# Patient Record
Sex: FEMALE | Race: WHITE | Employment: FULL TIME | URBAN - METROPOLITAN AREA
[De-identification: names, ages, dates, MRNs, and addresses within clinical notes are randomized per-mention and may not be internally consistent; named-entity substitution may affect disease eponyms.]

---

## 2017-01-03 RX ORDER — OMEPRAZOLE 40 MG/1
CAPSULE, DELAYED RELEASE ORAL
Qty: 180 CAPSULE | Refills: 1 | Status: SHIPPED | OUTPATIENT
Start: 2017-01-03 | End: 2017-08-16 | Stop reason: SDUPTHER

## 2017-01-04 ENCOUNTER — TELEPHONE (OUTPATIENT)
Dept: FAMILY MEDICINE CLINIC | Age: 37
End: 2017-01-04

## 2017-02-20 ENCOUNTER — OFFICE VISIT (OUTPATIENT)
Dept: FAMILY MEDICINE CLINIC | Age: 37
End: 2017-02-20

## 2017-02-20 VITALS
TEMPERATURE: 98.7 F | DIASTOLIC BLOOD PRESSURE: 70 MMHG | WEIGHT: 244 LBS | RESPIRATION RATE: 16 BRPM | HEART RATE: 88 BPM | SYSTOLIC BLOOD PRESSURE: 118 MMHG | BODY MASS INDEX: 41.66 KG/M2 | HEIGHT: 64 IN

## 2017-02-20 DIAGNOSIS — I10 ESSENTIAL HYPERTENSION: ICD-10-CM

## 2017-02-20 DIAGNOSIS — J45.21 MILD INTERMITTENT ASTHMA WITH ACUTE EXACERBATION: Primary | ICD-10-CM

## 2017-02-20 PROCEDURE — 99213 OFFICE O/P EST LOW 20 MIN: CPT | Performed by: INTERNAL MEDICINE

## 2017-02-20 RX ORDER — MONTELUKAST SODIUM 10 MG/1
10 TABLET ORAL NIGHTLY
Qty: 30 TABLET | Refills: 2 | Status: SHIPPED | OUTPATIENT
Start: 2017-02-20 | End: 2018-01-19

## 2017-02-20 RX ORDER — LEVOFLOXACIN 500 MG/1
500 TABLET, FILM COATED ORAL DAILY
Qty: 8 TABLET | Refills: 0 | Status: SHIPPED | OUTPATIENT
Start: 2017-02-20 | End: 2017-02-28

## 2017-02-20 RX ORDER — FLUTICASONE PROPIONATE 110 UG/1
2 AEROSOL, METERED RESPIRATORY (INHALATION) 2 TIMES DAILY
Qty: 1 INHALER | Refills: 2 | Status: SHIPPED | OUTPATIENT
Start: 2017-02-20 | End: 2017-03-09 | Stop reason: ALTCHOICE

## 2017-02-20 ASSESSMENT — ENCOUNTER SYMPTOMS
WHEEZING: 1
SHORTNESS OF BREATH: 0
NAUSEA: 0
VOMITING: 0

## 2017-03-09 ENCOUNTER — OFFICE VISIT (OUTPATIENT)
Dept: FAMILY MEDICINE CLINIC | Age: 37
End: 2017-03-09

## 2017-03-09 VITALS
BODY MASS INDEX: 42.23 KG/M2 | RESPIRATION RATE: 14 BRPM | WEIGHT: 246 LBS | SYSTOLIC BLOOD PRESSURE: 116 MMHG | HEART RATE: 91 BPM | OXYGEN SATURATION: 98 % | DIASTOLIC BLOOD PRESSURE: 70 MMHG

## 2017-03-09 DIAGNOSIS — J45.901 ASTHMA EXACERBATION: Primary | ICD-10-CM

## 2017-03-09 PROCEDURE — 99213 OFFICE O/P EST LOW 20 MIN: CPT | Performed by: NURSE PRACTITIONER

## 2017-03-09 RX ORDER — PREDNISONE 20 MG/1
40 TABLET ORAL DAILY
Qty: 14 TABLET | Refills: 0 | Status: SHIPPED | OUTPATIENT
Start: 2017-03-09 | End: 2017-03-16

## 2017-03-09 RX ORDER — FLUTICASONE FUROATE AND VILANTEROL 100; 25 UG/1; UG/1
1 POWDER RESPIRATORY (INHALATION) DAILY
Qty: 1 EACH | Refills: 0 | COMMUNITY
Start: 2017-03-09 | End: 2018-01-19

## 2017-03-09 ASSESSMENT — ENCOUNTER SYMPTOMS
SHORTNESS OF BREATH: 1
NAUSEA: 0
COUGH: 0
DIARRHEA: 0
WHEEZING: 1
CHEST TIGHTNESS: 1
RHINORRHEA: 1
SORE THROAT: 0
VOMITING: 0
SINUS PRESSURE: 0

## 2017-04-07 ENCOUNTER — OFFICE VISIT (OUTPATIENT)
Dept: FAMILY MEDICINE CLINIC | Age: 37
End: 2017-04-07

## 2017-04-07 VITALS
BODY MASS INDEX: 40.68 KG/M2 | TEMPERATURE: 98.3 F | RESPIRATION RATE: 12 BRPM | SYSTOLIC BLOOD PRESSURE: 124 MMHG | HEART RATE: 93 BPM | WEIGHT: 237 LBS | OXYGEN SATURATION: 98 % | DIASTOLIC BLOOD PRESSURE: 80 MMHG

## 2017-04-07 DIAGNOSIS — J06.9 VIRAL URI: Primary | ICD-10-CM

## 2017-04-07 PROCEDURE — 99213 OFFICE O/P EST LOW 20 MIN: CPT | Performed by: NURSE PRACTITIONER

## 2017-04-07 RX ORDER — FLUTICASONE PROPIONATE 50 MCG
2 SPRAY, SUSPENSION (ML) NASAL DAILY
Qty: 1 BOTTLE | Refills: 0 | Status: SHIPPED | OUTPATIENT
Start: 2017-04-07 | End: 2018-01-19

## 2017-04-07 ASSESSMENT — ENCOUNTER SYMPTOMS
SORE THROAT: 1
SINUS PRESSURE: 1
DIARRHEA: 0
SINUS PAIN: 1
CHEST TIGHTNESS: 1
WHEEZING: 0
RHINORRHEA: 1
COUGH: 1
NAUSEA: 0
SHORTNESS OF BREATH: 0
VOMITING: 0

## 2017-04-09 ENCOUNTER — TELEPHONE (OUTPATIENT)
Dept: FAMILY MEDICINE CLINIC | Age: 37
End: 2017-04-09

## 2017-04-09 RX ORDER — AZITHROMYCIN 250 MG/1
TABLET, FILM COATED ORAL
Qty: 1 PACKET | Refills: 0 | OUTPATIENT
Start: 2017-04-09 | End: 2017-04-19

## 2017-04-10 RX ORDER — LISINOPRIL 10 MG/1
TABLET ORAL
Qty: 90 TABLET | Refills: 0 | Status: SHIPPED | OUTPATIENT
Start: 2017-04-10 | End: 2017-09-30 | Stop reason: SDUPTHER

## 2017-04-12 ENCOUNTER — TELEPHONE (OUTPATIENT)
Dept: FAMILY MEDICINE CLINIC | Age: 37
End: 2017-04-12

## 2017-04-12 RX ORDER — PREDNISONE 20 MG/1
20 TABLET ORAL 2 TIMES DAILY
Qty: 10 TABLET | Refills: 0 | Status: SHIPPED | OUTPATIENT
Start: 2017-04-12 | End: 2017-04-17

## 2017-04-17 ENCOUNTER — TELEPHONE (OUTPATIENT)
Dept: FAMILY MEDICINE CLINIC | Age: 37
End: 2017-04-17

## 2017-04-17 RX ORDER — PREDNISONE 10 MG/1
TABLET ORAL
Qty: 39 TABLET | Refills: 0 | Status: SHIPPED | OUTPATIENT
Start: 2017-04-17 | End: 2017-04-29

## 2017-04-17 RX ORDER — DEXTROMETHORPHAN HYDROBROMIDE AND PROMETHAZINE HYDROCHLORIDE 15; 6.25 MG/5ML; MG/5ML
5 SYRUP ORAL 4 TIMES DAILY PRN
Qty: 240 ML | Refills: 0 | Status: SHIPPED | OUTPATIENT
Start: 2017-04-17 | End: 2017-04-24

## 2017-04-25 ENCOUNTER — OFFICE VISIT (OUTPATIENT)
Dept: INTERNAL MEDICINE CLINIC | Age: 37
End: 2017-04-25

## 2017-04-25 VITALS
DIASTOLIC BLOOD PRESSURE: 76 MMHG | HEIGHT: 64 IN | WEIGHT: 230 LBS | OXYGEN SATURATION: 96 % | BODY MASS INDEX: 39.27 KG/M2 | SYSTOLIC BLOOD PRESSURE: 120 MMHG | HEART RATE: 114 BPM | RESPIRATION RATE: 16 BRPM

## 2017-04-25 DIAGNOSIS — R30.0 BURNING WITH URINATION: Primary | ICD-10-CM

## 2017-04-25 DIAGNOSIS — N30.00 ACUTE CYSTITIS WITHOUT HEMATURIA: ICD-10-CM

## 2017-04-25 LAB
BILIRUBIN, POC: ABNORMAL
BLOOD URINE, POC: ABNORMAL
CLARITY, POC: ABNORMAL
COLOR, POC: YELLOW
GLUCOSE URINE, POC: ABNORMAL
KETONES, POC: ABNORMAL
LEUKOCYTE EST, POC: ABNORMAL
NITRITE, POC: ABNORMAL
PH, POC: 5
PROTEIN, POC: ABNORMAL
SPECIFIC GRAVITY, POC: 1.01
UROBILINOGEN, POC: 0.2

## 2017-04-25 PROCEDURE — 99214 OFFICE O/P EST MOD 30 MIN: CPT | Performed by: NURSE PRACTITIONER

## 2017-04-25 PROCEDURE — 81002 URINALYSIS NONAUTO W/O SCOPE: CPT | Performed by: NURSE PRACTITIONER

## 2017-04-25 RX ORDER — SULFAMETHOXAZOLE AND TRIMETHOPRIM 800; 160 MG/1; MG/1
1 TABLET ORAL 2 TIMES DAILY
Qty: 10 TABLET | Refills: 0 | Status: SHIPPED | OUTPATIENT
Start: 2017-04-25 | End: 2017-04-30

## 2017-04-25 ASSESSMENT — ENCOUNTER SYMPTOMS
DIARRHEA: 0
NAUSEA: 0
ABDOMINAL PAIN: 1
WHEEZING: 1
VOMITING: 0

## 2017-04-28 LAB
ORGANISM: ABNORMAL
URINE CULTURE, ROUTINE: ABNORMAL

## 2017-06-12 RX ORDER — DULOXETIN HYDROCHLORIDE 60 MG/1
CAPSULE, DELAYED RELEASE ORAL
Qty: 30 CAPSULE | Refills: 2 | Status: SHIPPED | OUTPATIENT
Start: 2017-06-12 | End: 2017-09-18 | Stop reason: SDUPTHER

## 2017-06-16 RX ORDER — MODAFINIL 200 MG/1
TABLET ORAL
Qty: 60 TABLET | Refills: 2 | Status: SHIPPED | OUTPATIENT
Start: 2017-06-16 | End: 2018-03-16 | Stop reason: SDUPTHER

## 2017-08-16 RX ORDER — OMEPRAZOLE 40 MG/1
CAPSULE, DELAYED RELEASE ORAL
Qty: 180 CAPSULE | Refills: 1 | Status: SHIPPED | OUTPATIENT
Start: 2017-08-16 | End: 2018-06-03 | Stop reason: SDUPTHER

## 2017-09-21 RX ORDER — SPIRONOLACTONE 100 MG/1
TABLET, FILM COATED ORAL
Qty: 90 TABLET | Refills: 1 | Status: SHIPPED | OUTPATIENT
Start: 2017-09-21 | End: 2018-04-03 | Stop reason: SDUPTHER

## 2017-10-01 RX ORDER — LISINOPRIL 10 MG/1
TABLET ORAL
Qty: 90 TABLET | Refills: 0 | Status: SHIPPED | OUTPATIENT
Start: 2017-10-01 | End: 2018-01-19

## 2017-10-02 NOTE — TELEPHONE ENCOUNTER
Patient is over-due for follow up. Please call them to schedule. Medicines will be refilled this time to allow for making an appointment.

## 2017-10-28 ENCOUNTER — NURSE ONLY (OUTPATIENT)
Dept: FAMILY MEDICINE CLINIC | Age: 37
End: 2017-10-28

## 2017-10-28 DIAGNOSIS — Z23 NEED FOR INFLUENZA VACCINATION: Primary | ICD-10-CM

## 2017-10-28 PROCEDURE — 90471 IMMUNIZATION ADMIN: CPT | Performed by: FAMILY MEDICINE

## 2017-10-28 PROCEDURE — 90686 IIV4 VACC NO PRSV 0.5 ML IM: CPT | Performed by: FAMILY MEDICINE

## 2017-11-06 RX ORDER — LISINOPRIL 10 MG/1
TABLET ORAL
Qty: 90 TABLET | Refills: 0 | Status: SHIPPED | OUTPATIENT
Start: 2017-11-06 | End: 2019-02-01

## 2018-01-19 ENCOUNTER — OFFICE VISIT (OUTPATIENT)
Dept: FAMILY MEDICINE CLINIC | Age: 38
End: 2018-01-19

## 2018-01-19 VITALS
SYSTOLIC BLOOD PRESSURE: 124 MMHG | WEIGHT: 236 LBS | BODY MASS INDEX: 40.29 KG/M2 | TEMPERATURE: 98.7 F | RESPIRATION RATE: 14 BRPM | HEART RATE: 89 BPM | DIASTOLIC BLOOD PRESSURE: 62 MMHG | HEIGHT: 64 IN

## 2018-01-19 DIAGNOSIS — Z00.00 WELL ADULT EXAM: Primary | ICD-10-CM

## 2018-01-19 DIAGNOSIS — E28.2 POLYCYSTIC OVARIAN DISEASE: ICD-10-CM

## 2018-01-19 DIAGNOSIS — R73.03 PREDIABETES: ICD-10-CM

## 2018-01-19 DIAGNOSIS — I10 ESSENTIAL HYPERTENSION: ICD-10-CM

## 2018-01-19 DIAGNOSIS — E78.2 MIXED HYPERLIPIDEMIA: ICD-10-CM

## 2018-01-19 DIAGNOSIS — J45.20 MILD INTERMITTENT ASTHMA WITHOUT COMPLICATION: ICD-10-CM

## 2018-01-19 DIAGNOSIS — J30.89 CHRONIC NONSEASONAL ALLERGIC RHINITIS DUE TO POLLEN: ICD-10-CM

## 2018-01-19 DIAGNOSIS — F41.9 ANXIETY: ICD-10-CM

## 2018-01-19 DIAGNOSIS — L68.0 HIRSUTISM: ICD-10-CM

## 2018-01-19 LAB
A/G RATIO: 1.4 (ref 1.1–2.2)
ALBUMIN SERPL-MCNC: 4.7 G/DL (ref 3.4–5)
ALP BLD-CCNC: 63 U/L (ref 40–129)
ALT SERPL-CCNC: 12 U/L (ref 10–40)
ANION GAP SERPL CALCULATED.3IONS-SCNC: 12 MMOL/L (ref 3–16)
AST SERPL-CCNC: 13 U/L (ref 15–37)
BILIRUB SERPL-MCNC: 0.4 MG/DL (ref 0–1)
BUN BLDV-MCNC: 9 MG/DL (ref 7–20)
CALCIUM SERPL-MCNC: 9.9 MG/DL (ref 8.3–10.6)
CHLORIDE BLD-SCNC: 100 MMOL/L (ref 99–110)
CHOLESTEROL, TOTAL: 256 MG/DL (ref 0–199)
CO2: 28 MMOL/L (ref 21–32)
CREAT SERPL-MCNC: 0.6 MG/DL (ref 0.6–1.1)
ESTIMATED AVERAGE GLUCOSE: 114 MG/DL
GFR AFRICAN AMERICAN: >60
GFR NON-AFRICAN AMERICAN: >60
GLOBULIN: 3.4 G/DL
GLUCOSE BLD-MCNC: 93 MG/DL (ref 70–99)
HBA1C MFR BLD: 5.6 %
HDLC SERPL-MCNC: 41 MG/DL (ref 40–60)
LDL CHOLESTEROL CALCULATED: 177 MG/DL
POTASSIUM SERPL-SCNC: 4.7 MMOL/L (ref 3.5–5.1)
SODIUM BLD-SCNC: 140 MMOL/L (ref 136–145)
TOTAL PROTEIN: 8.1 G/DL (ref 6.4–8.2)
TRIGL SERPL-MCNC: 189 MG/DL (ref 0–150)
VLDLC SERPL CALC-MCNC: 38 MG/DL

## 2018-01-19 PROCEDURE — 99395 PREV VISIT EST AGE 18-39: CPT | Performed by: FAMILY MEDICINE

## 2018-01-19 PROCEDURE — 90732 PPSV23 VACC 2 YRS+ SUBQ/IM: CPT | Performed by: FAMILY MEDICINE

## 2018-01-19 PROCEDURE — 90471 IMMUNIZATION ADMIN: CPT | Performed by: FAMILY MEDICINE

## 2018-01-19 RX ORDER — LORATADINE 10 MG/1
10 TABLET ORAL DAILY
COMMUNITY
Start: 2018-01-19 | End: 2020-12-28

## 2018-01-19 RX ORDER — DULOXETIN HYDROCHLORIDE 30 MG/1
30 CAPSULE, DELAYED RELEASE ORAL DAILY
Qty: 30 CAPSULE | Refills: 3 | Status: SHIPPED | OUTPATIENT
Start: 2018-01-19 | End: 2018-03-08 | Stop reason: DRUGHIGH

## 2018-01-19 ASSESSMENT — ENCOUNTER SYMPTOMS
GASTROINTESTINAL NEGATIVE: 1
ALLERGIC/IMMUNOLOGIC NEGATIVE: 1
RESPIRATORY NEGATIVE: 1
EYES NEGATIVE: 1

## 2018-01-19 ASSESSMENT — PATIENT HEALTH QUESTIONNAIRE - PHQ9
SUM OF ALL RESPONSES TO PHQ QUESTIONS 1-9: 0
SUM OF ALL RESPONSES TO PHQ9 QUESTIONS 1 & 2: 0
1. LITTLE INTEREST OR PLEASURE IN DOING THINGS: 0
2. FEELING DOWN, DEPRESSED OR HOPELESS: 0

## 2018-01-19 NOTE — PROGRESS NOTES
Subjective:      Patient ID: Ayse Watts is a 42 y.o. female. Blood pressure 124/62, pulse 89, temperature 98.7 °F (37.1 °C), temperature source Oral, resp. rate 14, height 5' 4\" (1.626 m), weight 236 lb (107 kg), not currently breastfeeding. HPI here for cpx. Doing well!! She is happy that she has been able to go to gym 4-5 days a week for past 6-9 months. She does cardio and weights (usually exercises for  min). Has lost inches, not pounds. But she does not really weigh herself. Clothes fit better. She feels better. Has metabolic disorders as below- remains on lisinopril for bp. Aldactone is for hirsutism (due to PCOS). She cycles monthly on her own. Not on metformin. Uses cymbalta for anxiety. Has been on it a long time. Has been on something for anxiety since age 11. Would be afraid to stop, she thinks. But is in a good place in life to wean. Asthma: does not see pulm or allergy. No daily meds for this. Singulair did not help. Last flare was a year ago- used prednisone. Uses albuterol inhaler zero times since last flare. Did get flu vaccine. Does use claritin otc 10mg daily. Uses prilosec 40 for GERD sx. Was scoped by dr Meche Barrera 2013. Last pap done by ADARSH Alexandra- 3/17. No abnormals. Dental: sees dentist at least annually. Sees optometrist at least every 2 yrs. Hx prediabetes.   No results found for: LABA1C  No results found for: EAG       Lab Results   Component Value Date    CHOL 148 04/25/2014    TRIG 131 04/25/2014    HDL 28 (L) 04/25/2014    LDLCALC 94 04/25/2014     Lab Results   Component Value Date    ALT 19 09/23/2013    AST 16 09/23/2013        Has normal renal function   Lab Results   Component Value Date     04/25/2014    K 3.6 04/25/2014    BUN 7 04/25/2014    CREATININE 0.6 04/25/2014          Patient Active Problem List   Diagnosis    Polycystic ovarian disease    Hyperlipidemia    Anxiety    Hirsutism    Allergic Right: No supraclavicular adenopathy present. Left: No supraclavicular adenopathy present. Neurological: She is alert and oriented to person, place, and time. Skin: Skin is warm and dry. She is not diaphoretic. Psychiatric: She has a normal mood and affect. Her behavior is normal. Judgment and thought content normal.   Nursing note and vitals reviewed. Assessment:      1. Well adult exam  - Discussed healthy diet/exercise, dental care, vision screen, sunscreen, seatbelt use, smoke alarms; anticip guidance given. Congratulated on efforts to lose weight and be healthy. 2. Essential hypertension  - Blood pressure is at goal on current therapy; continue meds and monitoring. Regular physical activity and healthy diet (low sodium, multiple servings of produce daily) was recommended. Renal function to be assessed yearly.   - COMPREHENSIVE METABOLIC PANEL; Future    3. Mild intermittent asthma without complication  - Stable; continue to monitor. Pneumovax given today. 4. Polycystic ovarian disease  - Stable; continue exercise to regulate cycles. 5. Mixed hyperlipidemia  Not on lipid meds. Check FLP today  - Lipid Panel; Future    6. Anxiety  - Stable; continue Cymbalta, but she will try to wean dose to 30mg and maybe stop if she feels OK. 7. Prediabetes A1c 5.7 6/1/16  - assess a1c.  encoruaged healthy lifestyle changes that she is pursuing.  - Hemoglobin A1C; Future    8. Hirsutism  - Stable; continue aldactone at her request    9. Chronic nonseasonal allergic rhinitis due to pollen  - Stable; continue claritin. Plan:      F/u 6 mo.

## 2018-02-10 ENCOUNTER — OFFICE VISIT (OUTPATIENT)
Dept: FAMILY MEDICINE CLINIC | Age: 38
End: 2018-02-10

## 2018-02-10 VITALS
BODY MASS INDEX: 39.95 KG/M2 | TEMPERATURE: 98.7 F | HEIGHT: 64 IN | WEIGHT: 234 LBS | HEART RATE: 86 BPM | DIASTOLIC BLOOD PRESSURE: 78 MMHG | SYSTOLIC BLOOD PRESSURE: 118 MMHG | RESPIRATION RATE: 18 BRPM

## 2018-02-10 DIAGNOSIS — B96.89 ACUTE BACTERIAL SINUSITIS: Primary | ICD-10-CM

## 2018-02-10 DIAGNOSIS — J01.90 ACUTE BACTERIAL SINUSITIS: Primary | ICD-10-CM

## 2018-02-10 PROCEDURE — 99213 OFFICE O/P EST LOW 20 MIN: CPT | Performed by: FAMILY MEDICINE

## 2018-02-10 RX ORDER — GUAIFENESIN 600 MG/1
600 TABLET, EXTENDED RELEASE ORAL 2 TIMES DAILY
Qty: 30 TABLET | Refills: 0 | Status: SHIPPED | OUTPATIENT
Start: 2018-02-10 | End: 2019-08-02

## 2018-02-10 RX ORDER — AMOXICILLIN 500 MG/1
500 CAPSULE ORAL 3 TIMES DAILY
Qty: 21 CAPSULE | Refills: 0 | Status: SHIPPED | OUTPATIENT
Start: 2018-02-10 | End: 2018-02-17

## 2018-02-10 ASSESSMENT — ENCOUNTER SYMPTOMS
COUGH: 1
SHORTNESS OF BREATH: 0
SINUS PAIN: 1
SINUS PRESSURE: 1

## 2018-02-10 NOTE — PROGRESS NOTES
2/10/2018    This is a 42 y.o. female   Chief Complaint   Patient presents with    Sinusitis     HPI   Symptoms started a couple of days ago with sinus pressure and post-nasal drip. Has a deviated septum and has hx of recurrent sinusitis issues, used to see ENT for this. - No fever or chills  - No sick contacts at home. +ear pressure, post-nasal drip. Throat is a little sore. Review of Systems   Constitutional: Negative for chills and fever. HENT: Positive for postnasal drip, sinus pain and sinus pressure. Respiratory: Positive for cough. Negative for shortness of breath. Patient Active Problem List   Diagnosis    Polycystic ovarian disease    Hyperlipidemia    Anxiety    Hirsutism    Allergic rhinitis due to pollen    Deviated septum    Asthma    Lumbar disc disease L4-5, L5-S1 Dr Daxa De Souza [facility]) Epidural injections 2011    HTN (hypertension)    Prediabetes A1c 5.7 6/1/16        Past Medical History:   Diagnosis Date    Allergic rhinitis     Anxiety     Asthma     Environmental allergies     food    Hyperlipidemia     Hypertension     Lumbar disc disease L4-5, L5-S1 Dr Daxa De Souza [facility]) Epidural injections 2011 8/20/2012    Obstructive sleep apnea     PCOS (polycystic ovarian syndrome)     S/P colonoscopy 1/17/13    normal. Dr Camilo Kan       Past Surgical History:   Procedure Laterality Date    BREAST REDUCTION SURGERY  2004    FOOT SURGERY  2000    hallux fx    UPPER GASTROINTESTINAL ENDOSCOPY  1/17/13    gastritis; Dr Nathen Key History     Social History    Marital status: Legally      Spouse name: N/A    Number of children: 1    Years of education: N/A     Occupational History    Not on file.      Social History Main Topics    Smoking status: Never Smoker    Smokeless tobacco: Never Used      Comment: congratulated on nonsmoking status    Alcohol use No    Drug use: No    Sexual activity: Not on file     Other Topics Concern    Not on file     Social History Narrative    No narrative on file       Family History   Problem Relation Age of Onset    High Blood Pressure Mother     Other Mother      hypothyroid    Diabetes Sister     Diabetes Maternal Grandmother     Diabetes Maternal Grandfather        Current Outpatient Prescriptions   Medication Sig Dispense Refill    amoxicillin (AMOXIL) 500 MG capsule Take 1 capsule by mouth 3 times daily for 7 days 21 capsule 0    guaiFENesin (MUCINEX) 600 MG extended release tablet Take 1 tablet by mouth 2 times daily 30 tablet 0    DULoxetine (CYMBALTA) 30 MG extended release capsule Take 1 capsule by mouth daily 30 capsule 3    loratadine (CLARITIN) 10 MG tablet Take 1 tablet by mouth daily      lisinopril (PRINIVIL;ZESTRIL) 10 MG tablet TAKE 1 TABLET EVERY DAY 90 tablet 0    spironolactone (ALDACTONE) 100 MG tablet TAKE ONE TABLET BY MOUTH EVERY DAY 90 tablet 1    omeprazole (PRILOSEC) 40 MG delayed release capsule TAKE 1 CAPSULE TWICE DAILY 180 capsule 1    PROAIR  (90 BASE) MCG/ACT inhaler INHALE 2 PUFFS EVERY 6 HOURS AS NEEDED FOR WHEEZING OR SHORTNESS OF BREATH 8.5 Inhaler 0    Multiple Vitamins-Minerals (THERAPEUTIC MULTIVITAMIN-MINERALS) tablet Take 1 tablet by mouth daily. No current facility-administered medications for this visit. Allergies   Allergen Reactions    Ensure      EGGS    Peanut-Containing Drug Products      PEANUTS    Food      Certain foods (chocolate, tea, soy, eggs, tree nuts, coffee, tomatoes, black pepper)       /78 (Site: Right Arm, Position: Sitting, Cuff Size: Large Adult)   Pulse 86   Temp 98.7 °F (37.1 °C) (Oral)   Resp 18   Ht 5' 4\" (1.626 m)   Wt 234 lb (106.1 kg)   BMI 40.17 kg/m²     Physical Exam   Constitutional: She appears well-developed and well-nourished. HENT:   Head: Normocephalic and atraumatic.    TMs normal  oropharynx moist with mild erythema and mild post-nasal drip  Sinuses +TTP   Eyes: Conjunctivae are normal.   Neck: Normal range of motion. Neck supple. Cardiovascular: Normal rate, regular rhythm and normal heart sounds. No murmur heard. Pulmonary/Chest: Effort normal and breath sounds normal. She has no wheezes. She has no rales. Lymphadenopathy:     She has no cervical adenopathy. Skin: Skin is warm. No rash noted. Psychiatric: She has a normal mood and affect. Wt Readings from Last 3 Encounters:   02/10/18 234 lb (106.1 kg)   01/19/18 236 lb (107 kg)   04/25/17 230 lb (104.3 kg)       BP Readings from Last 3 Encounters:   02/10/18 118/78   01/19/18 124/62   04/25/17 120/76         Assessment/Plan:  Hamzah Landeros was seen today for sinusitis. Diagnoses and all orders for this visit:    Acute bacterial sinusitis  Pt with significant sinus symptoms warranting antibiotic treatment based on past history and current severity. Normal lung exam. Discussed med may cause diarrhea and encouraged use of probiotic. Take mucinex for congestion and increase water intake. Call if symptoms worsen or fail to improve. -     amoxicillin (AMOXIL) 500 MG capsule; Take 1 capsule by mouth 3 times daily for 7 days  -     guaiFENesin (MUCINEX) 600 MG extended release tablet; Take 1 tablet by mouth 2 times daily      Return if symptoms worsen or fail to improve.

## 2018-02-28 ENCOUNTER — TELEPHONE (OUTPATIENT)
Dept: FAMILY MEDICINE CLINIC | Age: 38
End: 2018-02-28

## 2018-02-28 RX ORDER — CEFDINIR 300 MG/1
600 CAPSULE ORAL DAILY
Qty: 20 CAPSULE | Refills: 0 | Status: SHIPPED | OUTPATIENT
Start: 2018-02-28 | End: 2018-03-05 | Stop reason: ALTCHOICE

## 2018-03-05 ENCOUNTER — OFFICE VISIT (OUTPATIENT)
Dept: FAMILY MEDICINE CLINIC | Age: 38
End: 2018-03-05

## 2018-03-05 VITALS
WEIGHT: 238 LBS | RESPIRATION RATE: 12 BRPM | DIASTOLIC BLOOD PRESSURE: 70 MMHG | BODY MASS INDEX: 40.85 KG/M2 | TEMPERATURE: 98.5 F | SYSTOLIC BLOOD PRESSURE: 114 MMHG | HEART RATE: 91 BPM

## 2018-03-05 DIAGNOSIS — H66.002 ACUTE SUPPURATIVE OTITIS MEDIA OF LEFT EAR WITHOUT SPONTANEOUS RUPTURE OF TYMPANIC MEMBRANE, RECURRENCE NOT SPECIFIED: ICD-10-CM

## 2018-03-05 DIAGNOSIS — J02.9 SORE THROAT: ICD-10-CM

## 2018-03-05 DIAGNOSIS — J01.90 ACUTE RHINOSINUSITIS: ICD-10-CM

## 2018-03-05 LAB — S PYO AG THROAT QL: NORMAL

## 2018-03-05 PROCEDURE — 87880 STREP A ASSAY W/OPTIC: CPT | Performed by: NURSE PRACTITIONER

## 2018-03-05 PROCEDURE — 99213 OFFICE O/P EST LOW 20 MIN: CPT | Performed by: NURSE PRACTITIONER

## 2018-03-05 RX ORDER — LEVOFLOXACIN 500 MG/1
500 TABLET, FILM COATED ORAL DAILY
Qty: 10 TABLET | Refills: 0 | Status: SHIPPED | OUTPATIENT
Start: 2018-03-05 | End: 2018-03-15

## 2018-03-05 ASSESSMENT — ENCOUNTER SYMPTOMS
VOMITING: 0
NAUSEA: 0
SINUS PRESSURE: 1
SORE THROAT: 1
DIARRHEA: 1
SHORTNESS OF BREATH: 1
CHEST TIGHTNESS: 0
COUGH: 1

## 2018-03-05 NOTE — PROGRESS NOTES
maxillary sinus tenderness. Right sinus exhibits no frontal sinus tenderness. Left sinus exhibits maxillary sinus tenderness. Left sinus exhibits no frontal sinus tenderness. Mouth/Throat: Uvula is midline. Posterior oropharyngeal erythema present. No oropharyngeal exudate. Eyes: EOM are normal.   Neck: Neck supple. Cardiovascular: Normal rate, regular rhythm and normal heart sounds. Exam reveals no gallop and no friction rub. No murmur heard. Pulmonary/Chest: Effort normal and breath sounds normal. No respiratory distress. Lymphadenopathy:        Head (right side): No submandibular adenopathy present. Head (left side): No submandibular adenopathy present. Right cervical: No superficial cervical adenopathy present. Left cervical: No superficial cervical adenopathy present. Neurological: She is alert and oriented to person, place, and time. Skin: Skin is warm and dry. Psychiatric: She has a normal mood and affect. Her behavior is normal.   Nursing note and vitals reviewed. Assessment and Plan  Eliza Ruiz was seen today for follow-up. Diagnoses and all orders for this visit:    Acute rhinosinusitis: symptoms have not improved on omnicef. Will d/c omnicef and start levaquin  -     levofloxacin (LEVAQUIN) 500 MG tablet; Take 1 tablet by mouth daily for 10 days For treatment of bacterial infection. Should take an over-the-counter probiotic daily while on the antibiotic to help prevent antibiotic associated diarrhea. Increase fluids  Rest  Flonase 2 sprays each nostril daily    Acute suppurative otitis media of left ear without spontaneous rupture of tympanic membrane, recurrence not specified  -     levofloxacin (LEVAQUIN) 500 MG tablet; Take 1 tablet by mouth daily for 10 days For treatment of bacterial infection. Sore throat: likley due to PND  -     POCT rapid strep A- negative   Gargle with warm salt water PRN    Patient is to call if symptoms worsen or fail to improve.

## 2018-03-08 RX ORDER — DULOXETIN HYDROCHLORIDE 60 MG/1
CAPSULE, DELAYED RELEASE ORAL
Qty: 30 CAPSULE | Refills: 5 | Status: SHIPPED | OUTPATIENT
Start: 2018-03-08 | End: 2018-08-30 | Stop reason: SDUPTHER

## 2018-03-16 ENCOUNTER — TELEPHONE (OUTPATIENT)
Dept: FAMILY MEDICINE CLINIC | Age: 38
End: 2018-03-16

## 2018-03-16 RX ORDER — MODAFINIL 200 MG/1
TABLET ORAL
Qty: 60 TABLET | Refills: 2 | Status: SHIPPED | OUTPATIENT
Start: 2018-03-16 | End: 2018-06-16

## 2018-03-31 ENCOUNTER — OFFICE VISIT (OUTPATIENT)
Dept: FAMILY MEDICINE CLINIC | Age: 38
End: 2018-03-31

## 2018-03-31 VITALS
BODY MASS INDEX: 39.78 KG/M2 | HEIGHT: 64 IN | RESPIRATION RATE: 16 BRPM | DIASTOLIC BLOOD PRESSURE: 68 MMHG | WEIGHT: 233 LBS | SYSTOLIC BLOOD PRESSURE: 110 MMHG | OXYGEN SATURATION: 97 % | HEART RATE: 72 BPM

## 2018-03-31 DIAGNOSIS — N30.00 ACUTE CYSTITIS WITHOUT HEMATURIA: ICD-10-CM

## 2018-03-31 DIAGNOSIS — R35.0 URINARY FREQUENCY: Primary | ICD-10-CM

## 2018-03-31 DIAGNOSIS — R82.998 LEUKOCYTES IN URINE: ICD-10-CM

## 2018-03-31 LAB
BILIRUBIN, POC: ABNORMAL
BLOOD URINE, POC: ABNORMAL
CLARITY, POC: ABNORMAL
COLOR, POC: YELLOW
GLUCOSE URINE, POC: ABNORMAL
KETONES, POC: ABNORMAL
LEUKOCYTE EST, POC: ABNORMAL
NITRITE, POC: ABNORMAL
PH, POC: 6.5
PROTEIN, POC: ABNORMAL
SPECIFIC GRAVITY, POC: 1.02
UROBILINOGEN, POC: 0.2

## 2018-03-31 PROCEDURE — 99213 OFFICE O/P EST LOW 20 MIN: CPT | Performed by: INTERNAL MEDICINE

## 2018-03-31 PROCEDURE — 81002 URINALYSIS NONAUTO W/O SCOPE: CPT | Performed by: INTERNAL MEDICINE

## 2018-03-31 RX ORDER — CIPROFLOXACIN 500 MG/1
500 TABLET, FILM COATED ORAL 2 TIMES DAILY
Qty: 14 TABLET | Refills: 0 | Status: SHIPPED | OUTPATIENT
Start: 2018-03-31 | End: 2018-04-05

## 2018-03-31 ASSESSMENT — ENCOUNTER SYMPTOMS
ABDOMINAL PAIN: 0
NAUSEA: 0
VOMITING: 0

## 2018-04-01 LAB
ORGANISM: ABNORMAL
URINE CULTURE, ROUTINE: ABNORMAL
URINE CULTURE, ROUTINE: ABNORMAL

## 2018-04-03 RX ORDER — SPIRONOLACTONE 100 MG/1
TABLET, FILM COATED ORAL
Qty: 90 TABLET | Refills: 1 | Status: SHIPPED | OUTPATIENT
Start: 2018-04-03 | End: 2018-10-03 | Stop reason: SDUPTHER

## 2018-04-04 RX ORDER — AMOXICILLIN 500 MG/1
500 CAPSULE ORAL 3 TIMES DAILY
Qty: 15 CAPSULE | Refills: 0 | Status: SHIPPED | OUTPATIENT
Start: 2018-04-04 | End: 2018-04-09

## 2018-05-08 ENCOUNTER — OFFICE VISIT (OUTPATIENT)
Dept: FAMILY MEDICINE CLINIC | Age: 38
End: 2018-05-08

## 2018-05-08 VITALS
HEART RATE: 98 BPM | SYSTOLIC BLOOD PRESSURE: 124 MMHG | OXYGEN SATURATION: 98 % | HEIGHT: 64 IN | RESPIRATION RATE: 14 BRPM | DIASTOLIC BLOOD PRESSURE: 80 MMHG | TEMPERATURE: 98.4 F

## 2018-05-08 DIAGNOSIS — M54.50 ACUTE RIGHT-SIDED LOW BACK PAIN WITHOUT SCIATICA: Primary | ICD-10-CM

## 2018-05-08 PROCEDURE — 99213 OFFICE O/P EST LOW 20 MIN: CPT | Performed by: FAMILY MEDICINE

## 2018-05-08 RX ORDER — TIZANIDINE 4 MG/1
4 TABLET ORAL EVERY 8 HOURS PRN
Qty: 45 TABLET | Refills: 0 | Status: SHIPPED | OUTPATIENT
Start: 2018-05-08 | End: 2018-05-23

## 2018-05-08 RX ORDER — METHYLPREDNISOLONE 4 MG/1
TABLET ORAL
Qty: 1 KIT | Refills: 0 | Status: SHIPPED | OUTPATIENT
Start: 2018-05-08 | End: 2018-05-11 | Stop reason: ALTCHOICE

## 2018-05-10 ENCOUNTER — TELEPHONE (OUTPATIENT)
Dept: FAMILY MEDICINE CLINIC | Age: 38
End: 2018-05-10

## 2018-05-10 DIAGNOSIS — M51.9 LUMBAR DISC DISEASE: Primary | ICD-10-CM

## 2018-05-10 RX ORDER — TRAMADOL HYDROCHLORIDE 50 MG/1
50 TABLET ORAL EVERY 4 HOURS PRN
Qty: 30 TABLET | Refills: 0 | Status: SHIPPED | OUTPATIENT
Start: 2018-05-10 | End: 2018-05-15

## 2018-05-21 ENCOUNTER — TELEPHONE (OUTPATIENT)
Dept: FAMILY MEDICINE CLINIC | Age: 38
End: 2018-05-21

## 2018-06-04 RX ORDER — OMEPRAZOLE 40 MG/1
CAPSULE, DELAYED RELEASE ORAL
Qty: 180 CAPSULE | Refills: 1 | Status: SHIPPED | OUTPATIENT
Start: 2018-06-04 | End: 2018-12-07 | Stop reason: SDUPTHER

## 2018-06-05 ENCOUNTER — HOSPITAL ENCOUNTER (OUTPATIENT)
Dept: OTHER | Age: 38
Discharge: HOME | End: 2018-06-30
Attending: FAMILY MEDICINE | Admitting: FAMILY MEDICINE

## 2018-06-05 NOTE — FLOWSHEET NOTE
Physical Therapy  Cancellation/No-show Note  Patient Name:  Nate Mello  :  1980   Date:  2018  Cancelled visits to date: 0  No-shows to date: 1    For today's appointment patient:  []  Cancelled  []  Rescheduled appointment  [x]  No-show     Reason given by patient:  []  Patient ill  []  Conflicting appointment  []  No transportation    []  Conflict with work  []  No reason given  [x]  Other:     Comments:  18 Pt was a no show for today's scheduled Initial Eval.    Electronically signed by:  Calvin Vernon PT

## 2018-06-12 ENCOUNTER — TELEPHONE (OUTPATIENT)
Dept: ORTHOPEDIC SURGERY | Age: 38
End: 2018-06-12

## 2018-06-13 ENCOUNTER — TELEPHONE (OUTPATIENT)
Dept: FAMILY MEDICINE CLINIC | Age: 38
End: 2018-06-13

## 2018-06-18 ENCOUNTER — TELEPHONE (OUTPATIENT)
Dept: FAMILY MEDICINE CLINIC | Age: 38
End: 2018-06-18

## 2018-06-18 PROBLEM — G47.33 OBSTRUCTIVE SLEEP APNEA: Status: ACTIVE | Noted: 2018-06-18

## 2018-06-18 PROBLEM — G47.30 HYPERSOMNIA WITH SLEEP APNEA: Status: ACTIVE | Noted: 2018-06-18

## 2018-06-18 PROBLEM — G47.10 HYPERSOMNIA WITH SLEEP APNEA: Status: ACTIVE | Noted: 2018-06-18

## 2018-06-18 NOTE — TELEPHONE ENCOUNTER
The problem was her incomplete problem list.  Needed to contain her diagnosis of sleep apnea with hypersomnia. This has been corrected. Will need to resubmit the PA, I believe, for this to be approved. Sorry and thanks!

## 2018-06-23 ENCOUNTER — OFFICE VISIT (OUTPATIENT)
Dept: FAMILY MEDICINE CLINIC | Age: 38
End: 2018-06-23

## 2018-06-23 VITALS
DIASTOLIC BLOOD PRESSURE: 80 MMHG | TEMPERATURE: 98.3 F | HEART RATE: 83 BPM | BODY MASS INDEX: 38.61 KG/M2 | RESPIRATION RATE: 16 BRPM | WEIGHT: 232 LBS | SYSTOLIC BLOOD PRESSURE: 114 MMHG

## 2018-06-23 DIAGNOSIS — B96.89 ACUTE BACTERIAL TONSILLITIS: ICD-10-CM

## 2018-06-23 DIAGNOSIS — J03.80 ACUTE BACTERIAL TONSILLITIS: ICD-10-CM

## 2018-06-23 DIAGNOSIS — J02.9 SORE THROAT: Primary | ICD-10-CM

## 2018-06-23 LAB — S PYO AG THROAT QL: NORMAL

## 2018-06-23 PROCEDURE — 87880 STREP A ASSAY W/OPTIC: CPT | Performed by: NURSE PRACTITIONER

## 2018-06-23 PROCEDURE — 99213 OFFICE O/P EST LOW 20 MIN: CPT | Performed by: NURSE PRACTITIONER

## 2018-06-23 RX ORDER — AMOXICILLIN 500 MG/1
500 CAPSULE ORAL 3 TIMES DAILY
Qty: 30 CAPSULE | Refills: 0 | Status: SHIPPED | OUTPATIENT
Start: 2018-06-23 | End: 2018-07-03

## 2018-06-23 ASSESSMENT — ENCOUNTER SYMPTOMS
NAUSEA: 0
SINUS PRESSURE: 1
CHEST TIGHTNESS: 0
SINUS PAIN: 1
SHORTNESS OF BREATH: 0
RHINORRHEA: 1
COUGH: 0
SORE THROAT: 1
DIARRHEA: 1
VOMITING: 0

## 2018-06-25 LAB — THROAT CULTURE: NORMAL

## 2018-06-25 RX ORDER — MODAFINIL 200 MG/1
TABLET ORAL
COMMUNITY
Start: 2015-12-07 | End: 2018-08-09 | Stop reason: SDUPTHER

## 2018-07-01 ENCOUNTER — HOSPITAL ENCOUNTER (OUTPATIENT)
Dept: OTHER | Age: 38
Discharge: HOME | End: 2018-07-31
Attending: FAMILY MEDICINE | Admitting: FAMILY MEDICINE

## 2018-07-03 RX ORDER — LISINOPRIL 10 MG/1
TABLET ORAL
Qty: 90 TABLET | Refills: 0 | Status: SHIPPED | OUTPATIENT
Start: 2018-07-03 | End: 2018-10-03 | Stop reason: SDUPTHER

## 2018-08-03 ENCOUNTER — TELEPHONE (OUTPATIENT)
Dept: FAMILY MEDICINE CLINIC | Age: 38
End: 2018-08-03

## 2018-08-09 ENCOUNTER — TELEPHONE (OUTPATIENT)
Dept: FAMILY MEDICINE CLINIC | Age: 38
End: 2018-08-09

## 2018-08-09 DIAGNOSIS — G47.10 HYPERSOMNIA WITH SLEEP APNEA: Primary | ICD-10-CM

## 2018-08-09 DIAGNOSIS — G47.30 HYPERSOMNIA WITH SLEEP APNEA: Primary | ICD-10-CM

## 2018-08-09 RX ORDER — MODAFINIL 200 MG/1
200 TABLET ORAL DAILY
Qty: 30 TABLET | Refills: 2 | Status: SHIPPED | OUTPATIENT
Start: 2018-08-09 | End: 2019-02-28 | Stop reason: SDUPTHER

## 2018-09-27 ENCOUNTER — NURSE ONLY (OUTPATIENT)
Dept: FAMILY MEDICINE CLINIC | Age: 38
End: 2018-09-27
Payer: COMMERCIAL

## 2018-09-27 PROCEDURE — 90682 RIV4 VACC RECOMBINANT DNA IM: CPT | Performed by: FAMILY MEDICINE

## 2018-09-27 PROCEDURE — 90471 IMMUNIZATION ADMIN: CPT | Performed by: FAMILY MEDICINE

## 2018-10-19 ENCOUNTER — OFFICE VISIT (OUTPATIENT)
Dept: FAMILY MEDICINE CLINIC | Age: 38
End: 2018-10-19
Payer: COMMERCIAL

## 2018-10-19 VITALS
BODY MASS INDEX: 38.44 KG/M2 | WEIGHT: 231 LBS | SYSTOLIC BLOOD PRESSURE: 132 MMHG | DIASTOLIC BLOOD PRESSURE: 78 MMHG | RESPIRATION RATE: 15 BRPM | HEART RATE: 91 BPM | TEMPERATURE: 98.6 F

## 2018-10-19 DIAGNOSIS — B96.89 ACUTE BACTERIAL SINUSITIS: Primary | ICD-10-CM

## 2018-10-19 DIAGNOSIS — J01.90 ACUTE BACTERIAL SINUSITIS: Primary | ICD-10-CM

## 2018-10-19 DIAGNOSIS — J45.21 MILD INTERMITTENT ASTHMA WITH ACUTE EXACERBATION: ICD-10-CM

## 2018-10-19 DIAGNOSIS — G47.33 OBSTRUCTIVE SLEEP APNEA: ICD-10-CM

## 2018-10-19 PROCEDURE — 99214 OFFICE O/P EST MOD 30 MIN: CPT | Performed by: FAMILY MEDICINE

## 2018-10-19 RX ORDER — CEFDINIR 300 MG/1
600 CAPSULE ORAL DAILY
Qty: 20 CAPSULE | Refills: 0 | Status: SHIPPED | OUTPATIENT
Start: 2018-10-19 | End: 2018-10-29

## 2018-10-19 RX ORDER — ETONOGESTREL AND ETHINYL ESTRADIOL 11.7; 2.7 MG/1; MG/1
INSERT, EXTENDED RELEASE VAGINAL
COMMUNITY
Start: 2018-09-06 | End: 2019-08-02 | Stop reason: ALTCHOICE

## 2018-11-05 ENCOUNTER — TELEPHONE (OUTPATIENT)
Dept: FAMILY MEDICINE CLINIC | Age: 38
End: 2018-11-05

## 2018-11-05 RX ORDER — SULFAMETHOXAZOLE AND TRIMETHOPRIM 800; 160 MG/1; MG/1
1 TABLET ORAL 2 TIMES DAILY
Qty: 20 TABLET | Refills: 0 | Status: SHIPPED | OUTPATIENT
Start: 2018-11-05 | End: 2018-11-15

## 2018-11-13 RX ORDER — METHYLPREDNISOLONE 4 MG/1
TABLET ORAL
Qty: 1 KIT | Refills: 0 | Status: SHIPPED | OUTPATIENT
Start: 2018-11-13 | End: 2018-11-19

## 2018-12-03 ENCOUNTER — TELEPHONE (OUTPATIENT)
Dept: FAMILY MEDICINE CLINIC | Age: 38
End: 2018-12-03

## 2018-12-03 DIAGNOSIS — J32.9 CHRONIC SINUSITIS, UNSPECIFIED LOCATION: Primary | ICD-10-CM

## 2018-12-07 RX ORDER — OMEPRAZOLE 40 MG/1
CAPSULE, DELAYED RELEASE ORAL
Qty: 180 CAPSULE | Refills: 1 | Status: SHIPPED | OUTPATIENT
Start: 2018-12-07 | End: 2019-06-05 | Stop reason: SDUPTHER

## 2018-12-10 ENCOUNTER — HOSPITAL ENCOUNTER (OUTPATIENT)
Dept: CT IMAGING | Age: 38
Discharge: HOME | End: 2018-12-10
Payer: COMMERCIAL

## 2018-12-10 DIAGNOSIS — J32.9 CHRONIC SINUSITIS, UNSPECIFIED LOCATION: ICD-10-CM

## 2018-12-10 PROCEDURE — 70486 CT MAXILLOFACIAL W/O DYE: CPT

## 2018-12-12 ENCOUNTER — TELEPHONE (OUTPATIENT)
Dept: FAMILY MEDICINE CLINIC | Age: 38
End: 2018-12-12

## 2018-12-12 DIAGNOSIS — J31.0 CHRONIC RHINITIS: Primary | ICD-10-CM

## 2018-12-31 ENCOUNTER — OFFICE VISIT (OUTPATIENT)
Dept: PULMONOLOGY | Age: 38
End: 2018-12-31
Payer: COMMERCIAL

## 2018-12-31 VITALS
HEART RATE: 98 BPM | SYSTOLIC BLOOD PRESSURE: 132 MMHG | OXYGEN SATURATION: 98 % | HEIGHT: 65 IN | BODY MASS INDEX: 38.82 KG/M2 | WEIGHT: 233 LBS | DIASTOLIC BLOOD PRESSURE: 85 MMHG

## 2018-12-31 DIAGNOSIS — J30.1 NON-SEASONAL ALLERGIC RHINITIS DUE TO POLLEN: Chronic | ICD-10-CM

## 2018-12-31 DIAGNOSIS — I10 ESSENTIAL HYPERTENSION: Chronic | ICD-10-CM

## 2018-12-31 DIAGNOSIS — J45.21 MILD INTERMITTENT ASTHMA WITH ACUTE EXACERBATION: Chronic | ICD-10-CM

## 2018-12-31 DIAGNOSIS — G47.33 OBSTRUCTIVE SLEEP APNEA SYNDROME: Primary | ICD-10-CM

## 2018-12-31 DIAGNOSIS — E28.2 POLYCYSTIC OVARIAN DISEASE: Chronic | ICD-10-CM

## 2018-12-31 DIAGNOSIS — E66.9 NON MORBID OBESITY, UNSPECIFIED OBESITY TYPE: Chronic | ICD-10-CM

## 2018-12-31 PROCEDURE — 99244 OFF/OP CNSLTJ NEW/EST MOD 40: CPT | Performed by: INTERNAL MEDICINE

## 2018-12-31 ASSESSMENT — SLEEP AND FATIGUE QUESTIONNAIRES
HOW LIKELY ARE YOU TO NOD OFF OR FALL ASLEEP WHILE LYING DOWN TO REST IN THE AFTERNOON WHEN CIRCUMSTANCES PERMIT: 3
HOW LIKELY ARE YOU TO NOD OFF OR FALL ASLEEP WHILE SITTING QUIETLY AFTER LUNCH WITHOUT ALCOHOL: 0
HOW LIKELY ARE YOU TO NOD OFF OR FALL ASLEEP WHEN YOU ARE A PASSENGER IN A CAR FOR AN HOUR WITHOUT A BREAK: 2
HOW LIKELY ARE YOU TO NOD OFF OR FALL ASLEEP WHILE WATCHING TV: 3
NECK CIRCUMFERENCE (INCHES): 16.5
HOW LIKELY ARE YOU TO NOD OFF OR FALL ASLEEP WHILE SITTING INACTIVE IN A PUBLIC PLACE: 0
ESS TOTAL SCORE: 11
HOW LIKELY ARE YOU TO NOD OFF OR FALL ASLEEP WHILE SITTING AND TALKING TO SOMEONE: 0
HOW LIKELY ARE YOU TO NOD OFF OR FALL ASLEEP IN A CAR, WHILE STOPPED FOR A FEW MINUTES IN TRAFFIC: 0
HOW LIKELY ARE YOU TO NOD OFF OR FALL ASLEEP WHILE SITTING AND READING: 3

## 2018-12-31 NOTE — PROGRESS NOTES
Chrystal Holstein MD, FAASM, [facility]  [facility] AND [facility] \[facility] [place] [facility]\""  [facility]  3rd Floor,  [address], [address] [phone]   [address] [place] [address] [address] . Raul Salvador [address] [phone] [address] [facility] [address] [address] [phone]    Assessment:      Visit Diagnoses and Associated Orders     Obstructive sleep apnea syndrome   (New Problem)  -  Primary    on Tx         Essential hypertension   (Stable)           Mild intermittent asthma with acute exacerbation   (Stable)           Non-seasonal allergic rhinitis due to pollen   (Stable)           Polycystic ovarian disease   (Stable)           Non morbid obesity, unspecified obesity type   (Stable)                  Plan:      Reviewed old records, pertinent data listed. Chronic-worsening:  Continue medications per her PCP and other physicians. She instructed not to drive unless had 4 hrs of effective therapy for her BIBIANA the night before. Did review the risks of under or untreated BIBIANA including, but not limited to, higher risks of motor vehicle accidents, stroke, heart attacks, and death. She understands and accepts all these risks. Needs to be setup with a new [employer] company and needs to use her machine every night. The primary encounter diagnosis was Obstructive sleep apnea syndrome. Diagnoses of Essential hypertension, Mild intermittent asthma with acute exacerbation, Non-seasonal allergic rhinitis due to pollen, Polycystic ovarian disease, and Non morbid obesity, unspecified obesity type were also pertinent to this visit. The chronic medical conditions listed are directly related to the primary diagnosis listed above. The management of the primary diagnosis affects the secondary diagnosis and vice versa. Subjective:     Patient ID: Israel Mari is a 34 y.o. female.     Chief Complaint   Patient presents with    Sleep Apnea HPI:      Johnie Grimm is a 34 y.o. female referred by Yariel Flaherty MD for a sleep evaluation. She complains of snoring, periods of not breathing, tossing and turning, excessive daytime sleepiness, feels sleepy during the day but she denies knees buckling with laughing, completely or partially paralyzed while falling asleep or waking up. Symptoms began 15 years ago, controlled at first on bilevel but in the last 8 months has had trouble using her machine. Has stopped using her machine since April and symptoms have returned off her machine. Still taking provigil 200 mg a day. Struggled to get a mask that fit correctly and her previous DME company out of [place] stated to her there were no other mask available. Hypertension, Asthma, allergic rhinitis, PCOS, and obesity: stable on meds and followed by pt's PCP and other physicians. Previous evaluation and treatment has included- PSG and PSG with C PAP titration. Reviewed studies from [facility] [place]. polysomngraphy done on 4/23/04 showed AHI-34/hr with low sat-84%. Then had titrations done on 5/14/04, 5/20/06, and 8/1/08. Machine Modem/Download Info:  Compliance (hours/night): 3.6 hrs/night  Download AHI (/hour): 3.3 /HR     Average IPAP Pressure: 14.9 cmH2O  Average EPAP Pressure: 9.7 cmH2O AUTO BIPAP - Settings (Cesar)  IPAP Max: 24 cmH2O  EPAP Min: 8 cmH2O  Pressure Support Min: 4  Pressure Support Max: 8   Comfort Settings  Humidity Level (0-8): 3  Heated Tubing (Yes/No): Yes  Flex/EPR (0-3): 3 PAP Mask  Mask Type: Nasal mask  Mask Model: Wizard  Mask Size: Standard     DOT/CDL - No  FAA/'s license -No    Previous Report(s) Reviewed: historical medical records, office notes, andreferral letter(s). Pertinent data has been documented.     Wheeler - Total score: 11    Caffeine Intake - 1 cans/bottles of caffeinated soda pop per day(s)    Social History     Social History    Marital status: Legally      Spouse name: N/A  Number of children: 1    Years of education: N/A     Occupational History    Not on file. Social History Main Topics    Smoking status: Never Smoker    Smokeless tobacco: Never Used      Comment: congratulated on nonsmoking status    Alcohol use No    Drug use: No    Sexual activity: Not on file     Other Topics Concern    Not on file     Social History Narrative    No narrative on file        Current Outpatient Prescriptions   Medication Sig Dispense Refill    omeprazole (PRILOSEC) 40 MG delayed release capsule TAKE ONE CAPSULE BY MOUTH TWICE A  capsule 1    DULoxetine (CYMBALTA) 60 MG extended release capsule TAKE 1 CAPSULE BY MOUTH DAILY 30 capsule 5    DULoxetine (CYMBALTA) 60 MG extended release capsule TAKE 1 CAPSULE BY MOUTH DAILY 30 capsule 5    etonogestrel-ethinyl estradiol (NUVARING) 0.12-0.015 MG/24HR vaginal ring 1 RING INTRAVAGINALLY AS DIRECTED PER MONTH, REMOVE FOR 5 DAYS      spironolactone (ALDACTONE) 100 MG tablet TAKE ONE TABLET BY MOUTH EVERY DAY 90 tablet 0    lisinopril (PRINIVIL;ZESTRIL) 10 MG tablet TAKE 1 TABLET EVERY DAY 90 tablet 0    acetaminophen (APAP EXTRA STRENGTH) 500 MG tablet Take 1 tablet by mouth every 6 hours as needed for Pain 60 tablet 0    guaiFENesin (MUCINEX) 600 MG extended release tablet Take 1 tablet by mouth 2 times daily 30 tablet 0    loratadine (CLARITIN) 10 MG tablet Take 1 tablet by mouth daily      lisinopril (PRINIVIL;ZESTRIL) 10 MG tablet TAKE 1 TABLET EVERY DAY 90 tablet 0    PROAIR  (90 BASE) MCG/ACT inhaler INHALE 2 PUFFS EVERY 6 HOURS AS NEEDED FOR WHEEZING OR SHORTNESS OF BREATH 8.5 Inhaler 0    Multiple Vitamins-Minerals (THERAPEUTIC MULTIVITAMIN-MINERALS) tablet Take 1 tablet by mouth daily. No current facility-administered medications for this visit.         Allergies as of 12/31/2018 - Review Complete 12/31/2018   Allergen Reaction Noted    Ensure  04/22/2011    Peanut-containing drug products

## 2019-01-02 ENCOUNTER — TELEPHONE (OUTPATIENT)
Dept: FAMILY MEDICINE CLINIC | Age: 39
End: 2019-01-02

## 2019-01-02 DIAGNOSIS — J06.9 VIRAL URI: ICD-10-CM

## 2019-01-02 RX ORDER — FLUTICASONE PROPIONATE 50 MCG
SPRAY, SUSPENSION (ML) NASAL
Qty: 2 BOTTLE | Refills: 3 | Status: SHIPPED | OUTPATIENT
Start: 2019-01-02 | End: 2019-10-08

## 2019-01-03 ENCOUNTER — OFFICE VISIT (OUTPATIENT)
Dept: FAMILY MEDICINE CLINIC | Age: 39
End: 2019-01-03
Payer: COMMERCIAL

## 2019-01-03 VITALS
OXYGEN SATURATION: 98 % | RESPIRATION RATE: 18 BRPM | TEMPERATURE: 98.3 F | HEART RATE: 89 BPM | SYSTOLIC BLOOD PRESSURE: 110 MMHG | DIASTOLIC BLOOD PRESSURE: 70 MMHG | WEIGHT: 229 LBS | BODY MASS INDEX: 38.11 KG/M2

## 2019-01-03 DIAGNOSIS — J45.21 MILD INTERMITTENT ASTHMA WITH ACUTE EXACERBATION: ICD-10-CM

## 2019-01-03 DIAGNOSIS — B96.89 ACUTE BACTERIAL SINUSITIS: Primary | ICD-10-CM

## 2019-01-03 DIAGNOSIS — J01.90 ACUTE BACTERIAL SINUSITIS: Primary | ICD-10-CM

## 2019-01-03 PROCEDURE — 99214 OFFICE O/P EST MOD 30 MIN: CPT | Performed by: NURSE PRACTITIONER

## 2019-01-03 RX ORDER — CEFDINIR 300 MG/1
300 CAPSULE ORAL 2 TIMES DAILY
Qty: 20 CAPSULE | Refills: 0 | Status: SHIPPED | OUTPATIENT
Start: 2019-01-03 | End: 2019-01-13

## 2019-01-03 RX ORDER — FLUTICASONE FUROATE AND VILANTEROL 100; 25 UG/1; UG/1
1 POWDER RESPIRATORY (INHALATION) DAILY
Qty: 1 EACH | Refills: 0 | COMMUNITY
Start: 2019-01-03 | End: 2019-02-01

## 2019-01-03 ASSESSMENT — ENCOUNTER SYMPTOMS
WHEEZING: 1
SINUS PRESSURE: 1
SINUS PAIN: 1
SHORTNESS OF BREATH: 1
SORE THROAT: 0
CHEST TIGHTNESS: 1
DIARRHEA: 0
VOMITING: 0
NAUSEA: 0
RHINORRHEA: 1
COUGH: 1

## 2019-01-11 ENCOUNTER — TELEPHONE (OUTPATIENT)
Dept: PULMONOLOGY | Age: 39
End: 2019-01-11

## 2019-01-14 ENCOUNTER — OFFICE VISIT (OUTPATIENT)
Dept: FAMILY MEDICINE CLINIC | Age: 39
End: 2019-01-14
Payer: COMMERCIAL

## 2019-01-14 VITALS
SYSTOLIC BLOOD PRESSURE: 124 MMHG | OXYGEN SATURATION: 99 % | RESPIRATION RATE: 18 BRPM | HEART RATE: 106 BPM | WEIGHT: 232 LBS | DIASTOLIC BLOOD PRESSURE: 70 MMHG | BODY MASS INDEX: 38.61 KG/M2

## 2019-01-14 DIAGNOSIS — J31.0 CHRONIC RHINITIS: ICD-10-CM

## 2019-01-14 DIAGNOSIS — J45.21 MILD INTERMITTENT ASTHMA WITH ACUTE EXACERBATION: Primary | ICD-10-CM

## 2019-01-14 PROCEDURE — 99213 OFFICE O/P EST LOW 20 MIN: CPT | Performed by: NURSE PRACTITIONER

## 2019-01-14 RX ORDER — PREDNISONE 20 MG/1
20 TABLET ORAL 2 TIMES DAILY
Qty: 10 TABLET | Refills: 0 | Status: SHIPPED | OUTPATIENT
Start: 2019-01-14 | End: 2019-01-19

## 2019-01-14 ASSESSMENT — ENCOUNTER SYMPTOMS
RHINORRHEA: 1
WHEEZING: 1
COUGH: 1
NAUSEA: 0
SHORTNESS OF BREATH: 1
SORE THROAT: 0
CHEST TIGHTNESS: 0
DIARRHEA: 0
VOMITING: 0

## 2019-01-15 ENCOUNTER — TELEPHONE (OUTPATIENT)
Dept: PULMONOLOGY | Age: 39
End: 2019-01-15

## 2019-02-01 ENCOUNTER — OFFICE VISIT (OUTPATIENT)
Dept: FAMILY MEDICINE CLINIC | Age: 39
End: 2019-02-01
Payer: COMMERCIAL

## 2019-02-01 VITALS
TEMPERATURE: 98.7 F | WEIGHT: 230 LBS | RESPIRATION RATE: 12 BRPM | HEART RATE: 104 BPM | BODY MASS INDEX: 38.32 KG/M2 | HEIGHT: 65 IN | SYSTOLIC BLOOD PRESSURE: 126 MMHG | DIASTOLIC BLOOD PRESSURE: 72 MMHG

## 2019-02-01 DIAGNOSIS — L68.0 HIRSUTISM: ICD-10-CM

## 2019-02-01 DIAGNOSIS — R73.03 PREDIABETES: ICD-10-CM

## 2019-02-01 DIAGNOSIS — B07.8 OTHER VIRAL WARTS: ICD-10-CM

## 2019-02-01 DIAGNOSIS — F41.9 ANXIETY: ICD-10-CM

## 2019-02-01 DIAGNOSIS — E78.2 MIXED HYPERLIPIDEMIA: Primary | ICD-10-CM

## 2019-02-01 DIAGNOSIS — I10 ESSENTIAL HYPERTENSION: ICD-10-CM

## 2019-02-01 DIAGNOSIS — G47.10 HYPERSOMNIA WITH SLEEP APNEA: ICD-10-CM

## 2019-02-01 DIAGNOSIS — J45.30 MILD PERSISTENT ASTHMA, UNSPECIFIED WHETHER COMPLICATED: ICD-10-CM

## 2019-02-01 DIAGNOSIS — E78.2 MIXED HYPERLIPIDEMIA: ICD-10-CM

## 2019-02-01 DIAGNOSIS — E28.2 POLYCYSTIC OVARIAN DISEASE: ICD-10-CM

## 2019-02-01 DIAGNOSIS — G47.30 HYPERSOMNIA WITH SLEEP APNEA: ICD-10-CM

## 2019-02-01 LAB
A/G RATIO: 1.3 (ref 1.1–2.2)
ALBUMIN SERPL-MCNC: 4.3 G/DL (ref 3.4–5)
ALP BLD-CCNC: 56 U/L (ref 40–129)
ALT SERPL-CCNC: 12 U/L (ref 10–40)
ANION GAP SERPL CALCULATED.3IONS-SCNC: 12 MMOL/L (ref 3–16)
AST SERPL-CCNC: 11 U/L (ref 15–37)
BILIRUB SERPL-MCNC: <0.2 MG/DL (ref 0–1)
BUN BLDV-MCNC: 8 MG/DL (ref 7–20)
CALCIUM SERPL-MCNC: 9.4 MG/DL (ref 8.3–10.6)
CHLORIDE BLD-SCNC: 103 MMOL/L (ref 99–110)
CHOLESTEROL, TOTAL: 312 MG/DL (ref 0–199)
CO2: 26 MMOL/L (ref 21–32)
CREAT SERPL-MCNC: 0.6 MG/DL (ref 0.6–1.1)
GFR AFRICAN AMERICAN: >60
GFR NON-AFRICAN AMERICAN: >60
GLOBULIN: 3.4 G/DL
GLUCOSE BLD-MCNC: 92 MG/DL (ref 70–99)
HBA1C MFR BLD: 5.3 %
HDLC SERPL-MCNC: 42 MG/DL (ref 40–60)
LDL CHOLESTEROL CALCULATED: 232 MG/DL
POTASSIUM SERPL-SCNC: 4.3 MMOL/L (ref 3.5–5.1)
SODIUM BLD-SCNC: 141 MMOL/L (ref 136–145)
TOTAL PROTEIN: 7.7 G/DL (ref 6.4–8.2)
TRIGL SERPL-MCNC: 191 MG/DL (ref 0–150)
VLDLC SERPL CALC-MCNC: 38 MG/DL

## 2019-02-01 PROCEDURE — 83036 HEMOGLOBIN GLYCOSYLATED A1C: CPT | Performed by: FAMILY MEDICINE

## 2019-02-01 PROCEDURE — 99214 OFFICE O/P EST MOD 30 MIN: CPT | Performed by: FAMILY MEDICINE

## 2019-02-01 RX ORDER — IMIQUIMOD 12.5 MG/.25G
CREAM TOPICAL
Qty: 12 EACH | Refills: 3 | Status: SHIPPED | OUTPATIENT
Start: 2019-02-01 | End: 2019-02-08

## 2019-02-01 ASSESSMENT — PATIENT HEALTH QUESTIONNAIRE - PHQ9
2. FEELING DOWN, DEPRESSED OR HOPELESS: 0
1. LITTLE INTEREST OR PLEASURE IN DOING THINGS: 0
SUM OF ALL RESPONSES TO PHQ9 QUESTIONS 1 & 2: 0
SUM OF ALL RESPONSES TO PHQ QUESTIONS 1-9: 0
SUM OF ALL RESPONSES TO PHQ QUESTIONS 1-9: 0

## 2019-02-01 ASSESSMENT — ENCOUNTER SYMPTOMS
ALLERGIC/IMMUNOLOGIC NEGATIVE: 1
EYES NEGATIVE: 1
RESPIRATORY NEGATIVE: 1
GASTROINTESTINAL NEGATIVE: 1

## 2019-02-04 RX ORDER — ATORVASTATIN CALCIUM 40 MG/1
40 TABLET, FILM COATED ORAL DAILY
Qty: 90 TABLET | Refills: 1 | Status: SHIPPED | OUTPATIENT
Start: 2019-02-04 | End: 2019-08-29 | Stop reason: SDUPTHER

## 2019-02-25 ENCOUNTER — OFFICE VISIT (OUTPATIENT)
Dept: FAMILY MEDICINE CLINIC | Age: 39
End: 2019-02-25
Payer: COMMERCIAL

## 2019-02-25 VITALS
SYSTOLIC BLOOD PRESSURE: 120 MMHG | RESPIRATION RATE: 20 BRPM | HEART RATE: 95 BPM | OXYGEN SATURATION: 100 % | WEIGHT: 234 LBS | DIASTOLIC BLOOD PRESSURE: 70 MMHG | TEMPERATURE: 98.1 F | BODY MASS INDEX: 38.94 KG/M2

## 2019-02-25 DIAGNOSIS — J45.40 MODERATE PERSISTENT ASTHMA, UNSPECIFIED WHETHER COMPLICATED: ICD-10-CM

## 2019-02-25 DIAGNOSIS — J32.9 RECURRENT SINUSITIS: Primary | ICD-10-CM

## 2019-02-25 PROCEDURE — 99214 OFFICE O/P EST MOD 30 MIN: CPT | Performed by: NURSE PRACTITIONER

## 2019-02-25 RX ORDER — LEVOFLOXACIN 500 MG/1
500 TABLET, FILM COATED ORAL DAILY
Qty: 10 TABLET | Refills: 0 | Status: SHIPPED | OUTPATIENT
Start: 2019-02-25 | End: 2019-03-07

## 2019-02-25 ASSESSMENT — ENCOUNTER SYMPTOMS
CHEST TIGHTNESS: 1
NAUSEA: 0
SHORTNESS OF BREATH: 1
WHEEZING: 1
RHINORRHEA: 1
DIARRHEA: 0
SINUS PRESSURE: 1
COUGH: 1
SINUS PAIN: 1
SORE THROAT: 1
VOMITING: 0

## 2019-04-02 ENCOUNTER — OFFICE VISIT (OUTPATIENT)
Dept: PULMONOLOGY | Age: 39
End: 2019-04-02
Payer: COMMERCIAL

## 2019-04-02 VITALS
WEIGHT: 232 LBS | OXYGEN SATURATION: 98 % | HEIGHT: 65 IN | BODY MASS INDEX: 38.65 KG/M2 | SYSTOLIC BLOOD PRESSURE: 133 MMHG | HEART RATE: 92 BPM | DIASTOLIC BLOOD PRESSURE: 92 MMHG

## 2019-04-02 DIAGNOSIS — J45.40 MODERATE PERSISTENT ASTHMA, UNSPECIFIED WHETHER COMPLICATED: Chronic | ICD-10-CM

## 2019-04-02 DIAGNOSIS — E66.9 NON MORBID OBESITY, UNSPECIFIED OBESITY TYPE: ICD-10-CM

## 2019-04-02 DIAGNOSIS — I10 ESSENTIAL HYPERTENSION: Chronic | ICD-10-CM

## 2019-04-02 DIAGNOSIS — G47.33 OBSTRUCTIVE SLEEP APNEA (ADULT) (PEDIATRIC): Primary | Chronic | ICD-10-CM

## 2019-04-02 DIAGNOSIS — J30.1 NON-SEASONAL ALLERGIC RHINITIS DUE TO POLLEN: Chronic | ICD-10-CM

## 2019-04-02 PROCEDURE — 99214 OFFICE O/P EST MOD 30 MIN: CPT | Performed by: INTERNAL MEDICINE

## 2019-04-02 ASSESSMENT — ENCOUNTER SYMPTOMS
CHOKING: 0
RHINORRHEA: 0
COUGH: 0
SHORTNESS OF BREATH: 0
APNEA: 0

## 2019-04-02 ASSESSMENT — SLEEP AND FATIGUE QUESTIONNAIRES
HOW LIKELY ARE YOU TO NOD OFF OR FALL ASLEEP WHILE SITTING AND TALKING TO SOMEONE: 0
HOW LIKELY ARE YOU TO NOD OFF OR FALL ASLEEP WHILE SITTING INACTIVE IN A PUBLIC PLACE: 0
HOW LIKELY ARE YOU TO NOD OFF OR FALL ASLEEP WHEN YOU ARE A PASSENGER IN A CAR FOR AN HOUR WITHOUT A BREAK: 3
HOW LIKELY ARE YOU TO NOD OFF OR FALL ASLEEP IN A CAR, WHILE STOPPED FOR A FEW MINUTES IN TRAFFIC: 0
ESS TOTAL SCORE: 9
HOW LIKELY ARE YOU TO NOD OFF OR FALL ASLEEP WHILE SITTING AND READING: 2
HOW LIKELY ARE YOU TO NOD OFF OR FALL ASLEEP WHILE WATCHING TV: 1
HOW LIKELY ARE YOU TO NOD OFF OR FALL ASLEEP WHILE SITTING QUIETLY AFTER LUNCH WITHOUT ALCOHOL: 0
HOW LIKELY ARE YOU TO NOD OFF OR FALL ASLEEP WHILE LYING DOWN TO REST IN THE AFTERNOON WHEN CIRCUMSTANCES PERMIT: 3

## 2019-04-02 NOTE — LETTER
[facility]  [address] [address]  Phone: [phone]  Fax: [phone]    April 2, 2019       Patient: Ree Albarran   MR Number: [id number]   YOB: 1980   Date of Visit: 4/2/2019       Ree Albarran was seen for a follow up visit today. Here is my assessment and plan as well as an attached copy of her visit today:    Obstructive sleep apnea (adult) (pediatric)  Chronic- Stable. Reviewed compliance download with pt. Supplies and parts as needed for her machine. These are medically necessary. Continue medications per her PCP and other physicians. Limit caffeine use after 3pm.  Needs to increase total sleep time and get increased sleep time consistently. Could do trial of increased dose of provigil but will follow BP and rash. Patient to do a trial of drug holiday to see if symptoms improve off of meds. 3 month f/u. Essential hypertension  Chronic- Stable. Cont meds per PCP and other physicians. Asthma  Chronic- Stable. Cont meds per PCP and other physicians. Allergic rhinitis due to pollen  Chronic- Stable. Cont meds per PCP and other physicians. Non morbid obesity, unspecified obesity type  Chronic-Stable. Encouraged her to work on weight loss through diet and exercise. If you have questions or concerns, please do not hesitate to call me. I look forward to following Delores along with you.     Sincerely,    Angela Andrade MD    [facility] providers:  [facility]

## 2019-04-02 NOTE — PROGRESS NOTES
Shobha Hoang MD, FAASM, [facility] HEART AND SURGICAL [facility]  [facility]  3rd Floor, [address] [facility], [address] [phone]   [address] [place] [address] Mega Baez. Raul Salvador 37 [phone] [address] [address] [id number] [phone]    Assessment/Plan:     Obstructive sleep apnea (adult) (pediatric)  Chronic- Stable. Reviewed compliance download with pt. Supplies and parts as needed for her machine. These are medically necessary. Continue medications per her PCP and other physicians. Limit caffeine use after 3pm.  Needs to increase total sleep time and get increased sleep time consistently. Could do trial of increased dose of provigil but will follow BP and rash. Patient to do a trial of drug holiday to see if symptoms improve off of meds. 3 month f/u. Essential hypertension  Chronic- Stable. Cont meds per PCP and other physicians. Asthma  Chronic- Stable. Cont meds per PCP and other physicians. Allergic rhinitis due to pollen  Chronic- Stable. Cont meds per PCP and other physicians. Non morbid obesity, unspecified obesity type  Chronic-Stable. Encouraged her to work on weight loss through diet and exercise. The primary encounter diagnosis was Obstructive sleep apnea (adult) (pediatric). Diagnoses of Essential hypertension, Moderate persistent asthma, unspecified whether complicated, Non-seasonal allergic rhinitis due to pollen, and Non morbid obesity, unspecified obesity type were also pertinent to this visit. The chronic medical conditions listed are directly related to the primary diagnosis listed above. The management of the primary diagnosis affects the secondary diagnosis and vice versa. Subjective:     Patient ID: Jovan Appiah is a 37 y.o. female.     Chief Complaint   Patient presents with    Sleep Apnea     Subjective   HPI:    Machine Modem/Download Info:  Compliance (hours/night): 4.5 hrs/night  Download AHI (/hour): 4 /HR  Average IPAP Pressure: 14.6 cmH2O  Average EPAP Pressure: 9.3 cmH2O AUTO BIPAP - Settings (Cesar)  IPAP Max: 25 cmH2O  EPAP Min: 8 cmH2O  Pressure Support Min: 4  Pressure Support Max: 8   Comfort Settings  Humidity Level (0-8): 3  Heated Tubing (Yes/No): Yes  Flex/EPR (0-3): 3 PAP Mask  Mask Type: Nasal mask  Mask Model: Dreamwear  Mask Size: sm     BIBIANA: Has a mask that is better and able to use her machine more. excessive daytime sleepiness is getting better but still gets tired in the afternoon. Has to wake at 4:30A. Bed time is 10:30-11P. Still taking provigil 200 mg in the AM.  Pressure feels okay. Asthma, hypertension, allergic rhinitis, and obesity: stable on meds and followed by pt's PCP and other physicians.     [facility]    Kiln - Total score: 9    Social History     Socioeconomic History    Marital status: Legally      Spouse name: Not on file    Number of children: 1    Years of education: Not on file    Highest education level: Not on file   Occupational History    Not on file   Social Needs    Financial resource strain: Not on file    Food insecurity:     Worry: Not on file     Inability: Not on file    Transportation needs:     Medical: Not on file     Non-medical: Not on file   Tobacco Use    Smoking status: Never Smoker    Smokeless tobacco: Never Used    Tobacco comment: congratulated on nonsmoking status   Substance and Sexual Activity    Alcohol use: No    Drug use: No    Sexual activity: Not on file   Lifestyle    Physical activity:     Days per week: Not on file     Minutes per session: Not on file    Stress: Not on file   Relationships    Social connections:     Talks on phone: Not on file     Gets together: Not on file     Attends Hinduism service: Not on file     Active member of club or organization: Not on file     Attends meetings of clubs or organizations: Not on file     Relationship status: Not on file    Intimate partner violence:     Fear of current or ex partner: Not on file     Emotionally abused: Not on file     Physically abused: Not on file     Forced sexual activity: Not on file   Other Topics Concern    Not on file   Social History Narrative    Not on file       Current Outpatient Medications   Medication Sig Dispense Refill    modafinil (PROVIGIL) 200 MG tablet TAKE 1 TABLET BY MOUTH EVERY DAY 30 tablet 1    tiotropium (SPIRIVA RESPIMAT) 1.25 MCG/ACT AERS inhaler Inhale 2 puffs into the lungs daily 1 Inhaler 1    atorvastatin (LIPITOR) 40 MG tablet Take 1 tablet by mouth daily 90 tablet 1    mometasone-formoterol (DULERA) 100-5 MCG/ACT inhaler Inhale 2 puffs into the lungs 2 times daily 1 Inhaler 5    lisinopril (PRINIVIL;ZESTRIL) 10 MG tablet TAKE 1 TABLET EVERY DAY 90 tablet 1    fluticasone (FLONASE) 50 MCG/ACT nasal spray USE 2 SPRAYS BY NASAL ROUTE DAILY 2 Bottle 3    omeprazole (PRILOSEC) 40 MG delayed release capsule TAKE ONE CAPSULE BY MOUTH TWICE A  capsule 1    DULoxetine (CYMBALTA) 60 MG extended release capsule TAKE 1 CAPSULE BY MOUTH DAILY 30 capsule 5    etonogestrel-ethinyl estradiol (NUVARING) 0.12-0.015 MG/24HR vaginal ring 1 RING INTRAVAGINALLY AS DIRECTED PER MONTH, REMOVE FOR 5 DAYS      spironolactone (ALDACTONE) 100 MG tablet TAKE ONE TABLET BY MOUTH EVERY DAY 90 tablet 0    acetaminophen (APAP EXTRA STRENGTH) 500 MG tablet Take 1 tablet by mouth every 6 hours as needed for Pain 60 tablet 0    guaiFENesin (MUCINEX) 600 MG extended release tablet Take 1 tablet by mouth 2 times daily 30 tablet 0    loratadine (CLARITIN) 10 MG tablet Take 1 tablet by mouth daily      PROAIR  (90 BASE) MCG/ACT inhaler INHALE 2 PUFFS EVERY 6 HOURS AS NEEDED FOR WHEEZING OR SHORTNESS OF BREATH 8.5 Inhaler 0    Multiple Vitamins-Minerals (THERAPEUTIC MULTIVITAMIN-MINERALS) tablet Take 1 tablet by mouth daily. No current facility-administered medications for this visit. Allergies as of 04/02/2019 - Review Complete 04/02/2019   Allergen Reaction Noted    Ensure  04/22/2011    Peanut-containing drug products  04/22/2011    Food  05/17/2011       Patient Active Problem List   Diagnosis    Polycystic ovarian disease    Hyperlipidemia    Anxiety    Hirsutism    Allergic rhinitis due to pollen    Deviated septum    Asthma    Lumbar disc disease L4-5, L5-S1 Dr Caroline [facility]) Epidural injections 2011    Essential hypertension    Prediabetes A1c 5.7 6/1/16    Obstructive sleep apnea (adult) (pediatric)    Hypersomnia with sleep apnea- on Provigil 200 bid since 11/2/2010    Other viral warts    Non morbid obesity, unspecified obesity type       Past Medical History:   Diagnosis Date    Allergic rhinitis     Anxiety     Asthma     Environmental allergies     food    Hyperlipidemia     Hypertension     Lumbar disc disease L4-5, L5-S1 Dr [facility]) Epidural injections 2011 8/20/2012    Obstructive sleep apnea     Obstructive sleep apnea (adult) (pediatric) 6/18/2018    PCOS (polycystic ovarian syndrome)     S/P colonoscopy 1/17/13    normal. Dr Charlotte Rosas       Past Surgical History:   Procedure Laterality Date    BREAST REDUCTION SURGERY  2004    FOOT SURGERY  2000    hallux fx    UPPER GASTROINTESTINAL ENDOSCOPY  1/17/13    gastritis; Dr Charlotte Rosas       Family History   Problem Relation Age of Onset    High Blood Pressure Mother     Other Mother         hypothyroid    Diabetes Sister     Diabetes Maternal Grandmother     Diabetes Maternal Grandfather            ROS:  Review of Systems   Constitutional: Negative. HENT: Negative for congestion, ear pain and rhinorrhea. Respiratory: Negative for apnea, cough, choking and shortness of breath. Cardiovascular: Negative for chest pain, palpitations and leg swelling. Musculoskeletal: Negative for neck pain. Vitals:  Weight BMI   Wt Readings from Last 3 Encounters:   04/02/19 232 lb (105.2 kg)   02/25/19 234 lb (106.1 kg)   02/01/19 230 lb (104.3 kg)    Body mass index is 38.61 kg/m².      BP HR SaO2   BP Readings from Last 3 Encounters:   04/02/19 (!) 133/92   02/25/19 120/70   02/01/19 126/72    Pulse Readings from Last 3 Encounters:   04/02/19 92   02/25/19 95   02/01/19 104    SpO2 Readings from Last 3 Encounters:   04/02/19 98%   02/25/19 100%   01/14/19 99%          Electronically signed by Eulalio Vides MD on 4/2/2019 at 4:23 PM

## 2019-04-02 NOTE — ASSESSMENT & PLAN NOTE
Chronic- Stable. Reviewed compliance download with pt. Supplies and parts as needed for her machine. These are medically necessary. Continue medications per her PCP and other physicians. Limit caffeine use after 3pm.  Needs to increase total sleep time and get increased sleep time consistently. Could do trial of increased dose of provigil but will follow BP and rash. Patient to do a trial of drug holiday to see if symptoms improve off of meds. 3 month f/u.

## 2019-05-15 ENCOUNTER — TELEPHONE (OUTPATIENT)
Dept: FAMILY MEDICINE CLINIC | Age: 39
End: 2019-05-15

## 2019-05-16 ENCOUNTER — OFFICE VISIT (OUTPATIENT)
Dept: FAMILY MEDICINE CLINIC | Age: 39
End: 2019-05-16
Payer: COMMERCIAL

## 2019-05-16 VITALS
HEART RATE: 93 BPM | HEIGHT: 65 IN | DIASTOLIC BLOOD PRESSURE: 80 MMHG | BODY MASS INDEX: 37.15 KG/M2 | WEIGHT: 223 LBS | SYSTOLIC BLOOD PRESSURE: 118 MMHG | TEMPERATURE: 98.3 F

## 2019-05-16 DIAGNOSIS — J32.9 RECURRENT SINUSITIS: Primary | ICD-10-CM

## 2019-05-16 DIAGNOSIS — J45.31 MILD PERSISTENT ASTHMA WITH ACUTE EXACERBATION: ICD-10-CM

## 2019-05-16 PROCEDURE — 99214 OFFICE O/P EST MOD 30 MIN: CPT | Performed by: NURSE PRACTITIONER

## 2019-05-16 RX ORDER — IPRATROPIUM BROMIDE 42 UG/1
SPRAY, METERED NASAL
COMMUNITY
Start: 2019-04-12 | End: 2019-08-02

## 2019-05-16 RX ORDER — PREDNISONE 20 MG/1
20 TABLET ORAL 2 TIMES DAILY
Qty: 10 TABLET | Refills: 0 | Status: SHIPPED | OUTPATIENT
Start: 2019-05-16 | End: 2019-05-21

## 2019-05-16 RX ORDER — DOXYCYCLINE HYCLATE 100 MG/1
100 CAPSULE ORAL 2 TIMES DAILY
Qty: 20 CAPSULE | Refills: 0 | Status: SHIPPED | OUTPATIENT
Start: 2019-05-16 | End: 2019-05-26

## 2019-05-16 ASSESSMENT — ENCOUNTER SYMPTOMS
COUGH: 1
SORE THROAT: 1
VOMITING: 0
CHEST TIGHTNESS: 1
RHINORRHEA: 1
DIARRHEA: 0
WHEEZING: 1
NAUSEA: 1
SHORTNESS OF BREATH: 1
SINUS PRESSURE: 1
SINUS PAIN: 1

## 2019-05-16 NOTE — PROGRESS NOTES
5/16/2019    This is a 37 y.o. female   Chief Complaint   Patient presents with    Congestion     sinus pressure, headache, drip cough, mucus, ear pressure, sneezing,x 1 week   . URI    This is a recurrent problem. The current episode started in the past 7 days. The problem has been rapidly worsening. Associated symptoms include congestion, coughing, ear pain, headaches, nausea, rhinorrhea, sinus pain, sneezing, a sore throat and wheezing. Pertinent negatives include no chest pain, diarrhea or vomiting. Treatments tried: allergy medications  The treatment provided mild relief. Seeing allergist Dr. Alaina Brizuela. Was given atrovent to use PRN. On flonase and claritin daily. No on singulair as it has not helped symptoms in the past. Reports that she is not a candidate for allergy injections. They did not feel that she would benefit. Was told that she just needs to treat symptoms. ENT- Dr. Nirmal Harper. Has deviated septum, but does not feel that she needs to have corrected.  He sent her to Dr. Alaina Brizuela.      Patient Active Problem List   Diagnosis    Polycystic ovarian disease    Hyperlipidemia    Anxiety    Hirsutism    Allergic rhinitis due to pollen    Deviated septum    Asthma    Lumbar disc disease L4-5, L5-S1 Dr Nenita Guzmán) Epidural injections 2011    Essential hypertension    Prediabetes A1c 5.7 6/1/16    Obstructive sleep apnea (adult) (pediatric)    Hypersomnia with sleep apnea- on Provigil 200 bid since 11/2/2010    Other viral warts    Non morbid obesity, unspecified obesity type          Current Outpatient Medications   Medication Sig Dispense Refill    ipratropium (ATROVENT) 0.06 % nasal spray 1 spray each nostril bid-tid      DULoxetine (CYMBALTA) 60 MG extended release capsule TAKE 1 CAPSULE BY MOUTH DAILY 30 capsule 2    tiotropium (SPIRIVA RESPIMAT) 1.25 MCG/ACT AERS inhaler Inhale 2 puffs into the lungs daily 1 Inhaler 1    atorvastatin (LIPITOR) 40 MG tablet Take 1 Gastrointestinal: Positive for nausea. Negative for diarrhea and vomiting. Nausea from PND   Neurological: Positive for light-headedness and headaches. Vitals:    05/16/19 0945   BP: 118/80   Site: Right Upper Arm   Position: Sitting   Cuff Size: Large Adult   Pulse: 93   Temp: 98.3 °F (36.8 °C)   TempSrc: Oral   Weight: 223 lb (101.2 kg)   Height: 5' 5\" (1.651 m)       Body mass index is 37.11 kg/m². Wt Readings from Last 3 Encounters:   05/16/19 223 lb (101.2 kg)   04/02/19 232 lb (105.2 kg)   02/25/19 234 lb (106.1 kg)       BP Readings from Last 3 Encounters:   05/16/19 118/80   04/02/19 (!) 133/92   02/25/19 120/70       Physical Exam   Constitutional: She is oriented to person, place, and time. She appears well-developed and well-nourished. No distress. HENT:   Head: Normocephalic and atraumatic. Right Ear: Tympanic membrane, external ear and ear canal normal. Tympanic membrane is not erythematous and not bulging. Left Ear: Tympanic membrane, external ear and ear canal normal. Tympanic membrane is not erythematous and not bulging. Nose: Right sinus exhibits maxillary sinus tenderness. Right sinus exhibits no frontal sinus tenderness. Left sinus exhibits maxillary sinus tenderness. Left sinus exhibits no frontal sinus tenderness. Mouth/Throat: Uvula is midline. Posterior oropharyngeal erythema present. No oropharyngeal exudate. Eyes: EOM are normal.   Neck: Neck supple. Cardiovascular: Normal rate, regular rhythm and normal heart sounds. Exam reveals no gallop and no friction rub. No murmur heard. Pulmonary/Chest: Effort normal. No respiratory distress. She has wheezes in the right middle field, the right lower field, the left middle field and the left lower field. She has no rhonchi. She has no rales. Lymphadenopathy:        Head (right side): No submandibular adenopathy present. Head (left side): No submandibular adenopathy present.         Right cervical: No superficial cervical adenopathy present. Left cervical: No superficial cervical adenopathy present. Neurological: She is alert and oriented to person, place, and time. Skin: Skin is warm and dry. Psychiatric: She has a normal mood and affect. Her behavior is normal.   Nursing note and vitals reviewed. [address] was seen today for congestion. Diagnoses and all orders for this visit:    Recurrent sinusitis  -     doxycycline hyclate (VIBRAMYCIN) 100 MG capsule; Take 1 capsule by mouth 2 times daily for 10 days For treatment of bacterial infection. -     predniSONE (DELTASONE) 20 MG tablet; Take 1 tablet by mouth 2 times daily for 5 days  Should take an over-the-counter probiotic daily while on the antibiotic to help prevent antibiotic associated diarrhea. Increase fluids  Rest  Continue to f/u with allergist Dr. Monae Phillips and ENT Dr. Dewey Madsen. Continue allergic rhinitis medications- flonase, claritin     Mild persistent asthma with acute exacerbation  -     predniSONE (DELTASONE) 20 MG tablet; Take 1 tablet by mouth 2 times daily for 5 days  Albuterol inhaler 2 puffs every 4-6 hours PRN. Continue spiriva and dulera. Continue to f/u with allergist Dr. Monae Phillips.   Patient is to call if symptoms worsen or fail to improve. Return in about 2 weeks (around 5/30/2019), or if symptoms worsen or fail to improve, for breathing, with Dr. Delicia Sharp.

## 2019-05-30 DIAGNOSIS — G47.10 HYPERSOMNIA WITH SLEEP APNEA: ICD-10-CM

## 2019-05-30 DIAGNOSIS — G47.30 HYPERSOMNIA WITH SLEEP APNEA: ICD-10-CM

## 2019-05-30 RX ORDER — MODAFINIL 200 MG/1
TABLET ORAL
Qty: 30 TABLET | Refills: 2 | Status: SHIPPED | OUTPATIENT
Start: 2019-05-30 | End: 2019-07-09 | Stop reason: ALTCHOICE

## 2019-06-05 RX ORDER — OMEPRAZOLE 40 MG/1
CAPSULE, DELAYED RELEASE ORAL
Qty: 180 CAPSULE | Refills: 1 | Status: SHIPPED | OUTPATIENT
Start: 2019-06-05 | End: 2020-01-08

## 2019-07-01 RX ORDER — LISINOPRIL 10 MG/1
TABLET ORAL
Qty: 30 TABLET | Refills: 5 | Status: SHIPPED | OUTPATIENT
Start: 2019-07-01 | End: 2019-09-27 | Stop reason: ALTCHOICE

## 2019-07-09 ENCOUNTER — OFFICE VISIT (OUTPATIENT)
Dept: PULMONOLOGY | Age: 39
End: 2019-07-09
Payer: COMMERCIAL

## 2019-07-09 VITALS
BODY MASS INDEX: 36.32 KG/M2 | OXYGEN SATURATION: 98 % | SYSTOLIC BLOOD PRESSURE: 132 MMHG | HEART RATE: 84 BPM | DIASTOLIC BLOOD PRESSURE: 82 MMHG | WEIGHT: 218 LBS | HEIGHT: 65 IN

## 2019-07-09 DIAGNOSIS — E66.9 NON MORBID OBESITY, UNSPECIFIED OBESITY TYPE: Chronic | ICD-10-CM

## 2019-07-09 DIAGNOSIS — I10 ESSENTIAL HYPERTENSION: Chronic | ICD-10-CM

## 2019-07-09 DIAGNOSIS — G47.33 OBSTRUCTIVE SLEEP APNEA (ADULT) (PEDIATRIC): Primary | Chronic | ICD-10-CM

## 2019-07-09 DIAGNOSIS — J45.30 MILD PERSISTENT ASTHMA WITHOUT COMPLICATION: Chronic | ICD-10-CM

## 2019-07-09 PROCEDURE — 99214 OFFICE O/P EST MOD 30 MIN: CPT | Performed by: INTERNAL MEDICINE

## 2019-07-09 RX ORDER — ARMODAFINIL 200 MG/1
200 TABLET ORAL EVERY MORNING
Qty: 30 TABLET | Refills: 5 | Status: SHIPPED | OUTPATIENT
Start: 2019-07-09 | End: 2019-07-17 | Stop reason: ALTCHOICE

## 2019-07-09 ASSESSMENT — ENCOUNTER SYMPTOMS
RHINORRHEA: 0
COUGH: 0
APNEA: 0
SHORTNESS OF BREATH: 0
CHOKING: 0

## 2019-07-09 ASSESSMENT — SLEEP AND FATIGUE QUESTIONNAIRES
HOW LIKELY ARE YOU TO NOD OFF OR FALL ASLEEP WHEN YOU ARE A PASSENGER IN A CAR FOR AN HOUR WITHOUT A BREAK: 3
HOW LIKELY ARE YOU TO NOD OFF OR FALL ASLEEP IN A CAR, WHILE STOPPED FOR A FEW MINUTES IN TRAFFIC: 0
HOW LIKELY ARE YOU TO NOD OFF OR FALL ASLEEP WHILE SITTING AND READING: 3
HOW LIKELY ARE YOU TO NOD OFF OR FALL ASLEEP WHILE WATCHING TV: 3
ESS TOTAL SCORE: 12
HOW LIKELY ARE YOU TO NOD OFF OR FALL ASLEEP WHILE SITTING QUIETLY AFTER LUNCH WITHOUT ALCOHOL: 0
HOW LIKELY ARE YOU TO NOD OFF OR FALL ASLEEP WHILE LYING DOWN TO REST IN THE AFTERNOON WHEN CIRCUMSTANCES PERMIT: 3
HOW LIKELY ARE YOU TO NOD OFF OR FALL ASLEEP WHILE SITTING INACTIVE IN A PUBLIC PLACE: 0
HOW LIKELY ARE YOU TO NOD OFF OR FALL ASLEEP WHILE SITTING AND TALKING TO SOMEONE: 0

## 2019-07-09 NOTE — PROGRESS NOTES
Lamni Oconnell MD, FAASM, [facility] \[facility] [place] [facility]\""  [facility]  3rd Floor, [address] [facility], [address] [phone]   [address] [place] [address] Drive [address] [place] [address] Jara   [facility]. [address]ul [address] [phone] [address] [address] [id number] [phone]    Assessment/Plan:     Obstructive sleep apnea (adult) (pediatric)  Chronic- Stable. Reviewed compliance download with pt. Supplies and parts as needed for her machine. These are medically necessary. Continue medications per her PCP and other physicians. Limit caffeine use after 3pm.  Needs to increase total sleep time. Will attempt to change to nuvigil 200 mg, if not better then can continue provigil and can add 100-200 mg in the late AM for afternoon excessive daytime sleepiness. Essential hypertension  Chronic- Stable. Cont meds per PCP and other physicians. Asthma  Chronic- Stable. Cont meds per PCP and other physicians. Non morbid obesity, unspecified obesity type  Chronic-Stable. Encouraged her to work on weight loss through diet and exercise. Diagnoses of Obstructive sleep apnea (adult) (pediatric), Essential hypertension, Mild persistent asthma without complication, and Non morbid obesity, unspecified obesity type were pertinent to this visit. The chronic medical conditions listed are directly related to the primary diagnosis listed above. The management of the primary diagnosis affects the secondary diagnosis and vice versa. 4month f/u. Orders Placed This Encounter   Medications    Armodafinil 200 MG TABS     Sig: Take 200 mg by mouth every morning for 180 days. Dispense:  30 tablet     Refill:  5     Subjective:     Patient ID: Alexandra Coffey is a 37 y.o. female.     Chief Complaint   Patient presents with    Sleep Apnea     Subjective   HPI:    Machine Modem/Download Info:  Compliance (hours/night): 5

## 2019-07-16 ENCOUNTER — TELEPHONE (OUTPATIENT)
Dept: PULMONOLOGY | Age: 39
End: 2019-07-16

## 2019-07-16 DIAGNOSIS — G47.33 OBSTRUCTIVE SLEEP APNEA (ADULT) (PEDIATRIC): Primary | ICD-10-CM

## 2019-07-17 RX ORDER — MODAFINIL 200 MG/1
200 TABLET ORAL 2 TIMES DAILY
Qty: 60 TABLET | Refills: 2 | Status: SHIPPED | OUTPATIENT
Start: 2019-07-17 | End: 2020-07-08

## 2019-08-02 ENCOUNTER — OFFICE VISIT (OUTPATIENT)
Dept: FAMILY MEDICINE CLINIC | Age: 39
End: 2019-08-02
Payer: COMMERCIAL

## 2019-08-02 VITALS
SYSTOLIC BLOOD PRESSURE: 110 MMHG | HEART RATE: 80 BPM | TEMPERATURE: 98.5 F | HEIGHT: 65 IN | DIASTOLIC BLOOD PRESSURE: 76 MMHG | WEIGHT: 216 LBS | BODY MASS INDEX: 35.99 KG/M2 | OXYGEN SATURATION: 97 %

## 2019-08-02 DIAGNOSIS — F41.9 ANXIETY: ICD-10-CM

## 2019-08-02 DIAGNOSIS — E66.9 NON MORBID OBESITY, UNSPECIFIED OBESITY TYPE: ICD-10-CM

## 2019-08-02 DIAGNOSIS — J45.40 MODERATE PERSISTENT ASTHMA, UNSPECIFIED WHETHER COMPLICATED: ICD-10-CM

## 2019-08-02 DIAGNOSIS — E78.2 MIXED HYPERLIPIDEMIA: ICD-10-CM

## 2019-08-02 DIAGNOSIS — R73.03 PREDIABETES: ICD-10-CM

## 2019-08-02 DIAGNOSIS — I10 ESSENTIAL HYPERTENSION: ICD-10-CM

## 2019-08-02 DIAGNOSIS — E78.2 MIXED HYPERLIPIDEMIA: Primary | ICD-10-CM

## 2019-08-02 DIAGNOSIS — L68.0 HIRSUTISM: ICD-10-CM

## 2019-08-02 LAB
A/G RATIO: 1.5 (ref 1.1–2.2)
ALBUMIN SERPL-MCNC: 3.9 G/DL (ref 3.4–5)
ALP BLD-CCNC: 68 U/L (ref 40–129)
ALT SERPL-CCNC: 10 U/L (ref 10–40)
ANION GAP SERPL CALCULATED.3IONS-SCNC: 13 MMOL/L (ref 3–16)
AST SERPL-CCNC: 11 U/L (ref 15–37)
BILIRUB SERPL-MCNC: <0.2 MG/DL (ref 0–1)
BUN BLDV-MCNC: 9 MG/DL (ref 7–20)
CALCIUM SERPL-MCNC: 9.2 MG/DL (ref 8.3–10.6)
CHLORIDE BLD-SCNC: 102 MMOL/L (ref 99–110)
CHOLESTEROL, TOTAL: 149 MG/DL (ref 0–199)
CO2: 27 MMOL/L (ref 21–32)
CREAT SERPL-MCNC: 0.6 MG/DL (ref 0.6–1.1)
GFR AFRICAN AMERICAN: >60
GFR NON-AFRICAN AMERICAN: >60
GLOBULIN: 2.6 G/DL
GLUCOSE BLD-MCNC: 88 MG/DL (ref 70–99)
HDLC SERPL-MCNC: 46 MG/DL (ref 40–60)
LDL CHOLESTEROL CALCULATED: 74 MG/DL
POTASSIUM SERPL-SCNC: 4.1 MMOL/L (ref 3.5–5.1)
SODIUM BLD-SCNC: 142 MMOL/L (ref 136–145)
TOTAL PROTEIN: 6.5 G/DL (ref 6.4–8.2)
TRIGL SERPL-MCNC: 145 MG/DL (ref 0–150)
TSH SERPL DL<=0.05 MIU/L-ACNC: 1.92 UIU/ML (ref 0.27–4.2)
VLDLC SERPL CALC-MCNC: 29 MG/DL

## 2019-08-02 PROCEDURE — 99214 OFFICE O/P EST MOD 30 MIN: CPT | Performed by: FAMILY MEDICINE

## 2019-08-02 RX ORDER — ALBUTEROL SULFATE 90 UG/1
AEROSOL, METERED RESPIRATORY (INHALATION)
Qty: 8.5 INHALER | Refills: 0 | Status: SHIPPED | OUTPATIENT
Start: 2019-08-02 | End: 2020-11-09 | Stop reason: SDUPTHER

## 2019-08-02 NOTE — PROGRESS NOTES
tablet by mouth every 6 hours as needed for Pain 60 tablet 0    guaiFENesin (MUCINEX) 600 MG extended release tablet Take 1 tablet by mouth 2 times daily 30 tablet 0    loratadine (CLARITIN) 10 MG tablet Take 1 tablet by mouth daily      PROAIR  (90 BASE) MCG/ACT inhaler INHALE 2 PUFFS EVERY 6 HOURS AS NEEDED FOR WHEEZING OR SHORTNESS OF BREATH 8.5 Inhaler 0    Multiple Vitamins-Minerals (THERAPEUTIC MULTIVITAMIN-MINERALS) tablet Take 1 tablet by mouth daily. No current facility-administered medications for this visit. Immunization History   Administered Date(s) Administered    Influenza Virus Vaccine 09/09/2014    Influenza, Intradermal, Preservative free 11/28/2012, 09/23/2013    Influenza, Intradermal, Quadrivalent, Preservative Free 10/05/2016    Influenza, Quadv, 3 yrs and older, IM, PF (Fluzone 3 yrs and older or Afluria 5 yrs and older) 10/28/2017    Influenza, Quadv, Recombinant, IM PF (Flublok 18 yrs and older) 09/27/2018    Pneumococcal Polysaccharide ([id number]) 01/19/2018    Tdap (Boostrix, Adacel) 08/20/2012        Social History     Tobacco Use    Smoking status: Never Smoker    Smokeless tobacco: Never Used    Tobacco comment: congratulated on nonsmoking status   Substance Use Topics    Alcohol use: No    Drug use: No        Review of Systems As above. No chest pains, dizziness, heart palpitations, dyspnea, lightheadedness, worsening edema. Objective:   Physical Exam   Constitutional: She is oriented to person, place, and time. She appears well-developed and well-nourished. Neck: Normal range of motion. Neck supple. Carotid bruit is not present. No thyromegaly present. Cardiovascular: Normal rate, regular rhythm, normal heart sounds and intact distal pulses. No murmur heard. Pulmonary/Chest: Effort normal and breath sounds normal. No respiratory distress. She has no wheezes. She has no rales. Musculoskeletal: Normal range of motion.  She exhibits no

## 2019-08-03 LAB
ESTIMATED AVERAGE GLUCOSE: 111.2 MG/DL
HBA1C MFR BLD: 5.5 %

## 2019-08-12 ENCOUNTER — TELEPHONE (OUTPATIENT)
Dept: FAMILY MEDICINE CLINIC | Age: 39
End: 2019-08-12

## 2019-08-29 RX ORDER — ATORVASTATIN CALCIUM 40 MG/1
TABLET, FILM COATED ORAL
Qty: 30 TABLET | Refills: 5 | Status: SHIPPED | OUTPATIENT
Start: 2019-08-29 | End: 2020-03-05

## 2019-09-26 ENCOUNTER — TELEPHONE (OUTPATIENT)
Dept: FAMILY MEDICINE CLINIC | Age: 39
End: 2019-09-26

## 2019-09-27 ENCOUNTER — OFFICE VISIT (OUTPATIENT)
Dept: FAMILY MEDICINE CLINIC | Age: 39
End: 2019-09-27
Payer: COMMERCIAL

## 2019-09-27 VITALS
DIASTOLIC BLOOD PRESSURE: 80 MMHG | SYSTOLIC BLOOD PRESSURE: 122 MMHG | WEIGHT: 210 LBS | BODY MASS INDEX: 34.99 KG/M2 | HEART RATE: 73 BPM | HEIGHT: 65 IN | TEMPERATURE: 98.1 F

## 2019-09-27 DIAGNOSIS — J01.90 ACUTE BACTERIAL SINUSITIS: Primary | ICD-10-CM

## 2019-09-27 DIAGNOSIS — B96.89 ACUTE BACTERIAL SINUSITIS: Primary | ICD-10-CM

## 2019-09-27 DIAGNOSIS — Z23 NEED FOR INFLUENZA VACCINATION: ICD-10-CM

## 2019-09-27 DIAGNOSIS — J45.21 MILD INTERMITTENT ASTHMA WITH EXACERBATION: ICD-10-CM

## 2019-09-27 PROCEDURE — 90471 IMMUNIZATION ADMIN: CPT | Performed by: NURSE PRACTITIONER

## 2019-09-27 PROCEDURE — 99214 OFFICE O/P EST MOD 30 MIN: CPT | Performed by: NURSE PRACTITIONER

## 2019-09-27 PROCEDURE — 90674 CCIIV4 VAC NO PRSV 0.5 ML IM: CPT | Performed by: NURSE PRACTITIONER

## 2019-09-27 RX ORDER — PREDNISONE 20 MG/1
20 TABLET ORAL 2 TIMES DAILY
Qty: 10 TABLET | Refills: 0 | Status: SHIPPED | OUTPATIENT
Start: 2019-09-27 | End: 2019-10-02

## 2019-09-27 RX ORDER — DOXYCYCLINE HYCLATE 100 MG
100 TABLET ORAL 2 TIMES DAILY
Qty: 20 TABLET | Refills: 0 | Status: SHIPPED | OUTPATIENT
Start: 2019-09-27 | End: 2019-10-07

## 2019-09-27 ASSESSMENT — ENCOUNTER SYMPTOMS
WHEEZING: 1
DIARRHEA: 0
CHEST TIGHTNESS: 1
VOMITING: 0
COUGH: 1
SINUS PRESSURE: 1
RHINORRHEA: 1
SORE THROAT: 0
SINUS PAIN: 1
NAUSEA: 0
SHORTNESS OF BREATH: 1

## 2019-09-27 NOTE — PROGRESS NOTES
exacerbation  -     predniSONE (DELTASONE) 20 MG tablet; Take 1 tablet by mouth 2 times daily for 5 days  She has been of her daily inhalers for the past couple of months. Would like her to restart her dulera 2 puffs BID. Should rinse mouth out after use. Patient is to call if symptoms worsen or fail to improve. Need for influenza vaccination  -     INFLUENZA, MDCK QUADV, 4 YRS AND OLDER, IM, PF, PREFILL SYR OR SDV, 0.5ML (FLUCELVAX QUADV, PF)    Return in about 2 weeks (around 10/11/2019), or if symptoms worsen or fail to improve, for breathing, with Dr. Boa Muller.

## 2019-10-06 LAB — URINE CULTURE, ROUTINE: NORMAL

## 2019-10-07 ENCOUNTER — TELEPHONE (OUTPATIENT)
Dept: PULMONOLOGY | Age: 39
End: 2019-10-07

## 2019-10-08 ENCOUNTER — OFFICE VISIT (OUTPATIENT)
Dept: FAMILY MEDICINE CLINIC | Age: 39
End: 2019-10-08
Payer: COMMERCIAL

## 2019-10-08 VITALS
HEIGHT: 65 IN | BODY MASS INDEX: 35.32 KG/M2 | HEART RATE: 86 BPM | DIASTOLIC BLOOD PRESSURE: 80 MMHG | WEIGHT: 212 LBS | OXYGEN SATURATION: 98 % | SYSTOLIC BLOOD PRESSURE: 114 MMHG | TEMPERATURE: 98.6 F

## 2019-10-08 DIAGNOSIS — D72.829 LEUKOCYTOSIS, UNSPECIFIED TYPE: ICD-10-CM

## 2019-10-08 DIAGNOSIS — I10 ESSENTIAL HYPERTENSION: ICD-10-CM

## 2019-10-08 DIAGNOSIS — R82.71 ASYMPTOMATIC BACTERIURIA: ICD-10-CM

## 2019-10-08 DIAGNOSIS — S43.432A ANTERIOR TO POSTERIOR TEAR OF SUPERIOR GLENOID LABRUM OF LEFT SHOULDER: ICD-10-CM

## 2019-10-08 DIAGNOSIS — J45.21 MILD INTERMITTENT ASTHMA WITH EXACERBATION: ICD-10-CM

## 2019-10-08 DIAGNOSIS — Z01.818 PREOP EXAMINATION: Primary | ICD-10-CM

## 2019-10-08 DIAGNOSIS — B96.89 ACUTE BACTERIAL SINUSITIS: ICD-10-CM

## 2019-10-08 DIAGNOSIS — J01.90 ACUTE BACTERIAL SINUSITIS: ICD-10-CM

## 2019-10-08 LAB
BASOPHILS ABSOLUTE: 0 K/UL (ref 0–0.2)
BASOPHILS RELATIVE PERCENT: 0.5 %
BILIRUBIN, POC: ABNORMAL
BLOOD URINE, POC: ABNORMAL
CLARITY, POC: CLEAR
COLOR, POC: YELLOW
EOSINOPHILS ABSOLUTE: 0.2 K/UL (ref 0–0.6)
EOSINOPHILS RELATIVE PERCENT: 1.9 %
GLUCOSE URINE, POC: ABNORMAL
HCT VFR BLD CALC: 37.6 % (ref 36–48)
HEMOGLOBIN: 12.9 G/DL (ref 12–16)
KETONES, POC: ABNORMAL
LEUKOCYTE EST, POC: ABNORMAL
LYMPHOCYTES ABSOLUTE: 2.1 K/UL (ref 1–5.1)
LYMPHOCYTES RELATIVE PERCENT: 24.5 %
MCH RBC QN AUTO: 30.6 PG (ref 26–34)
MCHC RBC AUTO-ENTMCNC: 34.4 G/DL (ref 31–36)
MCV RBC AUTO: 89.2 FL (ref 80–100)
MONOCYTES ABSOLUTE: 0.5 K/UL (ref 0–1.3)
MONOCYTES RELATIVE PERCENT: 6 %
NEUTROPHILS ABSOLUTE: 5.7 K/UL (ref 1.7–7.7)
NEUTROPHILS RELATIVE PERCENT: 67.1 %
NITRITE, POC: ABNORMAL
PDW BLD-RTO: 12.7 % (ref 12.4–15.4)
PH, POC: 6.5
PLATELET # BLD: 366 K/UL (ref 135–450)
PMV BLD AUTO: 7 FL (ref 5–10.5)
PROTEIN, POC: ABNORMAL
RBC # BLD: 4.22 M/UL (ref 4–5.2)
SPECIFIC GRAVITY, POC: 1.01
UROBILINOGEN, POC: 0.2
WBC # BLD: 8.5 K/UL (ref 4–11)

## 2019-10-08 PROCEDURE — 81002 URINALYSIS NONAUTO W/O SCOPE: CPT | Performed by: FAMILY MEDICINE

## 2019-10-08 PROCEDURE — 99244 OFF/OP CNSLTJ NEW/EST MOD 40: CPT | Performed by: FAMILY MEDICINE

## 2019-10-08 RX ORDER — CEFDINIR 300 MG/1
600 CAPSULE ORAL DAILY
Qty: 20 CAPSULE | Refills: 0 | Status: SHIPPED | OUTPATIENT
Start: 2019-10-08 | End: 2019-10-18

## 2019-10-08 ASSESSMENT — ENCOUNTER SYMPTOMS
ALLERGIC/IMMUNOLOGIC NEGATIVE: 1
GASTROINTESTINAL NEGATIVE: 1
EYES NEGATIVE: 1
COUGH: 1

## 2019-10-29 ENCOUNTER — OFFICE VISIT (OUTPATIENT)
Dept: PULMONOLOGY | Age: 39
End: 2019-10-29
Payer: COMMERCIAL

## 2019-10-29 VITALS
SYSTOLIC BLOOD PRESSURE: 132 MMHG | BODY MASS INDEX: 37.49 KG/M2 | HEART RATE: 95 BPM | HEIGHT: 65 IN | OXYGEN SATURATION: 99 % | WEIGHT: 225 LBS | DIASTOLIC BLOOD PRESSURE: 82 MMHG

## 2019-10-29 DIAGNOSIS — I10 ESSENTIAL HYPERTENSION: Chronic | ICD-10-CM

## 2019-10-29 DIAGNOSIS — G47.33 OBSTRUCTIVE SLEEP APNEA (ADULT) (PEDIATRIC): Chronic | ICD-10-CM

## 2019-10-29 DIAGNOSIS — E66.9 NON MORBID OBESITY, UNSPECIFIED OBESITY TYPE: Chronic | ICD-10-CM

## 2019-10-29 DIAGNOSIS — J45.30 MILD PERSISTENT ASTHMA WITHOUT COMPLICATION: Chronic | ICD-10-CM

## 2019-10-29 PROCEDURE — 99214 OFFICE O/P EST MOD 30 MIN: CPT | Performed by: INTERNAL MEDICINE

## 2019-10-29 RX ORDER — MODAFINIL 200 MG/1
200 TABLET ORAL DAILY
COMMUNITY
End: 2019-12-11 | Stop reason: SDUPTHER

## 2019-10-29 RX ORDER — OMEGA-3 FATTY ACIDS/FISH OIL 300-1000MG
1 CAPSULE ORAL
COMMUNITY
End: 2020-12-28

## 2019-10-29 ASSESSMENT — ENCOUNTER SYMPTOMS
APNEA: 0
CHOKING: 0
COUGH: 0
SHORTNESS OF BREATH: 0
RHINORRHEA: 0

## 2019-10-29 ASSESSMENT — SLEEP AND FATIGUE QUESTIONNAIRES
HOW LIKELY ARE YOU TO NOD OFF OR FALL ASLEEP WHILE WATCHING TV: 1
HOW LIKELY ARE YOU TO NOD OFF OR FALL ASLEEP IN A CAR, WHILE STOPPED FOR A FEW MINUTES IN TRAFFIC: 0
HOW LIKELY ARE YOU TO NOD OFF OR FALL ASLEEP WHILE SITTING AND READING: 3
HOW LIKELY ARE YOU TO NOD OFF OR FALL ASLEEP WHILE SITTING QUIETLY AFTER LUNCH WITHOUT ALCOHOL: 2
HOW LIKELY ARE YOU TO NOD OFF OR FALL ASLEEP WHILE SITTING AND TALKING TO SOMEONE: 0
HOW LIKELY ARE YOU TO NOD OFF OR FALL ASLEEP WHEN YOU ARE A PASSENGER IN A CAR FOR AN HOUR WITHOUT A BREAK: 3
HOW LIKELY ARE YOU TO NOD OFF OR FALL ASLEEP WHILE SITTING INACTIVE IN A PUBLIC PLACE: 0
HOW LIKELY ARE YOU TO NOD OFF OR FALL ASLEEP WHILE LYING DOWN TO REST IN THE AFTERNOON WHEN CIRCUMSTANCES PERMIT: 3
ESS TOTAL SCORE: 12

## 2019-11-20 ENCOUNTER — PATIENT MESSAGE (OUTPATIENT)
Dept: FAMILY MEDICINE CLINIC | Age: 39
End: 2019-11-20

## 2019-11-20 RX ORDER — LISINOPRIL 5 MG/1
TABLET ORAL
Qty: 90 TABLET | Refills: 1 | Status: SHIPPED | OUTPATIENT
Start: 2019-11-20 | End: 2020-11-09 | Stop reason: SDUPTHER

## 2019-12-11 DIAGNOSIS — G47.33 OBSTRUCTIVE SLEEP APNEA SYNDROME: Primary | ICD-10-CM

## 2019-12-12 RX ORDER — MODAFINIL 200 MG/1
TABLET ORAL
Qty: 60 TABLET | Refills: 5 | Status: SHIPPED | OUTPATIENT
Start: 2019-12-12 | End: 2020-04-13 | Stop reason: SDUPTHER

## 2020-03-05 RX ORDER — ATORVASTATIN CALCIUM 40 MG/1
40 TABLET, FILM COATED ORAL DAILY
Qty: 30 TABLET | Refills: 0 | Status: SHIPPED | OUTPATIENT
Start: 2020-03-05 | End: 2020-04-13

## 2020-03-20 ENCOUNTER — TELEPHONE (OUTPATIENT)
Dept: FAMILY MEDICINE CLINIC | Age: 40
End: 2020-03-20

## 2020-03-20 ENCOUNTER — PATIENT MESSAGE (OUTPATIENT)
Dept: FAMILY MEDICINE CLINIC | Age: 40
End: 2020-03-20

## 2020-03-20 NOTE — TELEPHONE ENCOUNTER
From: Brigid Ge  To: Shreya Aguilar MD  Sent: 3/20/2020 2:36 PM EDT  Subject: Non-Urgent Medical Question    Hi Dr. Kalee Aguirre is still experiencing congestion and wheezing in her chest from the flu 2 weeks ago. I called the office earlier I was told no one is being seen and a message would be sent to you. Then I was transferred to the coronavirus hotline where I was told Kenn Vela does not sound like she has the coronavirus and I would need to speak to a nurse. Then I was transferred again and put on hold for an hour just to be hung up on. Can you give me some instruction on what I need to do? I don't want the chest congestion to turn into pneumonia.      Thanks,  Brigid Ge

## 2020-03-23 NOTE — TELEPHONE ENCOUNTER
I did call Jocelynn Cerrato on Friday 3/20/20 to let her know antibiotic was sent to pharmacy and she needed to pick it up. She said she would.

## 2020-03-31 ENCOUNTER — TELEMEDICINE (OUTPATIENT)
Dept: FAMILY MEDICINE CLINIC | Age: 40
End: 2020-03-31
Payer: COMMERCIAL

## 2020-03-31 PROCEDURE — 99213 OFFICE O/P EST LOW 20 MIN: CPT | Performed by: FAMILY MEDICINE

## 2020-03-31 RX ORDER — AZITHROMYCIN 250 MG/1
TABLET, FILM COATED ORAL
Qty: 1 PACKET | Refills: 0 | Status: SHIPPED | OUTPATIENT
Start: 2020-03-31 | End: 2020-04-10

## 2020-03-31 RX ORDER — PREDNISONE 10 MG/1
TABLET ORAL
Qty: 30 TABLET | Refills: 0 | Status: SHIPPED | OUTPATIENT
Start: 2020-03-31 | End: 2020-04-10

## 2020-04-03 ENCOUNTER — PATIENT MESSAGE (OUTPATIENT)
Dept: FAMILY MEDICINE CLINIC | Age: 40
End: 2020-04-03

## 2020-04-03 NOTE — TELEPHONE ENCOUNTER
Called [facility] and advised to try hot tub soaks as advised by Dr Lamin Marshall.   If this does not work she will let us know

## 2020-04-09 ENCOUNTER — PATIENT MESSAGE (OUTPATIENT)
Dept: FAMILY MEDICINE CLINIC | Age: 40
End: 2020-04-09

## 2020-04-09 RX ORDER — MONTELUKAST SODIUM 10 MG/1
10 TABLET ORAL DAILY
Qty: 30 TABLET | Refills: 3 | Status: SHIPPED | OUTPATIENT
Start: 2020-04-09 | End: 2020-11-09

## 2020-04-09 RX ORDER — BUDESONIDE AND FORMOTEROL FUMARATE DIHYDRATE 160; 4.5 UG/1; UG/1
2 AEROSOL RESPIRATORY (INHALATION) 2 TIMES DAILY
Qty: 3 INHALER | Refills: 1 | Status: SHIPPED | OUTPATIENT
Start: 2020-04-09 | End: 2020-12-15 | Stop reason: SDUPTHER

## 2020-04-09 RX ORDER — PREDNISONE 10 MG/1
TABLET ORAL
Qty: 30 TABLET | Refills: 0 | Status: SHIPPED | OUTPATIENT
Start: 2020-04-09 | End: 2020-04-19

## 2020-04-13 ENCOUNTER — VIRTUAL VISIT (OUTPATIENT)
Dept: FAMILY MEDICINE CLINIC | Age: 40
End: 2020-04-13
Payer: COMMERCIAL

## 2020-04-13 VITALS — BODY MASS INDEX: 39.11 KG/M2 | WEIGHT: 235 LBS | TEMPERATURE: 96.4 F

## 2020-04-13 PROCEDURE — 99214 OFFICE O/P EST MOD 30 MIN: CPT | Performed by: NURSE PRACTITIONER

## 2020-04-13 RX ORDER — NITROFURANTOIN 25; 75 MG/1; MG/1
100 CAPSULE ORAL 2 TIMES DAILY
Qty: 14 CAPSULE | Refills: 0 | Status: SHIPPED | OUTPATIENT
Start: 2020-04-13 | End: 2020-04-20

## 2020-04-13 RX ORDER — DULOXETIN HYDROCHLORIDE 60 MG/1
CAPSULE, DELAYED RELEASE ORAL
Qty: 30 CAPSULE | Refills: 1 | Status: SHIPPED | OUTPATIENT
Start: 2020-04-13 | End: 2020-06-12

## 2020-04-13 RX ORDER — ATORVASTATIN CALCIUM 40 MG/1
TABLET, FILM COATED ORAL
Qty: 30 TABLET | Refills: 1 | Status: SHIPPED | OUTPATIENT
Start: 2020-04-13 | End: 2020-06-12

## 2020-04-13 ASSESSMENT — ENCOUNTER SYMPTOMS
VOMITING: 0
NAUSEA: 0
FOREIGN BODY SENSATION: 0
COUGH: 0
EYE DISCHARGE: 1
EYE REDNESS: 0
EYE ITCHING: 1

## 2020-04-13 NOTE — PROGRESS NOTES
4/13/2020    This is a 41 y.o. female   Chief Complaint   Patient presents with    Eye Problem     Lt eye pain, drainage for 3 days    Urinary Tract Infection     dysuria, urinary frequency, flank pain for 1 week    . Eye Problem    The left eye is affected. This is a new problem. Episode onset: 3 days  The problem occurs intermittently. The problem has been gradually improving. There was no injury mechanism. The pain is mild. There is no known exposure to pink eye. She wears contacts. Associated symptoms include an eye discharge and itching. Pertinent negatives include no eye redness, fever, foreign body sensation, nausea or vomiting. Treatments tried: warm compress  The treatment provided mild relief. Urinary Tract Infection    This is a new problem. The current episode started in the past 7 days. The problem occurs every urination. The problem has been gradually worsening. The quality of the pain is described as burning. The pain is at a severity of 5/10. The pain is moderate. There has been no fever. Associated symptoms include flank pain, frequency and urgency. Pertinent negatives include no hematuria, hesitancy, nausea or vomiting. Treatments tried: AZO. The treatment provided mild relief. Positive for white and slight green drainage from left eye. Has been crusted shut in AM. Positive for itching. Denies redness does have some pink irritation. Has not been wearing contacts. Did warm compress today with slight improvement. Having decreased urine output at times.      LMP- 3 weeks ago      Patient Active Problem List   Diagnosis    Polycystic ovarian disease    Hyperlipidemia    Anxiety    Hirsutism    Allergic rhinitis due to pollen    Deviated septum    Asthma    Lumbar disc disease L4-5, L5-S1 Dr [facility]) Epidural injections 2011    Essential hypertension    Prediabetes A1c 5.7 6/1/16    Obstructive sleep apnea (adult) (pediatric)    Hypersomnia with sleep apnea- on Provigil 200 bid since 11/2/2010    Other viral warts    Non morbid obesity, unspecified obesity type          Current Outpatient Medications   Medication Sig Dispense Refill    montelukast (SINGULAIR) 10 MG tablet Take 1 tablet by mouth daily 30 tablet 3    budesonide-formoterol (SYMBICORT) 160-4.5 MCG/ACT AERO Inhale 2 puffs into the lungs 2 times daily 3 Inhaler 1    Spacer/Aero-Holding Chambers GABRIELLE 1 Device by Does not apply route daily as needed (use with HFA devices) 1 Device 0    atorvastatin (LIPITOR) 40 MG tablet Take 1 tablet by mouth daily APPOINTMENT REQUIRED FOR FURTHER REFILLS 30 tablet 0    DULoxetine (CYMBALTA) 60 MG extended release capsule TAKE 1 CAPSULE BY MOUTH DAILY 30 capsule 1    omeprazole (PRILOSEC) 40 MG delayed release capsule TAKE ONE CAPSULE BY MOUTH TWICE A DAY 60 capsule 2    lisinopril (PRINIVIL;ZESTRIL) 5 MG tablet TAKE 1 TABLET EVERY DAY 90 tablet 1    ibuprofen (ADVIL;MOTRIN) 200 MG CAPS Take 1 capsule by mouth      spironolactone (ALDACTONE) 100 MG tablet TAKE ONE TABLET BY MOUTH EVERY DAY 90 tablet 1    SPRINTEC 28 0.25-35 MG-MCG per tablet       albuterol sulfate HFA (PROAIR HFA) 108 (90 Base) MCG/ACT inhaler INHALE 2 PUFFS EVERY 6 HOURS AS NEEDED FOR WHEEZING OR SHORTNESS OF BREATH 8.5 Inhaler 0    modafinil (PROVIGIL) 200 MG tablet Take 1 tablet by mouth 2 times daily for 90 days. 1-2 po qAM 60 tablet 2    loratadine (CLARITIN) 10 MG tablet Take 1 tablet by mouth daily      Multiple Vitamins-Minerals (THERAPEUTIC MULTIVITAMIN-MINERALS) tablet Take 1 tablet by mouth daily.  predniSONE (DELTASONE) 10 MG tablet Take 4 tabs for 3 days, then 3 tabs for 3 days, then 2 tabs for 3 days, then 1 tab for 3 days. (Patient not taking: Reported on 4/13/2020) 30 tablet 0     No current facility-administered medications for this visit.         Allergies   Allergen Reactions    Ensure      EGGS    Peanut-Containing Drug Products      PEANUTS    Food      Certain foods (chocolate, tea, soy, eggs, tree nuts, coffee, tomatoes, black pepper)       Review of Systems   Constitutional: Negative for activity change and fever. Eyes: Positive for discharge and itching. Negative for redness. Respiratory: Negative for cough. Cardiovascular: Negative for chest pain. Gastrointestinal: Negative for nausea and vomiting. Genitourinary: Positive for dysuria, flank pain, frequency and urgency. Negative for difficulty urinating, hematuria, hesitancy, pelvic pain and vaginal discharge. Vitals:    04/13/20 1136   Temp: 96.4 °F (35.8 °C)   Weight: 235 lb (106.6 kg)   Patient reported     Body mass index is 39.11 kg/m². Wt Readings from Last 3 Encounters:   04/13/20 235 lb (106.6 kg)   10/29/19 225 lb (102.1 kg)   10/08/19 212 lb (96.2 kg)       BP Readings from Last 3 Encounters:   10/29/19 132/82   10/08/19 114/80   09/27/19 122/80       Physical Exam  Vitals signs and nursing note reviewed. Constitutional:       Appearance: She is well-developed. HENT:      Head: Normocephalic and atraumatic. Nose: Nose normal.   Eyes:      General: Lids are normal.      Extraocular Movements: Extraocular movements intact. Conjunctiva/sclera: Conjunctivae normal.      Right eye: Right conjunctiva is not injected. Left eye: Left conjunctiva is not injected. Pulmonary:      Effort: Pulmonary effort is normal.   Neurological:      Mental Status: She is alert and oriented to person, place, and time. Psychiatric:         Mood and Affect: Mood normal.         Behavior: Behavior normal.         Thought Content: Thought content normal.         Judgment: Judgment normal.         Assessmentand Plan  Nesha Banerjee was seen today for eye problem and urinary tract infection. Diagnoses and all orders for this visit:    Acute cystitis without hematuria  -     nitrofurantoin, macrocrystal-monohydrate, (MACROBID) 100 MG capsule;  Take 1 capsule by mouth 2 times daily for 7 days For treatment of urinary tract infection. Increase fluids  Patient is to call if symptoms worsen or fail to improve within a couple of days. Eye drainage  Eyes do not appear to be injected on video exam.   Advised to use warm compresses for 20 minutes TID and PRN. Should not wear contacts till symptoms resolve. Patient is to call if symptoms do not continue to improve. If worsen she will let me know and will call in antibiotic ointment. Return if symptoms worsen or fail to improve. Kathya Brink is a 41 y.o. female being evaluated by a Virtual Visit (video visit) encounter to address concerns as mentioned above. A caregiver was present when appropriate. Due to this being a TeleHealth encounter (During THJFO-33 public health emergency), evaluation of the following organ systems was limited: Vitals/Constitutional/EENT/Resp/CV/GI//MS/Neuro/Skin/Heme-Lymph-Imm. Pursuant to the emergency declaration under the [address] [place] [address] authority and the [employer] Act, this Virtual Visit was conducted with patient's (and/or legal guardian's) consent, to reduce the patient's risk of exposure to COVID-19 and provide necessary medical care. The patient (and/or legal guardian) has also been advised to contact this office for worsening conditions or problems, and seek emergency medical treatment and/or call 911 if deemed necessary. Services were provided through a video synchronous discussion virtually to substitute for in-person clinic visit. Patient and provider were located at their individual homes. --QI Garcia CNP on 4/13/2020 at 11:44 AM    An electronic signature was used to authenticate this note.

## 2020-05-05 ENCOUNTER — TELEPHONE (OUTPATIENT)
Dept: PULMONOLOGY | Age: 40
End: 2020-05-05

## 2020-07-08 RX ORDER — MODAFINIL 200 MG/1
TABLET ORAL
Qty: 60 TABLET | Refills: 2 | Status: SHIPPED | OUTPATIENT
Start: 2020-07-08 | End: 2020-11-20 | Stop reason: SDUPTHER

## 2020-07-28 ENCOUNTER — TELEPHONE (OUTPATIENT)
Dept: FAMILY MEDICINE CLINIC | Age: 40
End: 2020-07-28

## 2020-07-28 NOTE — TELEPHONE ENCOUNTER
Patient called stating for the past week her blood pressure has been running higher/ patient suffers from vertigo. but not sure from vertigo or from bp.   bp yesterday was 138/89   Patient had a bad headache last night, dizziness    Patient refusing virtually visit no availably with dr Sierra Jimenez visit until Friday 8/07. patient wants to be seen in person by dr Lucy Bradshaw. Patient stating would like a call back if we could get her in sooner.    Please advise

## 2020-08-31 ENCOUNTER — OFFICE VISIT (OUTPATIENT)
Dept: FAMILY MEDICINE CLINIC | Age: 40
End: 2020-08-31
Payer: COMMERCIAL

## 2020-08-31 ENCOUNTER — PATIENT MESSAGE (OUTPATIENT)
Dept: FAMILY MEDICINE CLINIC | Age: 40
End: 2020-08-31

## 2020-08-31 VITALS
HEART RATE: 77 BPM | OXYGEN SATURATION: 98 % | DIASTOLIC BLOOD PRESSURE: 70 MMHG | BODY MASS INDEX: 39.49 KG/M2 | SYSTOLIC BLOOD PRESSURE: 108 MMHG | WEIGHT: 237 LBS | HEIGHT: 65 IN | TEMPERATURE: 97.9 F

## 2020-08-31 LAB
BILIRUBIN, POC: ABNORMAL
BLOOD URINE, POC: ABNORMAL
CLARITY, POC: CLEAR
COLOR, POC: YELLOW
GLUCOSE URINE, POC: ABNORMAL
KETONES, POC: ABNORMAL
LEUKOCYTE EST, POC: ABNORMAL
NITRITE, POC: ABNORMAL
PH, POC: 5.5
PROTEIN, POC: ABNORMAL
SPECIFIC GRAVITY, POC: 1.01
UROBILINOGEN, POC: 0.2

## 2020-08-31 PROCEDURE — 99213 OFFICE O/P EST LOW 20 MIN: CPT | Performed by: FAMILY MEDICINE

## 2020-08-31 PROCEDURE — 81002 URINALYSIS NONAUTO W/O SCOPE: CPT | Performed by: FAMILY MEDICINE

## 2020-08-31 RX ORDER — NITROFURANTOIN 25; 75 MG/1; MG/1
100 CAPSULE ORAL 2 TIMES DAILY
Qty: 20 CAPSULE | Refills: 0 | Status: SHIPPED | OUTPATIENT
Start: 2020-08-31 | End: 2020-09-10

## 2020-08-31 ASSESSMENT — PATIENT HEALTH QUESTIONNAIRE - PHQ9
1. LITTLE INTEREST OR PLEASURE IN DOING THINGS: 0
SUM OF ALL RESPONSES TO PHQ QUESTIONS 1-9: 0
SUM OF ALL RESPONSES TO PHQ9 QUESTIONS 1 & 2: 0
2. FEELING DOWN, DEPRESSED OR HOPELESS: 0
SUM OF ALL RESPONSES TO PHQ QUESTIONS 1-9: 0

## 2020-08-31 NOTE — TELEPHONE ENCOUNTER
She needs to be seen today so we can evaluate this, get urine culture. OK to add on to end of day. Call her to see if the urgent care did a culture also.

## 2020-08-31 NOTE — TELEPHONE ENCOUNTER
From: Lyly Samano  To: Sharon Rosa MD  Sent: 8/31/2020 9:08 AM EDT  Subject: Non-Urgent Medical Question    Good morning Dr. Kylah Coffey,  A couple of weeks ago I went to urgent care and was diagnosed with UTI. I was give a 10 day supply of Bactrim; which I completed last Thursday. I am not feeling better. What would you recommend I do.     Thank you,  Lyly Samano

## 2020-08-31 NOTE — PROGRESS NOTES
Subjective:      Patient ID: Lyly Samano is a 41 y.o. female. Blood pressure 108/70, pulse 77, temperature 97.9 °F (36.6 °C), temperature source Temporal, height 5' 5\" (1.651 m), weight 237 lb (107.5 kg), last menstrual period 08/02/2020, SpO2 98 %, not currently breastfeeding. HPI   Chief Complaint   Patient presents with    Urinary Frequency     treated 2 weeks ago, took 10 days of med, still feels like she has it. dysruia x 2 wks. Dx as uti at urgent care. Bactrim bid for 10 days. Did not get fully better, now worse. A culture was done, but the species is unknown to [place]. But was sensitive to Bactrim. Mild abd pain- lower, freq, mild urgency, mid back discomfort (sometimes sharp). No f/c, no gross hematuria,  No flank pain. No vag d/c      Patient Active Problem List   Diagnosis    Polycystic ovarian disease    Hyperlipidemia    Anxiety    Hirsutism    Allergic rhinitis due to pollen    Deviated septum    Asthma    Lumbar disc disease L4-5, L5-S1 Dr [facility] [address] [facility]) Epidural injections 2011    Essential hypertension    Prediabetes A1c 5.7 6/1/16    Obstructive sleep apnea (adult) (pediatric)    Hypersomnia with sleep apnea- on Provigil 200 bid since 11/2/2010    Other viral warts    Non morbid obesity, unspecified obesity type      Body mass index is 39.44 kg/m².     Wt Readings from Last 3 Encounters:   08/31/20 237 lb (107.5 kg)   04/13/20 235 lb (106.6 kg)   10/29/19 225 lb (102.1 kg)      BP Readings from Last 3 Encounters:   08/31/20 108/70   10/29/19 132/82   10/08/19 114/80      Current Outpatient Medications   Medication Sig Dispense Refill    omeprazole (PRILOSEC) 40 MG delayed release capsule TAKE ONE CAPSULE BY MOUTH TWICE A DAY 60 capsule 5    DULoxetine (CYMBALTA) 60 MG extended release capsule TAKE ONE CAPSULE BY MOUTH DAILY 30 capsule 2    atorvastatin (LIPITOR) 40 MG tablet TAKE ONE TABLET BY MOUTH DAILY 30 tablet 2    modafinil (PROVIGIL) 200 MG tablet TAKE ONE TO TWO TABLETS BY MOUTH EVERY MORNING 60 tablet 2    spironolactone (ALDACTONE) 100 MG tablet TAKE ONE TABLET BY MOUTH DAILY 30 tablet 1    montelukast (SINGULAIR) 10 MG tablet Take 1 tablet by mouth daily 30 tablet 3    budesonide-formoterol (SYMBICORT) 160-4.5 MCG/ACT AERO Inhale 2 puffs into the lungs 2 times daily 3 Inhaler 1    Spacer/Aero-Holding Chambers GABRIELLE 1 Device by Does not apply route daily as needed (use with HFA devices) 1 Device 0    lisinopril (PRINIVIL;ZESTRIL) 5 MG tablet TAKE 1 TABLET EVERY DAY 90 tablet 1    ibuprofen (ADVIL;MOTRIN) 200 MG CAPS Take 1 capsule by mouth      SPRINTEC 28 0.25-35 MG-MCG per tablet       albuterol sulfate HFA (PROAIR HFA) 108 (90 Base) MCG/ACT inhaler INHALE 2 PUFFS EVERY 6 HOURS AS NEEDED FOR WHEEZING OR SHORTNESS OF BREATH 8.5 Inhaler 0    loratadine (CLARITIN) 10 MG tablet Take 1 tablet by mouth daily      Multiple Vitamins-Minerals (THERAPEUTIC MULTIVITAMIN-MINERALS) tablet Take 1 tablet by mouth daily. No current facility-administered medications for this visit.        Immunization History   Administered Date(s) Administered    Influenza Virus Vaccine 09/09/2014    Influenza, Intradermal, Preservative free 10/01/2010, 11/28/2012, 09/23/2013    Influenza, Intradermal, Quadrivalent, Preservative Free 10/05/2016    Influenza, MDCK Quadv, IM, PF (Flucelvax 4 yrs and older) 09/27/2019    Influenza, Quadv, IM, PF (6 mo and older Fluzone, Flulaval, Fluarix, and 3 yrs and older Afluria) 10/21/2015, 10/28/2017    Influenza, Karuna Vanessa, Recombinant, IM PF (Flublok 18 yrs and older) 09/27/2018    Pneumococcal Polysaccharide ([id number]) 01/19/2018    Tdap (Boostrix, Adacel) 08/20/2012        Social History     Tobacco Use    Smoking status: Never Smoker    Smokeless tobacco: Never Used    Tobacco comment: congratulated on nonsmoking status   Substance Use Topics    Alcohol use: No    Drug use: No        Review of Systems As above Objective:   Physical Exam  Vitals signs and nursing note reviewed. Constitutional:       Appearance: Normal appearance. She is well-developed. Neck:      Musculoskeletal: Normal range of motion and neck supple. Thyroid: No thyromegaly. Vascular: No carotid bruit. Cardiovascular:      Rate and Rhythm: Normal rate and regular rhythm. Pulses: Normal pulses. Heart sounds: Normal heart sounds. No murmur. Pulmonary:      Effort: Pulmonary effort is normal. No respiratory distress. Breath sounds: Normal breath sounds. No wheezing or rales. Abdominal:      General: Abdomen is flat. Bowel sounds are normal. There is no distension. Palpations: There is no mass. Tenderness: There is no abdominal tenderness. There is no right CVA tenderness or left CVA tenderness. Musculoskeletal: Normal range of motion. Right lower leg: No edema. Left lower leg: No edema. Skin:     General: Skin is warm and dry. Neurological:      General: No focal deficit present. Mental Status: She is alert and oriented to person, place, and time. Mental status is at baseline. Psychiatric:         Mood and Affect: Mood normal.         Behavior: Behavior normal.         Thought Content: Thought content normal.         Judgment: Judgment normal.         Assessment:      1. Urinary tract infection with hematuria, site unspecified  - POCT Urinalysis no Micro: trace blood, small leukos. - Culture, Urine    Start macrobid.   Rest/fluids  Call if worsens        Plan:      As above         Tim Mario MD

## 2020-09-01 LAB — URINE CULTURE, ROUTINE: NORMAL

## 2020-11-09 ENCOUNTER — OFFICE VISIT (OUTPATIENT)
Dept: FAMILY MEDICINE CLINIC | Age: 40
End: 2020-11-09
Payer: COMMERCIAL

## 2020-11-09 VITALS
HEIGHT: 66 IN | WEIGHT: 242.2 LBS | HEART RATE: 86 BPM | DIASTOLIC BLOOD PRESSURE: 76 MMHG | BODY MASS INDEX: 38.92 KG/M2 | OXYGEN SATURATION: 99 % | RESPIRATION RATE: 16 BRPM | SYSTOLIC BLOOD PRESSURE: 118 MMHG | TEMPERATURE: 97.5 F

## 2020-11-09 PROCEDURE — 99214 OFFICE O/P EST MOD 30 MIN: CPT | Performed by: FAMILY MEDICINE

## 2020-11-09 PROCEDURE — 90471 IMMUNIZATION ADMIN: CPT | Performed by: FAMILY MEDICINE

## 2020-11-09 PROCEDURE — 90756 CCIIV4 VACC ABX FREE IM: CPT | Performed by: FAMILY MEDICINE

## 2020-11-09 RX ORDER — ALBUTEROL SULFATE 90 UG/1
AEROSOL, METERED RESPIRATORY (INHALATION)
Qty: 8.5 INHALER | Refills: 0 | Status: SHIPPED | OUTPATIENT
Start: 2020-11-09 | End: 2021-01-12

## 2020-11-09 RX ORDER — DULOXETIN HYDROCHLORIDE 30 MG/1
30 CAPSULE, DELAYED RELEASE ORAL DAILY
Qty: 30 CAPSULE | Refills: 0 | Status: SHIPPED
Start: 2020-11-09 | End: 2020-12-28 | Stop reason: ALTCHOICE

## 2020-11-09 RX ORDER — LISINOPRIL 5 MG/1
TABLET ORAL
Qty: 90 TABLET | Refills: 1 | Status: SHIPPED | OUTPATIENT
Start: 2020-11-09 | End: 2021-06-09

## 2020-11-09 RX ORDER — MODAFINIL 200 MG/1
TABLET ORAL
Qty: 60 TABLET | Refills: 0 | OUTPATIENT
Start: 2020-11-09 | End: 2021-02-07

## 2020-11-09 RX ORDER — VILAZODONE HYDROCHLORIDE 20 MG/1
20 TABLET ORAL DAILY
Qty: 30 TABLET | Refills: 3 | Status: SHIPPED | OUTPATIENT
Start: 2020-11-09 | End: 2021-01-26

## 2020-11-09 RX ORDER — SPIRONOLACTONE 100 MG/1
TABLET, FILM COATED ORAL
Qty: 90 TABLET | Refills: 1 | Status: SHIPPED | OUTPATIENT
Start: 2020-11-09 | End: 2021-06-29

## 2020-11-09 NOTE — PROGRESS NOTES
Subjective:      Patient ID: Wing Marina is a 41 y.o. female. Blood pressure 118/76, pulse 86, temperature 97.5 °F (36.4 °C), temperature source Temporal, resp. rate 16, height 5' 6\" (1.676 m), weight 242 lb 3.2 oz (109.9 kg), SpO2 99 %, not currently breastfeeding. HPI    Chief Complaint   Patient presents with    Annual Exam     Patient here for physical. Alyson Rojas is complaing that she seems to be losing alot of hair when she washes it for past month. She is complainging that her vertigo is getting worse     + generalized hair loss for past month- notes increased hair in shower. Thinning @ crown. Hx PCOS and is on aldactone for several years. Aldactone has helped somewhat with hirsutism. Still has to shave. Has seen derm in past- for laser hair removal.    She thinks PNV use has helped in the past.  On sprintec bcp for a very long time: Dr Ale Acevedo    She has periodic vertigo. Has not sen ENT for this for a couple years, did not really talk about it then. Present for past few years,  But past 3-4 months has been more bothersome. 5x a week. Lasts ~3-5 min  epley maneuvers have been helpful in the past and has been doing them again and seem to help. She has chronic allergies and this fall has been bad. + headaches, scratchy throat, mucus production. No SOB. + sneezing.  meds used: claritin, stopped singulair as it did not help. Does use a nasal spray steroid- 1 spray each nostril BID. No longer goes to asthma doctor. Has noted this fall to be pretty flared up also. Is on symbicort and prn albuterol (very infrequent use)    Hyperlipidemia: on atorva 40. Cholesterol was VERY high prior to starting. Is not fasting today. Lab Results   Component Value Date    CHOL 149 08/02/2019    TRIG 145 08/02/2019    HDL 46 08/02/2019    LDLCALC 74 08/02/2019     Lab Results   Component Value Date    ALT 10 08/02/2019    AST 11 (L) 08/02/2019        On cymbalta for anxiety.   Affects libido and wishes to change. It does work ok, but finds that her mind does still ruminate on anxious thoughts. She does not recall a prior med that worked and did not affect sex drive. Patient Active Problem List   Diagnosis    Polycystic ovarian disease    Hyperlipidemia    Anxiety    Hirsutism    Allergic rhinitis due to pollen    Deviated septum    Asthma    Lumbar disc disease L4-5, L5-S1 Dr Lajoyce Shire [facility]) Epidural injections 2011    Essential hypertension    Prediabetes A1c 5.7 6/1/16    Obstructive sleep apnea (adult) (pediatric)    Hypersomnia with sleep apnea- on Provigil 200 bid since 11/2/2010    Other viral warts    Non morbid obesity, unspecified obesity type      Body mass index is 39.09 kg/m².     Wt Readings from Last 3 Encounters:   11/09/20 242 lb 3.2 oz (109.9 kg)   08/31/20 237 lb (107.5 kg)   04/13/20 235 lb (106.6 kg)      BP Readings from Last 3 Encounters:   11/09/20 118/76   08/31/20 108/70   10/29/19 132/82      Current Outpatient Medications   Medication Sig Dispense Refill    DULoxetine (CYMBALTA) 60 MG extended release capsule TAKE ONE CAPSULE BY MOUTH DAILY 90 capsule 1    atorvastatin (LIPITOR) 40 MG tablet TAKE ONE TABLET BY MOUTH DAILY 90 tablet 1    spironolactone (ALDACTONE) 100 MG tablet TAKE ONE TABLET BY MOUTH DAILY 30 tablet 1    omeprazole (PRILOSEC) 40 MG delayed release capsule TAKE ONE CAPSULE BY MOUTH TWICE A DAY 60 capsule 5    budesonide-formoterol (SYMBICORT) 160-4.5 MCG/ACT AERO Inhale 2 puffs into the lungs 2 times daily 3 Inhaler 1    Spacer/Aero-Holding Chambers GABRIELLE 1 Device by Does not apply route daily as needed (use with HFA devices) 1 Device 0    lisinopril (PRINIVIL;ZESTRIL) 5 MG tablet TAKE 1 TABLET EVERY DAY 90 tablet 1    ibuprofen (ADVIL;MOTRIN) 200 MG CAPS Take 1 capsule by mouth      SPRINTEC 28 0.25-35 MG-MCG per tablet       albuterol sulfate HFA (PROAIR HFA) 108 (90 Base) MCG/ACT inhaler INHALE 2 PUFFS EVERY 6 HOURS AS NEEDED FOR WHEEZING OR SHORTNESS OF BREATH 8.5 Inhaler 0    loratadine (CLARITIN) 10 MG tablet Take 1 tablet by mouth daily      Multiple Vitamins-Minerals (THERAPEUTIC MULTIVITAMIN-MINERALS) tablet Take 1 tablet by mouth daily.  modafinil (PROVIGIL) 200 MG tablet TAKE ONE TO TWO TABLETS BY MOUTH EVERY MORNING 60 tablet 2    montelukast (SINGULAIR) 10 MG tablet Take 1 tablet by mouth daily (Patient not taking: Reported on 11/9/2020) 30 tablet 3     No current facility-administered medications for this visit. Immunization History   Administered Date(s) Administered    Influenza Virus Vaccine 09/09/2014    Influenza, Intradermal, Preservative free 10/01/2010, 11/28/2012, 09/23/2013    Influenza, Intradermal, Quadrivalent, Preservative Free 10/05/2016    Influenza, MDCK Quadv, IM, PF (Flucelvax 4 yrs and older) 09/27/2019    Influenza, Quadv, IM, PF (6 mo and older Fluzone, Flulaval, Fluarix, and 3 yrs and older Afluria) 10/21/2015, 10/28/2017    Influenza, Loren Memory, Recombinant, IM PF (Flublok 18 yrs and older) 09/27/2018    Pneumococcal Polysaccharide ([id number]) 01/19/2018    Tdap (Boostrix, Adacel) 08/20/2012        Social History     Tobacco Use    Smoking status: Never Smoker    Smokeless tobacco: Never Used    Tobacco comment: congratulated on nonsmoking status   Substance Use Topics    Alcohol use: No    Drug use: No        Review of Systems No chest pains, dizziness, heart palpitations, dyspnea, lightheadedness, worsening edema. Objective:   Physical Exam  Vitals signs and nursing note reviewed. Constitutional:       Appearance: Normal appearance. She is well-developed. HENT:      Head: Normocephalic and atraumatic. Right Ear: Tympanic membrane, ear canal and external ear normal. There is no impacted cerumen. Left Ear: Tympanic membrane, ear canal and external ear normal. There is no impacted cerumen. Nose: Congestion present.       Comments: Mild turb fullness bilaterally  Neck: Musculoskeletal: Normal range of motion and neck supple. Thyroid: No thyromegaly. Vascular: No carotid bruit. Cardiovascular:      Rate and Rhythm: Normal rate and regular rhythm. Pulses: Normal pulses. Heart sounds: Normal heart sounds. No murmur. Pulmonary:      Effort: Pulmonary effort is normal. No respiratory distress. Breath sounds: Normal breath sounds. No wheezing or rales. Musculoskeletal: Normal range of motion. Right lower leg: No edema. Left lower leg: No edema. Skin:     General: Skin is warm and dry. Comments: Mild crown thinning of hair. Neurological:      General: No focal deficit present. Mental Status: She is alert and oriented to person, place, and time. Mental status is at baseline. Psychiatric:         Mood and Affect: Mood normal.         Behavior: Behavior normal.         Thought Content: Thought content normal.         Judgment: Judgment normal.         Assessment:      1. Essential hypertension  - bp stable on aldactone and prinzide. Continue; monitor renal fxn/lytes  - COMPREHENSIVE METABOLIC PANEL; Future    2. Hyperlipidemia, unspecified hyperlipidemia type  - on atorva for primary prevention. Retest when fasting  - Lipid, Fasting; Future    3. Prediabetes A1c 5.7 6/1/16  - discussed diet/exercise/wt loss/diabetes prevention methods to reduce insulin resistance. - Hemoglobin A1C; Future    4. Need for influenza vaccination  - INFLUENZA, MDCK QUADV, 4 YRS AND OLDER, IM, MDV, 0.5ML (FLUCELVAX QUADV)    5. Androgenic alopecia  - assess thyroid. Already on high dose aldactone. She is advised to f/u with her gyn to discuss her OCP to make sure it is the least androgenic option. She will also try DIM (Diindolylmethane 200mg/d) to see if this makes a difference (aids in estradiol glucuronidation for elimination, which in theory may divert androgens towards this pathway)     - TSH without Reflex; Future    6.  Vertigo  - likely due to allergy flare. Advised to change from claritin to more potent antihistamine such as allegra 180. And can increase nasal steroid to 2 sprays also. Does not have worrisome features, but she is to report if does not improve    7. Mild persistent asthma without complication  - - Stable; continue symbicort for maintenance therapy. Refill albuterol for prn use. 8. Anxiety  - change to vilazodone due to cymbalta's effect on libido. 11. Screening for HIV without presence of risk factors  - HIV Screen; Future          Plan:       Fu 6 mo        Tiana Hubbard MD

## 2020-11-09 NOTE — PATIENT INSTRUCTIONS
Diindolylmethane 200mg/d    Cymbalta: lower dose to 30 mg/d for 2 weeks, then every other day for a week, then stop. Viibryd (viilazodone) 20 mg tabs: use 1/2 tab for first 8-10 days, then move to whole tab once daily.

## 2020-11-16 ENCOUNTER — TELEPHONE (OUTPATIENT)
Dept: FAMILY MEDICINE CLINIC | Age: 40
End: 2020-11-16

## 2020-11-20 ENCOUNTER — VIRTUAL VISIT (OUTPATIENT)
Dept: PULMONOLOGY | Age: 40
End: 2020-11-20
Payer: COMMERCIAL

## 2020-11-20 PROCEDURE — 99214 OFFICE O/P EST MOD 30 MIN: CPT | Performed by: NURSE PRACTITIONER

## 2020-11-20 RX ORDER — MODAFINIL 200 MG/1
200 TABLET ORAL DAILY
Qty: 30 TABLET | Refills: 0 | Status: SHIPPED | OUTPATIENT
Start: 2020-11-20 | End: 2020-12-14

## 2020-11-20 ASSESSMENT — SLEEP AND FATIGUE QUESTIONNAIRES
HOW LIKELY ARE YOU TO NOD OFF OR FALL ASLEEP WHILE SITTING AND TALKING TO SOMEONE: 0
HOW LIKELY ARE YOU TO NOD OFF OR FALL ASLEEP WHILE LYING DOWN TO REST IN THE AFTERNOON WHEN CIRCUMSTANCES PERMIT: 3
HOW LIKELY ARE YOU TO NOD OFF OR FALL ASLEEP WHILE SITTING AND READING: 3
HOW LIKELY ARE YOU TO NOD OFF OR FALL ASLEEP WHILE WATCHING TV: 3
HOW LIKELY ARE YOU TO NOD OFF OR FALL ASLEEP WHEN YOU ARE A PASSENGER IN A CAR FOR AN HOUR WITHOUT A BREAK: 2
ESS TOTAL SCORE: 12
HOW LIKELY ARE YOU TO NOD OFF OR FALL ASLEEP WHILE SITTING QUIETLY AFTER LUNCH WITHOUT ALCOHOL: 1
HOW LIKELY ARE YOU TO NOD OFF OR FALL ASLEEP WHILE SITTING INACTIVE IN A PUBLIC PLACE: 0
HOW LIKELY ARE YOU TO NOD OFF OR FALL ASLEEP IN A CAR, WHILE STOPPED FOR A FEW MINUTES IN TRAFFIC: 0

## 2020-11-20 NOTE — PROGRESS NOTES
Dell Crump MD, FAASM, [facility]  Claudie Kayser, MSN, RN, CNP     [address] [place] [address]  Dept: [phone]  Dept Fax: [phone]  Loc: [phone]      [address] [place] [id number] [phone]    Subjective:     Patient ID: Nitza Durham is a 41 y.o. female. Chief Complaint   Patient presents with    Sleep Apnea       HPI:      Sleep Medicine Video Visit    Pursuant to the emergency declaration under the [address] [place] [address], [facility] waiver authority and the [employer] Act this Telephone Visit was insisted, with patient's consent, to reduce the patient's risk of exposure to COVID-19 and provide continuity of care for an established patient. Services were provided through a synchronous discussion over a telephone and/or Video chat to substitute for in-person clinic visit, and coded as such. While patient is at home. Machine Modem/Download Info:  Compliance (hours/night): 5.5 hrs/night  Download AHI (/hour): 3.9 /HR     Average IPAP Pressure: 14.6 cmH2O  Average EPAP Pressure: 9.4 cmH2O         AUTO BIPAP - Settings (Cesar)  IPAP Max: 25 cmH2O  EPAP Min: 8 cmH2O  Pressure Support Min: 4  Pressure Support Max: 8             Comfort Settings  Humidity Level (0-8): 3  Flex/EPR (0-3): 3 PAP Mask  Mask Type: Nasal mask  Clinically Relevant Leak: No     [place] - Total score: 12    Follow-up :     Last Visit : October 2019      Patient reports the listed chronic Co-morbidities: Hypersomia, HTN, Allergies, Asthma, Obesity   are well controlled and stable at this time.      Subjective Health Changes: None      Over Night Oximetry: [] Yes  [] No  [x] NA [] WNL   Using O2: [] Yes  [] No  [x] NA   Patient is compliant with the machine  [x] Yes  [] No   Feeling rested when using the machine   [x] Yes  [] No     Pressure is comfortable with inspiration and expiration  [x] Yes  [] No     Noticed changes in pressure   [] Yes  [] No  [x] NA   Mask is fitting well  [x] Yes  [] No   Noting Mask Air Leak  [] Yes  [x] No   Having painful Aerophagia  [] Yes  [x] No   Nocturia   0  per night. Having  HA upon waking  [] Yes  [x] No   Dry mouth upon waking   Dry Nose  Dry Eyes  [x] Yes  [] No   Congestion upon waking   [] Yes  [x] No    Nose Bleeds  [] Yes  [x] No   Using Sleep Aides  Provigil no rash, diarrhea, dizziness, or headaches    Understands how to change humidification and/or tubing temperature for comfort while at home  [x] Yes  [] No     Difficulties falling asleep  [] Yes  [x] No   Difficulties staying asleep  [] Yes  [x] No   Approximate time to bed  9:30-10pm   Approximate wake time  4-4:30am   Taking Naps  no   If taking naps usual length    [x] NA   If taking naps using the machine  [] Yes  [] No  [x] NA [] With and With out    Drowsy when driving  [] Yes  [x] No     Does patient carry a DOT/CDL  [] Yes  [x] No     Does patient carry FAA/[employer] License   [] Yes  [x] No      Any concerns noted with the machine at this time  [] Yes  [x] No        Diagnosis Orders   1. Obstructive sleep apnea (adult) (pediatric)  modafinil (PROVIGIL) 200 MG tablet   2. Non morbid obesity, unspecified obesity type     3. Hypersomnia with sleep apnea- on Provigil 200 bid since 11/2/2010     4. Essential hypertension     5. Non-seasonal allergic rhinitis due to pollen     6. Mild persistent asthma without complication         The chronic medical conditions listed are directly related to the primary diagnosis listed above. The management of the primary diagnosis affects the secondary diagnosis and vice versa. Assessment/Plan:     Non morbid obesity, unspecified obesity type  Chronic-Stable. Encouraged her to work on weight loss through diet and exercise.       Hypersomnia with sleep

## 2020-11-20 NOTE — ASSESSMENT & PLAN NOTE
Reviewed compliance download with pt. Supplies and parts as needed for her machine. These are medically necessary. Continue medications per her PCP and other physicians. Limit caffeine use after 3pm.  Encouraged her to work on weight loss through diet and exercise. Diagnoses of Non morbid obesity, unspecified obesity type, Hypersomnia with sleep apnea- on Provigil 200 bid since 11/2/2010, Essential hypertension, Non-seasonal allergic rhinitis due to pollen, and Mild persistent asthma without complication were pertinent to this visit. The chronic medical conditions listed are directly related to the primary diagnosis listed above. The management of the primary diagnosis affects the secondary diagnosis and vice versa.

## 2020-12-14 RX ORDER — MODAFINIL 200 MG/1
200-400 TABLET ORAL EVERY MORNING
Qty: 180 TABLET | Refills: 1 | Status: SHIPPED | OUTPATIENT
Start: 2020-12-14 | End: 2021-07-23 | Stop reason: SDUPTHER

## 2020-12-15 ENCOUNTER — PATIENT MESSAGE (OUTPATIENT)
Dept: FAMILY MEDICINE CLINIC | Age: 40
End: 2020-12-15

## 2020-12-15 RX ORDER — BUDESONIDE AND FORMOTEROL FUMARATE DIHYDRATE 160; 4.5 UG/1; UG/1
2 AEROSOL RESPIRATORY (INHALATION) 2 TIMES DAILY
Qty: 3 INHALER | Refills: 1 | Status: SHIPPED | OUTPATIENT
Start: 2020-12-15 | End: 2021-06-29

## 2020-12-15 NOTE — TELEPHONE ENCOUNTER
From: Saqib Quiles  To: Randalyn Gilford, MD  Sent: 12/15/2020 12:26 PM EST  Subject: Non-Urgent Medical Question    Good afternoon Dr. Chilango Manzanares been having difficulty with my breathing and my asthma. My rescue inhaler doesn't seem to be working as well as it did.  What do you suggest?    Thank you,  Saqib Quiles

## 2020-12-18 ENCOUNTER — TELEPHONE (OUTPATIENT)
Dept: FAMILY MEDICINE CLINIC | Age: 40
End: 2020-12-18

## 2020-12-18 DIAGNOSIS — Z11.4 SCREENING FOR HIV WITHOUT PRESENCE OF RISK FACTORS: ICD-10-CM

## 2020-12-18 DIAGNOSIS — L64.9 ANDROGENIC ALOPECIA: ICD-10-CM

## 2020-12-18 DIAGNOSIS — R73.03 PREDIABETES: ICD-10-CM

## 2020-12-18 DIAGNOSIS — E78.5 HYPERLIPIDEMIA, UNSPECIFIED HYPERLIPIDEMIA TYPE: ICD-10-CM

## 2020-12-18 DIAGNOSIS — I10 ESSENTIAL HYPERTENSION: Chronic | ICD-10-CM

## 2020-12-18 LAB
A/G RATIO: 1.5 (ref 1.1–2.2)
ALBUMIN SERPL-MCNC: 4.2 G/DL (ref 3.4–5)
ALP BLD-CCNC: 82 U/L (ref 40–129)
ALT SERPL-CCNC: 26 U/L (ref 10–40)
ANION GAP SERPL CALCULATED.3IONS-SCNC: 9 MMOL/L (ref 3–16)
AST SERPL-CCNC: 17 U/L (ref 15–37)
BILIRUB SERPL-MCNC: 0.3 MG/DL (ref 0–1)
BUN BLDV-MCNC: 9 MG/DL (ref 7–20)
CALCIUM SERPL-MCNC: 9.6 MG/DL (ref 8.3–10.6)
CHLORIDE BLD-SCNC: 103 MMOL/L (ref 99–110)
CHOLESTEROL, FASTING: 167 MG/DL (ref 0–199)
CO2: 28 MMOL/L (ref 21–32)
CREAT SERPL-MCNC: 0.6 MG/DL (ref 0.6–1.1)
GFR AFRICAN AMERICAN: >60
GFR NON-AFRICAN AMERICAN: >60
GLOBULIN: 2.8 G/DL
GLUCOSE BLD-MCNC: 105 MG/DL (ref 70–99)
HDLC SERPL-MCNC: 39 MG/DL (ref 40–60)
LDL CHOLESTEROL CALCULATED: 101 MG/DL
POTASSIUM SERPL-SCNC: 4.2 MMOL/L (ref 3.5–5.1)
SODIUM BLD-SCNC: 140 MMOL/L (ref 136–145)
TOTAL PROTEIN: 7 G/DL (ref 6.4–8.2)
TRIGLYCERIDE, FASTING: 136 MG/DL (ref 0–150)
TSH SERPL DL<=0.05 MIU/L-ACNC: 1.5 UIU/ML (ref 0.27–4.2)
VLDLC SERPL CALC-MCNC: 27 MG/DL

## 2020-12-18 NOTE — TELEPHONE ENCOUNTER
Pt. Came into the office because she just had some blood work done downstairs. She asked if Dr. Margie Cooley could put in an order to get the antibody test done for COVID.      Please Advise

## 2020-12-19 LAB
ESTIMATED AVERAGE GLUCOSE: 111.2 MG/DL
HBA1C MFR BLD: 5.5 %
HIV AG/AB: NORMAL
HIV ANTIGEN: NORMAL
HIV-1 ANTIBODY: NORMAL
HIV-2 AB: NORMAL

## 2020-12-22 ENCOUNTER — TELEPHONE (OUTPATIENT)
Dept: FAMILY MEDICINE CLINIC | Age: 40
End: 2020-12-22

## 2020-12-22 NOTE — TELEPHONE ENCOUNTER
Pt. called in concerning her asthma condition. Pt. sent Dr. Ganesh Bell a message about her asthma condition & he called her in a new inhaler and advised her to use that but she feels it's not helping. She is currently using Symbicort.      Please Advise

## 2020-12-22 NOTE — TELEPHONE ENCOUNTER
Patient screened for green clinic. No symptoms, just needs eval for asthma- inhaler not working. Per Jina Fernandez can book with Dr. Eh Rose tomorrow in [address] [place] [address]. Informed patient , she can make this time.

## 2020-12-22 NOTE — TELEPHONE ENCOUNTER
Probably tome to be seen. Screen for green clinic, otherwise virtual.  Can see Dr. Kunal Yu next week if not severe. Sooner if significant shortness of breath.

## 2020-12-23 ENCOUNTER — HOSPITAL ENCOUNTER (OUTPATIENT)
Dept: CT IMAGING | Age: 40
Discharge: HOME | End: 2020-12-23
Payer: COMMERCIAL

## 2020-12-23 ENCOUNTER — OFFICE VISIT (OUTPATIENT)
Dept: FAMILY MEDICINE CLINIC | Age: 40
End: 2020-12-23
Payer: COMMERCIAL

## 2020-12-23 ENCOUNTER — TELEPHONE (OUTPATIENT)
Dept: FAMILY MEDICINE CLINIC | Age: 40
End: 2020-12-23

## 2020-12-23 VITALS
DIASTOLIC BLOOD PRESSURE: 62 MMHG | SYSTOLIC BLOOD PRESSURE: 108 MMHG | RESPIRATION RATE: 14 BRPM | HEIGHT: 66 IN | BODY MASS INDEX: 38.89 KG/M2 | WEIGHT: 242 LBS | TEMPERATURE: 97.4 F | HEART RATE: 86 BPM | OXYGEN SATURATION: 99 %

## 2020-12-23 DIAGNOSIS — R06.02 SHORTNESS OF BREATH: ICD-10-CM

## 2020-12-23 DIAGNOSIS — Z87.09 HISTORY OF UPPER RESPIRATORY INFECTION: ICD-10-CM

## 2020-12-23 DIAGNOSIS — R07.81 PLEURITIC CHEST PAIN: ICD-10-CM

## 2020-12-23 LAB
D DIMER: 252 NG/ML DDU (ref 0–229)
FERRITIN: 50.8 NG/ML (ref 15–150)
SARS-COV-2 ANTIBODY, TOTAL: NEGATIVE

## 2020-12-23 PROCEDURE — 71260 CT THORAX DX C+: CPT

## 2020-12-23 PROCEDURE — 99214 OFFICE O/P EST MOD 30 MIN: CPT | Performed by: FAMILY MEDICINE

## 2020-12-23 PROCEDURE — 6360000004 HC RX CONTRAST MEDICATION: Performed by: FAMILY MEDICINE

## 2020-12-23 RX ORDER — PREDNISONE 10 MG/1
TABLET ORAL
Qty: 20 TABLET | Refills: 0 | Status: SHIPPED | OUTPATIENT
Start: 2020-12-23 | End: 2021-01-26

## 2020-12-23 RX ORDER — MONTELUKAST SODIUM 10 MG/1
10 TABLET ORAL NIGHTLY
Qty: 30 TABLET | Refills: 0 | Status: SHIPPED | OUTPATIENT
Start: 2020-12-23 | End: 2021-01-26

## 2020-12-23 RX ADMIN — IOPAMIDOL 75 ML: 755 INJECTION, SOLUTION INTRAVENOUS at 18:25

## 2020-12-23 NOTE — TELEPHONE ENCOUNTER
Please let Anand Esteban know that her blood test did show an elevation. So we need to check a lung scan. I've ordered this stat if we can assist her in scheduling please. Thank you.

## 2020-12-23 NOTE — PROGRESS NOTES
12/23/2020    This is a 41 y.o. female   Chief Complaint   Patient presents with    Asthma     HPI    Here to discuss asthma  - notes intermittent problems with SOB. Over past 2-3 weeks feels this has worsened some. Notes it when talking, exerting herself, singing. Getting winded when walking up the steps at work. - recently restarted Symbicort after being off this for a while. She's unsure if this has made a difference at this point  - denies cough. No fever or systemic symptoms. Has chronic allergy issues and takes Allegra for this. - when taking a deep breath, feels constricted or tight    No known cardiac disease. Denies significant family hx of cardiac disease (grandfather in 62s)    She reports prior hx of PFTs with her Pulmonologist.    Denies hx of DVT. Non-smoker. On Sprintec.  No known leg swelling    Review of Systems   As per HPI, otherwise negative    Past Medical History:   Diagnosis Date    Allergic rhinitis     Anxiety     Asthma     Environmental allergies     food    Hyperlipidemia     Hypertension     Lumbar disc disease L4-5, L5-S1 Dr [facility]) Epidural injections 2011 8/20/2012    Obstructive sleep apnea     Obstructive sleep apnea (adult) (pediatric) 6/18/2018    PCOS (polycystic ovarian syndrome)     S/P colonoscopy 1/17/13    normal. Dr Nancy De Paz       Past Surgical History:   Procedure Laterality Date    BREAST REDUCTION SURGERY  2004    FOOT SURGERY  2000    hallux fx    UPPER GASTROINTESTINAL ENDOSCOPY  1/17/13    gastritis; Dr Nancy De Paz       Family History   Problem Relation Age of Onset    High Blood Pressure Mother     Other Mother         hypothyroid    Diabetes Sister     Diabetes Maternal Grandmother     Diabetes Maternal Grandfather        Current Outpatient Medications   Medication Sig Dispense Refill    predniSONE (DELTASONE) 10 MG tablet Take 4 tabs by mouth daily for 2 days, 3 tabs for 2 days, 2 tabs for 2 days, 1 tab for 2 days 20 tablet 0  montelukast (SINGULAIR) 10 MG tablet Take 1 tablet by mouth nightly 30 tablet 0    budesonide-formoterol (SYMBICORT) 160-4.5 MCG/ACT AERO Inhale 2 puffs into the lungs 2 times daily 3 Inhaler 1    Spacer/Aero-Holding Chambers GABRIELLE 1 Device by Does not apply route daily as needed (with HFA inhalers) 1 Device 0    modafinil (PROVIGIL) 200 MG tablet Take 1-2 tablets by mouth every morning for 30 days. 180 tablet 1    spironolactone (ALDACTONE) 100 MG tablet TAKE ONE TABLET BY MOUTH DAILY 90 tablet 1    lisinopril (PRINIVIL;ZESTRIL) 5 MG tablet TAKE 1 TABLET EVERY DAY 90 tablet 1    albuterol sulfate HFA (PROAIR HFA) 108 (90 Base) MCG/ACT inhaler INHALE 2 PUFFS EVERY 6 HOURS AS NEEDED FOR WHEEZING OR SHORTNESS OF BREATH 8.5 Inhaler 0    vilazodone HCl (VIIBRYD) 20 MG TABS Take 1 tablet by mouth daily 30 tablet 3    atorvastatin (LIPITOR) 40 MG tablet TAKE ONE TABLET BY MOUTH DAILY 90 tablet 1    omeprazole (PRILOSEC) 40 MG delayed release capsule TAKE ONE CAPSULE BY MOUTH TWICE A DAY 60 capsule 5    Spacer/Aero-Holding Chambers GABRIELLE 1 Device by Does not apply route daily as needed (use with HFA devices) 1 Device 0    ibuprofen (ADVIL;MOTRIN) 200 MG CAPS Take 1 capsule by mouth      SPRINTEC 28 0.25-35 MG-MCG per tablet       loratadine (CLARITIN) 10 MG tablet Take 1 tablet by mouth daily      Multiple Vitamins-Minerals (THERAPEUTIC MULTIVITAMIN-MINERALS) tablet Take 1 tablet by mouth daily.  DULoxetine (CYMBALTA) 30 MG extended release capsule Take 1 capsule by mouth daily (Patient not taking: Reported on 12/23/2020) 30 capsule 0     No current facility-administered medications for this visit. /62 (Site: Right Upper Arm, Position: Sitting, Cuff Size: Large Adult)   Pulse 86   Temp 97.4 °F (36.3 °C) (Temporal)   Resp 14   Ht 5' 6\" (1.676 m)   Wt 242 lb (109.8 kg)   SpO2 99%   BMI 39.06 kg/m²     Physical Exam  Constitutional:       Appearance: She is well-developed.    HENT: Head: Normocephalic and atraumatic. Neck:      Musculoskeletal: Normal range of motion. Cardiovascular:      Rate and Rhythm: Normal rate and regular rhythm. Heart sounds: Normal heart sounds. Pulmonary:      Effort: Pulmonary effort is normal.      Breath sounds: Normal breath sounds. Musculoskeletal: Normal range of motion. General: No tenderness. Skin:     General: Skin is warm and dry. Findings: No rash. Neurological:      Mental Status: She is alert and oriented to person, place, and time. Deep Tendon Reflexes: Reflexes are normal and symmetric. Wt Readings from Last 3 Encounters:   12/23/20 242 lb (109.8 kg)   11/09/20 242 lb 3.2 oz (109.9 kg)   08/31/20 237 lb (107.5 kg)     BP Readings from Last 3 Encounters:   12/23/20 108/62   11/09/20 118/76   08/31/20 108/70     Assessment/Plan:  1. Mild persistent asthma without complication  - montelukast (SINGULAIR) 10 MG tablet; Take 1 tablet by mouth nightly  Dispense: 30 tablet; Refill: 0  - Bhavna Pittman MD, Pulmonary, [facility]    2. Shortness of breath  - FERRITIN; Future  - D-DIMER, QUANTITATIVE; Future    3. Pleuritic chest pain  - D-DIMER, QUANTITATIVE; Future  - predniSONE (DELTASONE) 10 MG tablet; Take 4 tabs by mouth daily for 2 days, 3 tabs for 2 days, 2 tabs for 2 days, 1 tab for 2 days  Dispense: 20 tablet; Refill: 0      D-dimer obtained based on progressive SOB over past 2 weeks with risk factor of OCP use over 36years of age. + so CT-PE performed and was negative    Advised using inhaler Symbicort consistently. Start Singulair. If not improving can consider Pulm Consult with possible PFTs.

## 2020-12-28 ENCOUNTER — OFFICE VISIT (OUTPATIENT)
Dept: FAMILY MEDICINE CLINIC | Age: 40
End: 2020-12-28
Payer: COMMERCIAL

## 2020-12-28 ENCOUNTER — TELEPHONE (OUTPATIENT)
Dept: FAMILY MEDICINE CLINIC | Age: 40
End: 2020-12-28

## 2020-12-28 VITALS
OXYGEN SATURATION: 98 % | HEART RATE: 84 BPM | HEIGHT: 66 IN | SYSTOLIC BLOOD PRESSURE: 118 MMHG | DIASTOLIC BLOOD PRESSURE: 76 MMHG | WEIGHT: 242 LBS | BODY MASS INDEX: 38.89 KG/M2 | TEMPERATURE: 97.7 F

## 2020-12-28 LAB
BILIRUBIN, POC: ABNORMAL
BLOOD URINE, POC: ABNORMAL
CLARITY, POC: CLEAR
COLOR, POC: ABNORMAL
GLUCOSE URINE, POC: ABNORMAL
KETONES, POC: ABNORMAL
LEUKOCYTE EST, POC: ABNORMAL
NITRITE, POC: ABNORMAL
PH, POC: 6.5
PROTEIN, POC: ABNORMAL
SPECIFIC GRAVITY, POC: 1.01
UROBILINOGEN, POC: 0.2

## 2020-12-28 PROCEDURE — 99214 OFFICE O/P EST MOD 30 MIN: CPT | Performed by: FAMILY MEDICINE

## 2020-12-28 PROCEDURE — 81002 URINALYSIS NONAUTO W/O SCOPE: CPT | Performed by: FAMILY MEDICINE

## 2020-12-28 RX ORDER — AZITHROMYCIN 500 MG/1
500 TABLET, FILM COATED ORAL DAILY
Qty: 7 TABLET | Refills: 0 | Status: SHIPPED | OUTPATIENT
Start: 2020-12-28 | End: 2021-01-04

## 2020-12-28 RX ORDER — PREDNISONE 10 MG/1
TABLET ORAL
Qty: 46 TABLET | Refills: 0 | Status: SHIPPED | OUTPATIENT
Start: 2020-12-28 | End: 2021-01-07

## 2020-12-28 NOTE — PROGRESS NOTES
Subjective:      Patient ID: Sarah Longo is a 41 y.o. female. Blood pressure 118/76, pulse 84, temperature 97.7 °F (36.5 °C), temperature source Temporal, height 5' 6\" (1.676 m), weight 242 lb (109.8 kg), last menstrual period 12/12/2020, SpO2 98 %, not currently breastfeeding. HPI   Chief Complaint   Patient presents with    Asthma     f/u asthma    Dysuria     poss UTI - 2-3 days      Is in the midst of an asthma flare. For past month. Very SOB. Wheezing. Restarted symbicort a few weeks ago. Has appt with pulm. On allegra for STEVE. Saw dr Day 12/23, started steroid taper over 8 days. Never really got much response. Unsure what has caused current flare up. Also started on singlulair 10 last week. Has chronic nasal congestion. Has been to an allergist in past- has been on immunotherapy (did not really make a big difference)    Using albuteral several times a day. Also has dysuria for past 3-4 days. No fever. No abd pain  No vag d/c  Has 'discomfort' supraoptically that worsens if bladder is distended and when bladder empties. Has had cysto in past  No recent abx. Has hx of freqeunt yeast infections. Has appt with GYN for that tomorrow in fact. She has prior urethral dilation x2- Dr Parvez Vaughan. Last UTI 8/20 (culture neg), then 10/19 (mixed bacteria)  No hematuria    Patient Active Problem List   Diagnosis    Polycystic ovarian disease    Hyperlipidemia    Anxiety    Hirsutism    Allergic rhinitis due to pollen    Deviated septum    Asthma    Lumbar disc disease L4-5, L5-S1 Dr [facility] Epidural injections 2011    Essential hypertension    Prediabetes A1c 5.7 6/1/16    Obstructive sleep apnea (adult) (pediatric)    Hypersomnia with sleep apnea- on Provigil 200 bid since 11/2/2010    Other viral warts    Non morbid obesity, unspecified obesity type      Body mass index is 39.06 kg/m².     Wt Readings from Last 3 Encounters:   12/28/20 242 lb (109.8 kg) 12/23/20 242 lb (109.8 kg)   11/09/20 242 lb 3.2 oz (109.9 kg)      BP Readings from Last 3 Encounters:   12/28/20 118/76   12/23/20 108/62   11/09/20 118/76      Current Outpatient Medications   Medication Sig Dispense Refill    predniSONE (DELTASONE) 10 MG tablet Take 4 tabs by mouth daily for 2 days, 3 tabs for 2 days, 2 tabs for 2 days, 1 tab for 2 days 20 tablet 0    montelukast (SINGULAIR) 10 MG tablet Take 1 tablet by mouth nightly 30 tablet 0    budesonide-formoterol (SYMBICORT) 160-4.5 MCG/ACT AERO Inhale 2 puffs into the lungs 2 times daily 3 Inhaler 1    Spacer/Aero-Holding Chambers GABRIELLE 1 Device by Does not apply route daily as needed (with HFA inhalers) 1 Device 0    modafinil (PROVIGIL) 200 MG tablet Take 1-2 tablets by mouth every morning for 30 days. 180 tablet 1    spironolactone (ALDACTONE) 100 MG tablet TAKE ONE TABLET BY MOUTH DAILY 90 tablet 1    lisinopril (PRINIVIL;ZESTRIL) 5 MG tablet TAKE 1 TABLET EVERY DAY 90 tablet 1    albuterol sulfate HFA (PROAIR HFA) 108 (90 Base) MCG/ACT inhaler INHALE 2 PUFFS EVERY 6 HOURS AS NEEDED FOR WHEEZING OR SHORTNESS OF BREATH 8.5 Inhaler 0    vilazodone HCl (VIIBRYD) 20 MG TABS Take 1 tablet by mouth daily 30 tablet 3    atorvastatin (LIPITOR) 40 MG tablet TAKE ONE TABLET BY MOUTH DAILY 90 tablet 1    omeprazole (PRILOSEC) 40 MG delayed release capsule TAKE ONE CAPSULE BY MOUTH TWICE A DAY 60 capsule 5    Spacer/Aero-Holding Chambers GABRIELLE 1 Device by Does not apply route daily as needed (use with HFA devices) 1 Device 0    ibuprofen (ADVIL;MOTRIN) 200 MG CAPS Take 1 capsule by mouth      SPRINTEC 28 0.25-35 MG-MCG per tablet       loratadine (CLARITIN) 10 MG tablet Take 1 tablet by mouth daily      Multiple Vitamins-Minerals (THERAPEUTIC MULTIVITAMIN-MINERALS) tablet Take 1 tablet by mouth daily. No current facility-administered medications for this visit.        Immunization History   Administered Date(s) Administered  Influenza Virus Vaccine 09/09/2014    Influenza, Intradermal, Preservative free 10/01/2010, 11/28/2012, 09/23/2013    Influenza, Intradermal, Quadrivalent, Preservative Free 10/05/2016    Influenza, MDCK Quadv, IM, PF (Flucelvax 4 yrs and older) 09/27/2019    Influenza, MDCK Quadv, with preserv IM (Flucelvax 4 yrs and older) 11/09/2020    Influenza, Quadv, IM, PF (6 mo and older Fluzone, Flulaval, Fluarix, and 3 yrs and older Afluria) 10/21/2015, 10/28/2017    Influenza, [place], Recombinant, IM PF (Flublok 18 yrs and older) 09/27/2018    Pneumococcal Polysaccharide ([id number]) 01/19/2018    Tdap (Boostrix, Adacel) 08/20/2012        Social History     Tobacco Use    Smoking status: Never Smoker    Smokeless tobacco: Never Used    Tobacco comment: congratulated on nonsmoking status   Substance Use Topics    Alcohol use: No    Drug use: No      Review of Systems As above     Objective:   Physical Exam  Vitals signs and nursing note reviewed. Constitutional:       Appearance: Normal appearance. She is well-developed. Neck:      Musculoskeletal: Normal range of motion and neck supple. Thyroid: No thyromegaly. Vascular: No carotid bruit. Cardiovascular:      Rate and Rhythm: Normal rate and regular rhythm. Pulses: Normal pulses. Heart sounds: Normal heart sounds. No murmur. Pulmonary:      Effort: Pulmonary effort is normal. No respiratory distress. Breath sounds: Wheezing (with forced expiration) present. No rales. Abdominal:      General: Abdomen is flat. Bowel sounds are normal. There is no distension. Palpations: Abdomen is soft. Tenderness: There is no abdominal tenderness. There is no right CVA tenderness, left CVA tenderness, guarding or rebound. Musculoskeletal: Normal range of motion. Right lower leg: No edema. Left lower leg: No edema. Skin:     General: Skin is warm and dry. Neurological:      General: No focal deficit present. Mental Status: She is alert and oriented to person, place, and time. Mental status is at baseline. Psychiatric:         Mood and Affect: Mood normal.         Behavior: Behavior normal.         Thought Content: Thought content normal.         Judgment: Judgment normal.         Assessment:      1. Recurrent cystitis with negative culture  - ua shows leukos. Multiple cultures have been neg in past year. I wonder if she could have IC or intracellular pathogen like mycoplasma/ureaplasma. Will treat presumptively with zithromax.  (add other agent if cx shows urine pathogen)  - POCT Urinalysis no Micro  - Culture, Urine  - azithromycin (ZITHROMAX) 500 MG tablet; Take 1 tablet by mouth daily for 7 days  Dispense: 7 tablet; Refill: 0    2. Mild persistent asthma with acute exacerbation  - incr pred to 40 mg for 7 days, then taper. Continue Symbicort (high dose, 160), Singulair, prn albuterol, allergy meds  She declines allergy referral again as it was not helpful in past, will happily see pulm for consultation though. Plan: Fu prn for these since she is plannign to see pulm for asthma and GYN for recurrent infections.         Arlene Garrett MD

## 2020-12-29 LAB
ORGANISM: ABNORMAL
URINE CULTURE, ROUTINE: ABNORMAL
URINE CULTURE, ROUTINE: ABNORMAL

## 2020-12-30 RX ORDER — AMOXICILLIN 500 MG/1
500 CAPSULE ORAL 3 TIMES DAILY
Qty: 21 CAPSULE | Refills: 0 | Status: SHIPPED | OUTPATIENT
Start: 2020-12-30 | End: 2021-01-06

## 2021-01-05 RX ORDER — VILAZODONE HYDROCHLORIDE 40 MG/1
40 TABLET ORAL DAILY
Qty: 30 TABLET | Refills: 1 | Status: SHIPPED | OUTPATIENT
Start: 2021-01-05 | End: 2021-03-16

## 2021-01-06 ENCOUNTER — PATIENT MESSAGE (OUTPATIENT)
Dept: FAMILY MEDICINE CLINIC | Age: 41
End: 2021-01-06

## 2021-01-06 NOTE — TELEPHONE ENCOUNTER
From: Georgse Morillo  To: Janice Kumar MD  Sent: 1/6/2021 1:51 PM EST  Subject: Prescription Question    Good afternoon Dr. Yamil Wren     I am really sorry to keep bothering you, but I got the test results for the urine culture the hospital did. It has E. Coli. The antibiotic that you put me on, will that do the trick?     Thanks   Jorge Luis Ortiz

## 2021-01-07 RX ORDER — CIPROFLOXACIN 500 MG/1
500 TABLET, FILM COATED ORAL 2 TIMES DAILY
Qty: 14 TABLET | Refills: 0 | Status: SHIPPED | OUTPATIENT
Start: 2021-01-07 | End: 2021-01-14

## 2021-01-12 RX ORDER — ALBUTEROL SULFATE 90 UG/1
AEROSOL, METERED RESPIRATORY (INHALATION)
Qty: 8.5 G | Refills: 0 | Status: SHIPPED | OUTPATIENT
Start: 2021-01-12 | End: 2021-05-17

## 2021-01-26 ENCOUNTER — OFFICE VISIT (OUTPATIENT)
Dept: PULMONOLOGY | Age: 41
End: 2021-01-26
Payer: COMMERCIAL

## 2021-01-26 VITALS
DIASTOLIC BLOOD PRESSURE: 83 MMHG | OXYGEN SATURATION: 97 % | WEIGHT: 239.4 LBS | HEIGHT: 66 IN | SYSTOLIC BLOOD PRESSURE: 129 MMHG | BODY MASS INDEX: 38.47 KG/M2 | TEMPERATURE: 96.4 F | RESPIRATION RATE: 16 BRPM | HEART RATE: 100 BPM

## 2021-01-26 DIAGNOSIS — J45.30 MILD PERSISTENT ASTHMA WITHOUT COMPLICATION: Chronic | ICD-10-CM

## 2021-01-26 DIAGNOSIS — J45.40 MODERATE PERSISTENT ASTHMA WITHOUT COMPLICATION: ICD-10-CM

## 2021-01-26 DIAGNOSIS — J45.31 MILD PERSISTENT ASTHMA WITH ACUTE EXACERBATION: Primary | Chronic | ICD-10-CM

## 2021-01-26 DIAGNOSIS — J45.31 MILD PERSISTENT ASTHMA WITH ACUTE EXACERBATION: Chronic | ICD-10-CM

## 2021-01-26 LAB
BASOPHILS ABSOLUTE: 0 K/UL (ref 0–0.2)
BASOPHILS RELATIVE PERCENT: 0.3 %
EOSINOPHILS ABSOLUTE: 0.1 K/UL (ref 0–0.6)
EOSINOPHILS RELATIVE PERCENT: 1.5 %
HCT VFR BLD CALC: 35.6 % (ref 36–48)
HEMOGLOBIN: 12 G/DL (ref 12–16)
LYMPHOCYTES ABSOLUTE: 1.2 K/UL (ref 1–5.1)
LYMPHOCYTES RELATIVE PERCENT: 12.8 %
MCH RBC QN AUTO: 30.6 PG (ref 26–34)
MCHC RBC AUTO-ENTMCNC: 33.8 G/DL (ref 31–36)
MCV RBC AUTO: 90.4 FL (ref 80–100)
MONOCYTES ABSOLUTE: 0.5 K/UL (ref 0–1.3)
MONOCYTES RELATIVE PERCENT: 4.9 %
NEUTROPHILS ABSOLUTE: 7.4 K/UL (ref 1.7–7.7)
NEUTROPHILS RELATIVE PERCENT: 80.5 %
PDW BLD-RTO: 13.6 % (ref 12.4–15.4)
PLATELET # BLD: 338 K/UL (ref 135–450)
PMV BLD AUTO: 6.9 FL (ref 5–10.5)
RBC # BLD: 3.94 M/UL (ref 4–5.2)
WBC # BLD: 9.2 K/UL (ref 4–11)

## 2021-01-26 PROCEDURE — 99204 OFFICE O/P NEW MOD 45 MIN: CPT | Performed by: INTERNAL MEDICINE

## 2021-01-26 RX ORDER — MONTELUKAST SODIUM 10 MG/1
10 TABLET ORAL NIGHTLY
Qty: 30 TABLET | Refills: 5 | Status: SHIPPED | OUTPATIENT
Start: 2021-01-26 | End: 2021-08-16

## 2021-01-26 NOTE — PROGRESS NOTES
REASON FOR CONSULTATION:  Chief Complaint   Patient presents with    Asthma     ref by Dr. Danuta Lewis at request of nAastasiia Rodriguez MD     PCP: Anastasiia Rodriguez MD    HISTORY OF PRESENT ILLNESS: Viral Villarreal is a 41y.o. year old female never smoker present with history of asthma since age 4    Asthma: Patient complains of possible asthma. The patient has been previously diagnosed with asthma at age 4. Symptoms include inability to get a deep breath, and chest tightness. She states she does not have much in the way of wheezing or resting shortness of breath. Suspected precipitants include animal dander, pollens and smoke. Symptoms have been rapidly worsening since their onset. Current limitations in activity from asthma include none. Number of days of school or work missed in the last month: not applicable. She has had 2 exacerbations within the past year. Patient is on Symbicort, Singulair, and albuterol as needed. The patient reports adherence to this regimen    She has not had pulmonary function test performed since 2008. CBC with 200 eosinophils. In 2019. Has had 2 exacerbations within the past year. Both of these required multiple consecutive regimens of systemic corticosteroids. While not completely back to baseline symptoms are dramatically improved from earlier.       Past Medical History:   Diagnosis Date    Allergic rhinitis     Anxiety     Asthma     Environmental allergies     food    Hyperlipidemia     Hypertension     Lumbar disc disease L4-5, L5-S1 Dr Carmelo Perea [facility]) Epidural injections 2011 8/20/2012    Obstructive sleep apnea     Obstructive sleep apnea (adult) (pediatric) 6/18/2018    PCOS (polycystic ovarian syndrome)     S/P colonoscopy 1/17/13    normal. Dr Naima Krueger       Past Surgical History:   Procedure Laterality Date    BREAST REDUCTION SURGERY  2004    FOOT SURGERY  2000    hallux fx    UPPER GASTROINTESTINAL ENDOSCOPY  1/17/13 gastritis; Dr Grey Benson       family history includes Diabetes in her maternal grandfather, maternal grandmother, and sister; High Blood Pressure in her mother; Other in her mother. SOCIAL HISTORY:   reports that she has never smoked. She has never used smokeless tobacco.      ALLERGIES:  Patient is allergic to ensure; peanut-containing drug products; and food. REVIEW OF SYSTEMS:  Constitutional: Negative for fever   HENT: Negative for sore throat  Eyes: Negative for redness   Respiratory: Negative for dyspnea, cough  Cardiovascular: Negative for chest pain  Gastrointestinal: Negative for vomiting, diarrhea   Genitourinary: Negative for hematuria   Musculoskeletal: Negative for arthralgias   Skin: Negative for rash  Neurological: Negative for syncope  Hematological: Negative for adenopathy  Psychiatric/Behavorial: Negative for anxiety    Objective:   PHYSICAL EXAM:  Blood pressure 129/83, pulse 100, temperature 96.4 °F (35.8 °C), temperature source Temporal, resp. rate 16, height 5' 6\" (1.676 m), weight 239 lb 6.4 oz (108.6 kg), SpO2 97 %, not currently breastfeeding.'  Gen: No distress. Eyes: PERRL. No sclera icterus. No conjunctival injection. ENT: No discharge. Pharynx clear. External appearance of ears and nose normal.  Neck: Trachea midline. No obvious mass. Resp: No accessory muscle use. No crackles. No wheezes. No rhonchi. CV: Regular rate. Regular rhythm. No murmur or rub. No edema. GI: Non-tender. Non-distended. No hernia. Skin: Warm, dry, normal texture and turgor. No nodule on exposed extremities. Lymph: No cervical LAD. No supraclavicular LAD. M/S: No cyanosis. No clubbing. No joint deformity. Neuro: Moves all four extremities. Psych: Oriented x 3. No anxiety. Awake. Alert. Intact judgement and insight.     Current Outpatient Medications   Medication Sig Dispense Refill    montelukast (SINGULAIR) 10 MG tablet Take 1 tablet by mouth nightly 30 tablet 5  albuterol sulfate  (90 Base) MCG/ACT inhaler INHALE TWO PUFFS BY MOUTH EVERY 6 HOURS AS NEEDED FOR WHEEZING OR SHORTNESS OF BREATH 8.5 g 0    vilazodone HCl (VIIBRYD) 40 MG TABS Take 1 tablet by mouth daily 30 tablet 1    budesonide-formoterol (SYMBICORT) 160-4.5 MCG/ACT AERO Inhale 2 puffs into the lungs 2 times daily 3 Inhaler 1    Spacer/Aero-Holding Chambers GABRIELLE 1 Device by Does not apply route daily as needed (with HFA inhalers) 1 Device 0    modafinil (PROVIGIL) 200 MG tablet Take 1-2 tablets by mouth every morning for 30 days. 180 tablet 1    spironolactone (ALDACTONE) 100 MG tablet TAKE ONE TABLET BY MOUTH DAILY 90 tablet 1    lisinopril (PRINIVIL;ZESTRIL) 5 MG tablet TAKE 1 TABLET EVERY DAY 90 tablet 1    atorvastatin (LIPITOR) 40 MG tablet TAKE ONE TABLET BY MOUTH DAILY 90 tablet 1    omeprazole (PRILOSEC) 40 MG delayed release capsule TAKE ONE CAPSULE BY MOUTH TWICE A DAY 60 capsule 5    Spacer/Aero-Holding Chambers GABRIELLE 1 Device by Does not apply route daily as needed (use with HFA devices) 1 Device 0    SPRINTEC 28 0.25-35 MG-MCG per tablet       Multiple Vitamins-Minerals (THERAPEUTIC MULTIVITAMIN-MINERALS) tablet Take 1 tablet by mouth daily. No current facility-administered medications for this visit. Data Reviewed:   CBC and Renal reviewed  Last CBC  Lab Results   Component Value Date    WBC 8.5 10/08/2019    RBC 4.22 10/08/2019    HGB 12.9 10/08/2019    MCV 89.2 10/08/2019     10/08/2019     Last Renal  Lab Results   Component Value Date     12/18/2020    K 4.2 12/18/2020     12/18/2020    CO2 28 12/18/2020    CO2 28 10/04/2019    CO2 27 08/02/2019    BUN 9 12/18/2020    CREATININE 0.6 12/18/2020    GLUCOSE 105 12/18/2020    CALCIUM 9.6 12/18/2020       Last ABG  POC Blood Gas: No results found for: POCPH, POCPCO2, POCPO2, POCHCO3, NBEA, CPXD6SKW  No results for input(s): PH, PCO2, PO2, HCO3, BE, O2SAT in the last 72 hours. Radiology Review:  Pertinent images / reports were reviewed as a part of this visit. CT Chest w/ contrast: Images reviewed personally by me, interpretation as follows:  -No acute airspace process bilaterally. No masses or effusions. Pulmonary function testing  Ordered    Notes from Dr. Olvera Host reviewed    Assessment/Plan:       Diagnosis Orders   1. Mild persistent asthma with acute exacerbation  Full PFT Study With Bronchodilator    Respiratory allergen profile    CBC Auto Differential   2. Moderate persistent asthma without complication  Respiratory allergen profile    CBC Auto Differential   3. Mild persistent asthma without complication  montelukast (SINGULAIR) 10 MG tablet         Problem List Items Addressed This Visit        Pulmonary Problems    Asthma - Primary (Chronic)     -Patient has lifelong asthma and does appear she has had somewhat worsening control over the past year. With recent exacerbations x2.  -We will agree clinically consistent with asthma there are some features that are somewhat atypical, cannot rule out asthma mimickers.  -We will obtain PFTs with bronchodilator, respiratory allergy profile and CBC with differential to evaluate for IgE and eosinophils respectively.  -Agree with Symbicort 160, continue Singulair, albuterol as needed. Relevant Medications    montelukast (SINGULAIR) 10 MG tablet    Other Relevant Orders    Full PFT Study With Bronchodilator    Respiratory allergen profile    CBC Auto Differential    Respiratory allergen profile    CBC Auto Differential          Return to clinic in 1 month     This note was transcribed using [id number] [address] [employer]. Please disregard any translational errors.     Diamante Lowery [address] [place] [address] Pulmonary, Sleep and Critical Care

## 2021-01-26 NOTE — ASSESSMENT & PLAN NOTE
-Patient has lifelong asthma and does appear she has had somewhat worsening control over the past year. With recent exacerbations x2.  -We will agree clinically consistent with asthma there are some features that are somewhat atypical, cannot rule out asthma mimickers.  -We will obtain PFTs with bronchodilator, respiratory allergy profile and CBC with differential to evaluate for IgE and eosinophils respectively.  -Agree with Symbicort 160, continue Singulair, albuterol as needed.

## 2021-01-28 LAB
REASON FOR REJECTION: NORMAL
REJECTED TEST: NORMAL

## 2021-01-29 ENCOUNTER — TELEPHONE (OUTPATIENT)
Dept: PULMONOLOGY | Age: 41
End: 2021-01-29

## 2021-01-29 NOTE — TELEPHONE ENCOUNTER
Lab calling because Region 5 Respiratory panel that was ordered was rejected due to an insufficient collection. Please advise.

## 2021-01-29 NOTE — TELEPHONE ENCOUNTER
[phone] spoke to [facility] at the lab patient needs to come back to the lab due to not enough blood was collected. Kailyn Appiah said that she will call the patient and ask her to come back in to get the test redone.

## 2021-02-02 LAB
ALLERGEN BERMUDA GRASS IGE: <0.1
ALLERGEN BIRCH IGE: <0.1
ALLERGEN DOG DANDER IGE: <0.1
ALLERGEN GERMAN COCKROACH IGE: <0.1
ALLERGEN HORMODENDRUM IGE: <0.1
ALLERGEN MOUSE EPITHELIA IGE: <0.1
ALLERGEN OAK TREE IGE: <0.1
ALLERGEN PIGWEED ROUGH IGE: <0.1
ALLERGEN SHEEP SORREL (W18) IGE: <0.1
ALLERGEN TREE SYCAMORE: <0.1
ALLERGEN WALNUT TREE IGE: <0.1
ALLERGEN WHITE MULBERRY TREE, IGE: <0.1
ALLERGEN, TREE, WHITE ASH IGE: <0.1
ALTERNARIA ALTERNATA: <0.1
ASPERGILLUS FUMIGATUS: <0.1
CAT DANDER ANTIBODY: <0.1
COTTONWOOD TREE: <0.1
D. FARINAE: <0.1
D. PTERONYSSINUS: <0.1
IGE: 29
MAPLE/BOXELDER TREE: <0.1
MOUNTAIN CEDAR TREE: <0.1
MUCOR RACEMOSUS: <0.1
P. NOTATUM: <0.1
RUSSIAN THISTLE: <0.1
SHORT RAGWD(A ARTEMIS.) IGE: <0.1
TIMOTHY GRASS: <0.1

## 2021-02-08 ENCOUNTER — TELEPHONE (OUTPATIENT)
Dept: PULMONOLOGY | Age: 41
End: 2021-02-08

## 2021-02-08 NOTE — TELEPHONE ENCOUNTER
Sandra Almeida called back and said she called the lab and they said the quantity was sufficient other than the only things left off were [address]. She also wandered if the testing was correct because in the past she felt she had a higher response to things? ?  Do you want to reorder this and start over or do you want to discuss further at her next appt? ?

## 2021-02-08 NOTE — TELEPHONE ENCOUNTER
----- Message from Laila Rooney MD sent at 2/3/2021  2:54 PM EST -----  Yes.  ----- Message -----  From: Cheryle Petty, LPN  Sent: 3/8/9097   8:44 AM EST  To: MD Dr Syeda Arias to this it appears the specimen was rejected due to insufficient quantity. Does this need to be repeated?

## 2021-02-08 NOTE — TELEPHONE ENCOUNTER
Left messages to call back to let her know the respiratory allergen needs to be redone do to insufficient quantity

## 2021-02-09 NOTE — TELEPHONE ENCOUNTER
Do not need to repeat. The test has a high accuracy but skin testing is more sensitive. If patient would like can refer to allergist who could perform this skin testing.

## 2021-03-01 ENCOUNTER — PATIENT MESSAGE (OUTPATIENT)
Dept: FAMILY MEDICINE CLINIC | Age: 41
End: 2021-03-01

## 2021-03-01 DIAGNOSIS — J31.0 CHRONIC RHINITIS: Primary | ICD-10-CM

## 2021-03-01 NOTE — TELEPHONE ENCOUNTER
From: Khalif Patel  To: Shara Fishman MD  Sent: 3/1/2021 9:48 AM EST  Subject: Non-Urgent Medical Question    Goid morning Dr. Araceli Miller,    Can you refer me to an ENT? For about 3 months I have only been able to smell 3 different scents. I have been getting headaches too. The lining of my nose is red and irritated looking. I have never been diagnosed with COVID so I feel like it may be something else.     Thank you,  Ferol Duane

## 2021-03-03 ENCOUNTER — PATIENT MESSAGE (OUTPATIENT)
Dept: FAMILY MEDICINE CLINIC | Age: 41
End: 2021-03-03

## 2021-03-03 RX ORDER — BUSPIRONE HYDROCHLORIDE 5 MG/1
5 TABLET ORAL 2 TIMES DAILY
Qty: 60 TABLET | Refills: 0 | Status: SHIPPED | OUTPATIENT
Start: 2021-03-03 | End: 2021-04-05

## 2021-03-03 NOTE — TELEPHONE ENCOUNTER
From: Rito Beckett  To: Linda Chase MD  Sent: 3/3/2021 6:15 AM EST  Subject: Non-Urgent Medical Question    Good morning Dr. Brendon Paz,    I'm sorry to keep bothering you but I have been very tearful, worrying things I have no control over, and otto, which for me is a huge indicator that my anxiety medication isn't working. I am currently taking Vibryd 40mg.  What would you suggest?    Thank you,  Lawyer Price

## 2021-03-09 ENCOUNTER — OFFICE VISIT (OUTPATIENT)
Dept: ENT CLINIC | Age: 41
End: 2021-03-09
Payer: COMMERCIAL

## 2021-03-09 VITALS
BODY MASS INDEX: 38.58 KG/M2 | TEMPERATURE: 97.9 F | WEIGHT: 239 LBS | HEART RATE: 85 BPM | DIASTOLIC BLOOD PRESSURE: 75 MMHG | OXYGEN SATURATION: 97 % | SYSTOLIC BLOOD PRESSURE: 120 MMHG

## 2021-03-09 DIAGNOSIS — J34.2 DEVIATED NASAL SEPTUM: ICD-10-CM

## 2021-03-09 DIAGNOSIS — J31.0 CHRONIC RHINITIS: ICD-10-CM

## 2021-03-09 DIAGNOSIS — J32.0 CHRONIC MAXILLARY SINUSITIS: Primary | ICD-10-CM

## 2021-03-09 DIAGNOSIS — R43.8 HYPOSMIA: ICD-10-CM

## 2021-03-09 PROCEDURE — 31231 NASAL ENDOSCOPY DX: CPT | Performed by: STUDENT IN AN ORGANIZED HEALTH CARE EDUCATION/TRAINING PROGRAM

## 2021-03-09 PROCEDURE — 99244 OFF/OP CNSLTJ NEW/EST MOD 40: CPT | Performed by: STUDENT IN AN ORGANIZED HEALTH CARE EDUCATION/TRAINING PROGRAM

## 2021-03-09 RX ORDER — DOXYCYCLINE HYCLATE 100 MG
100 TABLET ORAL 2 TIMES DAILY
Qty: 20 TABLET | Refills: 0 | Status: SHIPPED | OUTPATIENT
Start: 2021-03-09 | End: 2021-03-19

## 2021-03-09 NOTE — PROGRESS NOTES
[address] [facility] SURGERY  CONSULT      Yduy Villafana (:  1980) is a 35 y.o. female, here for evaluation of the following chief complaint(s):  Sinus Problem (smells cigarette smoke and car exhaust fumes 80% of the time, sense of smell, has chronic sinus infection, runny nose )      ASSESSMENT/PLAN:  1. Chronic maxillary sinusitis  -     CT SINUS FOR IMAGE GUIDANCE; Future  2. Chronic rhinitis  3. Deviated nasal septum  4. Hyposmia      This is a very pleasant 35 y.o. female here today for evaluation of the the above-noted complaints. On exam she has evidence of some inflammation about the olfactory clefts bilaterally. I am unable to visualize the olfactory cleft on the left due to a high septal deviation posteriorly. I would like to obtain a CT sinus to evaluate for any underlying pathology as she has a history of recurrent sinus infections and chronic sinonasal complaints and loss of sense of smell. I will also plan to start her on doxycycline 100 mg twice daily for 10 days. I will see her back afterwards to discuss further treatment options. Should her CT be normal, then I think she would be a good candidate for saline irrigations and fluticasone nasal sprays. The risks, benefits and alternatives to antibiotics were discussed with patient in detail including but not limited to the risk of GI upset, xerostomia, anaphylaxis and rash/ sun sensitivity. The patient was instructed to contact me should they develop any adverse reactions or have other concerns. SUBJECTIVE/OBJECTIVE:  HPI    Yudy Villafana is here today for evaluation of evaluation of issues related to her nose. She gets multiple sinus infections per year. She requires antibiotics for these. She think she has a sinus infection right now. She also has over the last 3 months had a loss of sense of smell. She has not had Covid recently.   She has had some issues related to smelling gasoline and cigarette smoke that is not actually present. She gets difficulty breathing through her nose it is worse on the left side. She will get constant nasal drainage that is clear and green. She gets very rare nosebleeds. REVIEW OF SYSTEMS  The following systems were reviewed and revealed the following in addition to any already discussed in the HPI:    PHYSICAL EXAM    GENERAL: No acute distress, alert and oriented, no hoarseness, strong voice  EYES: EOMI, Anti-icteric  HENT:   Head: Normocephalic and atraumatic. Face:  Symmetric, facial nerve intact, no sinus tenderness  Right Ear: Normal external ear, normal external auditory canal, intact tympanic membrane with normal mobility and aerated middle ear  Left Ear: Normal external ear, normal external auditory canal, intact tympanic membrane with normal mobility and aerated middle ear  Mouth/Oral Cavity:  normal lips, Uvula is midline, no mucosal lesions, no trismus, normal dentition, normal salivary quality/flow  Oropharynx/Larynx:  normal oropharynx, 2+ tonsils; patient did not tolerate mirror exam due to excessive gag reflex  Nose:Normal external nasal appearance. Anterior rhinoscopy shows  a deviated septum preventing view posteriorly. Inflammation of turbinates. Normal mucosa   NECK: Normal range of motion, no thyromegaly, trachea is midline, no lymphadenopathy, no neck masses, no crepitus  CHEST: Normal respiratory effort, no retractions, breathing comfortably  SKIN: No rashes, normal appearing skin, no evidence of skin lesions/tumors  Neuro:  cranial nerve II-XII intact; normal gait  Cardio:  no edema        PROCEDURE  Nasal Endoscopy (CPT code 35434)    Preop: chronic rhinitis  Postop: Same    Verbal consent was received. After topical anesthesia and decongestion had been obtained using aerosolized 1% lidocaine and oxymetazoline, a 45 degree rigid endoscope was placed into both nares with the patient in a sitting position.  The following was observed: Right Nasal Cavity and Paranasal Sinuses:  Polyp score = 0 (0 = no polyps, 1 = small polyps in middle meatus not reaching below the inferior border of the middle yaron, 2 = polyps reaching below the middle border of the middle turbinate, 3= large polyps reaching the lower border of the inferior turbinate or polyps medial to the middle yaron, 4= large polyps causing almost complete congestion/obstruction of the interior meatus)  Edema score = 1 (0 = absent, 1 = mild, 2 = severe)  Discharge score = 0 (0 = no discharge, 1 = clear thin discharge, 2 = thick purulent discharge)    Left Nasal Cavity and Paranasal Sinuses:    Polyp score = 0 (0 = no polyps, 1 = small polyps in middle meatus not reaching below the inferior border of the middle yaron, 2 = polyps reaching below the middle border of the middle turbinate, 3= large polyps reaching the lower border of the inferior turbinate or polyps medial to the middle yaron, 4= large polyps causing almost complete congestion/obstruction of the interior meatus)  Edema score = 1 (0 = absent, 1 = mild, 2 = severe)  Discharge score = 0 (0 = no discharge, 1 = clear thin discharge, 2 = thick purulent discharge)    Septum: intact and deviated   Other:   -The inferior and middle turbinates were examined. The middle meatus, and sphenoethmoid recess was examined bilaterally.    -There is a left word high septal deviation. There is some mild inflammation of the bilateral olfactory cleft. There is some crusting scattered throughout the nasal cavities. .   -There were no complications. Tolerated well without complication. I attest that I was present for and did the entire procedure myself. This note was generated completely or in part utilizing Dragon dictation speech recognition software. Occasionally, words are mistranscribed and despite editing, the text may contain inaccuracies due to incorrect word recognition.   If further clarification is needed please contact the office at [phone]. An electronic signature was used to authenticate this note.     --Jodi Jackson MD

## 2021-03-16 ENCOUNTER — OFFICE VISIT (OUTPATIENT)
Dept: ENT CLINIC | Age: 41
End: 2021-03-16
Payer: COMMERCIAL

## 2021-03-16 ENCOUNTER — HOSPITAL ENCOUNTER (OUTPATIENT)
Dept: CT IMAGING | Age: 41
Discharge: HOME | End: 2021-03-16
Payer: COMMERCIAL

## 2021-03-16 VITALS — TEMPERATURE: 97.9 F | DIASTOLIC BLOOD PRESSURE: 81 MMHG | SYSTOLIC BLOOD PRESSURE: 125 MMHG | HEART RATE: 89 BPM

## 2021-03-16 DIAGNOSIS — J32.0 CHRONIC MAXILLARY SINUSITIS: ICD-10-CM

## 2021-03-16 DIAGNOSIS — J31.0 CHRONIC RHINITIS: Primary | ICD-10-CM

## 2021-03-16 DIAGNOSIS — J34.89 NASAL VALVE COLLAPSE: ICD-10-CM

## 2021-03-16 DIAGNOSIS — R43.8 HYPOSMIA: ICD-10-CM

## 2021-03-16 DIAGNOSIS — J34.2 DEVIATED NASAL SEPTUM: ICD-10-CM

## 2021-03-16 PROCEDURE — 70486 CT MAXILLOFACIAL W/O DYE: CPT

## 2021-03-16 PROCEDURE — 99214 OFFICE O/P EST MOD 30 MIN: CPT | Performed by: STUDENT IN AN ORGANIZED HEALTH CARE EDUCATION/TRAINING PROGRAM

## 2021-03-16 RX ORDER — MOMETASONE FUROATE 50 UG/1
2 SPRAY, METERED NASAL 2 TIMES DAILY
Qty: 2 INHALER | Refills: 3 | Status: SHIPPED
Start: 2021-03-16 | End: 2021-06-04 | Stop reason: ALTCHOICE

## 2021-03-16 RX ORDER — VILAZODONE HYDROCHLORIDE 40 MG/1
TABLET ORAL
Qty: 30 TABLET | Refills: 1 | Status: SHIPPED | OUTPATIENT
Start: 2021-03-16 | End: 2021-05-17

## 2021-03-17 NOTE — PROGRESS NOTES
Paul Carlos (:  1980) is a 35 y.o. female, here for evaluation of the following chief complaint(s): Other (CT Results )      ASSESSMENT/PLAN:  1. Chronic rhinitis  2. Deviated nasal septum  3. Hyposmia  4. Nasal valve collapse      This is a very pleasant 35 y.o. female here today for evaluation of the the above-noted complaints. On exam she has evidence of some inflammation about the olfactory clefts bilaterally. I am unable to visualize the olfactory cleft on the left due to a high septal deviation posteriorly. Her CT scan did not show any evidence of a skull base mass or chronic sinus disease causing obstruction of her olfactory cleft. I have discussed with her the concept of smell retraining therapy and its benefit in helping with decrease sense of smell. Regarding her sinonasal complaints, we discussed that should she be interested she would be a good candidate for an open septorhinoplasty to address her nasal valve collapse and septal deviation turbinate hypertrophy. Like to hold off any surgery at this time to address her sinonasal complaints I think that that is very reasonable. I have asked her to continue her nasal steroid sprays and irrigations. SUBJECTIVE/OBJECTIVE:  ROBBY    Fatimah Sarmiento is here today for evaluation of evaluation of issues related to her nose. She gets multiple sinus infections per year. She requires antibiotics for these. She think she has a sinus infection right now. She also has over the last 3 months had a loss of sense of smell. She has not had Covid recently. She has had some issues related to smelling gasoline and cigarette smoke that is not actually present. She gets difficulty breathing through her nose it is worse on the left side. She will get constant nasal drainage that is clear and green. She gets very rare nosebleeds.     Update 3/17/2012:    She is still having issues breathing out of her nose and with nasal congestion. She is still getting intermittent nasal drainage    REVIEW OF SYSTEMS  The following systems were reviewed and revealed the following in addition to any already discussed in the HPI:    PHYSICAL EXAM    GENERAL: No acute distress, alert and oriented, no hoarseness, strong voice  EYES: EOMI, Anti-icteric  HENT:   Head: Normocephalic and atraumatic. Face:  Symmetric, facial nerve intact, no sinus tenderness  Right Ear: Normal external ear, normal external auditory canal, intact tympanic membrane with normal mobility and aerated middle ear  Left Ear: Normal external ear, normal external auditory canal, intact tympanic membrane with normal mobility and aerated middle ear  Mouth/Oral Cavity:  normal lips, Uvula is midline, no mucosal lesions, no trismus, normal dentition, normal salivary quality/flow  Oropharynx/Larynx:  normal oropharynx, 2+ tonsils; patient did not tolerate mirror exam due to excessive gag reflex  Nose:Normal external nasal appearance. Anterior rhinoscopy shows  a deviated septum preventing view posteriorly. Inflammation of turbinates. Normal mucosa   NECK: Normal range of motion, no thyromegaly, trachea is midline, no lymphadenopathy, no neck masses, no crepitus  CHEST: Normal respiratory effort, no retractions, breathing comfortably  SKIN: No rashes, normal appearing skin, no evidence of skin lesions/tumors  Neuro:  cranial nerve II-XII intact; normal gait  Cardio:  no edema        PROCEDURE  Nasal Endoscopy (CPT code 94678) from previous visit    Preop: chronic rhinitis  Postop: Same    Verbal consent was received. After topical anesthesia and decongestion had been obtained using aerosolized 1% lidocaine and oxymetazoline, a 45 degree rigid endoscope was placed into both nares with the patient in a sitting position.  The following was observed:    Right Nasal Cavity and Paranasal Sinuses:  Polyp score = 0 (0 = no polyps, 1 = small polyps in middle meatus not reaching below the inferior border of the middle yaron, 2 = polyps reaching below the middle border of the middle turbinate, 3= large polyps reaching the lower border of the inferior turbinate or polyps medial to the middle yaron, 4= large polyps causing almost complete congestion/obstruction of the interior meatus)  Edema score = 1 (0 = absent, 1 = mild, 2 = severe)  Discharge score = 0 (0 = no discharge, 1 = clear thin discharge, 2 = thick purulent discharge)    Left Nasal Cavity and Paranasal Sinuses:    Polyp score = 0 (0 = no polyps, 1 = small polyps in middle meatus not reaching below the inferior border of the middle yaron, 2 = polyps reaching below the middle border of the middle turbinate, 3= large polyps reaching the lower border of the inferior turbinate or polyps medial to the middle yaron, 4= large polyps causing almost complete congestion/obstruction of the interior meatus)  Edema score = 1 (0 = absent, 1 = mild, 2 = severe)  Discharge score = 0 (0 = no discharge, 1 = clear thin discharge, 2 = thick purulent discharge)    Septum: intact and deviated   Other:   -The inferior and middle turbinates were examined. The middle meatus, and sphenoethmoid recess was examined bilaterally.    -There is a left word high septal deviation. There is some mild inflammation of the bilateral olfactory cleft. There is some crusting scattered throughout the nasal cavities. .   -There were no complications. Tolerated well without complication. I attest that I was present for and did the entire procedure myself. This note was generated completely or in part utilizing Dragon dictation speech recognition software. Occasionally, words are mistranscribed and despite editing, the text may contain inaccuracies due to incorrect word recognition. If further clarification is needed please contact the office at [phone].     An electronic signature was used to authenticate this note.    --Mitch Keene MD

## 2021-03-25 ENCOUNTER — TELEPHONE (OUTPATIENT)
Dept: FAMILY MEDICINE CLINIC | Age: 41
End: 2021-03-25

## 2021-03-25 NOTE — TELEPHONE ENCOUNTER
Patient calling asking what Dr. Araceli Miller opinion is due to her health conditions if she should get covid vaccine. Patient has had bellspalsy twice in her life and not sure if she should get the vaccine. If Dr. Alanna Marie reccomends this which one?    Please advise

## 2021-04-05 RX ORDER — BUSPIRONE HYDROCHLORIDE 5 MG/1
TABLET ORAL
Qty: 60 TABLET | Refills: 0 | Status: SHIPPED | OUTPATIENT
Start: 2021-04-05 | End: 2021-04-23

## 2021-04-08 ENCOUNTER — TELEPHONE (OUTPATIENT)
Dept: FAMILY MEDICINE CLINIC | Age: 41
End: 2021-04-08

## 2021-04-08 NOTE — TELEPHONE ENCOUNTER
Lilly Eduardo pt has been vaccinated last Friday. Are you able to see in office or should she still go to [facility]?

## 2021-04-08 NOTE — TELEPHONE ENCOUNTER
I called the pt she has asthma she is pretty sure she has a sinus infection and its in her chest   Pt has a cough for a few days   Pt has a runny nose no other symptoms     Pt has had the covid vaccine last Friday [address] to see in office tomorrow with CJ?

## 2021-04-08 NOTE — TELEPHONE ENCOUNTER
Needs virtual with someone, or at [facility] if she wants to be seen in person.

## 2021-04-15 ENCOUNTER — PATIENT MESSAGE (OUTPATIENT)
Dept: FAMILY MEDICINE CLINIC | Age: 41
End: 2021-04-15

## 2021-04-15 DIAGNOSIS — R06.02 SHORTNESS OF BREATH: Primary | ICD-10-CM

## 2021-04-15 RX ORDER — PREDNISONE 10 MG/1
TABLET ORAL
Qty: 12 TABLET | Refills: 0 | Status: SHIPPED | OUTPATIENT
Start: 2021-04-15 | End: 2021-04-23 | Stop reason: ALTCHOICE

## 2021-04-15 NOTE — TELEPHONE ENCOUNTER
From: Bryce Kraus  To: Anjana Philip MD  Sent: 4/15/2021 2:18 PM EDT  Subject: Non-Urgent Medical Question    Good afternoon Dr. Alla Weiner,    I seen Dr. Bethanie Shah mid last week because my asthma had flared up. He gave me an antibiotic and steroid for a few days. It seem to work while I was taking it but now I feel the same again. I feel like I'm still weezing and like someone has me in a bear hug. My chest and back feel tight and my back hurts. It feels like pleurisy. I still have sinus pressure and the post nasal drip. I know it's not Covid. These are the symptoms I normally have. And I was vaccinated on 4/2/21 with the [employer] shot. I have an appointment scheduled with you on April 23rd but not sure my asthma will hold off until then.  What would you suggest?

## 2021-04-15 NOTE — TELEPHONE ENCOUNTER
[address] [place] [address] called wanting to verify quantity of this medication. She said the way it was written was for 12 but it should be 13 or 10.     Please Advise     #[phone]  Fax #[phone]

## 2021-04-15 NOTE — TELEPHONE ENCOUNTER
Antibiotic and steroid worked while taking it. Now has symptoms back. You saw her last week. What should she do? Do you want to retreat her?

## 2021-04-16 ENCOUNTER — HOSPITAL ENCOUNTER (OUTPATIENT)
Age: 41
Discharge: HOME | End: 2021-04-16
Payer: COMMERCIAL

## 2021-04-16 ENCOUNTER — HOSPITAL ENCOUNTER (OUTPATIENT)
Dept: GENERAL RADIOLOGY | Age: 41
Discharge: HOME | End: 2021-04-16
Payer: COMMERCIAL

## 2021-04-16 ENCOUNTER — TELEPHONE (OUTPATIENT)
Dept: FAMILY MEDICINE CLINIC | Age: 41
End: 2021-04-16

## 2021-04-16 DIAGNOSIS — R06.02 SHORTNESS OF BREATH: ICD-10-CM

## 2021-04-16 LAB — D DIMER: 281 NG/ML DDU (ref 0–229)

## 2021-04-16 PROCEDURE — 71046 X-RAY EXAM CHEST 2 VIEWS: CPT

## 2021-04-16 NOTE — TELEPHONE ENCOUNTER
Spoke with patient regarding results. She had a normal chest x-ray. Her D-dimer is very slightly elevated, is actually about the same as it was back in December and at that time she had a negative CT for PE. So, since no significant change from this prior baseline we discussed it is okay to forego further imaging. Fortunately, her symptoms are actually improving.   We discussed red flag symptoms that would warrant urgent evaluation

## 2021-04-16 NOTE — TELEPHONE ENCOUNTER
Right, the quantity for the prednisone should be #10. Please confirm this with the pharmacy if it has not been done so already. Thanks.

## 2021-04-23 ENCOUNTER — OFFICE VISIT (OUTPATIENT)
Dept: FAMILY MEDICINE CLINIC | Age: 41
End: 2021-04-23
Payer: COMMERCIAL

## 2021-04-23 VITALS
BODY MASS INDEX: 36.96 KG/M2 | SYSTOLIC BLOOD PRESSURE: 122 MMHG | HEART RATE: 85 BPM | OXYGEN SATURATION: 98 % | DIASTOLIC BLOOD PRESSURE: 80 MMHG | HEIGHT: 66 IN | WEIGHT: 230 LBS

## 2021-04-23 DIAGNOSIS — J45.31 MILD PERSISTENT ASTHMA WITH ACUTE EXACERBATION: Chronic | ICD-10-CM

## 2021-04-23 DIAGNOSIS — F43.12 CHRONIC POST-TRAUMATIC STRESS DISORDER (PTSD): Primary | ICD-10-CM

## 2021-04-23 PROCEDURE — 99214 OFFICE O/P EST MOD 30 MIN: CPT | Performed by: FAMILY MEDICINE

## 2021-04-23 RX ORDER — BUSPIRONE HYDROCHLORIDE 10 MG/1
10 TABLET ORAL 3 TIMES DAILY
Qty: 90 TABLET | Refills: 2 | Status: SHIPPED | OUTPATIENT
Start: 2021-04-23 | End: 2021-07-21

## 2021-04-23 ASSESSMENT — PATIENT HEALTH QUESTIONNAIRE - PHQ9
SUM OF ALL RESPONSES TO PHQ9 QUESTIONS 1 & 2: 0
SUM OF ALL RESPONSES TO PHQ QUESTIONS 1-9: 0
2. FEELING DOWN, DEPRESSED OR HOPELESS: 0
SUM OF ALL RESPONSES TO PHQ QUESTIONS 1-9: 0

## 2021-04-23 NOTE — PROGRESS NOTES
2021    Blood pressure 122/80, pulse 85, height 5' 6\" (1.676 m), weight 230 lb (104.3 kg), last menstrual period 2021, SpO2 98 %, not currently breastfeeding. Bne Beard (:  1980) is a 35 y.o. female, here for evaluation of the following medical concerns:    Chief Complaint   Patient presents with    Follow-up     med check - anxiety med not helping     Here due to increase in anxiety. Increased fear of death and dying. We changed to vilazodone due to side effects to cymbalta- decreased libido. But this is not as effective. Adding buspar has not helped. Goes to downward spirals of anxiiety if any body function is off or gets a mild illness. Had asthma flare last week. This is resolved but it was very upsetting to her.  + panic (manages with breathing techniques). Greater freq now- daily. There was a traumatic death experience as a child. Still has flashbacks to that experience (grandfather in a casket). Nightmares about 'death' and about her grandfather. She had PTSD psychology about that already  Has been to therapy a lot over the years. Does not feel more would be helpful. But has never done EMDR. She does not worry about things other than death fear. prior meds: celexa, cymbalta 60, buspar, wellbutrin, effexor xr. No drug use, alcohol use, tobacco use. Sleep is good. Does not feel depressed    'my life is wonderful' otherwise. Has understanding boyfriend. So this is more frustrating for her because she has no cause for worry. Her breathing is returned to normal now. Using her usual meds as below. Usual albuterol use is ~1/mo.       Patient Active Problem List   Diagnosis    Polycystic ovarian disease    Hyperlipidemia    Anxiety    Hirsutism    Allergic rhinitis due to pollen    Deviated septum    Asthma    Lumbar disc disease L4-5, L5-S1 Dr [facility]) Epidural injections     Essential hypertension    Prediabetes A1c 5.7 16  Obstructive sleep apnea (adult) (pediatric)    Hypersomnia with sleep apnea- on Provigil 200 bid since 11/2/2010    Other viral warts    Non morbid obesity, unspecified obesity type        Body mass index is 37.12 kg/m². Wt Readings from Last 3 Encounters:   04/23/21 230 lb (104.3 kg)   04/08/21 239 lb 3.2 oz (108.5 kg)   03/09/21 239 lb (108.4 kg)       BP Readings from Last 3 Encounters:   04/23/21 122/80   03/16/21 125/81   03/09/21 120/75       Allergies   Allergen Reactions    Ensure      EGGS    Peanut-Containing Drug Products      PEANUTS    Food      Certain foods (chocolate, tea, soy, eggs, tree nuts, coffee, tomatoes, black pepper)       Prior to Visit Medications    Medication Sig Taking? Authorizing Provider   atorvastatin (LIPITOR) 40 MG tablet TAKE ONE TABLET BY MOUTH DAILY Yes Tk Diez MD   busPIRone (BUSPAR) 5 MG tablet TAKE ONE TABLET BY MOUTH TWICE A DAY Yes Tk Diez MD   VILAZODONE HCL 40 MG Tablet TAKE ONE TABLET BY MOUTH DAILY Yes Tk Diez MD   mometasone (NASONEX) 50 MCG/ACT nasal spray 2 sprays by Nasal route 2 times daily A one month prescription equals 2 bottles.  Yes Trenton Francisco MD   montelukast (SINGULAIR) 10 MG tablet Take 1 tablet by mouth nightly Yes Nael Benton MD   albuterol sulfate  (90 Base) MCG/ACT inhaler INHALE TWO PUFFS BY MOUTH EVERY 6 HOURS AS NEEDED FOR WHEEZING OR SHORTNESS OF BREATH Yes Tk Diez MD   budesonide-formoterol [facility]) 160-4.5 MCG/ACT AERO Inhale 2 puffs into the lungs 2 times daily Yes Tk Diez MD   Spacer/Aero-Holding Chambers GABRIELLE 1 Device by Does not apply route daily as needed (with HFA inhalers) Yes Tk Diez MD   spironolactone (ALDACTONE) 100 MG tablet TAKE ONE TABLET BY MOUTH DAILY Yes Tk Diez MD   lisinopril (PRINIVIL;ZESTRIL) 5 MG tablet TAKE 1 TABLET EVERY DAY Yes Tk Diez MD   omeprazole (PRILOSEC) 40 MG delayed release 9: 07 AM

## 2021-04-27 ENCOUNTER — PATIENT MESSAGE (OUTPATIENT)
Dept: FAMILY MEDICINE CLINIC | Age: 41
End: 2021-04-27

## 2021-04-27 RX ORDER — PREDNISONE 10 MG/1
TABLET ORAL
Qty: 30 TABLET | Refills: 0 | Status: SHIPPED | OUTPATIENT
Start: 2021-04-27 | End: 2021-05-07

## 2021-04-28 ENCOUNTER — OFFICE VISIT (OUTPATIENT)
Dept: ENT CLINIC | Age: 41
End: 2021-04-28
Payer: COMMERCIAL

## 2021-04-28 VITALS
HEIGHT: 66 IN | HEART RATE: 91 BPM | BODY MASS INDEX: 37.45 KG/M2 | DIASTOLIC BLOOD PRESSURE: 81 MMHG | WEIGHT: 233 LBS | SYSTOLIC BLOOD PRESSURE: 131 MMHG | TEMPERATURE: 98 F

## 2021-04-28 DIAGNOSIS — R43.8 HYPOSMIA: ICD-10-CM

## 2021-04-28 DIAGNOSIS — J32.0 CHRONIC MAXILLARY SINUSITIS: ICD-10-CM

## 2021-04-28 DIAGNOSIS — J31.0 CHRONIC RHINITIS: Primary | ICD-10-CM

## 2021-04-28 DIAGNOSIS — J34.89 NASAL VALVE COLLAPSE: ICD-10-CM

## 2021-04-28 DIAGNOSIS — J34.2 DEVIATED NASAL SEPTUM: ICD-10-CM

## 2021-04-28 PROCEDURE — 99213 OFFICE O/P EST LOW 20 MIN: CPT | Performed by: STUDENT IN AN ORGANIZED HEALTH CARE EDUCATION/TRAINING PROGRAM

## 2021-04-28 RX ORDER — FLUTICASONE PROPIONATE 50 MCG
2 SPRAY, SUSPENSION (ML) NASAL 2 TIMES DAILY
Qty: 2 BOTTLE | Refills: 5 | Status: ON HOLD
Start: 2021-04-28 | End: 2021-10-29 | Stop reason: HOSPADM

## 2021-04-28 NOTE — PROGRESS NOTES
Paul Carlos (:  1980) is a 35 y.o. female, here for evaluation of the following chief complaint(s):  Follow-up (F/u to discuss possible surgical options. No changes since last OV other than decreased sense of smell.)      ASSESSMENT/PLAN:  1. Chronic rhinitis  2. Deviated nasal septum  3. Hyposmia  4. Nasal valve collapse  5. Chronic maxillary sinusitis      This is a very pleasant 35 y.o. female here today for evaluation of the the above-noted complaints. On exam she has evidence of some inflammation about the olfactory clefts bilaterally. I am unable to visualize the olfactory cleft on the left due to a high septal deviation posteriorly. Her CT scan did not show any evidence of a skull base mass or chronic sinus disease causing obstruction of her olfactory cleft. I have discussed with her the concept of smell retraining therapy and its benefit in helping with decrease sense of smell. Regarding her sinonasal complaints, we discussed that should she be interested she would be a good candidate for an open septorhinoplasty to address her nasal valve collapse,  septal deviation turbinate hypertrophy as well as posterior ablation of her nasal nerves. I discussed with her that I do not think she is getting sinus infections but rather flares of her nasal allergies. If she were to undergo surgery the goals of surgery would be to help her breathe better through her nose as well as to improve the application of topical nasal steroids. I did discuss with her that this may not improve her symptoms of frequent sinus infections and she expressed understanding. She would like to hold off any surgery at this time to address her sinonasal complaints and I think that that is very reasonable. I have asked her to continue her nasal steroid sprays and irrigations.       SUBJECTIVE/OBJECTIVE:  Dena Hawkinsleonel Patel is here today for evaluation of evaluation of issues related to her nose. She gets multiple sinus infections per year. She requires antibiotics for these. She think she has a sinus infection right now. She also has over the last 3 months had a loss of sense of smell. She has not had Covid recently. She has had some issues related to smelling gasoline and cigarette smoke that is not actually present. She gets difficulty breathing through her nose it is worse on the left side. She will get constant nasal drainage that is clear and green. She gets very rare nosebleeds. Update 3/17/2012:    She is still having issues breathing out of her nose and with nasal congestion. She is still getting intermittent nasal drainage. Update 4/28/2021:    Patient presents today for follow-up. She is still having the same issues regarded to difficulty breathing out of her nose. She gets intermittent nasal drainage which is clear. She thinks she is getting sinus infections. REVIEW OF SYSTEMS  The following systems were reviewed and revealed the following in addition to any already discussed in the HPI:    PHYSICAL EXAM    GENERAL: No acute distress, alert and oriented, no hoarseness, strong voice  EYES: EOMI, Anti-icteric  HENT:   Head: Normocephalic and atraumatic. Face:  Symmetric, facial nerve intact, no sinus tenderness  Right Ear: Normal external ear, normal external auditory canal, intact tympanic membrane with normal mobility and aerated middle ear  Left Ear: Normal external ear, normal external auditory canal, intact tympanic membrane with normal mobility and aerated middle ear  Mouth/Oral Cavity:  normal lips, Uvula is midline, no mucosal lesions, no trismus, normal dentition, normal salivary quality/flow  Oropharynx/Larynx:  normal oropharynx, 2+ tonsils; patient did not tolerate mirror exam due to excessive gag reflex  Nose:Normal external nasal appearance.   Anterior rhinoscopy shows  a deviated septum preventing view intact and deviated   Other:   -The inferior and middle turbinates were examined. The middle meatus, and sphenoethmoid recess was examined bilaterally.    -There is a left word high septal deviation. There is some mild inflammation of the bilateral olfactory cleft. There is some crusting scattered throughout the nasal cavities. .   -There were no complications. Tolerated well without complication. I attest that I was present for and did the entire procedure myself. This note was generated completely or in part utilizing Dragon dictation speech recognition software. Occasionally, words are mistranscribed and despite editing, the text may contain inaccuracies due to incorrect word recognition. If further clarification is needed please contact the office at [phone]. An electronic signature was used to authenticate this note.     --Ghanshyam Huddleston MD

## 2021-05-03 ENCOUNTER — TELEPHONE (OUTPATIENT)
Dept: PULMONOLOGY | Age: 41
End: 2021-05-03

## 2021-05-03 RX ORDER — BUSPIRONE HYDROCHLORIDE 5 MG/1
TABLET ORAL
Qty: 60 TABLET | Refills: 0 | OUTPATIENT
Start: 2021-05-03

## 2021-05-03 NOTE — TELEPHONE ENCOUNTER
Patient was prescribed prednisone by her PCP for SOB. She is still experiencing SOB and soreness in her back when she breathes in. Very little coughing but is  coughing up yellow. She has a follow up scheduled on 5/19 but needs to know if there is something she can use until then to help with her symptoms. Please advise.

## 2021-05-04 ENCOUNTER — OFFICE VISIT (OUTPATIENT)
Dept: FAMILY MEDICINE CLINIC | Age: 41
End: 2021-05-04
Payer: COMMERCIAL

## 2021-05-04 ENCOUNTER — TELEPHONE (OUTPATIENT)
Dept: FAMILY MEDICINE CLINIC | Age: 41
End: 2021-05-04

## 2021-05-04 VITALS
SYSTOLIC BLOOD PRESSURE: 120 MMHG | HEIGHT: 66 IN | DIASTOLIC BLOOD PRESSURE: 82 MMHG | HEART RATE: 88 BPM | BODY MASS INDEX: 37.28 KG/M2 | WEIGHT: 232 LBS | OXYGEN SATURATION: 99 %

## 2021-05-04 DIAGNOSIS — F43.12 CHRONIC POST-TRAUMATIC STRESS DISORDER (PTSD): ICD-10-CM

## 2021-05-04 DIAGNOSIS — J45.31 MILD PERSISTENT ASTHMA WITH ACUTE EXACERBATION: Primary | Chronic | ICD-10-CM

## 2021-05-04 DIAGNOSIS — J30.1 NON-SEASONAL ALLERGIC RHINITIS DUE TO POLLEN: Chronic | ICD-10-CM

## 2021-05-04 PROCEDURE — 99214 OFFICE O/P EST MOD 30 MIN: CPT | Performed by: FAMILY MEDICINE

## 2021-05-04 RX ORDER — FLUCONAZOLE 100 MG/1
100 TABLET ORAL DAILY
Qty: 10 TABLET | Refills: 0 | Status: SHIPPED | OUTPATIENT
Start: 2021-05-04 | End: 2021-05-14

## 2021-05-04 RX ORDER — LORAZEPAM 0.5 MG/1
0.5 TABLET ORAL EVERY 8 HOURS PRN
Qty: 20 TABLET | Refills: 0 | Status: SHIPPED | OUTPATIENT
Start: 2021-05-04 | End: 2022-01-14

## 2021-05-04 NOTE — PROGRESS NOTES
2021    Blood pressure 120/82, pulse 88, height 5' 6\" (1.676 m), weight 232 lb (105.2 kg), last menstrual period 2021, SpO2 99 %, not currently breastfeeding. Ben Beard (:  1980) is a 35 y.o. female, here for evaluation of the following medical concerns:    Chief Complaint   Patient presents with    Other     breathing issues     Worsening breathing over the past week. Tried steroids- but did not seem to improve. Having difficulty getting in with pulm. Chest feels tight. Hard to breathe in AND out  Seems to improve if she lays down and worsens with exertion. SOB with exertion. Back pain with symptoms. Saw Dr Estrella Syed . Is scheduled for PFT's.    pred taper was started  at 40 mg, but did not seem to help at all. Albuterol helps 'for a few minutes.'  She has also noted increased reflux related to constipation. Is emotionally upset by her distress as well. This is a trigger for anxiety. No f/c  No cough. Using all usual meds: symbicort BID, singulair. Albuterol. Saw DR Titi Gustafson for chronic rhinitis. Considering surgery. Has had multiple rounds of ABX and steroids: oral, nasal, inhaled. She has had immunotherapy in the past- DR Marti Perry at [facility]. He does not recommend further treatment. Has not tried food elimination diets. Patient Active Problem List   Diagnosis    Polycystic ovarian disease    Hyperlipidemia    Hirsutism    Allergic rhinitis due to pollen    Deviated septum    Asthma    Lumbar disc disease L4-5, L5-S1 Dr [facility]) Epidural injections     Essential hypertension    Prediabetes A1c 5.7 16    Obstructive sleep apnea (adult) (pediatric)    Hypersomnia with sleep apnea- on Provigil 200 bid since 2010    Other viral warts    Non morbid obesity, unspecified obesity type    Chronic post-traumatic stress disorder (PTSD)        Body mass index is 37.45 kg/m².     Wt Readings from Last 3 Encounters:   21 232 lb (105.2 kg)   04/28/21 233 lb (105.7 kg)   04/23/21 230 lb (104.3 kg)       BP Readings from Last 3 Encounters:   05/04/21 120/82   04/28/21 131/81   04/23/21 122/80       Allergies   Allergen Reactions    Ensure      EGGS    Peanut-Containing Drug Products      PEANUTS    Food      Certain foods (chocolate, tea, soy, eggs, tree nuts, coffee, tomatoes, black pepper)       Prior to Visit Medications    Medication Sig Taking? Authorizing Provider   fluticasone (FLONASE) 50 MCG/ACT nasal spray 2 sprays by Nasal route 2 times daily Yes Tennis Appl, MD   predniSONE (DELTASONE) 10 MG tablet Take 4 tabs for 3 days, then 3 tabs for 3 days, then 2 tabs for 3 days, then 1 tab for 3 days. Yes Serafin Robert MD   busPIRone (BUSPAR) 10 MG tablet Take 1 tablet by mouth 3 times daily Yes Serafin Robert MD   atorvastatin (LIPITOR) 40 MG tablet TAKE ONE TABLET BY MOUTH DAILY Yes Serafin Robert MD   VILAZODONE HCL 40 MG Tablet TAKE ONE TABLET BY MOUTH DAILY Yes Serafin Robert MD   mometasone (NASONEX) 50 MCG/ACT nasal spray 2 sprays by Nasal route 2 times daily A one month prescription equals 2 bottles.  Yes Solo Trimble MD   montelukast (SINGULAIR) 10 MG tablet Take 1 tablet by mouth nightly Yes Becky Brian MD   albuterol sulfate  (90 Base) MCG/ACT inhaler INHALE TWO PUFFS BY MOUTH EVERY 6 HOURS AS NEEDED FOR WHEEZING OR SHORTNESS OF BREATH Yes Serafin Robert MD   budesonide-formoterol [facility]) 160-4.5 MCG/ACT AERO Inhale 2 puffs into the lungs 2 times daily Yes Serafin Robert MD   Spacer/Aero-Holding Chambers GABRIELLE 1 Device by Does not apply route daily as needed (with HFA inhalers) Yes Serafin Robert MD   spironolactone (ALDACTONE) 100 MG tablet TAKE ONE TABLET BY MOUTH DAILY Yes Serafin Robert MD   lisinopril (PRINIVIL;ZESTRIL) 5 MG tablet TAKE 1 TABLET EVERY DAY Yes Serafin Robert MD   omeprazole (PRILOSEC) 40 MG delayed release capsule TAKE ONE Jose M Shi Yes Serafin Robert MD   Spacer/Aero-Holding Chambers GABRIELLE 1 Device by Does not apply route daily as needed (use with [facility] devices) Yes Serafin Robert MD   [address] [address] 0.25-35 MG-MCG per tablet  Yes Historical Provider, MD   Multiple Vitamins-Minerals (THERAPEUTIC MULTIVITAMIN-MINERALS) tablet Take 1 tablet by mouth daily. Yes Historical Provider, MD   modafinil (PROVIGIL) 200 MG tablet Take 1-2 tablets by mouth every morning for 30 days. Lilly Cleaning, APRN - CNP        Social History     Tobacco Use    Smoking status: Never Smoker    Smokeless tobacco: Never Used    Tobacco comment: congratulated on nonsmoking status   Substance Use Topics    Alcohol use: No    Drug use: No       Review of Systems    Physical Exam  Vitals signs and nursing note reviewed. Constitutional:       General: She is not in acute distress. Appearance: Normal appearance. She is well-developed. She is not toxic-appearing or diaphoretic. HENT:      Head: Normocephalic and atraumatic. Right Ear: Tympanic membrane, ear canal and external ear normal. There is no impacted cerumen. Left Ear: Tympanic membrane, ear canal and external ear normal. There is no impacted cerumen. Nose: Congestion present. No rhinorrhea. Comments: Nasal turbs full and reddened bilat, R>L. Mouth/Throat:      Mouth: Mucous membranes are moist.      Pharynx: Oropharynx is clear. No oropharyngeal exudate or posterior oropharyngeal erythema. Eyes:      General:         Right eye: No discharge. Left eye: No discharge. Conjunctiva/sclera: Conjunctivae normal.      Pupils: Pupils are equal, round, and reactive to light. Neck:      Musculoskeletal: Normal range of motion and neck supple. Thyroid: No thyromegaly. Trachea: No tracheal deviation. Cardiovascular:      Rate and Rhythm: Normal rate and regular rhythm. Heart sounds: Normal heart sounds. No murmur.  No friction rub. No gallop. Pulmonary:      Effort: Pulmonary effort is normal. No respiratory distress. Breath sounds: Wheezing (faint exp wheezes) present. No rales. Chest:      Chest wall: No tenderness. Musculoskeletal: Normal range of motion. Lymphadenopathy:      Cervical: No cervical adenopathy. Upper Body:      Right upper body: No supraclavicular adenopathy. Left upper body: No supraclavicular adenopathy. Skin:     General: Skin is warm and dry. Findings: No rash. Neurological:      General: No focal deficit present. Mental Status: She is alert and oriented to person, place, and time. Mental status is at baseline. Psychiatric:         Behavior: Behavior normal.         Thought Content: Thought content normal.         Judgment: Judgment normal.      Comments: Anxious, but pleasant         ASSESSMENT/PLAN:    1. Mild persistent asthma with acute exacerbation  - unusual to have recurrent flares without cause. discussed that several possibilities exist  1. Could have undiagnosed infection (MAC or fungal). 2. Could have component of laryngospasm that is worsening symptoms  3. Anxiety can be worsening symptoms and perceptions, which can be worsened by albuterol use. Her pulse ox is reassuring. When she is calmer, moves air better. Recommend:   1. Empiric treament with diflucan x 10 days (hold atorva)  2. Encouraged to get PFT's done to continue eval.  3. rx ativan for prn use if anxious with wheezing  4. Continue work up with pulm. 5. pred did not seem to help. She may need MORE steroids, but none. Will try above therapies first.    We discussed imaging: has had CXR and CT PA in past few months that were neither one abnormal.    2. Non-seasonal allergic rhinitis due to pollen  - once her lungs are managed, she will likely proceed with sinus surgery with DR Platt. Follow up: she will remain in contact with me about how she is progressing.     An trustedsafeignature was used to authenticate this note.     --Fredy Neil MD on 5/4/2021 at 4:23 PM

## 2021-05-04 NOTE — TELEPHONE ENCOUNTER
Patient was at her PCP's office at the time of this call. She stated she wants to cancel her future appointment and will find another doctor.

## 2021-05-04 NOTE — TELEPHONE ENCOUNTER
I don't see a clear indication for antibiotic. Would recommend monitoring sx improvement on steroids.

## 2021-05-06 ENCOUNTER — PATIENT MESSAGE (OUTPATIENT)
Dept: FAMILY MEDICINE CLINIC | Age: 41
End: 2021-05-06

## 2021-05-06 RX ORDER — FLUCONAZOLE 200 MG/1
200 TABLET ORAL DAILY
Qty: 7 TABLET | Refills: 0 | Status: SHIPPED | OUTPATIENT
Start: 2021-05-06 | End: 2021-05-13

## 2021-05-07 ENCOUNTER — OFFICE VISIT (OUTPATIENT)
Dept: PRIMARY CARE CLINIC | Age: 41
End: 2021-05-07
Payer: COMMERCIAL

## 2021-05-07 DIAGNOSIS — Z20.828 EXPOSURE TO SARS-ASSOCIATED CORONAVIRUS: Primary | ICD-10-CM

## 2021-05-07 PROCEDURE — 99211 OFF/OP EST MAY X REQ PHY/QHP: CPT | Performed by: NURSE PRACTITIONER

## 2021-05-07 NOTE — PATIENT INSTRUCTIONS

## 2021-05-07 NOTE — PROGRESS NOTES
Ilana Cheung received a viral test for COVID-19. They were educated on isolation and quarantine as appropriate. For any symptoms, they were directed to seek care from their PCP, given contact information to establish with a doctor, directed to an urgent care or the emergency room.

## 2021-05-08 LAB — SARS-COV-2: NOT DETECTED

## 2021-05-12 ENCOUNTER — HOSPITAL ENCOUNTER (OUTPATIENT)
Dept: PULMONOLOGY | Age: 41
Discharge: HOME | End: 2021-05-12
Payer: COMMERCIAL

## 2021-05-12 DIAGNOSIS — J45.31 MILD PERSISTENT ASTHMA WITH ACUTE EXACERBATION: Chronic | ICD-10-CM

## 2021-05-12 LAB
DLCO %PRED: 105 %
DLCO PRED: NORMAL
DLCO/VA %PRED: NORMAL
DLCO/VA PRED: NORMAL
DLCO/VA: NORMAL
DLCO: NORMAL
EXPIRATORY TIME-POST: NORMAL
EXPIRATORY TIME: NORMAL
FEF 25-75% %CHNG: NORMAL
FEF 25-75% %PRED-POST: NORMAL
FEF 25-75% %PRED-PRE: NORMAL
FEF 25-75% PRED: NORMAL
FEF 25-75%-POST: NORMAL
FEF 25-75%-PRE: NORMAL
FEV1 %PRED-POST: 102 %
FEV1 %PRED-PRE: 99 %
FEV1 PRED: NORMAL
FEV1-POST: NORMAL
FEV1-PRE: NORMAL
FEV1/FVC %PRED-POST: NORMAL
FEV1/FVC %PRED-PRE: NORMAL
FEV1/FVC PRED: NORMAL
FEV1/FVC-POST: 88 %
FEV1/FVC-PRE: 87 %
FVC %PRED-POST: NORMAL
FVC %PRED-PRE: NORMAL
FVC PRED: NORMAL
FVC-POST: NORMAL
FVC-PRE: NORMAL
GAW %PRED: NORMAL
GAW PRED: NORMAL
GAW: NORMAL
IC %PRED: NORMAL
IC PRED: NORMAL
IC: NORMAL
MEP: NORMAL
MIP: NORMAL
MVV %PRED-PRE: NORMAL
MVV PRED: NORMAL
MVV-PRE: NORMAL
PEF %PRED-POST: NORMAL
PEF %PRED-PRE: NORMAL
PEF PRED: NORMAL
PEF%CHNG: NORMAL
PEF-POST: NORMAL
PEF-PRE: NORMAL
RAW %PRED: NORMAL
RAW PRED: NORMAL
RAW: NORMAL
RV %PRED: NORMAL
RV PRED: NORMAL
RV: NORMAL
SVC %PRED: NORMAL
SVC PRED: NORMAL
SVC: NORMAL
TLC %PRED: 90 %
TLC PRED: NORMAL
TLC: NORMAL
VA %PRED: NORMAL
VA PRED: NORMAL
VA: NORMAL
VTG %PRED: NORMAL
VTG PRED: NORMAL
VTG: NORMAL

## 2021-05-12 PROCEDURE — 94640 AIRWAY INHALATION TREATMENT: CPT

## 2021-05-12 PROCEDURE — 94726 PLETHYSMOGRAPHY LUNG VOLUMES: CPT

## 2021-05-12 PROCEDURE — 6370000000 HC RX 637 (ALT 250 FOR IP): Performed by: INTERNAL MEDICINE

## 2021-05-12 PROCEDURE — 94726 PLETHYSMOGRAPHY LUNG VOLUMES: CPT | Performed by: INTERNAL MEDICINE

## 2021-05-12 PROCEDURE — 94060 EVALUATION OF WHEEZING: CPT | Performed by: INTERNAL MEDICINE

## 2021-05-12 PROCEDURE — 94729 DIFFUSING CAPACITY: CPT | Performed by: INTERNAL MEDICINE

## 2021-05-12 PROCEDURE — 94729 DIFFUSING CAPACITY: CPT

## 2021-05-12 PROCEDURE — 94664 DEMO&/EVAL PT USE INHALER: CPT

## 2021-05-12 PROCEDURE — 94060 EVALUATION OF WHEEZING: CPT

## 2021-05-12 RX ORDER — ALBUTEROL SULFATE 90 UG/1
4 AEROSOL, METERED RESPIRATORY (INHALATION) ONCE
Status: COMPLETED | OUTPATIENT
Start: 2021-05-12 | End: 2021-05-12

## 2021-05-12 RX ADMIN — ALBUTEROL SULFATE 4 PUFF: 90 AEROSOL, METERED RESPIRATORY (INHALATION) at 07:57

## 2021-05-12 ASSESSMENT — PULMONARY FUNCTION TESTS
FEV1_PERCENT_PREDICTED_PRE: 99
FEV1_PERCENT_PREDICTED_POST: 102
FEV1/FVC_POST: 88
FEV1/FVC_PRE: 87

## 2021-05-13 NOTE — PROCEDURES
Reason for PFT:  Mild persistent asthma    Spirometry  1. Spirometry is normal.  2. There is no demonstrable obstructive defect. Lung Volumes  3. Lung volumes are normal.    Bronchodilator  1. There is no response to bronchodilator demonstrated. [Increase in FEV1 and/or FVC => 12% of control and => 200 ml]    Flow-Volume Loop  4. The flow-volume loop is normal.    Diffusing Capacity  5. Diffusing capacity is normal.      Overall Interpretation  No obstruction is present    No restriction is present    There is not a bronchodilator response present    Diffusion capacity is normal    FEV1 is 3.02 L at 99% predicted. FVC is 3.49 L at 93% predicted    FEV1/FVC ratio is 87    Normal study         OBSTRUCTION % Predicted FEV1   MILD >70%   MODERATE 60-69%   MODERATELY-SEVERE 50-59%   SEVERE 35-49%   VERY SEVERE <35%         RESTRICTION % Predicted TLC   MILD Less than LLN but > than 70%   MODERATE 60-69%   MODERATELY-SEVERE <60%                 DIFFUSION CAPACITY DL,CO % Pred   MILD >60% AND < LLN   MODERATE 40-60%   SEVERE <40%       PFT data will be scanned into the media tab in epic.     Brisa Arenas [facility] Pulmonology

## 2021-05-17 ENCOUNTER — TELEPHONE (OUTPATIENT)
Dept: PULMONOLOGY | Age: 41
End: 2021-05-17

## 2021-05-17 DIAGNOSIS — R06.09 CHRONIC DYSPNEA: Primary | ICD-10-CM

## 2021-05-17 RX ORDER — VILAZODONE HYDROCHLORIDE 40 MG/1
TABLET ORAL
Qty: 30 TABLET | Refills: 0 | Status: SHIPPED | OUTPATIENT
Start: 2021-05-17 | End: 2021-08-02

## 2021-05-17 RX ORDER — ALBUTEROL SULFATE 90 UG/1
AEROSOL, METERED RESPIRATORY (INHALATION)
Qty: 8.5 G | Refills: 0 | Status: SHIPPED | OUTPATIENT
Start: 2021-05-17 | End: 2021-07-06

## 2021-05-17 NOTE — TELEPHONE ENCOUNTER
Hanna message for patient to return our call re :  A patient named Trace Ames is on my schedule May 27th who used to see Lyda Boxer was upset and wanted another doctor. Carylon Osmany disgruntled patients should not get assigned to anyone else in the office without a discussion with said doctor and the patient should be referred to another practice.  I am not going to be put in that situation. Ameena Oh is going to be looking for me to bad mouth him or question what he did and I will not do it     She needs to go elsewhere than someone else in our office

## 2021-05-18 ENCOUNTER — PATIENT MESSAGE (OUTPATIENT)
Dept: FAMILY MEDICINE CLINIC | Age: 41
End: 2021-05-18

## 2021-05-19 NOTE — TELEPHONE ENCOUNTER
I spoke to patient to advise I have 8 boxes of Viibryd for her which is an 8 week supply. Advised it will be at the .

## 2021-05-28 ENCOUNTER — HOSPITAL ENCOUNTER (OUTPATIENT)
Dept: NON INVASIVE DIAGNOSTICS | Age: 41
Discharge: HOME | End: 2021-05-28
Payer: COMMERCIAL

## 2021-05-28 ENCOUNTER — HOSPITAL ENCOUNTER (OUTPATIENT)
Dept: CT IMAGING | Age: 41
Discharge: HOME | End: 2021-05-28
Payer: COMMERCIAL

## 2021-05-28 DIAGNOSIS — R06.09 CHRONIC DYSPNEA: ICD-10-CM

## 2021-05-28 LAB
LV EF: 71 %
LVEF MODALITY: NORMAL

## 2021-05-28 PROCEDURE — 3430000000 HC RX DIAGNOSTIC RADIOPHARMACEUTICAL: Performed by: FAMILY MEDICINE

## 2021-05-28 PROCEDURE — A9502 TC99M TETROFOSMIN: HCPCS | Performed by: FAMILY MEDICINE

## 2021-05-28 PROCEDURE — 71260 CT THORAX DX C+: CPT

## 2021-05-28 PROCEDURE — 78451 HT MUSCLE IMAGE SPECT SING: CPT

## 2021-05-28 PROCEDURE — 6360000004 HC RX CONTRAST MEDICATION: Performed by: FAMILY MEDICINE

## 2021-05-28 PROCEDURE — 93017 CV STRESS TEST TRACING ONLY: CPT

## 2021-05-28 PROCEDURE — 78452 HT MUSCLE IMAGE SPECT MULT: CPT

## 2021-05-28 RX ADMIN — TETROFOSMIN 10 MILLICURIE: 1.38 INJECTION, POWDER, LYOPHILIZED, FOR SOLUTION INTRAVENOUS at 08:42

## 2021-05-28 RX ADMIN — TETROFOSMIN 30 MILLICURIE: 1.38 INJECTION, POWDER, LYOPHILIZED, FOR SOLUTION INTRAVENOUS at 10:12

## 2021-05-28 RX ADMIN — IOPAMIDOL 75 ML: 755 INJECTION, SOLUTION INTRAVENOUS at 08:27

## 2021-05-31 ENCOUNTER — PATIENT MESSAGE (OUTPATIENT)
Dept: FAMILY MEDICINE CLINIC | Age: 41
End: 2021-05-31

## 2021-06-01 RX ORDER — MOXIFLOXACIN HYDROCHLORIDE 400 MG/1
400 TABLET ORAL DAILY
Qty: 10 TABLET | Refills: 0 | Status: SHIPPED
Start: 2021-06-01 | End: 2021-06-04

## 2021-06-01 RX ORDER — LEVOFLOXACIN 500 MG/1
500 TABLET, FILM COATED ORAL DAILY
Qty: 10 TABLET | Refills: 0 | Status: SHIPPED | OUTPATIENT
Start: 2021-06-01 | End: 2021-06-04 | Stop reason: SDUPTHER

## 2021-06-01 NOTE — TELEPHONE ENCOUNTER
From: Norma Johns  To: Ruthie Mullins MD  Sent: 5/31/2021 9:04 PM EDT  Subject: Non-Urgent Medical Question    Sorry to keep bothering you Dr. Joel Leslie but I still feel miserable. I know I have a sinus infection and my right ear still hurts. I have a cough and I have coughed up yellow mucous. Can you call me in an antibiotic? I see you Friday at 11am but my ear hurts too bad to wait that long.

## 2021-06-04 ENCOUNTER — OFFICE VISIT (OUTPATIENT)
Dept: FAMILY MEDICINE CLINIC | Age: 41
End: 2021-06-04
Payer: COMMERCIAL

## 2021-06-04 VITALS
WEIGHT: 236 LBS | DIASTOLIC BLOOD PRESSURE: 66 MMHG | BODY MASS INDEX: 37.93 KG/M2 | HEIGHT: 66 IN | HEART RATE: 81 BPM | SYSTOLIC BLOOD PRESSURE: 106 MMHG | OXYGEN SATURATION: 98 %

## 2021-06-04 DIAGNOSIS — F43.12 CHRONIC POST-TRAUMATIC STRESS DISORDER (PTSD): ICD-10-CM

## 2021-06-04 DIAGNOSIS — J32.9 CHRONIC SINUSITIS, UNSPECIFIED LOCATION: Primary | ICD-10-CM

## 2021-06-04 DIAGNOSIS — H65.91 OME (OTITIS MEDIA WITH EFFUSION), RIGHT: ICD-10-CM

## 2021-06-04 DIAGNOSIS — J45.31 MILD PERSISTENT ASTHMA WITH ACUTE EXACERBATION: Chronic | ICD-10-CM

## 2021-06-04 PROCEDURE — 99214 OFFICE O/P EST MOD 30 MIN: CPT | Performed by: FAMILY MEDICINE

## 2021-06-04 RX ORDER — LEVOFLOXACIN 500 MG/1
500 TABLET, FILM COATED ORAL DAILY
Qty: 4 TABLET | Refills: 0 | Status: SHIPPED | OUTPATIENT
Start: 2021-06-04 | End: 2021-06-08

## 2021-06-04 NOTE — PROGRESS NOTES
2021    Blood pressure 106/66, pulse 81, height 5' 6\" (1.676 m), weight 236 lb (107 kg), last menstrual period 2021, SpO2 98 %, not currently breastfeeding. Simon Sauer (:  1980) is a 35 y.o. female, here for evaluation of the following medical concerns:    Chief Complaint   Patient presents with    Follow-up     f/u anxiety     F/u on chest tightness that has been disruptive and worrisome, quinton when associated with SOB. Extensive workup, influding CT lungs, PFT's, cardiac stress test all normal.    Has asthma that is well controlled with sybmicort, singulair and albuterol. She has started antibiotics 3 days ago (levaquin) which has helped reduce thick yellow mucus/sputum. Does have a hx of chronics sinusitis and sees DR Mick Guadalupe. With abx this has become more clear. Sinus pressure persists, but otalgia has improved significantly. Trial of antifungals did not help. She thinks that her chest tightness and dyspnea has also improved the last couple days. She is planning to see GI as well due to dyspepsia with burning after eating, belching, SOB (which improves after belching). She is already taking prilosec 40 BID, but symptoms remain. No wt loss, dysphagia, emesis. She uses buspar 10 mg TID and vilazodone 40 for anxiety.  (she feels her physical symptoms make anxiety worse, but increasing dose of buspar has really helped recently). Other ssri's have caused sexual dysfunction, but vilazodone does not.       Patient Active Problem List   Diagnosis    Polycystic ovarian disease    Hyperlipidemia    Hirsutism    Allergic rhinitis due to pollen    Deviated septum    Asthma    Lumbar disc disease L4-5, L5-S1 Dr Bradley Ferreira) Epidural injections     Essential hypertension    Prediabetes A1c 5.7 16    Obstructive sleep apnea (adult) (pediatric)    Hypersomnia with sleep apnea- on Provigil 200 bid since 2010    Other viral warts    Non morbid obesity, unspecified obesity type    Chronic post-traumatic stress disorder (PTSD)        Body mass index is 38.09 kg/m². Wt Readings from Last 3 Encounters:   06/04/21 236 lb (107 kg)   05/04/21 232 lb (105.2 kg)   04/28/21 233 lb (105.7 kg)       BP Readings from Last 3 Encounters:   06/04/21 106/66   05/04/21 120/82   04/28/21 131/81       Allergies   Allergen Reactions    Ensure      EGGS    Peanut-Containing Drug Products      PEANUTS    Food      Certain foods (chocolate, tea, soy, eggs, tree nuts, coffee, tomatoes, black pepper)       Prior to Visit Medications    Medication Sig Taking?  Authorizing Provider   levoFLOXacin (LEVAQUIN) 500 MG tablet Take 1 tablet by mouth daily for 10 days Yes Ari Thacker MD   albuterol sulfate  (90 Base) MCG/ACT inhaler INHALE TWO PUFFS BY MOUTH EVERY 6 HOURS AS NEEDED FOR WHEEZING OR FOR SHORTNESS OF BREATH Yes Ari Thacker MD   VILAZODONE HCL 40 MG Tablet TAKE ONE TABLET BY MOUTH DAILY Yes Ari Thacker MD   fluticasone (FLONASE) 50 MCG/ACT nasal spray 2 sprays by Nasal route 2 times daily Yes Rehana Holbrook MD   busPIRone (BUSPAR) 10 MG tablet Take 1 tablet by mouth 3 times daily Yes Ari Thacker MD   atorvastatin (LIPITOR) 40 MG tablet TAKE ONE TABLET BY MOUTH DAILY Yes Ari Thacker MD   montelukast (SINGULAIR) 10 MG tablet Take 1 tablet by mouth nightly Yes Janes James MD   budesonide-formoterol (SYMBICORT) 160-4.5 MCG/ACT AERO Inhale 2 puffs into the lungs 2 times daily Yes Ari Thacker MD   Spacer/Aero-Holding Chambers GABRIELLE 1 Device by Does not apply route daily as needed (with HFA inhalers) Yes Ari Thacker MD   spironolactone (ALDACTONE) 100 MG tablet TAKE ONE TABLET BY MOUTH DAILY Yes Ari Thacker MD   lisinopril (PRINIVIL;ZESTRIL) 5 MG tablet TAKE 1 TABLET EVERY DAY Yes Ari Thacker MD   omeprazole (PRILOSEC) 40 MG delayed release capsule TAKE ONE CAPSULE BY MOUTH TWICE A Jorge Aguilera MD   Spacer/Aero-Holding Chambers GABRIELLE 1 Device by Does not apply route daily as needed (use with [facility] devices) Yes Melissa Enciso MD   [id number] [address] 0.25-35 MG-MCG per tablet  Yes Historical Provider, MD   Multiple Vitamins-Minerals (THERAPEUTIC MULTIVITAMIN-MINERALS) tablet Take 1 tablet by mouth daily. Yes Historical Provider, MD   modafinil (PROVIGIL) 200 MG tablet Take 1-2 tablets by mouth every morning for 30 days. Lilly Hartman, APRN - CNP        Social History     Tobacco Use    Smoking status: Never Smoker    Smokeless tobacco: Never Used    Tobacco comment: congratulated on nonsmoking status   Vaping Use    Vaping Use: Never used   Substance Use Topics    Alcohol use: No    Drug use: No       Review of Systems    Physical Exam  Vitals and nursing note reviewed. Constitutional:       General: She is not in acute distress. Appearance: Normal appearance. She is well-developed. She is obese. She is not toxic-appearing or diaphoretic. HENT:      Head: Normocephalic and atraumatic. Right Ear: Ear canal and external ear normal. There is no impacted cerumen. Left Ear: Tympanic membrane, ear canal and external ear normal. There is no impacted cerumen. Ears:      Comments: R TM with effusion, mild erythema     Nose: Congestion present. No rhinorrhea. Comments: Nasal turbs red and full on R.  + max and frontal sinus tenderness, R>L     Mouth/Throat:      Mouth: Mucous membranes are moist.      Pharynx: Oropharynx is clear. No oropharyngeal exudate or posterior oropharyngeal erythema. Eyes:      General:         Right eye: No discharge. Left eye: No discharge. Conjunctiva/sclera: Conjunctivae normal.      Pupils: Pupils are equal, round, and reactive to light. Neck:      Thyroid: No thyromegaly. Vascular: No carotid bruit. Trachea: No tracheal deviation.    Cardiovascular:      Rate and Rhythm: Normal rate and regular rhythm. Pulses: Normal pulses. Heart sounds: Normal heart sounds. No murmur heard. No friction rub. No gallop. Pulmonary:      Effort: Pulmonary effort is normal. No respiratory distress. Breath sounds: Wheezing (faint exp wheezes bilateral lower lung fields.) present. No rales. Chest:      Chest wall: No tenderness. Musculoskeletal:         General: Normal range of motion. Cervical back: Normal range of motion and neck supple. Right lower leg: No edema. Left lower leg: No edema. Lymphadenopathy:      Cervical: No cervical adenopathy. Upper Body:      Right upper body: No supraclavicular adenopathy. Left upper body: No supraclavicular adenopathy. Skin:     General: Skin is warm and dry. Findings: No rash. Neurological:      General: No focal deficit present. Mental Status: She is alert and oriented to person, place, and time. Mental status is at baseline. Psychiatric:         Mood and Affect: Mood normal.         Behavior: Behavior normal.         Thought Content: Thought content normal.         Judgment: Judgment normal.         ASSESSMENT/PLAN:    1. Chronic sinusitis, unspecified location  - exam is significant for R nasal inflammation and hypertrophy. Sx starting to improve on levaquin. Will extend her thearapy from 10 to 14 days. 2. OME (otitis media with effusion), right  - improving on levaquin- continue. 3. Mild persistent asthma with acute exacerbation  - some mid lung wheezes were auscultated today, despite her recent normal PFT's. This is also beginning to improve on antibiotic thearpy (levaquin- which makes me wonder if she needs BAL-culture for pseudomonas if symptoms return again. 4. Chronic post-traumatic stress disorder (PTSD)  - anxious symptoms improved with higher dose of buspar with viibryd. Continue current medications and treatment plan.      Follow up: 3 mo    An  [employer] was used to authenticate this note.    --Taylor Oconnell MD on 6/4/2021 at 11:48 AM

## 2021-06-21 ENCOUNTER — HOSPITAL ENCOUNTER (OUTPATIENT)
Age: 41
Discharge: HOME | End: 2021-06-21
Payer: COMMERCIAL

## 2021-06-21 ENCOUNTER — HOSPITAL ENCOUNTER (OUTPATIENT)
Dept: GENERAL RADIOLOGY | Age: 41
Discharge: HOME | End: 2021-06-21
Payer: COMMERCIAL

## 2021-06-21 ENCOUNTER — OFFICE VISIT (OUTPATIENT)
Dept: FAMILY MEDICINE CLINIC | Age: 41
End: 2021-06-21
Payer: COMMERCIAL

## 2021-06-21 VITALS
SYSTOLIC BLOOD PRESSURE: 110 MMHG | DIASTOLIC BLOOD PRESSURE: 74 MMHG | RESPIRATION RATE: 16 BRPM | BODY MASS INDEX: 37.97 KG/M2 | TEMPERATURE: 98.3 F | HEIGHT: 66 IN | OXYGEN SATURATION: 97 % | HEART RATE: 83 BPM | WEIGHT: 236.25 LBS

## 2021-06-21 DIAGNOSIS — H60.501 ACUTE OTITIS EXTERNA OF RIGHT EAR, UNSPECIFIED TYPE: Primary | ICD-10-CM

## 2021-06-21 DIAGNOSIS — J32.9 CHRONIC SINUSITIS, UNSPECIFIED LOCATION: ICD-10-CM

## 2021-06-21 DIAGNOSIS — K59.00 CONSTIPATION, UNSPECIFIED CONSTIPATION TYPE: ICD-10-CM

## 2021-06-21 DIAGNOSIS — J45.40 MODERATE PERSISTENT ASTHMA WITHOUT COMPLICATION: ICD-10-CM

## 2021-06-21 DIAGNOSIS — R06.2 WHEEZING: ICD-10-CM

## 2021-06-21 PROCEDURE — 99214 OFFICE O/P EST MOD 30 MIN: CPT | Performed by: NURSE PRACTITIONER

## 2021-06-21 PROCEDURE — 74018 RADEX ABDOMEN 1 VIEW: CPT

## 2021-06-21 RX ORDER — CIPROFLOXACIN AND DEXAMETHASONE 3; 1 MG/ML; MG/ML
4 SUSPENSION/ DROPS AURICULAR (OTIC) 2 TIMES DAILY
Qty: 1 BOTTLE | Refills: 0 | Status: SHIPPED | OUTPATIENT
Start: 2021-06-21 | End: 2021-06-28

## 2021-06-21 ASSESSMENT — ENCOUNTER SYMPTOMS
SORE THROAT: 0
NAUSEA: 0
SHORTNESS OF BREATH: 1
WHEEZING: 1
DIARRHEA: 0
RHINORRHEA: 1
VOMITING: 0
COUGH: 1
ABDOMINAL PAIN: 0
SINUS PRESSURE: 1
SINUS PAIN: 1

## 2021-06-21 NOTE — PROGRESS NOTES
6/21/2021    This is a 35 y.o. female   Chief Complaint   Patient presents with    Otalgia     Patient complaining of right ear pain since early May. She was on antibiotics and  has not improved    Wheezing     Patient gets winded very easily with exertion. She has been using her albuterol more frequently   . Otalgia   There is pain in the right ear. This is a new problem. Episode onset: May 2021. The problem occurs constantly. The problem has been unchanged. There has been no fever. The pain is at a severity of 7/10. The pain is moderate. Associated symptoms include coughing and rhinorrhea. Pertinent negatives include no abdominal pain, diarrhea, ear discharge, headaches, hearing loss, sore throat or vomiting. When she is at work and the fans on running, will make pain worse in right ear. Seeing ENT Dr. Donnie Durand on 6/23/21. Has seen him the past for chronic sinusitis. Planning on scheduling for septorhinoplasty to address septal deviation, turbinate hypertrophy. Seen Dr. Edith Walker on 6/4/21 and given levaquin 500 mg for 14 days. Did not have improvement in ear pain. Felt that sinus symptoms have improved. Did have sinus congestion, PND, sinus pressure. Sinus drainage is yellow. Pain in right ear is 7.5/10 that is constant. Reports that wheezing has been constant for a long time. She is not able to say when she has felt normal. Also having shortness of breath. Also feels that she has a chronic chest cold. Has taken mucinex without improvement in symptoms. Occasionally will cough up thick yellow or white sputum. Most the time cough is dry. Uses albuterol inhaler 2-3 times per day. On symbicort 2 puffs BID, singulair, claritin, and flonase and sinus rinse BID. Had normal chest xray 4/16/21  CT chest normal- 5/28/21  Normal myocardial perfusion study 5/28/21. Normal PFT 5/13/21. Seen Dr. Akira Dawn. Reports there were no further recommendations.  This report is not currently available. Taking ibuprofen 400 mg TID for ear pain. Does not typically take tylenol. Patient Active Problem List   Diagnosis    Polycystic ovarian disease    Hyperlipidemia    Hirsutism    Allergic rhinitis due to pollen    Deviated septum    Asthma    Lumbar disc disease L4-5, L5-S1 Dr Antolin Cruz [facility]) Epidural injections 2011    Essential hypertension    Prediabetes A1c 5.7 6/1/16    Obstructive sleep apnea (adult) (pediatric)    Hypersomnia with sleep apnea- on Provigil 200 bid since 11/2/2010    Other viral warts    Non morbid obesity, unspecified obesity type    Chronic post-traumatic stress disorder (PTSD)          Current Outpatient Medications   Medication Sig Dispense Refill    lisinopril (PRINIVIL;ZESTRIL) 5 MG tablet TAKE ONE TABLET BY MOUTH DAILY 30 tablet 2    albuterol sulfate  (90 Base) MCG/ACT inhaler INHALE TWO PUFFS BY MOUTH EVERY 6 HOURS AS NEEDED FOR WHEEZING OR FOR SHORTNESS OF BREATH 8.5 g 0    VILAZODONE HCL 40 MG Tablet TAKE ONE TABLET BY MOUTH DAILY 30 tablet 0    fluticasone (FLONASE) 50 MCG/ACT nasal spray 2 sprays by Nasal route 2 times daily 2 Bottle 5    busPIRone (BUSPAR) 10 MG tablet Take 1 tablet by mouth 3 times daily 90 tablet 2    atorvastatin (LIPITOR) 40 MG tablet TAKE ONE TABLET BY MOUTH DAILY 30 tablet 5    montelukast (SINGULAIR) 10 MG tablet Take 1 tablet by mouth nightly 30 tablet 5    budesonide-formoterol (SYMBICORT) 160-4.5 MCG/ACT AERO Inhale 2 puffs into the lungs 2 times daily 3 Inhaler 1    Spacer/Aero-Holding Chambers GABRIELLE 1 Device by Does not apply route daily as needed (with HFA inhalers) 1 Device 0    modafinil (PROVIGIL) 200 MG tablet Take 1-2 tablets by mouth every morning for 30 days.  180 tablet 1    spironolactone (ALDACTONE) 100 MG tablet TAKE ONE TABLET BY MOUTH DAILY 90 tablet 1    omeprazole (PRILOSEC) 40 MG delayed release capsule TAKE ONE CAPSULE BY MOUTH TWICE A DAY 60 capsule 5    SPRINTEC 28 0.25-35 MG-MCG per tablet       Multiple Vitamins-Minerals (THERAPEUTIC MULTIVITAMIN-MINERALS) tablet Take 1 tablet by mouth daily. No current facility-administered medications for this visit. Allergies   Allergen Reactions    Ensure      EGGS    Peanut-Containing Drug Products      PEANUTS    Food      Certain foods (chocolate, tea, soy, eggs, tree nuts, coffee, tomatoes, black pepper)       Review of Systems   Constitutional: Negative for activity change and fever. HENT: Positive for congestion, ear pain, postnasal drip, rhinorrhea, sinus pressure and sinus pain. Negative for ear discharge, hearing loss and sore throat. Respiratory: Positive for cough, shortness of breath and wheezing. Cardiovascular: Negative for chest pain. Gastrointestinal: Negative for abdominal pain, diarrhea, nausea and vomiting. Neurological: Negative for dizziness and headaches. Vitals:    06/21/21 1450   BP: 110/74   Site: Right Upper Arm   Position: Sitting   Cuff Size: Large Adult   Pulse: 83   Resp: 16   Temp: 98.3 °F (36.8 °C)   TempSrc: Oral   SpO2: 97%   Weight: 236 lb 4 oz (107.2 kg)   Height: 5' 6\" (1.676 m)       Body mass index is 38.13 kg/m². Wt Readings from Last 3 Encounters:   06/21/21 236 lb 4 oz (107.2 kg)   06/04/21 236 lb (107 kg)   05/04/21 232 lb (105.2 kg)       BP Readings from Last 3 Encounters:   06/21/21 110/74   06/04/21 106/66   05/04/21 120/82       Physical Exam  Vitals and nursing note reviewed. Constitutional:       General: She is not in acute distress. Appearance: She is well-developed. HENT:      Head: Normocephalic and atraumatic. Right Ear: Tympanic membrane is bulging. Tympanic membrane is not perforated or erythematous. Left Ear: Ear canal and external ear normal. Tympanic membrane is bulging. Tympanic membrane is not perforated or erythematous. Ears:      Comments: Right ear canal slightly erythematous and narrowed.  Positive for tenderness to palpation. Nose: Mucosal edema present. Right Turbinates: Swollen. Left Turbinates: Swollen. Right Sinus: Maxillary sinus tenderness present. No frontal sinus tenderness. Left Sinus: Maxillary sinus tenderness present. No frontal sinus tenderness. Cardiovascular:      Rate and Rhythm: Normal rate and regular rhythm. Heart sounds: Normal heart sounds. No murmur heard. No friction rub. No gallop. Pulmonary:      Effort: Pulmonary effort is normal. No respiratory distress. Breath sounds: Examination of the right-middle field reveals wheezing. Examination of the right-lower field reveals wheezing. Wheezing present. Comments: Faint expiratory wheezing in right middle and lower lobe. Musculoskeletal:      Cervical back: Neck supple. Skin:     General: Skin is warm and dry. Neurological:      Mental Status: She is alert and oriented to person, place, and time. Psychiatric:         Behavior: Behavior normal.         Thought Content: Thought content normal.         Judgment: Judgment normal.         Assessmentand Plan  Helen Heaton was seen today for otalgia and wheezing. Diagnoses and all orders for this visit:    Acute otitis externa of right ear, unspecified type  -     ciprofloxacin-dexamethasone (CIPRODEX) 0.3-0.1 % otic suspension; Place 4 drops into the right ear 2 times daily for 7 days    Chronic sinusitis, unspecified location  Sinus symptoms improved after 14 days of levaquin. Planning on having septorhinoplasty with Dr. Anuj Saenz sometime in the future. Has f/u with him later this week to discuss. Continue current allergy medications. Zahraa Fabian MD, Pulmonary, [facility]  Symptoms improved while taking levaquin for 14 days, but did not resolve. Advised to have evaluation with pulmonologist. She would like to change providers- refer to Dr. Geri Orantes.    Likely will need bronchoscopy     Moderate persistent asthma without complication  -     Ivette Deluca MD, Pulmonary, [facility]  Continues with wheezing. Refer to pulmonologist for further evaluation. Continue current medications- symbicort, singulair, claritin, flonase. Continue albuterol inhaler PRN. Return if symptoms worsen or fail to improve.

## 2021-06-29 ENCOUNTER — HOSPITAL ENCOUNTER (OUTPATIENT)
Age: 41
Discharge: HOME | End: 2021-06-29
Payer: COMMERCIAL

## 2021-06-29 ENCOUNTER — OFFICE VISIT (OUTPATIENT)
Dept: PULMONOLOGY | Age: 41
End: 2021-06-29
Payer: COMMERCIAL

## 2021-06-29 VITALS
HEART RATE: 91 BPM | WEIGHT: 236 LBS | OXYGEN SATURATION: 100 % | BODY MASS INDEX: 37.93 KG/M2 | DIASTOLIC BLOOD PRESSURE: 72 MMHG | HEIGHT: 66 IN | SYSTOLIC BLOOD PRESSURE: 118 MMHG

## 2021-06-29 DIAGNOSIS — J45.50 SEVERE PERSISTENT ASTHMA WITHOUT COMPLICATION: Primary | ICD-10-CM

## 2021-06-29 DIAGNOSIS — J45.50 SEVERE PERSISTENT ASTHMA WITHOUT COMPLICATION: ICD-10-CM

## 2021-06-29 DIAGNOSIS — I10 ESSENTIAL HYPERTENSION: Primary | ICD-10-CM

## 2021-06-29 LAB
BASOPHILS ABSOLUTE: 0.1 K/UL (ref 0–0.2)
BASOPHILS RELATIVE PERCENT: 0.6 %
EOSINOPHILS ABSOLUTE: 0.3 K/UL (ref 0–0.6)
EOSINOPHILS RELATIVE PERCENT: 2.9 %
HCT VFR BLD CALC: 34.2 % (ref 36–48)
HEMOGLOBIN: 12.1 G/DL (ref 12–16)
LYMPHOCYTES ABSOLUTE: 2 K/UL (ref 1–5.1)
LYMPHOCYTES RELATIVE PERCENT: 23.2 %
MCH RBC QN AUTO: 31.8 PG (ref 26–34)
MCHC RBC AUTO-ENTMCNC: 35.2 G/DL (ref 31–36)
MCV RBC AUTO: 90.1 FL (ref 80–100)
MONOCYTES ABSOLUTE: 0.4 K/UL (ref 0–1.3)
MONOCYTES RELATIVE PERCENT: 4.4 %
NEUTROPHILS ABSOLUTE: 6 K/UL (ref 1.7–7.7)
NEUTROPHILS RELATIVE PERCENT: 68.9 %
PDW BLD-RTO: 12.8 % (ref 12.4–15.4)
PLATELET # BLD: 346 K/UL (ref 135–450)
PMV BLD AUTO: 6.9 FL (ref 5–10.5)
RBC # BLD: 3.79 M/UL (ref 4–5.2)
WBC # BLD: 8.7 K/UL (ref 4–11)

## 2021-06-29 PROCEDURE — 99244 OFF/OP CNSLTJ NEW/EST MOD 40: CPT | Performed by: INTERNAL MEDICINE

## 2021-06-29 PROCEDURE — 85025 COMPLETE CBC W/AUTO DIFF WBC: CPT

## 2021-06-29 PROCEDURE — 36415 COLL VENOUS BLD VENIPUNCTURE: CPT

## 2021-06-29 RX ORDER — FLUTICASONE FUROATE, UMECLIDINIUM BROMIDE AND VILANTEROL TRIFENATATE 200; 62.5; 25 UG/1; UG/1; UG/1
1 POWDER RESPIRATORY (INHALATION) DAILY
Qty: 1 EACH | Refills: 3 | Status: SHIPPED | OUTPATIENT
Start: 2021-06-29 | End: 2021-11-03

## 2021-06-29 RX ORDER — SPIRONOLACTONE 100 MG/1
TABLET, FILM COATED ORAL
Qty: 30 TABLET | Refills: 2 | Status: SHIPPED | OUTPATIENT
Start: 2021-06-29 | End: 2021-10-01

## 2021-06-29 ASSESSMENT — ENCOUNTER SYMPTOMS
WHEEZING: 1
BLOOD IN STOOL: 0
ANAL BLEEDING: 0
CHEST TIGHTNESS: 1
COUGH: 1
ABDOMINAL DISTENTION: 0
DIARRHEA: 0
CHOKING: 0
SINUS PRESSURE: 0
SHORTNESS OF BREATH: 1
BACK PAIN: 0
CONSTIPATION: 0
VOICE CHANGE: 0
APNEA: 0
STRIDOR: 0
ABDOMINAL PAIN: 0
SORE THROAT: 0
RHINORRHEA: 1

## 2021-06-29 NOTE — PROGRESS NOTES
Mina Melara    YOB: 1980     Date of Service:  6/29/2021     Chief Complaint   Patient presents with    New Patient     ref by Leopold Coss     Asthma    Pleurisy     that radiates to her back    Wheezing    Shortness of Breath    Cough     dry         HPI patient referred for consultation by Leopold Coss, CNP for evaluation of poorly controlled asthma. Patient states that she has had a constant pleuritic type chest pain associated with central chest tightness and wheezing for well over 6 months. She has also had a cough which is particularly expectorant of yellow phlegm in early morning. She has been extensively investigated, has received several courses of antibiotics and prednisone tapers over the months, with recurrent symptoms. Patient has issues with chronic sinusitis and postnasal drip. She follows with Dr. Nany Reveles, who has considered septorhinoplasty for nasal valve collapse and septal deviation/turbinate hypertrophy. Patient also has history of GERD-currently treated with Prilosec 40 mg twice daily. History of asthma since she has been a teenager, triggers are particularly environmental-change in season and some food allergies. Has not received allergy immunotherapy in the past.  Daughter and mother both have asthma. Patient has never smoked. Patient works in an [employer], with significant exposure to dust.  Denies any exposure to mold. No pets or birds at home. History of BIBIANA on BiPAP. History of anxiety, no significant voice change or dysphagia reported.     Allergies   Allergen Reactions    Ensure      EGGS    Peanut-Containing Drug Products      PEANUTS    Food      Certain foods (chocolate, tea, soy, eggs, tree nuts, coffee, tomatoes, black pepper)     Outpatient Medications Marked as Taking for the 6/29/21 encounter (Office Visit) with Elisabeth Martin MD   Medication Sig Dispense Refill    Fluticasone-Umeclidin-Vilant (Stephanie Tan ELLIPTA) 200-62.5-25 MCG/INH AEPB Inhale 1 puff into the lungs daily 1 each 3    lisinopril (PRINIVIL;ZESTRIL) 5 MG tablet TAKE ONE TABLET BY MOUTH DAILY 30 tablet 2    albuterol sulfate  (90 Base) MCG/ACT inhaler INHALE TWO PUFFS BY MOUTH EVERY 6 HOURS AS NEEDED FOR WHEEZING OR FOR SHORTNESS OF BREATH 8.5 g 0    VILAZODONE HCL 40 MG Tablet TAKE ONE TABLET BY MOUTH DAILY 30 tablet 0    fluticasone (FLONASE) 50 MCG/ACT nasal spray 2 sprays by Nasal route 2 times daily 2 Bottle 5    busPIRone (BUSPAR) 10 MG tablet Take 1 tablet by mouth 3 times daily 90 tablet 2    atorvastatin (LIPITOR) 40 MG tablet TAKE ONE TABLET BY MOUTH DAILY 30 tablet 5    montelukast (SINGULAIR) 10 MG tablet Take 1 tablet by mouth nightly 30 tablet 5    Spacer/Aero-Holding Chambers GABRIELLE 1 Device by Does not apply route daily as needed (with HFA inhalers) 1 Device 0    spironolactone (ALDACTONE) 100 MG tablet TAKE ONE TABLET BY MOUTH DAILY 90 tablet 1    omeprazole (PRILOSEC) 40 MG delayed release capsule TAKE ONE CAPSULE BY MOUTH TWICE A DAY 60 capsule 5    SPRINTEC 28 0.25-35 MG-MCG per tablet       Multiple Vitamins-Minerals (THERAPEUTIC MULTIVITAMIN-MINERALS) tablet Take 1 tablet by mouth daily.          Immunization History   Administered Date(s) Administered    Influenza Virus Vaccine 10/01/2010, 09/09/2014    Influenza, Intradermal, Preservative free 10/01/2010, 11/28/2012, 09/23/2013    Influenza, Intradermal, Quadrivalent, Preservative Free 10/05/2016    Influenza, MDCK Quadv, IM, PF (Flucelvax 4 yrs and older) 09/27/2019    Influenza, MDCK Quadv, with preserv IM (Flucelvax 4 yrs and older) 11/09/2020    Influenza, Quadv, IM, PF (6 mo and older Fluzone, Flulaval, Fluarix, and 3 yrs and older Afluria) 10/21/2015, 10/28/2017    Influenza, Luis Lard, Recombinant, IM PF (Flublok 18 yrs and older) 09/27/2018    Pneumococcal Polysaccharide ([id number]) 01/19/2018    Tdap (Boostrix, Adacel) 08/20/2012       Past Medical History:   Diagnosis Date    Allergic rhinitis     Anxiety     Asthma     Environmental allergies     food    Hyperlipidemia     Hypertension     Lumbar disc disease L4-5, L5-S1 Dr Sandie Guzmán) Epidural injections 2011 8/20/2012    Obstructive sleep apnea     Obstructive sleep apnea (adult) (pediatric) 6/18/2018    PCOS (polycystic ovarian syndrome)     S/P colonoscopy 1/17/13    normal. Dr Duc Tenorio     Past Surgical History:   Procedure Laterality Date    BREAST REDUCTION SURGERY  2004    FOOT SURGERY  2000    hallux fx    UPPER GASTROINTESTINAL ENDOSCOPY  1/17/13    gastritis; Dr Duc Tenorio     Family History   Problem Relation Age of Onset    High Blood Pressure Mother     Other Mother         hypothyroid    Diabetes Sister     Diabetes Maternal Grandmother     Diabetes Maternal Grandfather        Review of Systems:  Review of Systems   Constitutional: Positive for fatigue. Negative for activity change, appetite change and fever. HENT: Positive for congestion, postnasal drip and rhinorrhea. Negative for ear discharge, ear pain, sinus pressure, sneezing, sore throat, tinnitus and voice change. Respiratory: Positive for cough, chest tightness, shortness of breath and wheezing. Negative for apnea, choking and stridor. Cardiovascular: Positive for chest pain. Negative for palpitations and leg swelling. Gastrointestinal: Negative for abdominal distention, abdominal pain, anal bleeding, blood in stool, constipation and diarrhea. Musculoskeletal: Negative for arthralgias, back pain and gait problem. Skin: Negative for pallor and rash. Allergic/Immunologic: Negative for environmental allergies. Neurological: Negative for dizziness, tremors, seizures, syncope, speech difficulty, weakness, light-headedness, numbness and headaches. Hematological: Negative for adenopathy. Does not bruise/bleed easily. Psychiatric/Behavioral: Positive for sleep disturbance.        Vitals: 06/29/21 1435   BP: 118/72   Pulse: 91   SpO2: 100%   Weight: 236 lb (107 kg)   Height: 5' 6\" (1.676 m)     Patient-Reported Vitals 11/20/2020   Patient-Reported Weight 242lb   Patient-Reported Height 5'6   Patient-Reported Systolic 692   Patient-Reported Diastolic 76   Patient-Reported Pulse 86      Body mass index is 38.09 kg/m². Wt Readings from Last 3 Encounters:   06/29/21 236 lb (107 kg)   06/21/21 236 lb 4 oz (107.2 kg)   06/04/21 236 lb (107 kg)     BP Readings from Last 3 Encounters:   06/29/21 118/72   06/21/21 110/74   06/04/21 106/66         Physical Exam  Constitutional:       General: She is not in acute distress. Appearance: She is well-developed. She is not ill-appearing or diaphoretic. HENT:      Mouth/Throat:      Pharynx: No oropharyngeal exudate. Cardiovascular:      Rate and Rhythm: Normal rate and regular rhythm. Heart sounds: Normal heart sounds. No murmur heard. Pulmonary:      Effort: No respiratory distress. Breath sounds: Wheezing present. No rales. Chest:      Chest wall: No tenderness. Abdominal:      General: There is no distension. Palpations: There is no mass. Tenderness: There is no abdominal tenderness. There is no guarding or rebound. Musculoskeletal:         General: No swelling, tenderness or deformity. Skin:     Coloration: Skin is not pale. Findings: No erythema or rash. Neurological:      Mental Status: She is alert and oriented to person, place, and time. Cranial Nerves: No cranial nerve deficit. Motor: No abnormal muscle tone.       Coordination: Coordination normal.      Deep Tendon Reflexes: Reflexes normal.             Health Maintenance   Topic Date Due    Hepatitis C screen  Never done    A1C test (Diabetic or Prediabetic)  12/18/2021    Lipid screen  12/18/2021    Potassium monitoring  12/18/2021    Creatinine monitoring  12/18/2021    DTaP/Tdap/Td vaccine (2 - Td or Tdap) 08/20/2022    Cervical cancer screen  11/04/2022    Pneumococcal 0-64 years Vaccine (2 of 2 - PPSV23) 02/21/2043    Flu vaccine  Completed    COVID-19 Vaccine  Completed    HIV screen  Completed    Hepatitis A vaccine  Aged Out    Hepatitis B vaccine  Aged Out    Hib vaccine  Aged Out    Meningococcal (ACWY) vaccine  Aged Out          Assessment/Plan:     Diagnosis Orders   1. Severe persistent asthma without complication  CBC WITH AUTO DIFFERENTIAL      Reviewed patient's PFT performed on 5/12, no clear evidence of obstructive airway disease, FEV1 of 3.02 L [99% predicted] with no significant signs of hyperinflation noted. CTA chest performed on 5/28 was suggestive of no PE or lung nodules. The images were personally reviewed by me today. Respiratory allergy panel from blood work not suggestive of any significant allergies of note from 1/26, CBC suggestive of eosinophil count of 0.1. Nuclear stress test from 5/28 was negative. Overall patient symptoms are compatible with severe persistent asthma with recurrent exacerbations. Unclear about the triggers which could be leading to exacerbation over the last several months. Will repeat CBC with differential.  I have also recommended an evaluation by allergist for skin testing to be performed. Will switch Symbicort over to Trelegy 200, 1 puff daily. Continue with Singulair, Claritin and albuterol as needed. Based on her response to Trelegy and allergy testing/CBC, we will decide about use of biologic therapy. Return in about 1 month (around 7/29/2021).

## 2021-07-06 RX ORDER — ALBUTEROL SULFATE 90 UG/1
AEROSOL, METERED RESPIRATORY (INHALATION)
Qty: 8.5 G | Refills: 0 | Status: SHIPPED | OUTPATIENT
Start: 2021-07-06 | End: 2021-12-03

## 2021-07-21 ENCOUNTER — TELEPHONE (OUTPATIENT)
Dept: PULMONOLOGY | Age: 41
End: 2021-07-21

## 2021-07-21 RX ORDER — BUSPIRONE HYDROCHLORIDE 10 MG/1
TABLET ORAL
Qty: 90 TABLET | Refills: 1 | Status: SHIPPED | OUTPATIENT
Start: 2021-07-21 | End: 2021-08-06

## 2021-07-23 ENCOUNTER — VIRTUAL VISIT (OUTPATIENT)
Dept: PULMONOLOGY | Age: 41
End: 2021-07-23
Payer: COMMERCIAL

## 2021-07-23 DIAGNOSIS — I10 ESSENTIAL HYPERTENSION: Chronic | ICD-10-CM

## 2021-07-23 DIAGNOSIS — E66.9 NON MORBID OBESITY, UNSPECIFIED OBESITY TYPE: Chronic | ICD-10-CM

## 2021-07-23 DIAGNOSIS — G47.33 OBSTRUCTIVE SLEEP APNEA (ADULT) (PEDIATRIC): Primary | Chronic | ICD-10-CM

## 2021-07-23 DIAGNOSIS — J45.20 INTERMITTENT ASTHMA, UNSPECIFIED ASTHMA SEVERITY, UNSPECIFIED WHETHER COMPLICATED: Chronic | ICD-10-CM

## 2021-07-23 DIAGNOSIS — J30.1 ALLERGIC RHINITIS DUE TO POLLEN, UNSPECIFIED SEASONALITY: Chronic | ICD-10-CM

## 2021-07-23 DIAGNOSIS — G47.19 EXCESSIVE DAYTIME SLEEPINESS: ICD-10-CM

## 2021-07-23 PROCEDURE — 99214 OFFICE O/P EST MOD 30 MIN: CPT | Performed by: NURSE PRACTITIONER

## 2021-07-23 RX ORDER — MODAFINIL 200 MG/1
200-400 TABLET ORAL EVERY MORNING
Qty: 180 TABLET | Refills: 1 | Status: SHIPPED | OUTPATIENT
Start: 2021-07-23 | End: 2022-02-11 | Stop reason: SDUPTHER

## 2021-07-23 ASSESSMENT — SLEEP AND FATIGUE QUESTIONNAIRES
HOW LIKELY ARE YOU TO NOD OFF OR FALL ASLEEP WHILE SITTING AND TALKING TO SOMEONE: 1
HOW LIKELY ARE YOU TO NOD OFF OR FALL ASLEEP IN A CAR, WHILE STOPPED FOR A FEW MINUTES IN TRAFFIC: 0
HOW LIKELY ARE YOU TO NOD OFF OR FALL ASLEEP WHILE SITTING INACTIVE IN A PUBLIC PLACE: 1
HOW LIKELY ARE YOU TO NOD OFF OR FALL ASLEEP WHEN YOU ARE A PASSENGER IN A CAR FOR AN HOUR WITHOUT A BREAK: 3
HOW LIKELY ARE YOU TO NOD OFF OR FALL ASLEEP WHILE WATCHING TV: 2
HOW LIKELY ARE YOU TO NOD OFF OR FALL ASLEEP WHILE SITTING AND READING: 3
HOW LIKELY ARE YOU TO NOD OFF OR FALL ASLEEP WHILE LYING DOWN TO REST IN THE AFTERNOON WHEN CIRCUMSTANCES PERMIT: 3
ESS TOTAL SCORE: 14
HOW LIKELY ARE YOU TO NOD OFF OR FALL ASLEEP WHILE SITTING QUIETLY AFTER LUNCH WITHOUT ALCOHOL: 1

## 2021-07-23 NOTE — PROGRESS NOTES
Iqra Gaines MD, FAASM, [facility]  Bryson Medellin, MSN, RN, CNP     [address] [address]  Dept: [phone]  Dept Fax: [phone]  Loc: [phone]    Subjective:     Patient ID: Colin Barney is a 35 y.o. female. Chief Complaint   Patient presents with    Sleep Apnea       HPI:      Sleep Medicine Video Visit    Pursuant to the emergency declaration under the [address] [place] [address] waiver authority and the [employer] Act this Telephone Visit was insisted, with patient's consent, to reduce the patient's risk of exposure to COVID-19 and provide continuity of care for an established patient. Services were provided through a synchronous discussion over a telephone and/or Video chat to substitute for in-person clinic visit, and coded as such. While patient is at home.     Machine Modem/Download Info:  Compliance (hours/night): 6.5 hrs/night  Download AHI (/hour): 2.8 /HR     Average IPAP Pressure: 15 cmH2O  Average EPAP Pressure: 11 cmH2O         AUTO BIPAP - Settings (Cesar)  IPAP Max: 25 cmH2O  EPAP Min: 8 cmH2O  Pressure Support Min: 2  Pressure Support Max: 8             Comfort Settings  Humidity Level (0-8): 2  Flex/EPR (0-3): 3 PAP Mask  Mask Type: Nasal mask  Clinically Relevant Leak: No     [place] - Total score: 14    Follow-up :     Last Visit : November 2020      Subjective Health Changes: none      Over Night Oximetry: [] Yes  [] No  [x] NA [] WNL   Using O2: [] Yes  [] No  [x] NA   Patient is compliant with the machine  [x] Yes  [] No [] Per patient   Feeling rested when using the machine   [x] Yes  [] No     Pressure is comfortable with inspiration and expiration  [x] Yes  [] No   ([x] NA   [] Feeling of suffocation  [] Feeling like not enough air    [] To much pressure)     Noticed changes in pressure  [x] NA  [] Yes [] No     Mask is fitting well  [x] Yes  [] No   Noting Mask Air Leak  [] Yes  [x] No   Having painful Aerophagia  [] Yes  [x] No   Nocturia   0-1  per night. Having  HA upon waking  [] Yes  [x] No   Dry mouth upon waking   Dry Nose  Dry Eyes  [x] Yes  [] No   Congestion upon waking   [] Yes  [x] No    Nose Bleeds  [] Yes  [x] No   Using Sleep Aides  Provigil (No: Rash, Headache, Diarrhea, or Dizziness)  [] NA  [] OTC  [x] Per our office   [] Per another provider   Understands how to change humidification and/or tubing temperature for comfort while at home  [x] Yes  [] No     Difficulties falling asleep  [] Yes  [x] No   Difficulties staying asleep  [] Yes  [x] No   Approximate time to bed  10pm   Approximate wake time  4:30-10am   Taking Naps  occasional   If taking naps usual length   30-45 min    [] NA   If taking naps using the machine [] NA  [] Yes    [] No    [x] With and With out    Drowsy when driving  [] Yes  [x] No     Does patient carry a DOT/CDL  [] Yes  [x] No     Does patient carry FAA/[employer] License   [] Yes  [x] No      Any concerns noted with the machine at this time  [] Yes  [x] No       Assessment/Plan:   1. Obstructive sleep apnea (adult) (pediatric)  Assessment & Plan:   After downloading data and reviewing  Reviewed compliance download with pt. Supplies and parts as needed for his machine. These are medically necessary. Continue medications per his PCP and other physicians. Limit caffeine use after 3pm.    The chronic medical conditions listed are directly related to the primary diagnosis listed above. The management of the primary diagnosis affects the secondary diagnosis and vice versa    Patient is complaint encouraged to maintain compliance to aide on controlling other stated healthcare concerns. 2. Non morbid obesity, unspecified obesity type  Assessment & Plan:  Patient encouraged to work on maintaining a healthy weight per height.   Achievable with diet restriction/modifications and exercise (may consult primary care if unsure of any restrictions or concerns). Weight management directly correlates to risk of control and maintenance of Obstructive Sleep Apnea. 3. Essential hypertension  Assessment & Plan:  Chronic- stable. After speaking with patient:    Agree with current plan, and would agree to continue this plan per prescribing and managing physician. 4. Allergic rhinitis due to pollen, unspecified seasonality  Assessment & Plan:  Chronic- stable. After speaking with patient:    Agree with current plan, and would agree to continue this plan per prescribing and managing physician. 5. Intermittent asthma, unspecified asthma severity, unspecified whether complicated  Assessment & Plan:  Chronic- stable. After speaking with patient:    Agree with current plan, and would agree to continue this plan per prescribing and managing physician. 6. Excessive daytime sleepiness  Assessment & Plan:  Current chronicity: stable    Current Medication: Provigil (No: Rash, Headache, Diarrhea, or Dizziness)    Prescribed by: our office    Agree on current plan: Continue current plan per provider          - After pulling data and reviewing it   - Reviewed compliance download with patient    -Medically necessary supplies and parts as needed for her machine.   - Continue medications per his primary care provider and other physicians.   - Encouraged to limit caffeine use after 3pm.    - Encouraged her to work on weight loss through diet and exercise  - Educated not to drive when feeling sleepy   - Patient using [employer]  - Napping with the machine  Office locations  OARRS Viewed today  Compliance  Follow up  After speaking to the patient, patient is currently stable.  We will continue with the current machine settings  Recall Information and DME contact     The chronic medical conditions listed are directly related to the primary diagnosis listed above.    The management of the primary diagnosis affects the secondary diagnosis and vice versa.     - Will follow up in off in 6 months    Electronically signed by  INÉS Guzman, RN, CNP on 7/23/2021 at 10:12 AM

## 2021-07-23 NOTE — PROGRESS NOTES
21    Electronically signed by QI Ross CNP on 2021 at 10:12 AM    Jaylin Resendiz  1980  [address] [place], [place] [id number] [phone] (home)   [phone] (mobile)      Insurance Info (confirm with patient if correct):  Payor/Plan Subscr  Sex Relation Sub.  Ins. ID Effective Group Num

## 2021-07-27 ENCOUNTER — TELEPHONE (OUTPATIENT)
Dept: FAMILY MEDICINE CLINIC | Age: 41
End: 2021-07-27

## 2021-07-27 NOTE — TELEPHONE ENCOUNTER
----- Message from Karrie Saab sent at 7/27/2021  8:59 AM EDT -----  Subject: Appointment Request    Reason for Call: Routine (Patient Request) No Script    QUESTIONS  Type of Appointment? Established Patient  Reason for appointment request? Available appointments did not meet   patient need  Additional Information for Provider? Pt is having muscle spasms and    told her to make an appointment but there wasn't anything available until   08/06. Please advise. Thanks  ---------------------------------------------------------------------------  --------------  Darl Boom INFO  What is the best way for the office to contact you? OK to leave message on   voicemail  Preferred Call Back Phone Number? [phone]  ---------------------------------------------------------------------------  --------------  SCRIPT ANSWERS  Relationship to Patient? Self  (Is the patient requesting to see the provider for a procedure?)? No  (Is the patient requesting to see the provider urgently  today or   tomorrow. )? No  Have you been diagnosed with, awaiting test results for, or told that you   are suspected of having COVID-19 (Coronavirus)? (If patient has tested   negative or was tested as a requirement for work, school, or travel and   not based on symptoms, answer no)? No  Do you currently have flu-like symptoms including fever or chills, cough,   shortness of breath, difficulty breathing, or new loss of taste or smell? No  Have you had close contact with someone with COVID-19 in the last 14 days? No  (Service Expert  click yes below to proceed with [employer] As Usual   Scheduling)?  Yes

## 2021-07-27 NOTE — TELEPHONE ENCOUNTER
Called pt    Stated she is having muscle spams and medication vibryb is not working. Stated she was told she could make an appt to discuss.      Advised of appt 8.6.2021 at 1:00pm    Scheduled

## 2021-07-30 ENCOUNTER — TELEPHONE (OUTPATIENT)
Dept: ENT CLINIC | Age: 41
End: 2021-07-30

## 2021-07-30 ENCOUNTER — HOSPITAL ENCOUNTER (OUTPATIENT)
Age: 41
Discharge: HOME | End: 2021-07-30
Payer: COMMERCIAL

## 2021-07-30 ENCOUNTER — OFFICE VISIT (OUTPATIENT)
Dept: PULMONOLOGY | Age: 41
End: 2021-07-30
Payer: COMMERCIAL

## 2021-07-30 VITALS
SYSTOLIC BLOOD PRESSURE: 112 MMHG | BODY MASS INDEX: 37.93 KG/M2 | DIASTOLIC BLOOD PRESSURE: 78 MMHG | OXYGEN SATURATION: 99 % | WEIGHT: 236 LBS | HEART RATE: 79 BPM | HEIGHT: 66 IN

## 2021-07-30 DIAGNOSIS — J45.50 SEVERE PERSISTENT ASTHMA WITHOUT COMPLICATION: Primary | ICD-10-CM

## 2021-07-30 PROCEDURE — 82785 ASSAY OF IGE: CPT

## 2021-07-30 PROCEDURE — 99214 OFFICE O/P EST MOD 30 MIN: CPT | Performed by: INTERNAL MEDICINE

## 2021-07-30 ASSESSMENT — ENCOUNTER SYMPTOMS
CONSTIPATION: 0
WHEEZING: 1
BLOOD IN STOOL: 0
CHOKING: 0
SINUS PRESSURE: 0
BACK PAIN: 0
SORE THROAT: 0
ABDOMINAL PAIN: 0
CHEST TIGHTNESS: 1
RHINORRHEA: 0
APNEA: 0
VOICE CHANGE: 0
STRIDOR: 0
ABDOMINAL DISTENTION: 0
ANAL BLEEDING: 0
DIARRHEA: 0
SHORTNESS OF BREATH: 1
COUGH: 1

## 2021-07-30 NOTE — PROGRESS NOTES
Sorin Chan    YOB: 1980     Date of Service:  7/30/2021     Chief Complaint   Patient presents with    Asthma         HPI patient is here for follow-up for severe persistent asthma. Also has some symptoms of GERD for which she uses Prilosec. Has on and off persistent chest tightness, using albuterol frequently. Already on Trelegy 200, Singulair, Xyzal and albuterol inhaler. Uses peak flow meter at home, could not tell me the readings. Allergies   Allergen Reactions    Ensure      EGGS    Peanut-Containing Drug Products      PEANUTS    Food      Certain foods (chocolate, tea, soy, eggs, tree nuts, coffee, tomatoes, black pepper)     Outpatient Medications Marked as Taking for the 7/30/21 encounter (Office Visit) with Shanta Matt MD   Medication Sig Dispense Refill    modafinil (PROVIGIL) 200 MG tablet Take 1-2 tablets by mouth every morning for 30 days.  180 tablet 1    busPIRone (BUSPAR) 10 MG tablet TAKE ONE TABLET BY MOUTH THREE TIMES A DAY 90 tablet 1    albuterol sulfate  (90 Base) MCG/ACT inhaler INHALE TWO PUFFS BY MOUTH EVERY 6 HOURS AS NEEDED FOR WHEEZING OR FOR SHORTNESS OF BREATH 8.5 g 0    spironolactone (ALDACTONE) 100 MG tablet TAKE ONE TABLET BY MOUTH DAILY 30 tablet 2    Fluticasone-Umeclidin-Vilant (TRELEGY ELLIPTA) 200-62.5-25 MCG/INH AEPB Inhale 1 puff into the lungs daily 1 each 3    lisinopril (PRINIVIL;ZESTRIL) 5 MG tablet TAKE ONE TABLET BY MOUTH DAILY 30 tablet 2    VILAZODONE HCL 40 MG Tablet TAKE ONE TABLET BY MOUTH DAILY 30 tablet 0    fluticasone (FLONASE) 50 MCG/ACT nasal spray 2 sprays by Nasal route 2 times daily 2 Bottle 5    atorvastatin (LIPITOR) 40 MG tablet TAKE ONE TABLET BY MOUTH DAILY 30 tablet 5    montelukast (SINGULAIR) 10 MG tablet Take 1 tablet by mouth nightly 30 tablet 5    Spacer/Aero-Holding Chambers GABRIELLE 1 Device by Does not apply route daily as needed (with HFA inhalers) 1 Device 0    omeprazole (PRILOSEC) 40 MG delayed release capsule TAKE ONE CAPSULE BY MOUTH TWICE A DAY 60 capsule 5    SPRINTEC 28 0.25-35 MG-MCG per tablet       Multiple Vitamins-Minerals (THERAPEUTIC MULTIVITAMIN-MINERALS) tablet Take 1 tablet by mouth daily. Immunization History   Administered Date(s) Administered    Influenza Virus Vaccine 10/01/2010, 09/09/2014    Influenza, Intradermal, Preservative free 10/01/2010, 11/28/2012, 09/23/2013    Influenza, Intradermal, Quadrivalent, Preservative Free 10/05/2016    Influenza, MDCK Quadv, IM, PF (Flucelvax 4 yrs and older) 09/27/2019    Influenza, MDCK Quadv, with preserv IM (Flucelvax 4 yrs and older) 11/09/2020    Influenza, Quadv, IM, PF (6 mo and older Fluzone, Flulaval, Fluarix, and 3 yrs and older Afluria) 10/21/2015, 10/28/2017    Influenza, Jenniffer Jairon, Recombinant, IM PF (Flublok 18 yrs and older) 09/27/2018    Pneumococcal Polysaccharide ([id number]) 01/19/2018    Tdap (Boostrix, Adacel) 08/20/2012       Past Medical History:   Diagnosis Date    Allergic rhinitis     Anxiety     Asthma     Environmental allergies     food    Hyperlipidemia     Hypertension     Lumbar disc disease L4-5, L5-S1 Dr Josh Jenkins [facility]) Epidural injections 2011 8/20/2012    Obstructive sleep apnea     Obstructive sleep apnea (adult) (pediatric) 6/18/2018    PCOS (polycystic ovarian syndrome)     S/P colonoscopy 1/17/13    normal. Dr Heather Giordano     Past Surgical History:   Procedure Laterality Date    BREAST REDUCTION SURGERY  2004    FOOT SURGERY  2000    hallux fx    UPPER GASTROINTESTINAL ENDOSCOPY  1/17/13    gastritis; Dr Heather Giordano     Family History   Problem Relation Age of Onset    High Blood Pressure Mother     Other Mother         hypothyroid    Diabetes Sister     Diabetes Maternal Grandmother     Diabetes Maternal Grandfather        Review of Systems:  Review of Systems   Constitutional: Negative for activity change, appetite change, fatigue and fever. HENT: Negative for congestion, ear discharge, ear pain, postnasal drip, rhinorrhea, sinus pressure, sneezing, sore throat, tinnitus and voice change. Respiratory: Positive for cough, chest tightness, shortness of breath and wheezing. Negative for apnea, choking and stridor. Cardiovascular: Negative for chest pain, palpitations and leg swelling. Gastrointestinal: Negative for abdominal distention, abdominal pain, anal bleeding, blood in stool, constipation and diarrhea. Musculoskeletal: Negative for arthralgias, back pain and gait problem. Skin: Negative for pallor and rash. Allergic/Immunologic: Negative for environmental allergies. Neurological: Negative for dizziness, tremors, seizures, syncope, speech difficulty, weakness, light-headedness, numbness and headaches. Hematological: Negative for adenopathy. Does not bruise/bleed easily. Psychiatric/Behavioral: Negative for sleep disturbance. Vitals:    07/30/21 0915   BP: 112/78   Pulse: 79   SpO2: 99%   Weight: 236 lb (107 kg)   Height: 5' 6\" (1.676 m)     Patient-Reported Vitals 7/23/2021   Patient-Reported Weight 238lb   Patient-Reported Height 5'6\"   Patient-Reported Systolic -   Patient-Reported Diastolic -   Patient-Reported Pulse -      Body mass index is 38.09 kg/m². Wt Readings from Last 3 Encounters:   07/30/21 236 lb (107 kg)   06/29/21 236 lb (107 kg)   06/21/21 236 lb 4 oz (107.2 kg)     BP Readings from Last 3 Encounters:   07/30/21 112/78   06/29/21 118/72   06/21/21 110/74         Physical Exam  Constitutional:       General: She is not in acute distress. Appearance: She is well-developed. She is not diaphoretic. HENT:      Mouth/Throat:      Pharynx: No oropharyngeal exudate. Cardiovascular:      Rate and Rhythm: Normal rate and regular rhythm. Heart sounds: Normal heart sounds. No murmur heard. Pulmonary:      Effort: No respiratory distress. Breath sounds: Normal breath sounds.  No wheezing or rales.   Chest:      Chest wall: No tenderness. Abdominal:      General: There is no distension. Palpations: There is no mass. Tenderness: There is no abdominal tenderness. There is no guarding or rebound. Musculoskeletal:         General: No swelling, tenderness or deformity. Skin:     Coloration: Skin is not pale. Findings: No erythema or rash. Neurological:      Mental Status: She is alert and oriented to person, place, and time. Cranial Nerves: No cranial nerve deficit. Motor: No abnormal muscle tone. Coordination: Coordination normal.      Deep Tendon Reflexes: Reflexes normal.             Health Maintenance   Topic Date Due    Hepatitis C screen  Never done    Flu vaccine (1) 09/01/2021    A1C test (Diabetic or Prediabetic)  12/18/2021    Lipid screen  12/18/2021    Potassium monitoring  12/18/2021    Creatinine monitoring  12/18/2021    DTaP/Tdap/Td vaccine (2 - Td or Tdap) 08/20/2022    Cervical cancer screen  11/04/2022    Pneumococcal 0-64 years Vaccine (2 of 2 - PPSV23) 02/21/2043    COVID-19 Vaccine  Completed    HIV screen  Completed    Hepatitis A vaccine  Aged Out    Hepatitis B vaccine  Aged Out    Hib vaccine  Aged Out    Meningococcal (ACWY) vaccine  Aged Out          Assessment/Plan:    Severe persistent asthma, already on Trelegy 200, Singulair, Xyzal and albuterol inhaler. Also uses Prilosec 40 mg daily for history of GERD. Currently no exacerbation of asthma, but has frequent use of albuterol inhaler    Type II asthma-AEC of 0.3, IgE levels elevated. Patient has been seen by allergist-awaiting for skin allergy testing. Next step would be to determine if patient would qualify for biologic therapy such as Nucala/Dupixent or allergy immunotherapy, await advice from allergist.  Requested patient to contact us with more information if able to. Return in about 1 month (around 8/30/2021).

## 2021-07-30 NOTE — TELEPHONE ENCOUNTER
Patient was here in the spring and surgery was discussed. Patient has decided to proceed with surgery. Could surgery be scheduled or does she need to come to clinic first to discuss this?

## 2021-08-02 NOTE — TELEPHONE ENCOUNTER
Since it has been so long since I saw her,  I would like to see her again to just go over the surgery itself.

## 2021-08-06 ENCOUNTER — OFFICE VISIT (OUTPATIENT)
Dept: FAMILY MEDICINE CLINIC | Age: 41
End: 2021-08-06
Payer: COMMERCIAL

## 2021-08-06 VITALS
HEIGHT: 66 IN | OXYGEN SATURATION: 98 % | DIASTOLIC BLOOD PRESSURE: 72 MMHG | WEIGHT: 230 LBS | BODY MASS INDEX: 36.96 KG/M2 | SYSTOLIC BLOOD PRESSURE: 110 MMHG | HEART RATE: 82 BPM

## 2021-08-06 DIAGNOSIS — F43.12 CHRONIC POST-TRAUMATIC STRESS DISORDER (PTSD): ICD-10-CM

## 2021-08-06 DIAGNOSIS — M62.838 MUSCLE SPASM: Primary | ICD-10-CM

## 2021-08-06 LAB — IGE: 34 KU/L

## 2021-08-06 PROCEDURE — 99214 OFFICE O/P EST MOD 30 MIN: CPT | Performed by: FAMILY MEDICINE

## 2021-08-06 RX ORDER — BUSPIRONE HYDROCHLORIDE 10 MG/1
20 TABLET ORAL 2 TIMES DAILY
Qty: 120 TABLET | Refills: 2 | Status: SHIPPED | OUTPATIENT
Start: 2021-08-06 | End: 2021-11-02

## 2021-08-06 NOTE — PROGRESS NOTES
2021    Blood pressure 110/72, pulse 82, height 5' 6\" (1.676 m), weight 230 lb (104.3 kg), last menstrual period 2021, SpO2 98 %, not currently breastfeeding. Christina Darden (:  1980) is a 35 y.o. female, here for evaluation of the following medical concerns:    Chief Complaint   Patient presents with    Spasms     muscle spasms everywhere - 2 months- but getting worse recently    Other     viibryd not working     Here for concerns as above. C/o painful spasms in muscles (forearms, hands feet, legs, abdomen, not all at the same time)  For a couple months. Getting worse. Last typically a few minutes. Feet can last longer. Any time of day or night, have awakened her. No new meds (Trelegy is a new med as of ). About the same timeframe as her spasms. Unsure if it started prior to or after starting Trelegy. Last spasm was yesterday when sitting by the pool, after sitting up she developed spasms and cramps in abd muscles. Last renal function test:   Lab Results   Component Value Date     2020    K 4.2 2020    BUN 9 2020    CREATININE 0.6 2020     CrCl cannot be calculated (Patient's most recent lab result is older than the maximum 120 days allowed. ). She is also worried that she is more emotiononality premenstrually and is noting reduced sex drive since starting Viibryd. Not depressed. Life is great. Anxiety is overall well controlled. Seemed to happen when she increased from 20 to 40 mg dose. Also uses buspar 10 BID. She stopped using BCP's 3 wks ago. No diet changes recently. Still uses aldactone for hyperandrogenism.       Patient Active Problem List   Diagnosis    Polycystic ovarian disease    Hyperlipidemia    Hirsutism    Allergic rhinitis due to pollen    Deviated septum    Asthma    Lumbar disc disease L4-5, L5-S1 Dr [facility]) Epidural injections     Essential hypertension    Prediabetes A1c 5.7 6/1/16    Obstructive sleep apnea (adult) (pediatric)    Excessive daytime sleepiness    Other viral warts    Non morbid obesity, unspecified obesity type    Chronic post-traumatic stress disorder (PTSD)        Body mass index is 37.12 kg/m². Wt Readings from Last 3 Encounters:   08/06/21 230 lb (104.3 kg)   07/30/21 236 lb (107 kg)   06/29/21 236 lb (107 kg)       BP Readings from Last 3 Encounters:   08/06/21 110/72   07/30/21 112/78   06/29/21 118/72       Allergies   Allergen Reactions    Ensure      EGGS    Peanut-Containing Drug Products      PEANUTS    Food      Certain foods (chocolate, tea, soy, eggs, tree nuts, coffee, tomatoes, black pepper)       Prior to Visit Medications    Medication Sig Taking? Authorizing Provider   VILAZODONE HCL 40 MG Tablet TAKE ONE TABLET BY MOUTH DAILY Yes Lisseth Bautista MD   modafinil (PROVIGIL) 200 MG tablet Take 1-2 tablets by mouth every morning for 30 days.  Yes Tiffanie R Kehrt, APRN - CNP   busPIRone (BUSPAR) 10 MG tablet TAKE ONE TABLET BY MOUTH THREE TIMES A DAY Yes Lisseth Bautista MD   albuterol sulfate  (90 Base) MCG/ACT inhaler INHALE TWO PUFFS BY MOUTH EVERY 6 HOURS AS NEEDED FOR WHEEZING OR FOR SHORTNESS OF BREATH Yes Lisseth Bautista MD   spironolactone (ALDACTONE) 100 MG tablet TAKE ONE TABLET BY MOUTH DAILY Yes Lisseth Bautista MD   Fluticasone-Umeclidin-Vilant (TRELEGY ELLIPTA) 200-62.5-25 MCG/INH AEPB Inhale 1 puff into the lungs daily Yes Sejal Mccullough MD   lisinopril (PRINIVIL;ZESTRIL) 5 MG tablet TAKE ONE TABLET BY MOUTH DAILY Yes Lisseth Bautista MD   fluticasone (FLONASE) 50 MCG/ACT nasal spray 2 sprays by Nasal route 2 times daily Yes Santi Snow MD   atorvastatin (LIPITOR) 40 MG tablet TAKE ONE TABLET BY MOUTH DAILY Yes Lisseth Bautista MD   montelukast (SINGULAIR) 10 MG tablet Take 1 tablet by mouth nightly Yes Ammon Randolph MD   Spacer/Aero-Holding Chambers GABRIELLE 1 Device by Does not apply route daily as needed (with HFA inhalers) Yes Hamzah Leach MD   omeprazole (PRILOSEC) 40 MG delayed release capsule TAKE ONE CAPSULE BY MOUTH TWICE A DAY Yes Hamzah Leach MD   [address] [address] 0.25-35 MG-MCG per tablet  Yes Historical Provider, MD   Multiple Vitamins-Minerals (THERAPEUTIC MULTIVITAMIN-MINERALS) tablet Take 1 tablet by mouth daily. Yes Historical Provider, MD        Social History     Tobacco Use    Smoking status: Never Smoker    Smokeless tobacco: Never Used    Tobacco comment: congratulated on nonsmoking status   Vaping Use    Vaping Use: Never used   Substance Use Topics    Alcohol use: No    Drug use: No       Review of Systems As above     Physical Exam  Vitals and nursing note reviewed. Constitutional:       General: She is not in acute distress. Appearance: Normal appearance. She is well-developed. She is not ill-appearing. HENT:      Head: Normocephalic and atraumatic. Neck:      Thyroid: No thyromegaly. Vascular: No carotid bruit. Cardiovascular:      Rate and Rhythm: Normal rate and regular rhythm. Pulses: Normal pulses. Heart sounds: Normal heart sounds. No murmur heard. Pulmonary:      Effort: Pulmonary effort is normal. No respiratory distress. Breath sounds: Normal breath sounds. No wheezing or rales. Musculoskeletal:         General: Normal range of motion. Cervical back: Normal range of motion and neck supple. Right lower leg: No edema. Left lower leg: No edema. Skin:     General: Skin is warm and dry. Neurological:      General: No focal deficit present. Mental Status: She is alert and oriented to person, place, and time. Mental status is at baseline. Psychiatric:         Mood and Affect: Mood normal.         Behavior: Behavior normal.         Thought Content: Thought content normal.         Judgment: Judgment normal.         ASSESSMENT/PLAN:    1. Muscle spasm  - cause not clear.  Check labs and go from there.    - RENAL FUNCTION PANEL; Future  - PTH, INTACT; Future  - MAGNESIUM; Future    2. Chronic post-traumatic stress disorder (PTSD)  - due to side effects, will reduce viibryd dose to 20 mg and increase buspar to 20 mg twice daily. Follow up: 2 mo for f/u anxiety. An  Ushahidiignature was used to authenticate this note.     --Maycol Mathis MD on 8/6/2021 at 1:13 PM

## 2021-08-14 DIAGNOSIS — J45.30 MILD PERSISTENT ASTHMA WITHOUT COMPLICATION: Chronic | ICD-10-CM

## 2021-08-16 RX ORDER — MONTELUKAST SODIUM 10 MG/1
10 TABLET ORAL NIGHTLY
Qty: 30 TABLET | Refills: 5 | Status: SHIPPED | OUTPATIENT
Start: 2021-08-16 | End: 2022-06-10 | Stop reason: SDUPTHER

## 2021-08-26 ENCOUNTER — OFFICE VISIT (OUTPATIENT)
Dept: ENT CLINIC | Age: 41
End: 2021-08-26
Payer: COMMERCIAL

## 2021-08-26 VITALS
DIASTOLIC BLOOD PRESSURE: 80 MMHG | SYSTOLIC BLOOD PRESSURE: 124 MMHG | HEIGHT: 66 IN | WEIGHT: 233 LBS | BODY MASS INDEX: 37.45 KG/M2

## 2021-08-26 DIAGNOSIS — J31.0 CHRONIC RHINITIS: Primary | ICD-10-CM

## 2021-08-26 DIAGNOSIS — J34.2 DEVIATED NASAL SEPTUM: ICD-10-CM

## 2021-08-26 DIAGNOSIS — J34.89 NASAL VALVE COLLAPSE: ICD-10-CM

## 2021-08-26 DIAGNOSIS — R43.8 HYPOSMIA: ICD-10-CM

## 2021-08-26 PROCEDURE — 99214 OFFICE O/P EST MOD 30 MIN: CPT | Performed by: STUDENT IN AN ORGANIZED HEALTH CARE EDUCATION/TRAINING PROGRAM

## 2021-08-26 NOTE — PROGRESS NOTES
nasal valve collapse or saddle nose deformity), worsened nasal airway obstruction, hyposmia/anosmia, numbness to the teeth or gums, cosmetic changes to the nose and injury to the septum including septal perforation, need for further surgery. -General anesthesia carries independent risks which will be discussed by Anesthesiology on the day of surgery   -After a discussion of risks and benefits, the patient is in agreement with plan for the above procedure        SUBJECTIVE/OBJECTIVE:  Dena Luna is here today for evaluation of evaluation of issues related to her nose. She gets multiple sinus infections per year. She requires antibiotics for these. She think she has a sinus infection right now. She also has over the last 3 months had a loss of sense of smell. She has not had Covid recently. She has had some issues related to smelling gasoline and cigarette smoke that is not actually present. She gets difficulty breathing through her nose it is worse on the left side. She will get constant nasal drainage that is clear and green. She gets very rare nosebleeds. Update 3/17/2012:    She is still having issues breathing out of her nose and with nasal congestion. She is still getting intermittent nasal drainage. Update 4/28/2021:    Patient presents today for follow-up. She is still having the same issues regarded to difficulty breathing out of her nose. She gets intermittent nasal drainage which is clear. She thinks she is getting sinus infections. Update 8/26/2021:    Patient presents today for follow-up regarding issues related to her chronic sinonasal complaints. She is still having significant difficulty breathing through her nose. This is exacerbated by exercise. 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     REVIEW OF SYSTEMS  The following systems were reviewed and revealed the following in addition to any already discussed in the HPI:    PHYSICAL EXAM    GENERAL: No acute distress, alert and oriented, no hoarseness, strong voice  EYES: EOMI, Anti-icteric  HENT:   Head: Normocephalic and atraumatic. Face:  Symmetric, facial nerve intact, no sinus tenderness  Right Ear: Normal external ear, normal external auditory canal, intact tympanic membrane with normal mobility and aerated middle ear  Left Ear: Normal external ear, normal external auditory canal, intact tympanic membrane with normal mobility and aerated middle ear  Mouth/Oral Cavity:  normal lips, Uvula is midline, no mucosal lesions, no trismus, normal dentition, normal salivary quality/flow  Oropharynx/Larynx:  normal oropharynx, 2+ tonsils; patient did not tolerate mirror exam due to excessive gag reflex  Nose:Normal external nasal appearance. Anterior rhinoscopy shows  a deviated septum preventing view posteriorly. Inflammation of turbinates. Normal mucosa   NECK: Normal range of motion, no thyromegaly, trachea is midline, no lymphadenopathy, no neck masses, no crepitus  CHEST: Normal respiratory effort, no retractions, breathing comfortably  SKIN: No rashes, normal appearing skin, no evidence of skin lesions/tumors  Neuro:  cranial nerve II-XII intact; normal gait  Cardio:  no edema    Modified Ocean maneuver produced significant improvement in nasal airflow bilaterally. PROCEDURE  Nasal Endoscopy (CPT code 42426) from previous visit    Preop: chronic rhinitis  Postop: Same    Verbal consent was received. After topical anesthesia and decongestion had been obtained using aerosolized 1% lidocaine and oxymetazoline, a 45 degree rigid endoscope was placed into both nares with the patient in a sitting position.  The following was observed:    Right Nasal Cavity and Paranasal Sinuses:  Polyp score = 0 (0 = no polyps, 1 = small polyps in middle meatus not reaching below the inferior border of the middle yaron, 2 = polyps reaching below the middle border of the middle turbinate, 3= large polyps reaching the lower border of the inferior turbinate or polyps medial to the middle yaron, 4= large polyps causing almost complete congestion/obstruction of the interior meatus)  Edema score = 1 (0 = absent, 1 = mild, 2 = severe)  Discharge score = 0 (0 = no discharge, 1 = clear thin discharge, 2 = thick purulent discharge)    Left Nasal Cavity and Paranasal Sinuses:    Polyp score = 0 (0 = no polyps, 1 = small polyps in middle meatus not reaching below the inferior border of the middle yaron, 2 = polyps reaching below the middle border of the middle turbinate, 3= large polyps reaching the lower border of the inferior turbinate or polyps medial to the middle yaron, 4= large polyps causing almost complete congestion/obstruction of the interior meatus)  Edema score = 1 (0 = absent, 1 = mild, 2 = severe)  Discharge score = 0 (0 = no discharge, 1 = clear thin discharge, 2 = thick purulent discharge)    Septum: intact and deviated   Other:   -The inferior and middle turbinates were examined. The middle meatus, and sphenoethmoid recess was examined bilaterally.    -There is a left high septal deviation. There is some mild inflammation of the bilateral olfactory cleft. There is some crusting scattered throughout the nasal cavities. There is inferior turbinate hypertrophy. There are bilateral narrow nasal valves on endoscopic examination.  -There were no complications. Tolerated well without complication. I attest that I was present for and did the entire procedure myself. This note was generated completely or in part utilizing Dragon dictation speech recognition software. Occasionally, words are mistranscribed and despite editing, the text may contain inaccuracies due to incorrect word recognition. If further clarification is needed please contact the office at [phone]. An electronic signature was used to authenticate this note.     --Gregory Wilson MD

## 2021-08-30 ENCOUNTER — TELEPHONE (OUTPATIENT)
Dept: FAMILY MEDICINE CLINIC | Age: 41
End: 2021-08-30

## 2021-08-30 NOTE — TELEPHONE ENCOUNTER
----- Message from Brenna Mcmahon sent at 8/30/2021 12:13 PM EDT -----  Subject: Appointment Request    Reason for Call: Routine Pre-Op    QUESTIONS  Type of Appointment? Established Patient  Reason for appointment request? No appointments available during search  Additional Information for Provider? PT is having an endoscopy on 09/21/21   and needs a pre-op appt with Dr. Rick Alexander for medical clearance. Pt would   like to be seen any time on any Thursday or Friday before 09/21/21. PT is   available for call back at any time. ---------------------------------------------------------------------------  --------------  [employer]  What is the best way for the office to contact you? OK to leave message on   [employer], OK to respond with electronic message via [employer] portal (only   for patients who have registered [employer] account)  Preferred Call Back Phone Number? [phone]  ---------------------------------------------------------------------------  --------------  SCRIPT ANSWERS  Relationship to Patient? Self  Do you have questions for your provider that need to be answered prior to   scheduling your pre-op appointment? No  Have you been diagnosed with, awaiting test results for, or told that you   are suspected of having COVID-19 (Coronavirus)? (If patient has tested   negative or was tested as a requirement for work, school, or travel and   not based on symptoms, answer no)? No  Do you currently have flu-like symptoms including fever or chills, cough,   shortness of breath, difficulty breathing, or new loss of taste or smell? No  Have you had close contact with someone with COVID-19 in the last 14 days? No  (Service Expert  click yes below to proceed with [employer]   Scheduling)?  Yes

## 2021-09-02 ENCOUNTER — TELEPHONE (OUTPATIENT)
Dept: ENT CLINIC | Age: 41
End: 2021-09-02

## 2021-09-07 RX ORDER — LISINOPRIL 5 MG/1
TABLET ORAL
Qty: 30 TABLET | Refills: 2 | Status: SHIPPED | OUTPATIENT
Start: 2021-09-07 | End: 2021-12-06

## 2021-09-07 RX ORDER — OMEPRAZOLE 40 MG/1
CAPSULE, DELAYED RELEASE ORAL
Qty: 60 CAPSULE | Refills: 5 | Status: SHIPPED | OUTPATIENT
Start: 2021-09-07 | End: 2022-05-20

## 2021-09-20 ENCOUNTER — OFFICE VISIT (OUTPATIENT)
Dept: FAMILY MEDICINE CLINIC | Age: 41
End: 2021-09-20
Payer: COMMERCIAL

## 2021-09-20 VITALS
WEIGHT: 239.25 LBS | SYSTOLIC BLOOD PRESSURE: 112 MMHG | HEART RATE: 101 BPM | BODY MASS INDEX: 38.45 KG/M2 | DIASTOLIC BLOOD PRESSURE: 72 MMHG | RESPIRATION RATE: 14 BRPM | HEIGHT: 66 IN | OXYGEN SATURATION: 98 %

## 2021-09-20 DIAGNOSIS — R73.03 PREDIABETES: ICD-10-CM

## 2021-09-20 DIAGNOSIS — G47.33 OBSTRUCTIVE SLEEP APNEA (ADULT) (PEDIATRIC): Chronic | ICD-10-CM

## 2021-09-20 DIAGNOSIS — K21.9 CHRONIC GERD: ICD-10-CM

## 2021-09-20 DIAGNOSIS — I10 ESSENTIAL HYPERTENSION: Chronic | ICD-10-CM

## 2021-09-20 DIAGNOSIS — J45.50 SEVERE PERSISTENT ASTHMA WITHOUT COMPLICATION: ICD-10-CM

## 2021-09-20 DIAGNOSIS — Z01.818 PREOP EXAMINATION: Primary | ICD-10-CM

## 2021-09-20 PROCEDURE — 99243 OFF/OP CNSLTJ NEW/EST LOW 30: CPT | Performed by: NURSE PRACTITIONER

## 2021-09-20 NOTE — PROGRESS NOTES
Preoperative Consultation      Katherine Cummings  YOB: 1980    Date of Service:  9/20/2021    Vitals:    09/20/21 0747   BP: 112/72   Site: Right Upper Arm   Position: Sitting   Cuff Size: Large Adult   Pulse: 101   Resp: 14   SpO2: 98%   Weight: 239 lb 4 oz (108.5 kg)   Height: 5' 6\" (1.676 m)      Wt Readings from Last 2 Encounters:   09/20/21 239 lb 4 oz (108.5 kg)   08/26/21 233 lb (105.7 kg)     BP Readings from Last 3 Encounters:   09/20/21 112/72   08/26/21 124/80   08/06/21 110/72        Chief Complaint   Patient presents with    Pre-op Exam     Patient is having an endoscopy 9/21/21 with Dr. Natalie Nguyen      Certain foods (chocolate, tea, soy, eggs, tree nuts, coffee, tomatoes, black pepper)     Outpatient Medications Marked as Taking for the 9/20/21 encounter (Office Visit) with QI Anderson CNP   Medication Sig Dispense Refill    lisinopril (PRINIVIL;ZESTRIL) 5 MG tablet TAKE ONE TABLET BY MOUTH DAILY 30 tablet 2    omeprazole (PRILOSEC) 40 MG delayed release capsule TAKE ONE CAPSULE BY MOUTH TWICE A DAY 60 capsule 5    montelukast (SINGULAIR) 10 MG tablet Take 1 tablet by mouth nightly 30 tablet 5    busPIRone (BUSPAR) 10 MG tablet Take 2 tablets by mouth 2 times daily 120 tablet 2    VILAZODONE HCL 40 MG Tablet TAKE ONE TABLET BY MOUTH DAILY 30 tablet 5    modafinil (PROVIGIL) 200 MG tablet Take 1-2 tablets by mouth every morning for 30 days.  180 tablet 1    albuterol sulfate  (90 Base) MCG/ACT inhaler INHALE TWO PUFFS BY MOUTH EVERY 6 HOURS AS NEEDED FOR WHEEZING OR FOR SHORTNESS OF BREATH 8.5 g 0    spironolactone (ALDACTONE) 100 MG tablet TAKE ONE TABLET BY MOUTH DAILY 30 tablet 2    Fluticasone-Umeclidin-Vilant (TRELEGY ELLIPTA) 200-62.5-25 MCG/INH AEPB Inhale 1 puff into the lungs daily 1 each 3    fluticasone (FLONASE) 50 MCG/ACT nasal spray 2 sprays by Nasal route 2 times daily 2 Bottle 5    atorvastatin (LIPITOR) 40 MG tablet TAKE ONE TABLET BY MOUTH DAILY 30 tablet 5    Spacer/Aero-Holding Chambers GARBIELLE 1 Device by Does not apply route daily as needed (with HFA inhalers) 1 Device 0    Multiple Vitamins-Minerals (THERAPEUTIC MULTIVITAMIN-MINERALS) tablet Take 1 tablet by mouth daily. This patient presents to the office today for a preoperative consultation at the request of surgeon, Dr. Yesica Servin, who plans on performing EGD on September 21, 2021 at [facility].   Patient reports that she is having choking episodes with eating. Has burning sensation. Does have history of GERD. Currently takes omeprazole 40 mg BID. Severe asthma- follows with pulmonologist Dr. Malathi Ferguson. Reports that her breathing has improved with the trelegy inhaler. Has been dx with eosinophilic asthma. Planning on starting treatment after insurance approves. Using albuterol inhaler once weekly. HTN- tolerating medications well. Does not monitor BP at home.      BIBIANA- wears cpap nightly    Planned anesthesia: General   Known anesthesia problems: None   Bleeding risk: No recent or remote history of abnormal bleeding  Personal or FH of DVT/PE: No    Patient objection to receiving blood products: No    Patient Active Problem List   Diagnosis    Polycystic ovarian disease    Hyperlipidemia    Hirsutism    Allergic rhinitis due to pollen    Deviated septum    Asthma    Lumbar disc disease L4-5, L5-S1 Dr Scooter Hu[facility]) Epidural injections 2011    Essential hypertension    Prediabetes A1c 5.7 6/1/16    Obstructive sleep apnea (adult) (pediatric)    Excessive daytime sleepiness    Other viral warts    Non morbid obesity, unspecified obesity type    Chronic post-traumatic stress disorder (PTSD) with anxiety    Chronic GERD    Severe persistent asthma without complication       Past Medical History:   Diagnosis Date    Allergic rhinitis     Anxiety     Asthma     Environmental allergies     food    Hyperlipidemia     Hypertension     Lumbar disc disease L4-5, L5-S1 Dr [facility]) Epidural injections 2011 8/20/2012    Obstructive sleep apnea     Obstructive sleep apnea (adult) (pediatric) 6/18/2018    PCOS (polycystic ovarian syndrome)     S/P colonoscopy 1/17/13    normal. Dr Savi Lock     Past Surgical History:   Procedure Laterality Date    BREAST REDUCTION SURGERY  01/01/2004    FOOT SURGERY  01/01/2000    hallux fx    KNEE ARTHROSCOPY Right     SHOULDER ARTHROSCOPY Left     UPPER GASTROINTESTINAL ENDOSCOPY  01/17/2013    gastritis; Dr Savi Lock     Family History   Problem Relation Age of Onset    High Blood Pressure Mother     Other Mother         hypothyroid    Diabetes Sister     Diabetes Maternal Grandmother     Diabetes Maternal Grandfather      Social History     Socioeconomic History    Marital status: Single     Spouse name: Not on file    Number of children: 1    Years of education: Not on file    Highest education level: Not on file   Occupational History    Not on file   Tobacco Use    Smoking status: Never Smoker    Smokeless tobacco: Never Used    Tobacco comment: congratulated on nonsmoking status   Vaping Use    Vaping Use: Never used   Substance and Sexual Activity    Alcohol use: No    Drug use: No    Sexual activity: Not on file   Other Topics Concern    Not on file   Social History Narrative    Not on file       Review of Systems  A comprehensive review of systems was negative except for what was noted in the HPI. Physical Exam   Constitutional: She is oriented to person, place, and time. She appears well-developed and well-nourished. No distress. HENT:   Head: Normocephalic and atraumatic. Mouth/Throat: Uvula is midline, oropharynx is clear and moist and mucous membranes are normal.   Eyes: Conjunctivae and EOM are normal. Pupils are equal, round, and reactive to light.    Neck: Trachea normal and normal range of motion. Neck supple. No JVD present. Carotid bruit is not present. No mass and no thyromegaly present. Cardiovascular: Normal rate, regular rhythm, normal heart sounds and intact distal pulses. Exam reveals no gallop and no friction rub. No murmur heard. Pulmonary/Chest: Effort normal and breath sounds normal. No respiratory distress. She has no wheezes. She has no rales. Musculoskeletal: She exhibits no edema and no tenderness. Neurological: She is alert and oriented to person, place, and time. She has normal strength. No cranial nerve deficit or sensory deficit. Coordination and gait normal.   Skin: Skin is warm and dry. No rash noted. No erythema. Psychiatric: She has a normal mood and affect. Her behavior is normal.       Lab Review   Lab Results   Component Value Date     08/06/2021    K 4.1 08/06/2021     08/06/2021    CO2 25 08/06/2021    BUN 10 08/06/2021    CREATININE 0.7 08/06/2021    GLUCOSE 80 08/06/2021    CALCIUM 9.1 08/06/2021     Lab Results   Component Value Date    WBC 8.7 06/29/2021    HGB 12.1 06/29/2021    HCT 34.2 06/29/2021    MCV 90.1 06/29/2021     06/29/2021      Lab Results   Component Value Date    LABA1C 5.5 12/18/2020     Lab Results   Component Value Date    .2 12/18/2020          Assessment:       35 y.o. patient with planned surgery as above. Known risk factors for perioperative complications: Asthma, Hypertension, Obstructive sleep apnea  Current medications which may produce withdrawal symptoms if withheld perioperatively: none      Plan:     1. Preoperative workup as follows: none  2. Change in medication regimen before surgery: Take omeprazole on morning of surgery with sip of water, and hold all other medications until after surgery  3.  Prophylaxis for cardiac events with perioperative beta-blockers: Not indicated  ACC/AHA indications for pre-operative beta-blocker use:    · Vascular surgery with history of postitive stress test  · Intermediate or high risk surgery with history of CAD   · Intermediate or high risk surgery with multiple clinical predictors of CAD- 2 of the following: history of compensated or prior heart failure, history of cerebrovascular disease, DM, or renal insufficiency    Routine administration of higher-dose, long-acting metoprolol in beta-blockernaïve patients on the day of surgery, and in the absence of dose titration is associated with an overall increase in mortality. Beta-blockers should be started days to weeks prior to surgery and titrated to pulse < 70.  4. Deep vein thrombosis prophylaxis: regimen to be chosen by surgical team  5. No contraindications to planned procedure of EGD with Dr. Osorio on 9/21/21. 6. Chronic GERD- on omeprazole 40 mg BID with some improvement in symptoms. Planning EGD tomorrow. 7. Severe asthma- has had better control on current medications. Continue f/u with pulmonologist Dr. Kaleb Ashraf. Advised to take her albuterol inhaler with her day of procedure. 8. HTN- controlled current therapies  9. BIBIANA- stable on cpap nightly  10. Prediabetes- repeat HgA1c. Continue to work on low carb diet, aerobic exercise, and weight loss.

## 2021-09-24 ENCOUNTER — OFFICE VISIT (OUTPATIENT)
Dept: PULMONOLOGY | Age: 41
End: 2021-09-24
Payer: COMMERCIAL

## 2021-09-24 VITALS
OXYGEN SATURATION: 98 % | HEIGHT: 66 IN | HEART RATE: 87 BPM | DIASTOLIC BLOOD PRESSURE: 72 MMHG | SYSTOLIC BLOOD PRESSURE: 114 MMHG | WEIGHT: 239 LBS | BODY MASS INDEX: 38.41 KG/M2

## 2021-09-24 DIAGNOSIS — J45.50 SEVERE PERSISTENT ASTHMA WITHOUT COMPLICATION: Primary | ICD-10-CM

## 2021-09-24 PROCEDURE — 99214 OFFICE O/P EST MOD 30 MIN: CPT | Performed by: INTERNAL MEDICINE

## 2021-09-24 ASSESSMENT — ENCOUNTER SYMPTOMS
ABDOMINAL DISTENTION: 0
WHEEZING: 0
SINUS PRESSURE: 0
ABDOMINAL PAIN: 0
APNEA: 0
DIARRHEA: 0
BACK PAIN: 0
VOICE CHANGE: 0
COUGH: 0
BLOOD IN STOOL: 0
CHEST TIGHTNESS: 1
CONSTIPATION: 0
SHORTNESS OF BREATH: 1
ANAL BLEEDING: 0
SORE THROAT: 0
RHINORRHEA: 0
STRIDOR: 0
CHOKING: 0

## 2021-09-24 NOTE — PROGRESS NOTES
Susan Fairchild    YOB: 1980     Date of Service:  9/24/2021     Chief Complaint   Patient presents with    1 Month Follow-Up    Asthma    Shortness of Breath    Other     chest tightness         HPI severe persistent asthma, improved symptoms with occasional mild flares. Patient states that she has had some worsening shortness of breath and chest tightness for the last 2 days. Denies any significant cough or phlegm. History of GERD, already on Prilosec 40 mg twice daily. EGD 9/21 at [facility] significant findings.     Allergies   Allergen Reactions    Ensure      EGGS    Peanut-Containing Drug Products      PEANUTS    Food      Certain foods (chocolate, tea, soy, eggs, tree nuts, coffee, tomatoes, black pepper)     Outpatient Medications Marked as Taking for the 9/24/21 encounter (Office Visit) with Carlos Rosado MD   Medication Sig Dispense Refill    lisinopril (PRINIVIL;ZESTRIL) 5 MG tablet TAKE ONE TABLET BY MOUTH DAILY 30 tablet 2    omeprazole (PRILOSEC) 40 MG delayed release capsule TAKE ONE CAPSULE BY MOUTH TWICE A DAY 60 capsule 5    montelukast (SINGULAIR) 10 MG tablet Take 1 tablet by mouth nightly 30 tablet 5    busPIRone (BUSPAR) 10 MG tablet Take 2 tablets by mouth 2 times daily 120 tablet 2    VILAZODONE HCL 40 MG Tablet TAKE ONE TABLET BY MOUTH DAILY 30 tablet 5    albuterol sulfate  (90 Base) MCG/ACT inhaler INHALE TWO PUFFS BY MOUTH EVERY 6 HOURS AS NEEDED FOR WHEEZING OR FOR SHORTNESS OF BREATH 8.5 g 0    spironolactone (ALDACTONE) 100 MG tablet TAKE ONE TABLET BY MOUTH DAILY 30 tablet 2    Fluticasone-Umeclidin-Vilant (TRELEGY ELLIPTA) 200-62.5-25 MCG/INH AEPB Inhale 1 puff into the lungs daily 1 each 3    fluticasone (FLONASE) 50 MCG/ACT nasal spray 2 sprays by Nasal route 2 times daily 2 Bottle 5    atorvastatin (LIPITOR) 40 MG tablet TAKE ONE TABLET BY MOUTH DAILY 30 tablet 5    Spacer/Aero-Holding Chambers GABRIELLE 1 Device by Does not apply route daily as needed (with HFA inhalers) 1 Device 0    Multiple Vitamins-Minerals (THERAPEUTIC MULTIVITAMIN-MINERALS) tablet Take 1 tablet by mouth daily.          Immunization History   Administered Date(s) Administered    COVID-19, J&J, PF, 0.5 mL 04/03/2021    Influenza Virus Vaccine 10/01/2010, 09/09/2014    Influenza, Intradermal, Preservative free 10/01/2010, 11/28/2012, 09/23/2013    Influenza, Intradermal, Quadrivalent, Preservative Free 10/05/2016    Influenza, MDCK Quadv, IM, PF (Flucelvax 2 yrs and older) 09/27/2019    Influenza, MDCK Quadv, with preserv IM (Flucelvax 2 yrs and older) 11/09/2020    Influenza, Quadv, IM, PF (6 mo and older Fluzone, Flulaval, Fluarix, and 3 yrs and older Afluria) 10/21/2015, 10/28/2017    Influenza, Ora Inderjit, Recombinant, IM PF (Flublok 18 yrs and older) 09/27/2018    Pneumococcal Polysaccharide ([id number]) 01/19/2018    Tdap (Boostrix, Adacel) 08/20/2012       Past Medical History:   Diagnosis Date    Allergic rhinitis     Anxiety     Asthma     Environmental allergies     food    Hyperlipidemia     Hypertension     Lumbar disc disease L4-5, L5-S1 Dr [facility]) Epidural injections 2011 8/20/2012    Obstructive sleep apnea     Obstructive sleep apnea (adult) (pediatric) 6/18/2018    PCOS (polycystic ovarian syndrome)     S/P colonoscopy 1/17/13    normal. Dr Nancy De Paz     Past Surgical History:   Procedure Laterality Date    BREAST REDUCTION SURGERY  01/01/2004    FOOT SURGERY  01/01/2000    hallux fx    KNEE ARTHROSCOPY Right     SHOULDER ARTHROSCOPY Left     UPPER GASTROINTESTINAL ENDOSCOPY  01/17/2013    gastritis; Dr Nancy De Paz     Family History   Problem Relation Age of Onset    High Blood Pressure Mother     Other Mother         hypothyroid    Diabetes Sister     Diabetes Maternal Grandmother     Diabetes Maternal Grandfather        Review of Systems:  Review of Systems   Constitutional: Negative for activity change, appetite change, fatigue and fever. HENT: Negative for congestion, ear discharge, ear pain, postnasal drip, rhinorrhea, sinus pressure, sneezing, sore throat, tinnitus and voice change. Respiratory: Positive for chest tightness and shortness of breath. Negative for apnea, cough, choking, wheezing and stridor. Cardiovascular: Negative for chest pain, palpitations and leg swelling. Gastrointestinal: Negative for abdominal distention, abdominal pain, anal bleeding, blood in stool, constipation and diarrhea. Musculoskeletal: Negative for arthralgias, back pain and gait problem. Skin: Negative for pallor and rash. Allergic/Immunologic: Negative for environmental allergies. Neurological: Negative for dizziness, tremors, seizures, syncope, speech difficulty, weakness, light-headedness, numbness and headaches. Hematological: Negative for adenopathy. Does not bruise/bleed easily. Psychiatric/Behavioral: Negative for sleep disturbance. Vitals:    09/24/21 1118   BP: 114/72   Pulse: 87   SpO2: 98%   Weight: 239 lb (108.4 kg)   Height: 5' 6\" (1.676 m)     Patient-Reported Vitals 7/23/2021   Patient-Reported Weight 238lb   Patient-Reported Height 5'6\"   Patient-Reported Systolic -   Patient-Reported Diastolic -   Patient-Reported Pulse -      Body mass index is 38.58 kg/m². Wt Readings from Last 3 Encounters:   09/24/21 239 lb (108.4 kg)   09/20/21 239 lb 4 oz (108.5 kg)   08/26/21 233 lb (105.7 kg)     BP Readings from Last 3 Encounters:   09/24/21 114/72   09/20/21 112/72   08/26/21 124/80         Physical Exam  Constitutional:       General: She is not in acute distress. Appearance: She is well-developed. She is not diaphoretic. HENT:      Mouth/Throat:      Pharynx: No oropharyngeal exudate. Cardiovascular:      Rate and Rhythm: Normal rate and regular rhythm. Heart sounds: Normal heart sounds. No murmur heard. Pulmonary:      Effort: No respiratory distress.       Breath sounds: No wheezing or rales. Chest:      Chest wall: No tenderness. Abdominal:      General: There is no distension. Palpations: There is no mass. Tenderness: There is no abdominal tenderness. There is no guarding or rebound. Musculoskeletal:         General: No swelling, tenderness or deformity. Skin:     Coloration: Skin is not pale. Findings: No erythema or rash. Neurological:      Mental Status: She is alert and oriented to person, place, and time. Cranial Nerves: No cranial nerve deficit. Motor: No abnormal muscle tone. Coordination: Coordination normal.      Deep Tendon Reflexes: Reflexes normal.             Health Maintenance   Topic Date Due    Hepatitis C screen  Never done    Flu vaccine (1) 09/01/2021    Lipid screen  12/18/2021    Potassium monitoring  08/06/2022    Creatinine monitoring  08/06/2022    DTaP/Tdap/Td vaccine (2 - Td or Tdap) 08/20/2022    A1C test (Diabetic or Prediabetic)  09/20/2022    Cervical cancer screen  11/04/2022    Pneumococcal 0-64 years Vaccine (2 of 2 - PPSV23) 02/21/2043    COVID-19 Vaccine  Completed    HIV screen  Completed    Hepatitis A vaccine  Aged Out    Hepatitis B vaccine  Aged Out    Hib vaccine  Aged Out    Meningococcal (ACWY) vaccine  Aged Out          Assessment/Plan:    Severe persistent asthma, improved with use of Trelegy 200-also on Singulair 10 mg, Xyzal and albuterol inhaler. Requested patient to obtain peak flow meter so that she can closely monitor peak flow and maintain a diary. Seen by allergist/-skin testing suggestive of allergy to mold and cat dander. Patient does not have any pets at home, no obvious contact with mold. 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     Next option to consider would be biologic treatment with

## 2021-10-01 DIAGNOSIS — I10 ESSENTIAL HYPERTENSION: ICD-10-CM

## 2021-10-01 RX ORDER — SPIRONOLACTONE 100 MG/1
TABLET, FILM COATED ORAL
Qty: 30 TABLET | Refills: 2 | Status: SHIPPED | OUTPATIENT
Start: 2021-10-01 | End: 2022-01-03

## 2021-10-08 ENCOUNTER — OFFICE VISIT (OUTPATIENT)
Dept: FAMILY MEDICINE CLINIC | Age: 41
End: 2021-10-08
Payer: COMMERCIAL

## 2021-10-08 VITALS
BODY MASS INDEX: 38.25 KG/M2 | HEART RATE: 79 BPM | WEIGHT: 238 LBS | SYSTOLIC BLOOD PRESSURE: 116 MMHG | DIASTOLIC BLOOD PRESSURE: 80 MMHG | OXYGEN SATURATION: 98 % | HEIGHT: 66 IN

## 2021-10-08 DIAGNOSIS — F43.12 CHRONIC POST-TRAUMATIC STRESS DISORDER (PTSD): Primary | ICD-10-CM

## 2021-10-08 DIAGNOSIS — Z23 NEED FOR INFLUENZA VACCINATION: ICD-10-CM

## 2021-10-08 DIAGNOSIS — J45.51 SEVERE PERSISTENT ASTHMA WITH ACUTE EXACERBATION: Chronic | ICD-10-CM

## 2021-10-08 DIAGNOSIS — B96.89 ACUTE BACTERIAL SINUSITIS: ICD-10-CM

## 2021-10-08 DIAGNOSIS — J01.90 ACUTE BACTERIAL SINUSITIS: ICD-10-CM

## 2021-10-08 PROCEDURE — 90688 IIV4 VACCINE SPLT 0.5 ML IM: CPT | Performed by: FAMILY MEDICINE

## 2021-10-08 PROCEDURE — 90471 IMMUNIZATION ADMIN: CPT | Performed by: FAMILY MEDICINE

## 2021-10-08 PROCEDURE — 99214 OFFICE O/P EST MOD 30 MIN: CPT | Performed by: FAMILY MEDICINE

## 2021-10-08 RX ORDER — PREDNISONE 20 MG/1
20 TABLET ORAL 2 TIMES DAILY
Qty: 10 TABLET | Refills: 0 | Status: SHIPPED | OUTPATIENT
Start: 2021-10-08 | End: 2021-10-13

## 2021-10-08 RX ORDER — AZITHROMYCIN 250 MG/1
TABLET, FILM COATED ORAL
Qty: 1 PACKET | Refills: 0 | Status: SHIPPED | OUTPATIENT
Start: 2021-10-08 | End: 2021-10-18

## 2021-10-08 NOTE — PROGRESS NOTES
Vaccine Information Sheet, \"Influenza - Inactivated\"  given to Curtis Donnelly, or parent/legal guardian of  Curtis Donnelly and verbalized understanding. Patient responses:    Have you ever had a reaction to a flu vaccine? No  Do you have any current illness? No  Have you ever had [place] Syndrome? No  Do you have a serious allergy to any of the follow: Neomycin, Polymyxin, Thimerosal, eggs or egg products? No    Flu vaccine given per order. Please see immunization tab. Risks and benefits explained. Current VIS given.       Immunizations Administered     Name Date Dose Route    Influenza, Quadv, IM, (6 mo and older Fluzone, Flulaval, Fluarix and 3 yrs and older Afluria) 10/8/2021 0.5 mL Intramuscular    Site: Deltoid- Right    Lot: [id number]    [address]: 27006-841-27

## 2021-10-08 NOTE — PROGRESS NOTES
10/8/2021    Blood pressure 116/80, pulse 79, height 5' 6\" (1.676 m), weight 238 lb (108 kg), last menstrual period 10/07/2021, SpO2 98 %, not currently breastfeeding. Nemo Goodwin (:  1980) is a 35 y.o. female, here for evaluation of the following medical concerns:    Chief Complaint   Patient presents with    Follow-up     f/u check     Here for routine follow up of ptsd  Last visit we lowered viibryd to 20 mg due to sexual side effects and increased buspar to 20 bid. She states that this approach has not helped her sexual function. Her PTSD anxiety is not dramatically different. Maybe a bit more emotional premenstrually. She says the therapists she has sought for EMDR does not take insurance. Is on the wait list for Dr eRed.. can't remember name. Is in the [address] [place] [address]. Has been waiting months. Due for sinus surgery 10/29, but feels that she has another sinus infection. Asthma flared. No fever. + purulent rhinorrhea. Facial pressure. Patient Active Problem List   Diagnosis    Polycystic ovarian disease    Hyperlipidemia    Hirsutism    Allergic rhinitis due to pollen    Deviated septum    Asthma    Lumbar disc disease L4-5, L5-S1 Dr [facility]) Epidural injections     Essential hypertension    Prediabetes A1c 5.7 16    Obstructive sleep apnea (adult) (pediatric)    Excessive daytime sleepiness    Other viral warts    Non morbid obesity, unspecified obesity type    Chronic post-traumatic stress disorder (PTSD) with anxiety    Chronic GERD    Severe persistent asthma without complication        Body mass index is 38.41 kg/m².     Wt Readings from Last 3 Encounters:   10/08/21 238 lb (108 kg)   21 239 lb (108.4 kg)   21 239 lb 4 oz (108.5 kg)       BP Readings from Last 3 Encounters:   10/08/21 116/80   21 114/72   21 112/72       Allergies   Allergen Reactions    Ensure      EGGS    Peanut-Containing Drug Products      PEANUTS  Food      Certain foods (chocolate, tea, soy, eggs, tree nuts, coffee, tomatoes, black pepper)       Prior to Visit Medications    Medication Sig Taking? Authorizing Provider   spironolactone (ALDACTONE) 100 MG tablet TAKE ONE TABLET BY MOUTH DAILY Yes Angi Mcmahan MD   lisinopril (PRINIVIL;ZESTRIL) 5 MG tablet TAKE ONE TABLET BY MOUTH DAILY Yes Angi Mcmahan MD   omeprazole (PRILOSEC) 40 MG delayed release capsule TAKE ONE CAPSULE BY MOUTH TWICE A DAY Yes Angi Mcmahan MD   montelukast (SINGULAIR) 10 MG tablet Take 1 tablet by mouth nightly Yes QI Sandoval CNP   busPIRone (BUSPAR) 10 MG tablet Take 2 tablets by mouth 2 times daily Yes Angi Mcmahan MD   VILAZODONE HCL 40 MG Tablet TAKE ONE TABLET BY MOUTH DAILY Yes Angi Mcmahan MD   albuterol sulfate  (90 Base) MCG/ACT inhaler INHALE TWO PUFFS BY MOUTH EVERY 6 HOURS AS NEEDED FOR WHEEZING OR FOR SHORTNESS OF BREATH Yes Angi Mcmahan MD   Fluticasone-Umeclidin-Vilant (TRELEGY ELLIPTA) 062-66.5-87 MCG/INH AEPB Inhale 1 puff into the lungs daily Yes Marcia Sanchez MD   fluticasone (FLONASE) 50 MCG/ACT nasal spray 2 sprays by Nasal route 2 times daily Yes Kristin Alexander MD   atorvastatin (LIPITOR) 40 MG tablet TAKE ONE TABLET BY MOUTH DAILY Yes Angi Mcmahan MD   Spacer/Aero-Holding Chambers GABRIELLE 1 Device by Does not apply route daily as needed (with HFA inhalers) Yes Angi Mcmahan MD   Multiple Vitamins-Minerals (THERAPEUTIC MULTIVITAMIN-MINERALS) tablet Take 1 tablet by mouth daily. Yes Historical Provider, MD   modafinil (PROVIGIL) 200 MG tablet Take 1-2 tablets by mouth every morning for 30 days.   QI Hdez CNP        Social History     Tobacco Use    Smoking status: Never Smoker    Smokeless tobacco: Never Used    Tobacco comment: congratulated on nonsmoking status   Vaping Use    Vaping Use: Never used   Substance Use Topics    Alcohol use: No    Drug use: No       Review of Systems As above     Physical Exam  Vitals and nursing note reviewed. Constitutional:       General: She is not in acute distress. Appearance: Normal appearance. She is well-developed. She is not toxic-appearing or diaphoretic. HENT:      Head: Normocephalic and atraumatic. Right Ear: Tympanic membrane, ear canal and external ear normal. There is no impacted cerumen. Left Ear: Tympanic membrane, ear canal and external ear normal. There is no impacted cerumen. Nose: Congestion present. No rhinorrhea. Comments: Full pale turbs bilat, with max sinus tenderness. Mouth/Throat:      Mouth: Mucous membranes are moist.      Pharynx: Oropharynx is clear. No oropharyngeal exudate or posterior oropharyngeal erythema. Eyes:      General:         Right eye: No discharge. Left eye: No discharge. Conjunctiva/sclera: Conjunctivae normal.      Pupils: Pupils are equal, round, and reactive to light. Neck:      Thyroid: No thyromegaly. Vascular: No carotid bruit. Trachea: No tracheal deviation. Cardiovascular:      Rate and Rhythm: Normal rate and regular rhythm. Pulses: Normal pulses. Heart sounds: Normal heart sounds. No murmur heard. No friction rub. No gallop. Pulmonary:      Effort: Pulmonary effort is normal. No respiratory distress. Breath sounds: Wheezing (faint end exp wheezes bilat) present. No rales. Chest:      Chest wall: No tenderness. Musculoskeletal:         General: Normal range of motion. Cervical back: Normal range of motion and neck supple. Right lower leg: No edema. Left lower leg: No edema. Lymphadenopathy:      Cervical: No cervical adenopathy. Upper Body:      Right upper body: No supraclavicular adenopathy. Left upper body: No supraclavicular adenopathy. Skin:     General: Skin is warm and dry. Findings: No rash.    Neurological:      General: No focal deficit present. Mental Status: She is alert and oriented to person, place, and time. Mental status is at baseline. Psychiatric:         Mood and Affect: Mood normal.         Behavior: Behavior normal.         Thought Content: Thought content normal.         Judgment: Judgment normal.         ASSESSMENT/PLAN:    1. Chronic post-traumatic stress disorder (PTSD) with anxiety  - continue to wean off viibryd due to sexual side effects. - consider trying Trintellix (unsure of insurance formulary- discussed copay card use- can obtain online.)  - keep trying to get in with therapist for EMDR  - continue buspar at current dose. 2. Severe persistent asthma with acute exacerbation  - short pred burst. Continue current meds    3. Acute bacterial sinusitis  - azithromycin (ZITHROMAX Z-CLIFF) 250 MG tablet; Take as directed on packet. Dispense: 1 packet; Refill: 0    4. Need for influenza vaccination  - INFLUENZA, QUADV, 3 YRS AND OLDER, IM, MDV, 0.5ML (AFLURIA QUADV)      F/u 2 mo    An  Technical Machineignature was used to authenticate this note.     --Kelsy Reyes MD on 10/8/2021 at 12:14 PM

## 2021-10-15 ENCOUNTER — TELEPHONE (OUTPATIENT)
Dept: ENT CLINIC | Age: 41
End: 2021-10-15

## 2021-10-22 NOTE — PROGRESS NOTES
C-Difficile admission screening and protocol:     * Admitted with diarrhea? no     *Prior history of C-Diff. In last 3 months? no     *Antibiotic use in the past 6-8 weeks? Yes. multiple for sinus infections. Last taken 10/11/21     *Prior hospitalization or nursing home in the last month?   no

## 2021-10-22 NOTE — PROGRESS NOTES
[address] [facility] [place] time___0600_________        Surgery time___0730_________    Take the following medications with a sip of water:    Do not eat or drink anything after 12:00 midnight prior to your surgery. This includes water chewing gum, mints and ice chips. You may brush your teeth and gargle the morning of your surgery, but do not swallow the water     Please see your family doctor/pediatrician for a history and physical and/or concerning medications. Bring any test results/reports from your physicians office. If you are under the care of a heart doctor or specialist doctor, please be aware that you may be asked to them for clearance    You may be asked to stop blood thinners such as Coumadin, Plavix, Fragmin, Lovenox, etc., or any anti-inflammatories such as:  Aspirin, Ibuprofen, Advil, Naproxen prior to your surgery. We also ask that you stop any OTC medications such as fish oil, vitamin E, glucosamine, garlic, Multivitamins, COQ 10, etc.    We ask that you do not smoke 24 hours prior to surgery  We ask that you do not  drink any alcoholic beverages 24 hours prior to surgery     You must make arrangements for a responsible adult to take you home after your surgery. For your safety you will not be allowed to leave alone or drive yourself home. Your surgery will be cancelled if you do not have a ride home. Also for your safety, it is strongly suggested that someone stay with you the first 24 hours after your surgery. A parent or legal guardian must accompany a child scheduled for surgery and plan to stay at the hospital until the child is discharged. Please do not bring other children with you. For your comfort, please wear simple loose fitting clothing to the hospital.  Please do not bring valuables.     Do not wear any make-up or nail polish on your fingers or toes      For your safety, please do not wear any jewelry or body piercing's on the day of surgery. All jewelry must be removed. If you have dentures, they will be removed before going to operating room. For your convenience, we will provide you with a container. If you wear contact lenses or glasses, they will be removed, please bring a case for them. If you have a living will and a durable power of  for healthcare, please bring in a copy. As part of our patient safety program to minimize surgical site infections, we ask you to do the following:    · Please notify your surgeon if you develop any illness between         now and the  day of your surgery. · This includes a cough, cold, fever, sore throat, nausea,         or vomiting, and diarrhea, etc.  ·  Please notify your surgeon if you experience dizziness, shortness         of breath or blurred vision between now and the time of your surgery. Do not shave your operative site 96 hours prior to surgery. For face and neck surgery, men may use an electric razor 48 hours   prior to surgery. You may shower the night before surgery or the morning of   your surgery with an antibacterial soap. You will need to bring a photo ID and insurance card    [facility] has an onsite pharmacy, would you like to utilize our pharmacy     If you will be staying overnight and use a C-pap machine, please bring   your C-pap to hospital     Our goal is to provide you with excellent care, therefore, visitors will be limited to two(2) in the room at a time so that we may focus on providing this care for you. Please contact pre-admission testing if you have any further questions. [facility] phone number:  [phone] [facility] fax number:  [phone]  Please note these are generalized instructions for all surgical cases, you may be provided with more specific instructions according to your surgery.

## 2021-10-28 ENCOUNTER — TELEPHONE (OUTPATIENT)
Dept: ENT CLINIC | Age: 41
End: 2021-10-28

## 2021-10-28 ENCOUNTER — ANESTHESIA EVENT (OUTPATIENT)
Dept: OPERATING ROOM | Age: 41
End: 2021-10-28
Payer: COMMERCIAL

## 2021-10-28 NOTE — TELEPHONE ENCOUNTER
Message on VM with details for surgery on 10-29-21. Arrival to [facility] at 0600 for OR at 0700. NPO after midnight. Must have a  and bring COVID results with her.

## 2021-10-29 ENCOUNTER — HOSPITAL ENCOUNTER (OUTPATIENT)
Age: 41
Setting detail: OUTPATIENT SURGERY
Discharge: HOME | End: 2021-10-29
Attending: STUDENT IN AN ORGANIZED HEALTH CARE EDUCATION/TRAINING PROGRAM | Admitting: STUDENT IN AN ORGANIZED HEALTH CARE EDUCATION/TRAINING PROGRAM
Payer: COMMERCIAL

## 2021-10-29 ENCOUNTER — ANESTHESIA (OUTPATIENT)
Dept: OPERATING ROOM | Age: 41
End: 2021-10-29
Payer: COMMERCIAL

## 2021-10-29 VITALS
OXYGEN SATURATION: 98 % | HEART RATE: 77 BPM | BODY MASS INDEX: 38.92 KG/M2 | RESPIRATION RATE: 16 BRPM | HEIGHT: 66 IN | SYSTOLIC BLOOD PRESSURE: 114 MMHG | WEIGHT: 242.18 LBS | TEMPERATURE: 97.2 F | DIASTOLIC BLOOD PRESSURE: 63 MMHG

## 2021-10-29 VITALS
TEMPERATURE: 97.7 F | RESPIRATION RATE: 2 BRPM | DIASTOLIC BLOOD PRESSURE: 57 MMHG | OXYGEN SATURATION: 99 % | SYSTOLIC BLOOD PRESSURE: 105 MMHG

## 2021-10-29 DIAGNOSIS — G89.18 POST-OP PAIN: Primary | ICD-10-CM

## 2021-10-29 LAB — PREGNANCY, URINE: NEGATIVE

## 2021-10-29 PROCEDURE — 30465 REPAIR NASAL STENOSIS: CPT | Performed by: STUDENT IN AN ORGANIZED HEALTH CARE EDUCATION/TRAINING PROGRAM

## 2021-10-29 PROCEDURE — 6360000002 HC RX W HCPCS: Performed by: STUDENT IN AN ORGANIZED HEALTH CARE EDUCATION/TRAINING PROGRAM

## 2021-10-29 PROCEDURE — 2500000003 HC RX 250 WO HCPCS: Performed by: STUDENT IN AN ORGANIZED HEALTH CARE EDUCATION/TRAINING PROGRAM

## 2021-10-29 PROCEDURE — 2500000003 HC RX 250 WO HCPCS: Performed by: NURSE ANESTHETIST, CERTIFIED REGISTERED

## 2021-10-29 PROCEDURE — 30520 REPAIR OF NASAL SEPTUM: CPT | Performed by: STUDENT IN AN ORGANIZED HEALTH CARE EDUCATION/TRAINING PROGRAM

## 2021-10-29 PROCEDURE — 3700000001 HC ADD 15 MINUTES (ANESTHESIA): Performed by: STUDENT IN AN ORGANIZED HEALTH CARE EDUCATION/TRAINING PROGRAM

## 2021-10-29 PROCEDURE — 84703 CHORIONIC GONADOTROPIN ASSAY: CPT

## 2021-10-29 PROCEDURE — 6370000000 HC RX 637 (ALT 250 FOR IP): Performed by: ANESTHESIOLOGY

## 2021-10-29 PROCEDURE — 2709999900 HC NON-CHARGEABLE SUPPLY: Performed by: STUDENT IN AN ORGANIZED HEALTH CARE EDUCATION/TRAINING PROGRAM

## 2021-10-29 PROCEDURE — 6370000000 HC RX 637 (ALT 250 FOR IP): Performed by: STUDENT IN AN ORGANIZED HEALTH CARE EDUCATION/TRAINING PROGRAM

## 2021-10-29 PROCEDURE — 2780000010 HC IMPLANT OTHER: Performed by: STUDENT IN AN ORGANIZED HEALTH CARE EDUCATION/TRAINING PROGRAM

## 2021-10-29 PROCEDURE — 7100000000 HC PACU RECOVERY - FIRST 15 MIN: Performed by: STUDENT IN AN ORGANIZED HEALTH CARE EDUCATION/TRAINING PROGRAM

## 2021-10-29 PROCEDURE — 3600000003 HC SURGERY LEVEL 3 BASE: Performed by: STUDENT IN AN ORGANIZED HEALTH CARE EDUCATION/TRAINING PROGRAM

## 2021-10-29 PROCEDURE — 2580000003 HC RX 258: Performed by: STUDENT IN AN ORGANIZED HEALTH CARE EDUCATION/TRAINING PROGRAM

## 2021-10-29 PROCEDURE — 2580000003 HC RX 258: Performed by: ANESTHESIOLOGY

## 2021-10-29 PROCEDURE — 30465 REPAIR NASAL STENOSIS: CPT | Performed by: OTOLARYNGOLOGY

## 2021-10-29 PROCEDURE — 3700000000 HC ANESTHESIA ATTENDED CARE: Performed by: STUDENT IN AN ORGANIZED HEALTH CARE EDUCATION/TRAINING PROGRAM

## 2021-10-29 PROCEDURE — 7100000011 HC PHASE II RECOVERY - ADDTL 15 MIN: Performed by: STUDENT IN AN ORGANIZED HEALTH CARE EDUCATION/TRAINING PROGRAM

## 2021-10-29 PROCEDURE — 30140 RESECT INFERIOR TURBINATE: CPT | Performed by: STUDENT IN AN ORGANIZED HEALTH CARE EDUCATION/TRAINING PROGRAM

## 2021-10-29 PROCEDURE — 6360000002 HC RX W HCPCS: Performed by: NURSE ANESTHETIST, CERTIFIED REGISTERED

## 2021-10-29 PROCEDURE — 30117 REMOVAL OF INTRANASAL LESION: CPT | Performed by: STUDENT IN AN ORGANIZED HEALTH CARE EDUCATION/TRAINING PROGRAM

## 2021-10-29 PROCEDURE — 3600000013 HC SURGERY LEVEL 3 ADDTL 15MIN: Performed by: STUDENT IN AN ORGANIZED HEALTH CARE EDUCATION/TRAINING PROGRAM

## 2021-10-29 PROCEDURE — 7100000010 HC PHASE II RECOVERY - FIRST 15 MIN: Performed by: STUDENT IN AN ORGANIZED HEALTH CARE EDUCATION/TRAINING PROGRAM

## 2021-10-29 PROCEDURE — 7100000001 HC PACU RECOVERY - ADDTL 15 MIN: Performed by: STUDENT IN AN ORGANIZED HEALTH CARE EDUCATION/TRAINING PROGRAM

## 2021-10-29 PROCEDURE — 2720000010 HC SURG SUPPLY STERILE: Performed by: STUDENT IN AN ORGANIZED HEALTH CARE EDUCATION/TRAINING PROGRAM

## 2021-10-29 DEVICE — DURA 62100 SUBSTITUTE DUREPAIR 2X2IN NCE
Type: IMPLANTABLE DEVICE | Status: FUNCTIONAL
Brand: DUREPAIR®

## 2021-10-29 RX ORDER — FENTANYL CITRATE 50 UG/ML
INJECTION, SOLUTION INTRAMUSCULAR; INTRAVENOUS PRN
Status: DISCONTINUED | OUTPATIENT
Start: 2021-10-29 | End: 2021-10-29 | Stop reason: SDUPTHER

## 2021-10-29 RX ORDER — MEPERIDINE HYDROCHLORIDE 25 MG/ML
12.5 INJECTION INTRAMUSCULAR; INTRAVENOUS; SUBCUTANEOUS EVERY 5 MIN PRN
Status: DISCONTINUED | OUTPATIENT
Start: 2021-10-29 | End: 2021-10-29 | Stop reason: HOSPADM

## 2021-10-29 RX ORDER — OXYMETAZOLINE HYDROCHLORIDE 0.05 G/100ML
SPRAY NASAL
Status: COMPLETED | OUTPATIENT
Start: 2021-10-29 | End: 2021-10-29

## 2021-10-29 RX ORDER — OXYCODONE HYDROCHLORIDE 10 MG/1
10 TABLET ORAL PRN
Status: COMPLETED | OUTPATIENT
Start: 2021-10-29 | End: 2021-10-29

## 2021-10-29 RX ORDER — SUCCINYLCHOLINE/SOD CL,ISO/PF 200MG/10ML
SYRINGE (ML) INTRAVENOUS PRN
Status: DISCONTINUED | OUTPATIENT
Start: 2021-10-29 | End: 2021-10-29 | Stop reason: SDUPTHER

## 2021-10-29 RX ORDER — MIDAZOLAM HYDROCHLORIDE 1 MG/ML
INJECTION INTRAMUSCULAR; INTRAVENOUS PRN
Status: DISCONTINUED | OUTPATIENT
Start: 2021-10-29 | End: 2021-10-29 | Stop reason: SDUPTHER

## 2021-10-29 RX ORDER — SODIUM CHLORIDE 0.9 % (FLUSH) 0.9 %
10 SYRINGE (ML) INJECTION PRN
Status: DISCONTINUED | OUTPATIENT
Start: 2021-10-29 | End: 2021-10-29 | Stop reason: HOSPADM

## 2021-10-29 RX ORDER — ONDANSETRON 2 MG/ML
INJECTION INTRAMUSCULAR; INTRAVENOUS PRN
Status: DISCONTINUED | OUTPATIENT
Start: 2021-10-29 | End: 2021-10-29 | Stop reason: SDUPTHER

## 2021-10-29 RX ORDER — SODIUM CHLORIDE 9 MG/ML
25 INJECTION, SOLUTION INTRAVENOUS PRN
Status: DISCONTINUED | OUTPATIENT
Start: 2021-10-29 | End: 2021-10-29 | Stop reason: HOSPADM

## 2021-10-29 RX ORDER — LIDOCAINE HYDROCHLORIDE 20 MG/ML
INJECTION, SOLUTION EPIDURAL; INFILTRATION; INTRACAUDAL; PERINEURAL PRN
Status: DISCONTINUED | OUTPATIENT
Start: 2021-10-29 | End: 2021-10-29 | Stop reason: SDUPTHER

## 2021-10-29 RX ORDER — DEXAMETHASONE SODIUM PHOSPHATE 4 MG/ML
INJECTION, SOLUTION INTRA-ARTICULAR; INTRALESIONAL; INTRAMUSCULAR; INTRAVENOUS; SOFT TISSUE PRN
Status: DISCONTINUED | OUTPATIENT
Start: 2021-10-29 | End: 2021-10-29 | Stop reason: SDUPTHER

## 2021-10-29 RX ORDER — ROCURONIUM BROMIDE 10 MG/ML
INJECTION, SOLUTION INTRAVENOUS PRN
Status: DISCONTINUED | OUTPATIENT
Start: 2021-10-29 | End: 2021-10-29 | Stop reason: SDUPTHER

## 2021-10-29 RX ORDER — EPINEPHRINE 1 MG/ML
INJECTION, SOLUTION, CONCENTRATE INTRAVENOUS
Status: COMPLETED | OUTPATIENT
Start: 2021-10-29 | End: 2021-10-29

## 2021-10-29 RX ORDER — GINSENG 100 MG
CAPSULE ORAL
Status: COMPLETED | OUTPATIENT
Start: 2021-10-29 | End: 2021-10-29

## 2021-10-29 RX ORDER — SODIUM CHLORIDE 9 MG/ML
INJECTION, SOLUTION INTRAVENOUS CONTINUOUS
Status: DISCONTINUED | OUTPATIENT
Start: 2021-10-29 | End: 2021-10-29 | Stop reason: HOSPADM

## 2021-10-29 RX ORDER — AMOXICILLIN 250 MG
2 CAPSULE ORAL DAILY PRN
Qty: 14 TABLET | Refills: 1 | Status: SHIPPED | OUTPATIENT
Start: 2021-10-29 | End: 2021-11-05

## 2021-10-29 RX ORDER — MORPHINE SULFATE 2 MG/ML
2 INJECTION, SOLUTION INTRAMUSCULAR; INTRAVENOUS EVERY 5 MIN PRN
Status: DISCONTINUED | OUTPATIENT
Start: 2021-10-29 | End: 2021-10-29 | Stop reason: HOSPADM

## 2021-10-29 RX ORDER — HYDROCODONE BITARTRATE AND ACETAMINOPHEN 5; 325 MG/1; MG/1
1 TABLET ORAL EVERY 6 HOURS PRN
Qty: 20 TABLET | Refills: 0 | Status: SHIPPED | OUTPATIENT
Start: 2021-10-29 | End: 2021-11-03

## 2021-10-29 RX ORDER — LABETALOL HYDROCHLORIDE 5 MG/ML
INJECTION, SOLUTION INTRAVENOUS PRN
Status: DISCONTINUED | OUTPATIENT
Start: 2021-10-29 | End: 2021-10-29 | Stop reason: SDUPTHER

## 2021-10-29 RX ORDER — ONDANSETRON 2 MG/ML
4 INJECTION INTRAMUSCULAR; INTRAVENOUS
Status: DISCONTINUED | OUTPATIENT
Start: 2021-10-29 | End: 2021-10-29 | Stop reason: HOSPADM

## 2021-10-29 RX ORDER — DOXYCYCLINE HYCLATE 100 MG
100 TABLET ORAL 2 TIMES DAILY
Qty: 14 TABLET | Refills: 0 | Status: SHIPPED | OUTPATIENT
Start: 2021-10-29 | End: 2021-11-05

## 2021-10-29 RX ORDER — MAGNESIUM HYDROXIDE 1200 MG/15ML
LIQUID ORAL CONTINUOUS PRN
Status: COMPLETED | OUTPATIENT
Start: 2021-10-29 | End: 2021-10-29

## 2021-10-29 RX ORDER — MORPHINE SULFATE 2 MG/ML
1 INJECTION, SOLUTION INTRAMUSCULAR; INTRAVENOUS EVERY 5 MIN PRN
Status: DISCONTINUED | OUTPATIENT
Start: 2021-10-29 | End: 2021-10-29 | Stop reason: HOSPADM

## 2021-10-29 RX ORDER — ONDANSETRON 4 MG/1
4 TABLET, ORALLY DISINTEGRATING ORAL EVERY 4 HOURS PRN
Qty: 5 TABLET | Refills: 1 | Status: SHIPPED | OUTPATIENT
Start: 2021-10-29 | End: 2022-04-04

## 2021-10-29 RX ORDER — LIDOCAINE HYDROCHLORIDE AND EPINEPHRINE 10; 10 MG/ML; UG/ML
INJECTION, SOLUTION INFILTRATION; PERINEURAL
Status: COMPLETED | OUTPATIENT
Start: 2021-10-29 | End: 2021-10-29

## 2021-10-29 RX ORDER — PROPOFOL 10 MG/ML
INJECTION, EMULSION INTRAVENOUS PRN
Status: DISCONTINUED | OUTPATIENT
Start: 2021-10-29 | End: 2021-10-29 | Stop reason: SDUPTHER

## 2021-10-29 RX ORDER — FENTANYL CITRATE 50 UG/ML
50 INJECTION, SOLUTION INTRAMUSCULAR; INTRAVENOUS EVERY 5 MIN PRN
Status: DISCONTINUED | OUTPATIENT
Start: 2021-10-29 | End: 2021-10-29 | Stop reason: HOSPADM

## 2021-10-29 RX ORDER — SODIUM CHLORIDE 0.9 % (FLUSH) 0.9 %
10 SYRINGE (ML) INJECTION EVERY 12 HOURS SCHEDULED
Status: DISCONTINUED | OUTPATIENT
Start: 2021-10-29 | End: 2021-10-29 | Stop reason: HOSPADM

## 2021-10-29 RX ORDER — OXYCODONE HYDROCHLORIDE 5 MG/1
5 TABLET ORAL PRN
Status: COMPLETED | OUTPATIENT
Start: 2021-10-29 | End: 2021-10-29

## 2021-10-29 RX ORDER — FENTANYL CITRATE 50 UG/ML
25 INJECTION, SOLUTION INTRAMUSCULAR; INTRAVENOUS EVERY 5 MIN PRN
Status: DISCONTINUED | OUTPATIENT
Start: 2021-10-29 | End: 2021-10-29 | Stop reason: HOSPADM

## 2021-10-29 RX ADMIN — Medication 120 MG: at 07:40

## 2021-10-29 RX ADMIN — SODIUM CHLORIDE: 9 INJECTION, SOLUTION INTRAVENOUS at 09:37

## 2021-10-29 RX ADMIN — ROCURONIUM BROMIDE 5 MG: 10 INJECTION INTRAVENOUS at 07:40

## 2021-10-29 RX ADMIN — CEFAZOLIN 2000 MG: 10 INJECTION, POWDER, FOR SOLUTION INTRAVENOUS at 07:30

## 2021-10-29 RX ADMIN — PROPOFOL 150 MG: 10 INJECTION, EMULSION INTRAVENOUS at 07:40

## 2021-10-29 RX ADMIN — PROPOFOL 50 MG: 10 INJECTION, EMULSION INTRAVENOUS at 09:50

## 2021-10-29 RX ADMIN — PROPOFOL 50 MG: 10 INJECTION, EMULSION INTRAVENOUS at 09:45

## 2021-10-29 RX ADMIN — FENTANYL CITRATE 50 MCG: 50 INJECTION INTRAMUSCULAR; INTRAVENOUS at 07:58

## 2021-10-29 RX ADMIN — LABETALOL HYDROCHLORIDE 5 MG: 5 INJECTION, SOLUTION INTRAVENOUS at 10:04

## 2021-10-29 RX ADMIN — LIDOCAINE HYDROCHLORIDE 60 MG: 20 INJECTION, SOLUTION EPIDURAL; INFILTRATION; INTRACAUDAL; PERINEURAL at 07:40

## 2021-10-29 RX ADMIN — HYDROMORPHONE HYDROCHLORIDE 0.5 MG: 1 INJECTION, SOLUTION INTRAMUSCULAR; INTRAVENOUS; SUBCUTANEOUS at 08:46

## 2021-10-29 RX ADMIN — PROPOFOL 100 MG: 10 INJECTION, EMULSION INTRAVENOUS at 08:37

## 2021-10-29 RX ADMIN — ONDANSETRON 4 MG: 2 INJECTION INTRAMUSCULAR; INTRAVENOUS at 09:46

## 2021-10-29 RX ADMIN — FENTANYL CITRATE 50 MCG: 50 INJECTION INTRAMUSCULAR; INTRAVENOUS at 07:40

## 2021-10-29 RX ADMIN — HYDROMORPHONE HYDROCHLORIDE 0.5 MG: 1 INJECTION, SOLUTION INTRAMUSCULAR; INTRAVENOUS; SUBCUTANEOUS at 09:45

## 2021-10-29 RX ADMIN — SODIUM CHLORIDE: 9 INJECTION, SOLUTION INTRAVENOUS at 06:27

## 2021-10-29 RX ADMIN — LABETALOL HYDROCHLORIDE 5 MG: 5 INJECTION, SOLUTION INTRAVENOUS at 09:52

## 2021-10-29 RX ADMIN — MIDAZOLAM 2 MG: 1 INJECTION INTRAMUSCULAR; INTRAVENOUS at 07:30

## 2021-10-29 RX ADMIN — OXYCODONE 5 MG: 5 TABLET ORAL at 12:58

## 2021-10-29 RX ADMIN — PROPOFOL 50 MG: 10 INJECTION, EMULSION INTRAVENOUS at 10:04

## 2021-10-29 RX ADMIN — ROCURONIUM BROMIDE 45 MG: 10 INJECTION INTRAVENOUS at 07:54

## 2021-10-29 RX ADMIN — SUGAMMADEX 200 MG: 100 INJECTION, SOLUTION INTRAVENOUS at 11:02

## 2021-10-29 RX ADMIN — DEXAMETHASONE SODIUM PHOSPHATE 10 MG: 4 INJECTION, SOLUTION INTRAMUSCULAR; INTRAVENOUS at 08:00

## 2021-10-29 ASSESSMENT — PULMONARY FUNCTION TESTS
PIF_VALUE: 21
PIF_VALUE: 15
PIF_VALUE: 21
PIF_VALUE: 18
PIF_VALUE: 22
PIF_VALUE: 18
PIF_VALUE: 21
PIF_VALUE: 21
PIF_VALUE: 20
PIF_VALUE: 22
PIF_VALUE: 21
PIF_VALUE: 16
PIF_VALUE: 22
PIF_VALUE: 21
PIF_VALUE: 22
PIF_VALUE: 21
PIF_VALUE: 17
PIF_VALUE: 6
PIF_VALUE: 20
PIF_VALUE: 21
PIF_VALUE: 21
PIF_VALUE: 22
PIF_VALUE: 21
PIF_VALUE: 15
PIF_VALUE: 15
PIF_VALUE: 8
PIF_VALUE: 16
PIF_VALUE: 21
PIF_VALUE: 22
PIF_VALUE: 16
PIF_VALUE: 15
PIF_VALUE: 22
PIF_VALUE: 21
PIF_VALUE: 21
PIF_VALUE: 1
PIF_VALUE: 21
PIF_VALUE: 21
PIF_VALUE: 20
PIF_VALUE: 21
PIF_VALUE: 22
PIF_VALUE: 5
PIF_VALUE: 21
PIF_VALUE: 21
PIF_VALUE: 16
PIF_VALUE: 21
PIF_VALUE: 22
PIF_VALUE: 15
PIF_VALUE: 20
PIF_VALUE: 22
PIF_VALUE: 21
PIF_VALUE: 20
PIF_VALUE: 16
PIF_VALUE: 21
PIF_VALUE: 21
PIF_VALUE: 20
PIF_VALUE: 21
PIF_VALUE: 24
PIF_VALUE: 20
PIF_VALUE: 21
PIF_VALUE: 21
PIF_VALUE: 15
PIF_VALUE: 21
PIF_VALUE: 21
PIF_VALUE: 22
PIF_VALUE: 0
PIF_VALUE: 21
PIF_VALUE: 15
PIF_VALUE: 3
PIF_VALUE: 21
PIF_VALUE: 21
PIF_VALUE: 8
PIF_VALUE: 21
PIF_VALUE: 21
PIF_VALUE: 16
PIF_VALUE: 17
PIF_VALUE: 21
PIF_VALUE: 17
PIF_VALUE: 20
PIF_VALUE: 21
PIF_VALUE: 8
PIF_VALUE: 21
PIF_VALUE: 18
PIF_VALUE: 21
PIF_VALUE: 22
PIF_VALUE: 21
PIF_VALUE: 21
PIF_VALUE: 16
PIF_VALUE: 20
PIF_VALUE: 21
PIF_VALUE: 22
PIF_VALUE: 21
PIF_VALUE: 16
PIF_VALUE: 21
PIF_VALUE: 4
PIF_VALUE: 20
PIF_VALUE: 19
PIF_VALUE: 21
PIF_VALUE: 0
PIF_VALUE: 21
PIF_VALUE: 21
PIF_VALUE: 22
PIF_VALUE: 1
PIF_VALUE: 21
PIF_VALUE: 18
PIF_VALUE: 18
PIF_VALUE: 21
PIF_VALUE: 20
PIF_VALUE: 21
PIF_VALUE: 21
PIF_VALUE: 22
PIF_VALUE: 20
PIF_VALUE: 19
PIF_VALUE: 22
PIF_VALUE: 21
PIF_VALUE: 15
PIF_VALUE: 21
PIF_VALUE: 21
PIF_VALUE: 20
PIF_VALUE: 21
PIF_VALUE: 16
PIF_VALUE: 21
PIF_VALUE: 22
PIF_VALUE: 21
PIF_VALUE: 22
PIF_VALUE: 22
PIF_VALUE: 19
PIF_VALUE: 32
PIF_VALUE: 1
PIF_VALUE: 1
PIF_VALUE: 22
PIF_VALUE: 21
PIF_VALUE: 16
PIF_VALUE: 20
PIF_VALUE: 22
PIF_VALUE: 22
PIF_VALUE: 21
PIF_VALUE: 22
PIF_VALUE: 21
PIF_VALUE: 1
PIF_VALUE: 22
PIF_VALUE: 22
PIF_VALUE: 15
PIF_VALUE: 21
PIF_VALUE: 15
PIF_VALUE: 15
PIF_VALUE: 21
PIF_VALUE: 15
PIF_VALUE: 21
PIF_VALUE: 20

## 2021-10-29 ASSESSMENT — PAIN DESCRIPTION - ONSET
ONSET: ON-GOING
ONSET: ON-GOING

## 2021-10-29 ASSESSMENT — PAIN DESCRIPTION - DESCRIPTORS
DESCRIPTORS: ACHING
DESCRIPTORS: ACHING

## 2021-10-29 ASSESSMENT — PAIN DESCRIPTION - PROGRESSION: CLINICAL_PROGRESSION: NOT CHANGED

## 2021-10-29 ASSESSMENT — LIFESTYLE VARIABLES: SMOKING_STATUS: 0

## 2021-10-29 ASSESSMENT — PAIN DESCRIPTION - PAIN TYPE
TYPE: SURGICAL PAIN
TYPE: SURGICAL PAIN

## 2021-10-29 ASSESSMENT — PAIN - FUNCTIONAL ASSESSMENT
PAIN_FUNCTIONAL_ASSESSMENT: 0-10
PAIN_FUNCTIONAL_ASSESSMENT: ACTIVITIES ARE NOT PREVENTED

## 2021-10-29 ASSESSMENT — PAIN DESCRIPTION - FREQUENCY
FREQUENCY: CONTINUOUS
FREQUENCY: CONTINUOUS

## 2021-10-29 ASSESSMENT — PAIN DESCRIPTION - LOCATION
LOCATION: NOSE
LOCATION: NOSE

## 2021-10-29 ASSESSMENT — ENCOUNTER SYMPTOMS: SHORTNESS OF BREATH: 0

## 2021-10-29 ASSESSMENT — PAIN SCALES - GENERAL
PAINLEVEL_OUTOF10: 5

## 2021-10-29 NOTE — ANESTHESIA PRE PROCEDURE
Department of Anesthesiology  Preprocedure Note       Name:  Migue Griffin   Age:  35 y.o.  :  1980                                          MRN:  [id number]         Date:  10/29/2021      Surgeon: Pili Patel):  MD Chelsea Burnett MD    Procedure: Procedure(s):  SEPTOPLASTY, INFERIOR TURBINATE REDUCTION, REPAIR OF NASAL VALVE COLLAPSE, ABLATION OF POSTERIOR NASAL NERVES    Medications prior to admission:   Prior to Admission medications    Medication Sig Start Date End Date Taking? Authorizing Provider   spironolactone (ALDACTONE) 100 MG tablet TAKE ONE TABLET BY MOUTH DAILY 10/1/21  Yes Avery Vaughan MD   lisinopril (PRINIVIL;ZESTRIL) 5 MG tablet TAKE ONE TABLET BY MOUTH DAILY 21  Yes Avery Vaughan MD   omeprazole (PRILOSEC) 40 MG delayed release capsule TAKE ONE CAPSULE BY MOUTH TWICE A DAY 21  Yes Avery Vaughan MD   montelukast (SINGULAIR) 10 MG tablet Take 1 tablet by mouth nightly 21  Yes QI Lauren - CNP   busPIRone (BUSPAR) 10 MG tablet Take 2 tablets by mouth 2 times daily 21  Yes Avery Vaughan MD   VILAZODONE HCL 40 MG Tablet TAKE ONE TABLET BY MOUTH DAILY  Patient taking differently: 20 mg  21  Yes Avery Vaughan MD   albuterol sulfate  (90 Base) MCG/ACT inhaler INHALE TWO PUFFS BY MOUTH EVERY 6 HOURS AS NEEDED FOR WHEEZING OR FOR SHORTNESS OF BREATH 21  Yes Avery Vaughan MD   Fluticasone-Umeclidin-Vilant (TRELEGY ELLIPTA) 200-62.5-25 MCG/INH AEPB Inhale 1 puff into the lungs daily 21  Yes Jose Veras MD   fluticasone (FLONASE) 50 MCG/ACT nasal spray 2 sprays by Nasal route 2 times daily 21  Yes Alisa Luciano MD   atorvastatin (LIPITOR) 40 MG tablet TAKE ONE TABLET BY MOUTH DAILY 4/15/21  Yes Avery Vaughan MD   Multiple Vitamins-Minerals (THERAPEUTIC MULTIVITAMIN-MINERALS) tablet Take 1 tablet by mouth daily.    Yes Historical Provider, MD   VILAZODONE HCL 20 MG Tablet TAKE ONE TABLET BY MOUTH DAILY 10/25/21   Zulema Garcia MD   VORTIoxetine [facility]) 10 MG TABS tablet Take 1 tablet by mouth daily 10/8/21   Zulema Garcia MD   modafinil (PROVIGIL) 200 MG tablet Take 1-2 tablets by mouth every morning for 30 days. 7/23/21 9/20/21  Tiffanie R Kehrt, APRN - CNP   Spacer/Aero-Holding Pervis Bi 1 Device by Does not apply route daily as needed (with HFA inhalers) 12/15/20   Zulema Garcia MD       Current medications:    Current Facility-Administered Medications   Medication Dose Route Frequency Provider Last Rate Last Admin    ceFAZolin (ANCEF) 2000 mg in dextrose 5 % 100 mL IVPB  2,000 mg IntraVENous Once Yasmeen Martinez MD        0.9 % sodium chloride infusion   IntraVENous Continuous Courtney Sandoval  mL/hr at 10/29/21 0627 New Bag at 10/29/21 0627    sodium chloride flush 0.9 % injection 10 mL  10 mL IntraVENous 2 times per day Courtney Sandoval MD        sodium chloride flush 0.9 % injection 10 mL  10 mL IntraVENous PRN Courtney Sandoval MD        0.9 % sodium chloride infusion  25 mL IntraVENous PRN Courtney Sandoval MD           Allergies:     Allergies   Allergen Reactions    Ensure      EGGS    Peanut-Containing Drug Products      PEANUTS    Food      Certain foods (chocolate, tea, soy, eggs, tree nuts, coffee, tomatoes, black pepper)       Problem List:    Patient Active Problem List   Diagnosis Code    Polycystic ovarian disease E28.2    Hyperlipidemia E78.5    Hirsutism L68.0    Allergic rhinitis due to pollen J30.1    Deviated septum J34.2    Asthma J45.909    Lumbar disc disease L4-5, L5-S1 Dr [facility]) Epidural injections 2011 M51.9    Essential hypertension I10    Prediabetes A1c 5.7 6/1/16 R73.03    Obstructive sleep apnea (adult) (pediatric) G47.33    Excessive daytime sleepiness G47.19    Other viral warts B07.8    Non morbid obesity, unspecified obesity type E66.9    Chronic post-traumatic stress disorder (PTSD) with anxiety F43.12    Chronic GERD K21.9    Severe persistent asthma without complication G51.79       Past Medical History:        Diagnosis Date    Allergic rhinitis     Anxiety     Asthma     Environmental allergies     food    Hyperlipidemia     Hypertension     Lumbar disc disease L4-5, L5-S1 Dr Kia Ward [facility]) Epidural injections 2011 8/20/2012    Obstructive sleep apnea     Obstructive sleep apnea (adult) (pediatric) 6/18/2018    PCOS (polycystic ovarian syndrome)     S/P colonoscopy 1/17/13    normal. Dr Rissa Donnelly       Past Surgical History:        Procedure Laterality Date    BREAST REDUCTION SURGERY  01/01/2004    FOOT SURGERY  01/01/2000    hallux fx    KNEE ARTHROSCOPY Right     SHOULDER ARTHROSCOPY Left     UPPER GASTROINTESTINAL ENDOSCOPY  01/17/2013    gastritis; Dr Marco A Medina History:    Social History     Tobacco Use    Smoking status: Never Smoker    Smokeless tobacco: Never Used    Tobacco comment: congratulated on nonsmoking status   Substance Use Topics    Alcohol use:  No                                Counseling given: Not Answered  Comment: congratulated on nonsmoking status      Vital Signs (Current):   Vitals:    10/22/21 1428 10/29/21 0623   BP:  119/65   Pulse:  79   Resp:  16   Temp:  97.5 °F (36.4 °C)   TempSrc:  Temporal   SpO2:  97%   Weight: 240 lb (108.9 kg) 242 lb 2.8 oz (109.8 kg)   Height: 5' 6\" (1.676 m) 5' 6\" (1.676 m)                                              BP Readings from Last 3 Encounters:   10/29/21 119/65   10/08/21 116/80   09/24/21 114/72       NPO Status: Time of last liquid consumption: 2200                        Time of last solid consumption: 2130                        Date of last liquid consumption: 10/28/21                        Date of last solid food consumption: 10/28/21    BMI:   Wt Readings from Last 3 Encounters:   10/29/21 242 lb 2.8 oz (109.8 kg)   10/08/21 238 lb (108 kg)   09/24/21 239 lb (108.4 kg) Body mass index is 39.09 kg/m². CBC:   Lab Results   Component Value Date    WBC 8.7 06/29/2021    RBC 3.79 06/29/2021    HGB 12.1 06/29/2021    HCT 34.2 06/29/2021    MCV 90.1 06/29/2021    RDW 12.8 06/29/2021     06/29/2021       CMP:   Lab Results   Component Value Date     08/06/2021    K 4.1 08/06/2021     08/06/2021    CO2 25 08/06/2021    BUN 10 08/06/2021    CREATININE 0.7 08/06/2021    GFRAA >60 08/06/2021    GFRAA >60 01/02/2013    AGRATIO 1.5 12/18/2020    LABGLOM >60 08/06/2021    GLUCOSE 80 08/06/2021    PROT 7.0 12/18/2020    PROT 9.3 01/02/2013    CALCIUM 9.1 08/06/2021    BILITOT 0.3 12/18/2020    ALKPHOS 82 12/18/2020    AST 17 12/18/2020    ALT 26 12/18/2020       POC Tests: No results for input(s): POCGLU, POCNA, POCK, POCCL, POCBUN, POCHEMO, POCHCT in the last 72 hours.     Coags:   Lab Results   Component Value Date    PROTIME 11.4 04/25/2014    INR 1.02 04/25/2014       HCG (If Applicable):   Lab Results   Component Value Date    PREGTESTUR Negative 10/29/2021        ABGs: No results found for: PHART, PO2ART, XYT2GCX, POF2GER, BEART, W4GZBNEB     Type & Screen (If Applicable):  No results found for: LABABO, LABRH    Drug/Infectious Status (If Applicable):  No results found for: HIV, HEPCAB    COVID-19 Screening (If Applicable):   Lab Results   Component Value Date    COVID19 Not Detected 05/07/2021           Anesthesia Evaluation  Patient summary reviewed and Nursing notes reviewed no history of anesthetic complications:   Airway: Mallampati: II  TM distance: >3 FB   Neck ROM: full  Mouth opening: > = 3 FB Dental: normal exam         Pulmonary:   (+) sleep apnea:  asthma:     (-) pneumonia, COPD, shortness of breath, recent URI and not a current smoker                           Cardiovascular:    (+) hypertension:, hyperlipidemia    (-) pacemaker, valvular problems/murmurs, past MI, CAD, CABG/stent, dysrhythmias,  angina,  CHF, orthopnea, PND,  ESCOBEDO and no pulmonary hypertension      Rhythm: regular                      Neuro/Psych:   (+) psychiatric history:   (-) seizures, neuromuscular disease, TIA, CVA, headaches and depression/anxiety            GI/Hepatic/Renal:   (+) GERD:,      (-) hiatal hernia, PUD, hepatitis, liver disease, no renal disease, bowel prep and no morbid obesity      ROS comment: BMI 39. Endo/Other:    (+) no malignancy/cancer. (-) diabetes mellitus, hypothyroidism, hyperthyroidism, blood dyscrasia, arthritis, no electrolyte abnormalities, no malignancy/cancer                ROS comment: PCOS Abdominal:             Vascular:     - PVD, DVT and PE. Other Findings:             Anesthesia Plan      general     ASA 3       Induction: intravenous. MIPS: Postoperative opioids intended, Prophylactic antiemetics administered and Postoperative trial extubation. Anesthetic plan and risks discussed with patient. Plan discussed with CRNA. Sneha Ortiz MD   10/29/2021        This pre-anesthesia assessment may be used as a history and physical.    DOS STAFF ADDENDUM:    Pt seen and examined, chart reviewed (including anesthesia, drug and allergy history). No interval changes to history and physical examination. Anesthetic plan, risks, benefits, alternatives, and personnel involved discussed with patient. Patient verbalized an understanding and agrees to proceed.       Sneha Ortiz MD  October 29, 2021  6:51 AM

## 2021-10-29 NOTE — OP NOTE
[address] [facility]  OPERATIVE REPORT    Patient Name: Migue Griffin  YOB: 1980  Medical Record Number:  [id number]  Billing Number:  [id number]  Date of Procedure: [unfilled]  Time: 0730    Pre Operative Diagnoses:   Nasal congestion  Nasal valve collapse  Post Operative Diagnoses:    Same           Procedure:   Bilateral repair of nasal valve (28260)       Lon Parish MD  Assistant:  Dr. Murali Jacobs MD    OR Staff/ Assistant:  Circulator: Kristine Haywood RN  Surgical Assistant: Army Oc Stokes Circulator: Haim Garcia RN  Scrub Person First: Smith Vannesa  Scrub Person Second: Radha Reddy  Circulator Assist: Nazanin Hernandez RN    Anesthesia:  General anesthesia. Findings:  1) bilateral very narrow internal nasal valve corrected with  grafts    Indications: This is a 35 y.o. female with nasal congestion secondary to a septal deviation, inferior turbinate hypertrophy as well as nasal valve collapse. She saw my partner Dr. Murali Jacobs who asked me to help with the repair of the nasal valve portion of the procedure. .  Risks and benefits discussed with the patient including alternate treatment options, Informed consent was obtained, the patient elected to proceed with the planned procedure. DETAILS OF PROCEDURE(S):   The patient was brought to the operating room placed in supine position operating table. She underwent uncomplicated general anesthesia with endotracheal intubation. The head of bed was turned 180 degrees. Dr. Murali Jacobs performed the septoplasty portion of the procedure. Please see his dictation for details. After the septoplasty was finished I made a columellar and bilateral marginal incisions. The soft tissue was elevated off the lower lateral upper lateral cartilages. An incision was made down to the nasal bones. A subperiosteal dissection was then carried out.   The bilateral upper lateral cartilages were reflected off of the septum. Bilateral  grafts were fashioned from the quadrangular cartilage that had been removed. These were placed between the septum and upper lateral cartilages. These were then secured to the septum and upper lateral cartilages with horizontal mattress 5-0 PDS sutures. The marginal incision was closed with simple interrupted 4-0 chromic. The columellar incision was closed with simple interrupted 5-0 fast.  Please see 's dictation for details of the rest of the surgery. I attest that I was present for and did the entire procedure myself. Estimated Blood Loss: 15 mL  \     Complications: There were no complications.     Carmelita Gannon MD

## 2021-10-29 NOTE — PROGRESS NOTES
Pt to ph 2 from PACU. VSS. Franklyn Madrid. Dressing clean dry and intact. Pt states pain is a 5/10. Dressing clean, new dressing applied in PACU. Continue to monitor.

## 2021-10-29 NOTE — PROGRESS NOTES
Pt arrived to PACU from OR. Pt asleep on 10l/NRB. Nasal splints intact. + small amt bloody drainage noted.

## 2021-11-01 NOTE — OP NOTE
[address] [facility] SURGERY  OPERATIVE REPORT    Patient Name: Cris Lewis  YOB: 1980  Medical Record Number:  [id number]  Billing Number:  [id number]  Time: 0730    Pre Operative Diagnoses:   1. Septal deviation  2. Inferior turbinate hypertrophy  3. Nasal Obstruction  4. Chronic rhinitis  5. Nasal valve collapse    Post Operative Diagnoses:    Same        Procedure:    1. Endoscopic septoplasty (CPT [id number])  2. Submucous resection of inferior turbiantes (CPT 79404-43)  3. Bilateral ablation of posterior nasal nerves (CPT 11473-14-67)       Surgeon: No att. providers found    OR Staff/ Assistant:  Circulator: Kristine Haywood RN  Surgical Assistant: Romi Romero; Chayo Stokes Circulator: Chad Sicard, RN  Scrub Person First: Hunter Laboy  Scrub Person Second: [facility]  Circulator Assist: Kina Walker RN    Anesthesia:  General anesthesia. Findings:    1. Prominent left-sided anterior septal deviation with large bony maxillary spurs bilaterally causing nasal obstruction. After septoplasty there was significant improvement in nasal airway airflow. 2.  Significant inferior turbinate hypertrophy reduced with microdebrider  3. Nasal valve collapse corrected with  grafts  4. Good blanching overlying the mucosa at the site of origination of the posterior nasal nerves entrance into the nasal cavity. Indications: This is a 73-year-old female with multilevel nasal airway obstruction including nasal septal deviation, inferior turbinate hypertrophy and nasal valve collapse who was treated medically and failed appropriate medical management. Additionally, the patient had chronic rhinitis refractory to medical management. Risks and benefits were discussed with the patient including alternate treatment options. Informed consent was obtained and the patient elected to proceed with the planned procedure.     DETAILS OF PROCEDURE(S):   The patient was brought to the operating room and placed supine on the operating room table. After general anesthesia was obtained, epinephrine  soaked pledgets were placed into both nasal cavities. Patient was then prepped and draped in standard sterile fashion    Septoplasty:      1% lidocaine with 1:100,000 epinephrine was injected along both sides of the nasal septum. A #15 blade was used to make a Lew incision in the right nare. A mucoperichondrial flap was developed on the left. An 15 blade was used to transcribe through the septal cartilage and a mucoperichondrial flap was developed on the right hand side. Deviated portions of septal cartilage and bone were removed with a combination of a swivel knife, double action rongeur and through cutting forceps. A large bony spur along the bilateral maxillary crests was removed with an osteotome. A small tear was noted in the right nasal septal mucosa. A small piece of DuraGen was placed to facilitate healing. There was no opposing tears. Mattress sutures were then placed. The septal incision was then closed with chromic suture. I then began with the bilateral submucous resection of the inferior turbinates. The head of the turbinates were injected with lidocaine with epinephrine. Stab incisions were made at the head of the turbinates. A Edith elevator was utilized to develop bilateral submucosal pockets. The 2.9mm microdebrider device was then utilized to perform a submucous mucous resection of inferior turbinate tissue and bone on both the left and the right. The inferior turbinates were then outfractured with significant improvement in the nasal airway. I then performed the bilateral ablation of the posterior nasal nerves. The cryoablation device was inserted first into the left nasal cavity. At the site of the origination and entrance of the posterior nasal nerves into the nasal cavity, the device was placed.   Two 30 second rounds of cryoablation were performed with excellent blanching of the overlying mucosa. I then transition to the right side where the same procedure was performed. The cryoablation device was inserted into the nose and I performed cryoablation at 2 sites at the entrance of the posterior nasal nerve into the nasal cavity. I then assisted Dr. Ke Rajput with the opening of the nose for the placement of the  grafts. His portion of the operative note will be dictated separately. Bilateral Damico splints were then placed and secured in the nose with Prolene suture. Splint was then placed onto the nose. the nasal cavities where irrigated and hemostasis was achieved. An OG tube was passed to reduce post operative nausea. This concluded the procedure and the patient was turned back over to the anesthesia team.     I attest that I was present for and did the entire procedure myself. Estimated Blood Loss: less than 50 ml                 Specimens: * No specimens in log *            Complications: There were no complications.     Sydnie Arroyo MD

## 2021-11-02 RX ORDER — BUSPIRONE HYDROCHLORIDE 10 MG/1
TABLET ORAL
Qty: 120 TABLET | Refills: 2 | Status: SHIPPED | OUTPATIENT
Start: 2021-11-02 | End: 2022-02-17

## 2021-11-03 RX ORDER — FLUTICASONE FUROATE, UMECLIDINIUM BROMIDE AND VILANTEROL TRIFENATATE 200; 62.5; 25 UG/1; UG/1; UG/1
POWDER RESPIRATORY (INHALATION)
Qty: 1 EACH | Refills: 5 | Status: SHIPPED | OUTPATIENT
Start: 2021-11-03 | End: 2022-08-02

## 2021-11-03 NOTE — ANESTHESIA POSTPROCEDURE EVALUATION
Department of Anesthesiology  Postprocedure Note    Patient: Christopher Chi  MRN: [id number]  YOB: 1980  Date of evaluation: 11/3/2021  Time:  1:45 PM     Procedure Summary     Date: 10/29/21 Room / Location: [address]    Anesthesia Start: 0730 Anesthesia Stop: 1116    Procedure: SEPTOPLASTY, INFERIOR TURBINATE REDUCTION, REPAIR OF NASAL VALVE COLLAPSE, ABLATION OF POSTERIOR NASAL NERVES (N/A ) Diagnosis:       Nasal obstruction      Deviated nasal septum      Hypertrophy of inferior nasal turbinate      Chronic rhinitis      Nasal valve collapse      (NASAL OBSTRUCTION, DEVIATED NASAL SEPTUM, INFERIOR TURBINATE HYPERTROPHY, CHRONIC RHINITIS, NASAL VALVE COLLAPSE)    Surgeons: Elias Day MD Responsible Provider: Millicent Shi MD    Anesthesia Type: general ASA Status: 3          Anesthesia Type: general    Adriel Phase I: Adriel Score: 10    Adriel Phase II: Adriel Score: 10    Last vitals: Reviewed and per EMR flowsheets.        Anesthesia Post Evaluation    Patient location during evaluation: PACU  Patient participation: complete - patient participated  Level of consciousness: awake and alert  Pain score: 4  Airway patency: patent  Nausea & Vomiting: no nausea and no vomiting  Complications: no  Cardiovascular status: blood pressure returned to baseline  Respiratory status: acceptable  Hydration status: euvolemic  Multimodal analgesia pain management approach

## 2021-11-04 ENCOUNTER — OFFICE VISIT (OUTPATIENT)
Dept: ENT CLINIC | Age: 41
End: 2021-11-04
Payer: COMMERCIAL

## 2021-11-04 VITALS — BODY MASS INDEX: 37.93 KG/M2 | WEIGHT: 235 LBS

## 2021-11-04 DIAGNOSIS — J32.0 CHRONIC MAXILLARY SINUSITIS: ICD-10-CM

## 2021-11-04 DIAGNOSIS — R43.8 HYPOSMIA: ICD-10-CM

## 2021-11-04 DIAGNOSIS — J31.0 CHRONIC RHINITIS: ICD-10-CM

## 2021-11-04 DIAGNOSIS — J34.89 NASAL VALVE COLLAPSE: ICD-10-CM

## 2021-11-04 DIAGNOSIS — K21.9 CHRONIC GERD: ICD-10-CM

## 2021-11-04 DIAGNOSIS — J34.2 DEVIATED NASAL SEPTUM: Primary | ICD-10-CM

## 2021-11-04 PROCEDURE — 99024 POSTOP FOLLOW-UP VISIT: CPT | Performed by: STUDENT IN AN ORGANIZED HEALTH CARE EDUCATION/TRAINING PROGRAM

## 2021-11-04 PROCEDURE — 31231 NASAL ENDOSCOPY DX: CPT | Performed by: STUDENT IN AN ORGANIZED HEALTH CARE EDUCATION/TRAINING PROGRAM

## 2021-11-08 NOTE — PROGRESS NOTES
Paul Carlos (:  1980) is a 35 y.o. female, here for evaluation of the following chief complaint(s):  Post-Op Check      ASSESSMENT/PLAN:  1. Deviated nasal septum  2. Nasal valve collapse  3. Chronic maxillary sinusitis  4. Hyposmia  5. Chronic rhinitis      This is a very pleasant 35 y.o. female here today for evaluation of the the above-noted complaints. Patient is status post open septorhinoplasty, septoplasty, ITR and posterior ablation of her nasal nerves on 10/29/2021. On exam today, the patient looks very well. Her nasal septum is midline and her turbinates are well reduced. She has an proved nasal airflow and an improvement in the angle of her narrow nasal valves. I have asked the patient to irrigating 2-3 times per day. I have asked her to complete her perioperative antibiotics and I'll see her back in approximately 2 to 3 weeks. SUBJECTIVE/OBJECTIVE:  Dena Johnson is here today for evaluation of evaluation of issues related to her nose. She gets multiple sinus infections per year. She requires antibiotics for these. She think she has a sinus infection right now. She also has over the last 3 months had a loss of sense of smell. She has not had Covid recently. She has had some issues related to smelling gasoline and cigarette smoke that is not actually present. She gets difficulty breathing through her nose it is worse on the left side. She will get constant nasal drainage that is clear and green. She gets very rare nosebleeds. Update 3/17/2012:    She is still having issues breathing out of her nose and with nasal congestion. She is still getting intermittent nasal drainage. Update 2021:    Patient presents today for follow-up. She is still having the same issues regarded to difficulty breathing out of her nose. She gets intermittent nasal drainage which is clear. lesions/tumors  Neuro:  cranial nerve II-XII intact; normal gait  Cardio:  no edema    Modified Guthrie maneuver produced significant improvement in nasal airflow bilaterally. PROCEDURE  Nasal Endoscopy (CPT code 66339) from previous visit    Preop: chronic rhinitis  Postop: Same    Verbal consent was received. After topical anesthesia and decongestion had been obtained using aerosolized 1% lidocaine and oxymetazoline, a 45 degree rigid endoscope was placed into both nares with the patient in a sitting position. The following was observed:    Right Nasal Cavity and Paranasal Sinuses:  Polyp score = 0 (0 = no polyps, 1 = small polyps in middle meatus not reaching below the inferior border of the middle yaron, 2 = polyps reaching below the middle border of the middle turbinate, 3= large polyps reaching the lower border of the inferior turbinate or polyps medial to the middle yaron, 4= large polyps causing almost complete congestion/obstruction of the interior meatus)  Edema score = 1 (0 = absent, 1 = mild, 2 = severe)  Discharge score = 0 (0 = no discharge, 1 = clear thin discharge, 2 = thick purulent discharge)    Left Nasal Cavity and Paranasal Sinuses:    Polyp score = 0 (0 = no polyps, 1 = small polyps in middle meatus not reaching below the inferior border of the middle yaron, 2 = polyps reaching below the middle border of the middle turbinate, 3= large polyps reaching the lower border of the inferior turbinate or polyps medial to the middle yaron, 4= large polyps causing almost complete congestion/obstruction of the interior meatus)  Edema score = 1 (0 = absent, 1 = mild, 2 = severe)  Discharge score = 0 (0 = no discharge, 1 = clear thin discharge, 2 = thick purulent discharge)    Septum: intact and midline  Other:   -The inferior and middle turbinates were examined. The middle meatus, and sphenoethmoid recess was examined bilaterally.    -There is improvement in the narrow nasal valves bilaterally.   The turbinates are well reduced. The nose is healing very well and there is no septal perforation  -There were no complications. Tolerated well without complication. I attest that I was present for and did the entire procedure myself. This note was generated completely or in part utilizing Dragon dictation speech recognition software. Occasionally, words are mistranscribed and despite editing, the text may contain inaccuracies due to incorrect word recognition. If further clarification is needed please contact the office at [phone]. An electronic signature was used to authenticate this note.     --Karen Verde MD

## 2021-11-10 ENCOUNTER — TELEPHONE (OUTPATIENT)
Dept: FAMILY MEDICINE CLINIC | Age: 41
End: 2021-11-10

## 2021-11-10 RX ORDER — LEVOFLOXACIN 750 MG/1
750 TABLET ORAL DAILY
Qty: 10 TABLET | Refills: 0 | Status: SHIPPED | OUTPATIENT
Start: 2021-11-10 | End: 2021-11-20

## 2021-11-10 NOTE — TELEPHONE ENCOUNTER
Please notify Lan Izquierdo that I sent in a different antibiotic: levaquin. She will need to come in for re-evaluation if this does not help her feel better. TRANSFER - OUT REPORT:    Verbal report given to Lina Bowen RN on Carlos Moody  being transferred to [address] [place] [address] for routine progression of care       Report consisted of patients Situation, Background, Assessment and   Recommendations(SBAR). Information from the following report(s) SBAR, ED Summary, [facility] and Recent Results was reviewed with the receiving nurse. Lines:   Peripheral IV 06/03/18 Left Antecubital (Active)   Site Assessment Clean, dry, & intact 6/3/2018  7:03 PM   Phlebitis Assessment 0 6/3/2018  7:03 PM   Infiltration Assessment 0 6/3/2018  7:03 PM   Dressing Status Clean, dry, & intact 6/3/2018  7:03 PM       Peripheral IV 06/03/18 Left Antecubital (Active)   Site Assessment Clean, dry, & intact 6/3/2018  7:04 PM   Phlebitis Assessment 0 6/3/2018  7:04 PM   Infiltration Assessment 0 6/3/2018  7:04 PM   Dressing Status Clean, dry, & intact 6/3/2018  7:04 PM        Opportunity for questions and clarification was provided.       Patient transported with:   [employer]

## 2021-11-10 NOTE — TELEPHONE ENCOUNTER
Pt stated she has had sinus drainage and allergies going on. Stated that she had sinus surgery a few weeks ago and still in the healing process. Stated that with her asthma she is wheezing and coughing up yellow stuff. Stated that is has been getting worse over the last few days. Stated that she has congestion, sore throat, and runny nose. Stated that she has been taking doxycycline 100MG but finished that medication for about a week now. Stated that she has been taking ibuprofen and trelegy inhaler for her asthma. Stated that she has a nasal spray but can not us it for another two weeks after surgery. Please Advise if pt can be seen in office.     Pt can be reached at [phone]

## 2021-11-23 ENCOUNTER — NURSE TRIAGE (OUTPATIENT)
Dept: OTHER | Facility: CLINIC | Age: 41
End: 2021-11-23

## 2021-11-23 ENCOUNTER — TELEPHONE (OUTPATIENT)
Dept: PULMONOLOGY | Age: 41
End: 2021-11-23

## 2021-11-23 ENCOUNTER — OFFICE VISIT (OUTPATIENT)
Dept: ENT CLINIC | Age: 41
End: 2021-11-23

## 2021-11-23 DIAGNOSIS — K21.9 CHRONIC GERD: ICD-10-CM

## 2021-11-23 DIAGNOSIS — J31.0 CHRONIC RHINITIS: ICD-10-CM

## 2021-11-23 DIAGNOSIS — J34.2 DEVIATED NASAL SEPTUM: Primary | ICD-10-CM

## 2021-11-23 DIAGNOSIS — J32.0 CHRONIC MAXILLARY SINUSITIS: ICD-10-CM

## 2021-11-23 DIAGNOSIS — J34.89 NASAL VALVE COLLAPSE: ICD-10-CM

## 2021-11-23 DIAGNOSIS — R43.8 HYPOSMIA: ICD-10-CM

## 2021-11-23 PROCEDURE — 99024 POSTOP FOLLOW-UP VISIT: CPT | Performed by: STUDENT IN AN ORGANIZED HEALTH CARE EDUCATION/TRAINING PROGRAM

## 2021-11-23 RX ORDER — PREDNISONE 10 MG/1
TABLET ORAL
Qty: 30 TABLET | Refills: 0 | Status: SHIPPED | OUTPATIENT
Start: 2021-11-23 | End: 2022-04-04

## 2021-11-23 NOTE — PROGRESS NOTES
Paul Carlos (:  1980) is a 35 y.o. female, here for evaluation of the following chief complaint(s):  Post-Op Check      ASSESSMENT/PLAN:  1. Deviated nasal septum  2. Nasal valve collapse  3. Chronic maxillary sinusitis  4. Hyposmia  5. Chronic rhinitis  6. Chronic GERD      This is a very pleasant 35 y.o. female here today for evaluation of the the above-noted complaints. Patient is status post open septorhinoplasty, septoplasty, ITR and posterior ablation of her nasal nerves on 10/29/2021. On exam today, the patient looks very well. Her nasal septum is midline and her turbinates are well reduced. She has  improved nasal airflow and an improvement in the angle of her narrow nasal valves. I have asked the patient to continue irrigating 2-3 times per day. We will have her restart her fluticasone. I have asked her to continue to use Vaseline to her incision sites on her nostrils. SUBJECTIVE/OBJECTIVE:  Dena Chi is here today for evaluation of evaluation of issues related to her nose. She gets multiple sinus infections per year. She requires antibiotics for these. She think she has a sinus infection right now. She also has over the last 3 months had a loss of sense of smell. She has not had Covid recently. She has had some issues related to smelling gasoline and cigarette smoke that is not actually present. She gets difficulty breathing through her nose it is worse on the left side. She will get constant nasal drainage that is clear and green. She gets very rare nosebleeds. Update 3/17/2012:    She is still having issues breathing out of her nose and with nasal congestion. She is still getting intermittent nasal drainage. Update 2021:    Patient presents today for follow-up. She is still having the same issues regarded to difficulty breathing out of her nose.   She gets intermittent nasal drainage which is clear. She thinks she is getting sinus infections. Update 8/26/2021:    Patient presents today for follow-up regarding issues related to her chronic sinonasal complaints. She is still having significant difficulty breathing through her nose. This is exacerbated by exercise. 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 Update 11/4/2021:    Patient presents today for follow-up regarding her chronic sinonasal complaints. She is already breathing better through her nose. She didn't have any significant pain postoperatively. She is getting some blood clots and crusting out of her nose. Update 11/23/2021:    Patient presents today for follow-up regarding her chronic sinonasal complaints. She feels like there is some obstruction on the left side. She has been irrigating once to twice per day. She is using Vaseline around her incision site. She is occasionally getting yellow chunks out of her nose. REVIEW OF SYSTEMS  The following systems were reviewed and revealed the following in addition to any already discussed in the HPI:    PHYSICAL EXAM    GENERAL: No acute distress, alert and oriented, no hoarseness, strong voice  EYES: EOMI, Anti-icteric  HENT:   Well-healed inverted V incision of the nasal columella. PROCEDURE  Nasal Endoscopy (CPT code 84041)    Preop: chronic rhinitis  Postop: Same    Verbal consent was received. After topical anesthesia and decongestion had been obtained using aerosolized 1% lidocaine and oxymetazoline, a 45 degree rigid endoscope was placed into both nares with the patient in a sitting position.  The following was observed:    Right Nasal Cavity and Paranasal Sinuses:  Polyp score = 0 (0 = no polyps, 1 = small polyps in middle meatus not reaching below the inferior border of the middle yaron, 2 = polyps reaching below the middle border of the middle turbinate, 3= large polyps reaching the lower border of the inferior turbinate or polyps medial to the middle yaron, 4= large polyps causing almost complete congestion/obstruction of the interior meatus)  Edema score = 1 (0 = absent, 1 = mild, 2 = severe)  Discharge score = 0 (0 = no discharge, 1 = clear thin discharge, 2 = thick purulent discharge)    Left Nasal Cavity and Paranasal Sinuses:    Polyp score = 0 (0 = no polyps, 1 = small polyps in middle meatus not reaching below the inferior border of the middle yaron, 2 = polyps reaching below the middle border of the middle turbinate, 3= large polyps reaching the lower border of the inferior turbinate or polyps medial to the middle yaron, 4= large polyps causing almost complete congestion/obstruction of the interior meatus)  Edema score = 1 (0 = absent, 1 = mild, 2 = severe)  Discharge score = 0 (0 = no discharge, 1 = clear thin discharge, 2 = thick purulent discharge)    Septum: intact and midline  Other:   -The inferior and middle turbinates were examined. The middle meatus, and sphenoethmoid recess was examined bilaterally.    -There is improvement in the narrow nasal valves bilaterally. The turbinates are well reduced. There is evidence of crusting along the length of the bilateral inferior turbinates which was suctioned. The nose is healing very well and there is no septal perforation. There is improved external valve collapse with deep inhalation.  -There were no complications. Tolerated well without complication. I attest that I was present for and did the entire procedure myself. This note was generated completely or in part utilizing Dragon dictation speech recognition software. Occasionally, words are mistranscribed and despite editing, the text may contain inaccuracies due to incorrect word recognition. If further clarification is needed please contact the office at [phone]. An electronic signature was used to authenticate this note.     --Nonnie Kocher Adolfo Mackay MD

## 2021-11-23 NOTE — TELEPHONE ENCOUNTER
Received call from [place] at [facility] with [employer] Complaint. Brief description of triage:   Asthma is flaring up, started last week    Triage indicates for patient to go to the office now, [facility] if no available appointments     Care advice provided, patient verbalizes understanding; denies any other questions or concerns; instructed to call back for any new or worsening symptoms. Writer provided warm transfer to [place] [id number] at [facility] for appointment scheduling. Attention Provider: Thank you for allowing me to participate in the care of your patient. The patient was connected to triage in response to information provided to the E[facility]/PSC. Please do not respond through this encounter as the response is not directed to a shared pool. Reason for Disposition   MILD difficulty breathing (e.g., minimal/no SOB at rest, SOB with walking, pulse < 100) of new onset or worse than normal     States that it is moderate, but does not have a racing heart and patient is talking and finishing sentences well    Answer Assessment - Initial Assessment Questions  1. RESPIRATORY STATUS: \"Describe your breathing? \" (e.g., wheezing, shortness of breath, unable to speak, severe coughing)       Feels like someone has her in a bear hug, she has  To take deep breaths because short ones hurt    2. ONSET: \"When did this breathing problem begin? \"       Yesterday it was worse    3. PATTERN \"Does the difficult breathing come and go, or has it been constant since it started? \"       Comes and goes, only when she uses her rescue inhaler, states that relief is short lived    4. SEVERITY: \"How bad is your breathing? \" (e.g., mild, moderate, severe)     - MILD: No SOB at rest, mild SOB with walking, speaks normally in sentences, can lay down, no retractions, pulse < 100.     - MODERATE: SOB at rest, SOB with minimal exertion and prefers to sit, cannot lie down flat, speaks in phrases, mild retractions, audible wheezing, pulse 100-120.     - SEVERE: Very SOB at rest, speaks in single words, struggling to breathe, sitting hunched forward, retractions, pulse > 120       Moderate    5. RECURRENT SYMPTOM: \"Have you had difficulty breathing before? \" If so, ask: \"When was the last time? \" and \"What happened that time? \"       Yes, a few months ago, was given a steroid    6. CARDIAC HISTORY: \"Do you have any history of heart disease? \" (e.g., heart attack, angina, bypass surgery, angioplasty)       No    7. LUNG HISTORY: \"Do you have any history of lung disease? \"  (e.g., pulmonary embolus, asthma, emphysema)      asthma    8. CAUSE: \"What do you think is causing the breathing problem? \"       Her allergies or asthma    9. OTHER SYMPTOMS: \"Do you have any other symptoms? (e.g., dizziness, runny nose, cough, chest pain, fever)      Chest and back pain from using muscles to breath    10. PREGNANCY: \"Is there any chance you are pregnant? \" \"When was your last menstrual period? \"        No, November 5 11. TRAVEL: \"Have you traveled out of the country in the last month? \" (e.g., travel history, exposures)       No    Protocols used: BREATHING DIFFICULTY-ADULT-[place]

## 2021-11-23 NOTE — TELEPHONE ENCOUNTER
Called patient   Patient scheduled by call center for appt on   Per varinder to call patient   advised need to have covid test, and schedule vv    Patient stating she has had shortness of breath for a few days worse than normal  Patient stating having wheezing in chest  always has asthma, and this is a flair up.      Offered to schedule vv   Patient denied Appt  Patient stating she does not have covid, and she is going to find a new primary doctor  Patient disconnected call

## 2021-11-23 NOTE — TELEPHONE ENCOUNTER
Patient called stating she has increased wheezing, tightness in chest and a productive cough with yellowish-white phlegm that comes and goes. Denies fever. She states she is using the Albuterol prn, Singulair and Trelegy. She is requesting Prednisone.

## 2021-12-03 ENCOUNTER — OFFICE VISIT (OUTPATIENT)
Dept: PULMONOLOGY | Age: 41
End: 2021-12-03
Payer: COMMERCIAL

## 2021-12-03 VITALS
HEIGHT: 66 IN | DIASTOLIC BLOOD PRESSURE: 76 MMHG | BODY MASS INDEX: 37.77 KG/M2 | HEART RATE: 71 BPM | SYSTOLIC BLOOD PRESSURE: 108 MMHG | WEIGHT: 235 LBS | OXYGEN SATURATION: 99 %

## 2021-12-03 DIAGNOSIS — J45.51 SEVERE PERSISTENT ASTHMA WITH ACUTE EXACERBATION: Primary | ICD-10-CM

## 2021-12-03 PROCEDURE — 99214 OFFICE O/P EST MOD 30 MIN: CPT | Performed by: INTERNAL MEDICINE

## 2021-12-03 RX ORDER — ATORVASTATIN CALCIUM 40 MG/1
TABLET, FILM COATED ORAL
Qty: 30 TABLET | Refills: 5 | Status: SHIPPED | OUTPATIENT
Start: 2021-12-03 | End: 2022-06-10 | Stop reason: SDUPTHER

## 2021-12-03 RX ORDER — ALBUTEROL SULFATE 90 UG/1
AEROSOL, METERED RESPIRATORY (INHALATION)
Qty: 8.5 G | Refills: 0 | Status: SHIPPED | OUTPATIENT
Start: 2021-12-03 | End: 2022-08-03

## 2021-12-03 ASSESSMENT — ENCOUNTER SYMPTOMS
COUGH: 0
WHEEZING: 0
STRIDOR: 0
SHORTNESS OF BREATH: 1
APNEA: 0
ABDOMINAL PAIN: 0
BLOOD IN STOOL: 0
VOICE CHANGE: 0
ABDOMINAL DISTENTION: 0
CONSTIPATION: 0
RHINORRHEA: 0
ANAL BLEEDING: 0
BACK PAIN: 0
SINUS PRESSURE: 0
CHEST TIGHTNESS: 0
DIARRHEA: 0
SORE THROAT: 0
CHOKING: 0

## 2021-12-03 NOTE — PROGRESS NOTES
Rex Harris    YOB: 1980     Date of Service:  12/3/2021     Chief Complaint   Patient presents with    Asthma    Shortness of Breath    Other     CHEST TIGHTNESS         HPI patient currently having another flareup of asthma-which continues to be poorly controlled, currently at the tail end of a 12-day prednisone taper. Patient is status post septorhinoplasty on 10/29-patient initially noticed significant postnasal drip following which it has now improved. Has restarted using Flonase and nasal rinses. Patient has concerns for ongoing asthma flareup-currently has shortness of breath and tightness, despite maximal inhaler therapy.     Allergies   Allergen Reactions    Ensure      EGGS    Peanut-Containing Drug Products      PEANUTS    Food      Certain foods (chocolate, tea, soy, eggs, tree nuts, coffee, tomatoes, black pepper)     Outpatient Medications Marked as Taking for the 12/3/21 encounter (Office Visit) with Tomer Osorio MD   Medication Sig Dispense Refill    predniSONE (DELTASONE) 10 MG tablet 40 mg for 3 days 30 mg for 3 days 20 mg for 3 days 10 mg for 3 days 30 tablet 0    TRELEGY ELLIPTA 200-62.5-25 MCG/INH AEPB INHALE ONE PUFF BY MOUTH DAILY 1 each 5    busPIRone (BUSPAR) 10 MG tablet TAKE TWO TABLETS BY MOUTH TWICE A  tablet 2    ondansetron (ZOFRAN ODT) 4 MG disintegrating tablet Take 1 tablet by mouth every 4 hours as needed for Nausea or Vomiting 5 tablet 1    VILAZODONE HCL 20 MG Tablet TAKE ONE TABLET BY MOUTH DAILY 30 tablet 3    VORTIoxetine (TRINTELLIX) 10 MG TABS tablet Take 1 tablet by mouth daily 30 tablet 2    spironolactone (ALDACTONE) 100 MG tablet TAKE ONE TABLET BY MOUTH DAILY 30 tablet 2    lisinopril (PRINIVIL;ZESTRIL) 5 MG tablet TAKE ONE TABLET BY MOUTH DAILY 30 tablet 2    omeprazole (PRILOSEC) 40 MG delayed release capsule TAKE ONE CAPSULE BY MOUTH TWICE A DAY 60 capsule 5    montelukast (SINGULAIR) 10 MG tablet Take 1 tablet by mouth nightly 30 tablet 5    VILAZODONE HCL 40 MG Tablet TAKE ONE TABLET BY MOUTH DAILY (Patient taking differently: 20 mg ) 30 tablet 5    albuterol sulfate  (90 Base) MCG/ACT inhaler INHALE TWO PUFFS BY MOUTH EVERY 6 HOURS AS NEEDED FOR WHEEZING OR FOR SHORTNESS OF BREATH 8.5 g 0    atorvastatin (LIPITOR) 40 MG tablet TAKE ONE TABLET BY MOUTH DAILY 30 tablet 5    Spacer/Aero-Holding Chambers GABRIELLE 1 Device by Does not apply route daily as needed (with HFA inhalers) 1 Device 0    Multiple Vitamins-Minerals (THERAPEUTIC MULTIVITAMIN-MINERALS) tablet Take 1 tablet by mouth daily.          Immunization History   Administered Date(s) Administered    COVID-19, J&J, PF, 0.5 mL 04/03/2021    Influenza Virus Vaccine 10/01/2010, 09/09/2014    Influenza, Intradermal, Preservative free 10/01/2010, 11/28/2012, 09/23/2013    Influenza, Intradermal, Quadrivalent, Preservative Free 10/05/2016    Influenza, MDCK Quadv, IM, PF (Flucelvax 2 yrs and older) 09/27/2019    Influenza, MDCK Quadv, with preserv IM (Flucelvax 2 yrs and older) 11/09/2020    Influenza, Quadv, IM, (6 mo and older Fluzone, Flulaval, Fluarix and 3 yrs and older Afluria) 10/08/2021    Influenza, Quadv, IM, PF (6 mo and older Fluzone, Flulaval, Fluarix, and 3 yrs and older Afluria) 10/21/2015, 10/28/2017    Influenza, Scot Spark, Recombinant, IM PF (Flublok 18 yrs and older) 09/27/2018    Pneumococcal Polysaccharide ([id number]) 01/19/2018    Tdap (Boostrix, Adacel) 08/20/2012       Past Medical History:   Diagnosis Date    Allergic rhinitis     Anxiety     Asthma     Environmental allergies     food    Hyperlipidemia     Hypertension     Lumbar disc disease L4-5, L5-S1 Dr Doming[facility]) Epidural injections 2011 8/20/2012    Obstructive sleep apnea     Obstructive sleep apnea (adult) (pediatric) 6/18/2018    PCOS (polycystic ovarian syndrome)     S/P colonoscopy 1/17/13    normal. Dr Annalee Pena     Past Surgical History: Procedure Laterality Date    BREAST REDUCTION SURGERY  01/01/2004    FOOT SURGERY  01/01/2000    hallux fx    KNEE ARTHROSCOPY Right     NOSE SURGERY N/A 10/29/2021    SEPTOPLASTY, INFERIOR TURBINATE REDUCTION, REPAIR OF NASAL VALVE COLLAPSE, ABLATION OF POSTERIOR NASAL NERVES performed by Karen Verde MD at [facility] ARTHROSCOPY Left     SINUS SURGERY  10/29/2021    UPPER GASTROINTESTINAL ENDOSCOPY  01/17/2013    gastritis; Dr Mina Mohr     Family History   Problem Relation Age of Onset    High Blood Pressure Mother     Other Mother         hypothyroid    Diabetes Sister     Diabetes Maternal Grandmother     Diabetes Maternal Grandfather        Review of Systems:  Review of Systems   Constitutional: Negative for activity change, appetite change, fatigue and fever. HENT: Positive for congestion and postnasal drip. Negative for ear discharge, ear pain, rhinorrhea, sinus pressure, sneezing, sore throat, tinnitus and voice change. Respiratory: Positive for shortness of breath. Negative for apnea, cough, choking, chest tightness, wheezing and stridor. Cardiovascular: Positive for chest pain. Negative for palpitations and leg swelling. Gastrointestinal: Negative for abdominal distention, abdominal pain, anal bleeding, blood in stool, constipation and diarrhea. Musculoskeletal: Negative for arthralgias, back pain and gait problem. Skin: Negative for pallor and rash. Allergic/Immunologic: Negative for environmental allergies. Neurological: Negative for dizziness, tremors, seizures, syncope, speech difficulty, weakness, light-headedness, numbness and headaches. Hematological: Negative for adenopathy. Does not bruise/bleed easily. Psychiatric/Behavioral: Negative for sleep disturbance.        Vitals:    12/03/21 0921   BP: 108/76   Pulse: 71   SpO2: 99%   Weight: 235 lb (106.6 kg)   Height: 5' 6\" (1.676 m)     Patient-Reported Vitals 7/23/2021   Patient-Reported Weight 238lb Patient-Reported Height 5'6\"   Patient-Reported Systolic -   Patient-Reported Diastolic -   Patient-Reported Pulse -      Body mass index is 37.93 kg/m². Wt Readings from Last 3 Encounters:   12/03/21 235 lb (106.6 kg)   11/04/21 235 lb (106.6 kg)   10/29/21 242 lb 2.8 oz (109.8 kg)     BP Readings from Last 3 Encounters:   12/03/21 108/76   10/29/21 (!) 105/57   10/29/21 114/63         Physical Exam  Constitutional:       General: She is not in acute distress. Appearance: She is well-developed. She is not diaphoretic. HENT:      Mouth/Throat:      Pharynx: No oropharyngeal exudate. Cardiovascular:      Rate and Rhythm: Normal rate and regular rhythm. Heart sounds: Normal heart sounds. No murmur heard. Pulmonary:      Effort: No respiratory distress. Breath sounds: Rales present. No wheezing. Chest:      Chest wall: No tenderness. Abdominal:      General: There is no distension. Palpations: There is no mass. Tenderness: There is no abdominal tenderness. There is no guarding or rebound. Musculoskeletal:         General: No swelling, tenderness or deformity. Skin:     Coloration: Skin is not pale. Findings: No erythema or rash. Neurological:      Mental Status: She is alert and oriented to person, place, and time. Cranial Nerves: No cranial nerve deficit. Motor: No abnormal muscle tone.       Coordination: Coordination normal.      Deep Tendon Reflexes: Reflexes normal.             Health Maintenance   Topic Date Due    Hepatitis C screen  Never done    COVID-19 Vaccine (2 - Booster for Agustin series) 05/29/2021    Lipid screen  12/18/2021    Potassium monitoring  08/06/2022    Creatinine monitoring  08/06/2022    DTaP/Tdap/Td vaccine (2 - Td or Tdap) 08/20/2022    A1C test (Diabetic or Prediabetic)  09/20/2022    Cervical cancer screen  11/04/2022    Pneumococcal 0-64 years Vaccine (2 of 2 - PPSV23) 02/21/2043    Flu vaccine  Completed    HIV screen  Completed    Hepatitis A vaccine  Aged Out    Hepatitis B vaccine  Aged Out    Hib vaccine  Aged Out    Meningococcal (ACWY) vaccine  Aged Out          Assessment/Plan:    Severe persistent asthma, poorly controlled. Having to use albuterol inhaler up to 10 times a day. Already on Trelegy 200, Singulair, Xyzal and albuterol inhaler. Patient is also status post septorhinoplasty by ENT on 10/29. Improved postnasal drip, but patient has persistent symptoms related to asthma-currently recovering from a flareup. Continues to require albuterol inhaler multiple times a day. Hypereosinophilic/type II asthma, AEC of 0.3 from June 2021. Recurrent exacerbations despite maximal optimization of inhaler regime. Patient would benefit from Dupixent/anti-IL 4 &13 therapy, for which we will send a request today. IgE 34, not a candidate for Xolair. Patient already seen by allergist, only allergies noted was to cat dander and mold. Might need to consider allergy immunotherapy if poor response to Dupixent is noted. Return in about 2 months (around 2/3/2022).

## 2021-12-10 ENCOUNTER — TELEPHONE (OUTPATIENT)
Dept: PULMONOLOGY | Age: 41
End: 2021-12-10

## 2021-12-30 ENCOUNTER — HOSPITAL ENCOUNTER (OUTPATIENT)
Dept: GENERAL RADIOLOGY | Age: 41
Discharge: HOME | End: 2021-12-30
Payer: COMMERCIAL

## 2021-12-30 DIAGNOSIS — R06.02 SOB (SHORTNESS OF BREATH): ICD-10-CM

## 2021-12-30 PROCEDURE — 71046 X-RAY EXAM CHEST 2 VIEWS: CPT

## 2022-01-03 ENCOUNTER — PATIENT MESSAGE (OUTPATIENT)
Dept: FAMILY MEDICINE CLINIC | Age: 42
End: 2022-01-03

## 2022-01-03 NOTE — TELEPHONE ENCOUNTER
From: Klever Pennington  To: Dr. Vera Maid: 1/3/2022 9:14 AM EST  Subject: Medication     I haven't been able to take the Trintellix for a week because the pharmacy has had difficulty getting it from the . What should I do?

## 2022-01-20 ENCOUNTER — OFFICE VISIT (OUTPATIENT)
Dept: ENT CLINIC | Age: 42
End: 2022-01-20
Payer: COMMERCIAL

## 2022-01-20 VITALS
SYSTOLIC BLOOD PRESSURE: 116 MMHG | HEIGHT: 66 IN | WEIGHT: 235.8 LBS | DIASTOLIC BLOOD PRESSURE: 77 MMHG | BODY MASS INDEX: 37.9 KG/M2 | HEART RATE: 81 BPM

## 2022-01-20 DIAGNOSIS — J34.2 DEVIATED NASAL SEPTUM: Primary | ICD-10-CM

## 2022-01-20 DIAGNOSIS — J34.89 NASAL VALVE COLLAPSE: ICD-10-CM

## 2022-01-20 DIAGNOSIS — J32.0 CHRONIC MAXILLARY SINUSITIS: ICD-10-CM

## 2022-01-20 DIAGNOSIS — R43.8 HYPOSMIA: ICD-10-CM

## 2022-01-20 DIAGNOSIS — J31.0 CHRONIC RHINITIS: ICD-10-CM

## 2022-01-20 PROCEDURE — 99213 OFFICE O/P EST LOW 20 MIN: CPT | Performed by: STUDENT IN AN ORGANIZED HEALTH CARE EDUCATION/TRAINING PROGRAM

## 2022-01-20 PROCEDURE — 31231 NASAL ENDOSCOPY DX: CPT | Performed by: STUDENT IN AN ORGANIZED HEALTH CARE EDUCATION/TRAINING PROGRAM

## 2022-01-20 RX ORDER — MOMETASONE FUROATE 100 %
POWDER (GRAM) MISCELLANEOUS
Qty: 1 EACH | Refills: 6 | Status: SHIPPED | OUTPATIENT
Start: 2022-01-20 | End: 2022-06-10 | Stop reason: ALTCHOICE

## 2022-01-20 NOTE — PROGRESS NOTES
Paul Carlos (:  1980) is a 35 y.o. female, here for evaluation of the following chief complaint(s):  Post-Op Check (Post Op)      ASSESSMENT/PLAN:  1. Deviated nasal septum  2. Nasal valve collapse  3. Chronic maxillary sinusitis  4. Hyposmia  5. Chronic rhinitis      This is a very pleasant 35 y.o. female here today for evaluation of the the above-noted complaints. Patient is status post open septorhinoplasty, septoplasty, ITR and posterior ablation of her nasal nerves on 10/29/2021. On exam today, the patient looks very well. Her nasal septum is midline and her turbinates are well reduced. She has  improved nasal airflow and an improvement in the angle of her narrow nasal valves. The patient is reporting hyposmia that is worse after surgery. I reviewed with the patient that prior to surgery she did discuss with me that she had a significant loss of smell and parosmia's. We did not operate anywhere near the olfactory neurons for her most recent procedure, so I do not feel it is necessarily related to the surgery. Patient is still getting some scant crusting from the nose. I would like to start her on mupirocin ointment to the bilateral naris. We will also start her on double strength mometasone irrigations for a month to see if this makes any difference in her symptoms of sinus pain and pressure. Finally, we discussed smell retraining therapy with the patient. SUBJECTIVE/OBJECTIVE:  Dena Stevens is here today for evaluation of evaluation of issues related to her nose. She gets multiple sinus infections per year. She requires antibiotics for these. She think she has a sinus infection right now. She also has over the last 3 months had a loss of sense of smell. She has not had Covid recently.   She has had some issues related to smelling gasoline and cigarette smoke that is not actually present. She gets difficulty breathing through her nose it is worse on the left side. She will get constant nasal drainage that is clear and green. She gets very rare nosebleeds. Update 3/17/2012:    She is still having issues breathing out of her nose and with nasal congestion. She is still getting intermittent nasal drainage. Update 4/28/2021:    Patient presents today for follow-up. She is still having the same issues regarded to difficulty breathing out of her nose. She gets intermittent nasal drainage which is clear. She thinks she is getting sinus infections. Update 8/26/2021:    Patient presents today for follow-up regarding issues related to her chronic sinonasal complaints. She is still having significant difficulty breathing through her nose. This is exacerbated by exercise. 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 Update 11/4/2021:    Patient presents today for follow-up regarding her chronic sinonasal complaints. She is already breathing better through her nose. She didn't have any significant pain postoperatively. She is getting some blood clots and crusting out of her nose. Update 11/23/2021:    Patient presents today for follow-up regarding her chronic sinonasal complaints. She feels like there is some obstruction on the left side. She has been irrigating once to twice per day. She is using Vaseline around her incision site. She is occasionally getting yellow chunks out of her nose. Update 1/20/2022:    Patient presents today for follow-up regarding issues related to her nose. Overall she is breathing significantly better through her nose. She is using fluticasone until recently she developed a nosebleed on the left side so she stopped. She still feels like she gets nasal congestion and like she has a sinus infection.   She has been tested for allergies in the past.  She is getting some black things out of her nose when she blows her nose. She feels like her sense of smell is worse than prior to surgery. REVIEW OF SYSTEMS  The following systems were reviewed and revealed the following in addition to any already discussed in the HPI:    PHYSICAL EXAM    GENERAL: No acute distress, alert and oriented, no hoarseness, strong voice  EYES: EOMI, Anti-icteric  HENT:   Well-healed inverted V incision of the nasal columella. PROCEDURE  Nasal Endoscopy (CPT code 00083)    Preop: chronic rhinitis  Postop: Same    Verbal consent was received. After topical anesthesia and decongestion had been obtained using aerosolized 1% lidocaine and oxymetazoline, a 45 degree rigid endoscope was placed into both nares with the patient in a sitting position. The following was observed:      Septum: intact and midline  Other:   -The inferior and middle turbinates were examined. The middle meatus, and sphenoethmoid recess was examined bilaterally.    -There is improvement in the narrow nasal valves bilaterally. The turbinates are well reduced. The crusting present along the length of the turbinates has essentially resolved. There is an area of bright red blood along the left anterior nasal septum. There is some crusting at the site of the septoplasty incision. The nose is healing very well and there is no septal perforation. There is improved external valve collapse with deep inhalation.  -There were no complications. Tolerated well without complication. I attest that I was present for and did the entire procedure myself. This note was generated completely or in part utilizing Dragon dictation speech recognition software. Occasionally, words are mistranscribed and despite editing, the text may contain inaccuracies due to incorrect word recognition. If further clarification is needed please contact the office at [phone]. An electronic signature was used to authenticate this note.     --Aman Rooney MD

## 2022-01-26 ENCOUNTER — VIRTUAL VISIT (OUTPATIENT)
Dept: PRIMARY CARE CLINIC | Age: 42
End: 2022-01-26
Payer: COMMERCIAL

## 2022-01-26 DIAGNOSIS — B96.89 ACUTE BACTERIAL SINUSITIS: Primary | ICD-10-CM

## 2022-01-26 DIAGNOSIS — J31.0 CHRONIC RHINITIS: ICD-10-CM

## 2022-01-26 DIAGNOSIS — J01.90 ACUTE BACTERIAL SINUSITIS: Primary | ICD-10-CM

## 2022-01-26 DIAGNOSIS — J45.50 SEVERE PERSISTENT ASTHMA WITHOUT COMPLICATION: ICD-10-CM

## 2022-01-26 PROCEDURE — 99214 OFFICE O/P EST MOD 30 MIN: CPT | Performed by: NURSE PRACTITIONER

## 2022-01-26 RX ORDER — LEVOFLOXACIN 750 MG/1
750 TABLET ORAL DAILY
Qty: 10 TABLET | Refills: 0 | Status: SHIPPED | OUTPATIENT
Start: 2022-01-26 | End: 2022-02-05

## 2022-01-26 RX ORDER — DUPILUMAB 300 MG/2ML
INJECTION, SOLUTION SUBCUTANEOUS
COMMUNITY
Start: 2022-01-24

## 2022-01-26 ASSESSMENT — ENCOUNTER SYMPTOMS
SHORTNESS OF BREATH: 0
DIARRHEA: 0
SINUS PRESSURE: 1
COUGH: 0
COLOR CHANGE: 0
ABDOMINAL PAIN: 0
SINUS PAIN: 1
CHEST TIGHTNESS: 0
VOMITING: 0
SORE THROAT: 0
NAUSEA: 0
WHEEZING: 0
TROUBLE SWALLOWING: 0
RHINORRHEA: 0

## 2022-01-26 NOTE — PROGRESS NOTES
Within this Telehealth Consent, the terms you and yours refer to the person using the Telehealth Service (Service), or in the case of a use of the Service by or on behalf of a minor, you and yours refer to and include (i) the parent or legal guardian who provides consent to the use of the Service by such minor or uses the Service on behalf of such minor, and (ii) the minor for whom consent is being provided or on whose behalf the Service is being utilized. When using Service, you will be consulting with your health care providers via the use of Telehealth.   Telehealth involves the delivery of healthcare services using electronic communications, information technology or other means between a healthcare provider and a patient who are not in the same physical location. Telehealth may be used for diagnosis, treatment, follow-up and/or patient education, and may include, but is not limited to, one or more of the following:    Electronic transmission of medical records, photo images, personal health information or other data between a patient and a healthcare provider    Interactions between a patient and healthcare provider via audio, video and/or data communications    Use of output data from medical devices, sound and video files    Anticipated Benefits   The use of Telehealth by your Provider(s) through the Service may have the following possible benefits:    Making it easier and more efficient for you to access medical care and treatment for the conditions treated by such Provider(s) utilizing the Service    Allowing you to obtain medical care and treatment by Provider(s) at times that are convenient for you    Enabling you to interact with Provider(s) without the necessity of an in-office appointment     Possible Risks   While the use of Telehealth can provide potential benefits for you, there are also potential risks associated with the use of Telehealth.  These risks include, but may not be limited to the following:    Your Provider(s) may not able to provide medical treatment for your particular condition and you may be required to seek alternative healthcare or emergency care services.  The electronic systems or other security protocols or safeguards used in the Service could fail, causing a breach of privacy of your medical or other information.  Given regulatory requirements in certain jurisdictions, your Provider(s) diagnosis and/or treatment options, especially pertaining to certain prescriptions, may be limited. Acceptance   1. You understand that Services will be provided via Telehealth. This process involves the use of HIPAA compliant and secure, real-time audio-visual interfacing with a qualified and appropriately trained provider located at [facility]. 2. You understand that, under no circumstances, will this session be recorded. 3. You understand that the Provider(s) at [facility] and other clinical participants will be party to the information obtained during the Telehealth session in accordance with best medical practices. 4. You understand that the information obtained during the Telehealth session will be used to help determine the most appropriate treatment options. 5. You understand that You have the right to revoke this consent at any point in time. 6. You understand that Telehealth is voluntary, and that continued treatment is not dependent upon consent. 7. You understand that, in the event of non-consent to Telehealth services and/or technical difficulties, you will obtain services as typically provided in the absence of Telehealth technology. 8. You understand that this consent will be kept in Your medical record. 9. No potential benefits from the use of Telehealth or specific results can be guaranteed. Your condition may not be cured or improved and, in some cases, may get worse.    10. There are limitations in the provision of medical care and treatment via Telehealth and the Service and you may not be able to receive diagnosis and/or treatment through the Service for every condition for which you seek diagnosis and/or treatment. 11. There are potential risks to the use of Telehealth, including but not limited to the risks described in this Telehealth Consent. 12. Your Provider(s) have discussed the use of Telehealth and the Service with you, including the benefits and risks of such and you have provided oral consent to your Provider(s) for the use of Telehealth and the Service. 15. You understand that it is your duty to provide your Provider(s) truthful, accurate and complete information, including all relevant information regarding care that you may have received or may be receiving from other healthcare providers outside of the Service. 14. You understand that each of your Provider(s) may determine in his or sole discretion that your condition is not suitable for diagnosis and/or treatment using the Service, and that you may need to seek medical care and treatment a specialist or other healthcare provider, outside of the Service. 15. You understand that you are fully responsible for payment for all services provided by Provider(s) or through use of the Service and that you may not be able to use third-party insurance. 16. You represent that (a) you have read this Telehealth Consent carefully, (b) you understand the risks and benefits of the Service and the use of Telehealth in the medical care and treatment provided to you by Provider(s) using the Service, and (c) you have the legal capacity and authority to provide this consent for yourself and/or the minor for which you are consenting under applicable federal and state laws, including laws relating to the age of [de-identified] and/or parental/guardian consent.    17. You give your informed consent to the use of Telehealth by Provider(s) using the Service under the terms described in the Terms of Service and this Telehealth Consent. The patient was read the following statement and has consented to the visit as of 1/26/22.      The patient has been scheduled for their first telehealth visit on 1/26/22  with Kamila Carrero

## 2022-01-26 NOTE — PROGRESS NOTES
2022        TELEHEALTH EVALUATION -- Audio/Visual (During [id number] public health emergency)    HPI:    Lisbet Meléndez (:  1980) has requested an audio/video evaluation for the following concern(s):    Patient seen virtually as a new patient for a sick visit only. Will establish care with Dr Rony Friend at a later date. Onset: months ago, recent flare up about a wk ago. States \"I have another sinus infection\"  Now having a lot of PND, nasal congestion. Drainage is all different colors and thick. Has large amt of drainage. Feels a lot of sinus pain and congestion. Facial pressure. Right ear is starting to bother her. Feels like her typical sinus infection. Has been on anbx several times. Last anbx augmentin x 10 days about 3 wks ago for tooth problem. It did help with the sinuses while taking. Tired of taking anbx. Afraid she is going to become resistant to anbx. ENT:  S/p sinus surgery 10/2021. States surgery helped her breath, but drainage and pressure have been much worse. Recent apt [address]  Started on double strength mometasone irrigations x 1mo for her sinus pain/pressure. Prescribed  bactroban ointment for nostrils. Also on flonase. Will follow up in march. ASTHMA:  Follows with pulmonology   Severe, persistent asthma poorly controlled. On trelegy 200, singulair, xyzal and albuterol inhaler  Also seen allergist.     covid vaccines x 3    Review of Systems   Constitutional: Negative for activity change, appetite change, chills, fatigue and fever. HENT: Positive for congestion, postnasal drip, sinus pressure and sinus pain. Negative for ear pain, rhinorrhea, sore throat and trouble swallowing. Respiratory: Negative for cough, chest tightness, shortness of breath and wheezing. Cardiovascular: Negative for chest pain, palpitations and leg swelling. Gastrointestinal: Negative for abdominal pain, diarrhea, nausea and vomiting.    Genitourinary: Negative for difficulty urinating. Musculoskeletal: Negative for myalgias. Skin: Negative for color change, pallor and rash. Neurological: Negative for dizziness, weakness, light-headedness and headaches. Hematological: Negative for adenopathy. Prior to Visit Medications    Medication Sig Taking? Authorizing Provider   Kyleview 300 MG/2ML SOPN injection  Yes Historical Provider, MD   levoFLOXacin (LEVAQUIN) 750 MG tablet Take 1 tablet by mouth daily for 10 days Yes Kamila Guerra APRN - CNP   mupirocin (BACTROBAN NASAL) 2 % nasal ointment Dissolve a thin strip of ointment in your irrigation bottle and irrigate. Irrigate twice per day.  Yes Barak Gomez MD   Mometasone Furoate POWD Add 10ml of medication to 240 ml of saline in rinse bottle; irrigate sinuses with 120ml through each nostril twice daily Yes Barak Gomez MD   LORazepam (ATIVAN) 0.5 MG tablet TAKE ONE TABLET BY MOUTH EVERY 8 HOURS AS NEEDED FOR ANXIETY WITH FOR SHORTNESS OF BREATH Yes Dilma Chaidez MD   VORTIoxetine (TRINTELLIX) 10 MG TABS tablet Take 1 tablet by mouth daily Yes Dilma Chaidez MD   spironolactone (ALDACTONE) 100 MG tablet TAKE ONE TABLET BY MOUTH DAILY Yes Dilma Chaidez MD   lisinopril (PRINIVIL;ZESTRIL) 5 MG tablet TAKE ONE TABLET BY MOUTH DAILY Yes Dilma Chaidez MD   albuterol sulfate  (90 Base) MCG/ACT inhaler INHALE TWO PUFFS BY MOUTH EVERY 6 HOURS AS NEEDED FOR WHEEZING OR FOR SHORTNESS OF BREATH Yes Dilma Chaidez MD   atorvastatin (LIPITOR) 40 MG tablet TAKE ONE TABLET BY MOUTH DAILY Yes Dilma Chaidez MD   TRELEGY ELLIPTA 200-62.5-25 MCG/INH AEPB INHALE ONE PUFF BY MOUTH DAILY Yes Danii Miller MD   busPIRone (BUSPAR) 10 MG tablet TAKE TWO TABLETS BY MOUTH TWICE A DAY Yes Dilma Chaidez MD   omeprazole (PRILOSEC) 40 MG delayed release capsule TAKE ONE CAPSULE BY MOUTH TWICE A DAY Yes Dilma Chaidez MD   montelukast (SINGULAIR) 10 MG tablet Take 1 tablet by mouth nightly Yes QI Simms CNP   Spacer/Aero-Holding Vivien De Young GABRIELLE 1 Device by Does not apply route daily as needed (with HFA inhalers) Yes Agustin Solorzano MD   Multiple Vitamins-Minerals (THERAPEUTIC MULTIVITAMIN-MINERALS) tablet Take 1 tablet by mouth daily. Yes Historical Provider, MD   predniSONE (DELTASONE) 10 MG tablet 40 mg for 3 days 30 mg for 3 days 20 mg for 3 days 10 mg for 3 days  Patient not taking: Reported on 1/26/2022  Messi Mckeon MD   ondansetron (ZOFRAN ODT) 4 MG disintegrating tablet Take 1 tablet by mouth every 4 hours as needed for Nausea or Vomiting  Patient not taking: Reported on 1/26/2022  Xiang Sultana MD   VILAZODONE HCL 20 MG Tablet TAKE ONE TABLET BY MOUTH DAILY  Patient not taking: Reported on 1/26/2022  Agustin Solorzano MD   VILAZODONE HCL 40 MG Tablet TAKE ONE TABLET BY MOUTH DAILY  Patient not taking: Reported on 1/26/2022  Agustin Solorzano MD   modafinil (PROVIGIL) 200 MG tablet Take 1-2 tablets by mouth every morning for 30 days.   Tiffanie R Kehrt, APRN - CNP       Social History     Tobacco Use    Smoking status: Never Smoker    Smokeless tobacco: Never Used    Tobacco comment: congratulated on nonsmoking status   Vaping Use    Vaping Use: Never used   Substance Use Topics    Alcohol use: No    Drug use: No        Allergies   Allergen Reactions    Ensure      EGGS    Peanut-Containing Drug Products      PEANUTS    Food      Certain foods (chocolate, tea, soy, eggs, tree nuts, coffee, tomatoes, black pepper)   ,   Past Medical History:   Diagnosis Date    Allergic rhinitis     Anxiety     Asthma     Environmental allergies     food    Hyperlipidemia     Hypertension     Lumbar disc disease L4-5, L5-S1 Dr Dmitry Guzmán) Epidural injections 2011 8/20/2012    Obstructive sleep apnea     Obstructive sleep apnea (adult) (pediatric) 6/18/2018    PCOS (polycystic ovarian syndrome)     S/P colonoscopy 1/17/13    normal. Dr Tere Lorenzo   ,   Past Surgical History:   Procedure Laterality Date    BREAST REDUCTION SURGERY  01/01/2004    FOOT SURGERY  01/01/2000    hallux fx    KNEE ARTHROSCOPY Right     NOSE SURGERY N/A 10/29/2021    SEPTOPLASTY, INFERIOR TURBINATE REDUCTION, REPAIR OF NASAL VALVE COLLAPSE, ABLATION OF POSTERIOR NASAL NERVES performed by Sukhdev Sanchez MD at [facility] ARTHROSCOPY Left     SINUS SURGERY  10/29/2021    UPPER GASTROINTESTINAL ENDOSCOPY  01/17/2013    gastritis; Dr Julee Reddy:  [ [place]:  \"[x]\" Indicates a positive item  \"[]\" Indicates a negative item  -- DELETE ALL ITEMS NOT EXAMINED]  Vital Signs: (As obtained by patient/caregiver or practitioner observation)    Blood pressure-  Heart rate-    Respiratory rate-    Temperature-  Pulse oximetry-     Constitutional: [x] Appears well-developed and well-nourished [x] No apparent distress      [] Abnormal-   Mental status  [x] Alert and awake  [x] Oriented to person/place/time [x]Able to follow commands      Eyes:  EOM    [x]  Normal  [] Abnormal-  Sclera  [x]  Normal  [] Abnormal -         Discharge [x]  None visible  [] Abnormal -    HENT:   [x] Normocephalic, atraumatic.   [] Abnormal   [x] Mouth/Throat: Mucous membranes are moist.     External Ears [x] Normal  [] Abnormal-     Neck: [x] No visualized mass     Pulmonary/Chest: [x] Respiratory effort normal.  [x] No visualized signs of difficulty breathing or respiratory distress        [] Abnormal-      Musculoskeletal:   [] Normal gait with no signs of ataxia         [x] Normal range of motion of neck        [] Abnormal-       Neurological:        [x] No Facial Asymmetry (Cranial nerve 7 motor function) (limited exam to video visit)          [x] No gaze palsy        [] Abnormal-         Skin:        [x] No significant exanthematous lesions or discoloration noted on facial skin         [] Abnormal-            Psychiatric:       [x] Normal Affect [x] No Hallucinations        [] Abnormal-     Other pertinent observable physical exam findings-     ASSESSMENT/PLAN:  1. Acute bacterial sinusitis  Chart reviewed, including ENT, pulm notes  Start on anbx as previously prescribed  Continue on ENT regimen with steroid sinus irrigations, bactroban oint and flonase. If signs and symptoms return after anbx complete. Advised to follow back up with ENT. - levoFLOXacin (LEVAQUIN) 750 MG tablet; Take 1 tablet by mouth daily for 10 days  Dispense: 10 tablet; Refill: 0    2. Severe persistent asthma without complication  Continue per pulmonology. Chart reviewed. 3. Chronic rhinitis  Continue per ENT  Has also been evaluated by allergist.     Return in about 4 weeks (around 2/23/2022) for establish care with Dr Eri Rodriguez. Reynaldo Matthews, was evaluated through a synchronous (real-time) audio-video encounter. The patient (or guardian if applicable) is aware that this is a billable service, which includes applicable co-pays. This Virtual Visit was conducted with patient's (and/or legal guardian's) consent. The visit was conducted pursuant to the emergency declaration under the [address] [place] [address], [address] [place] [address] waiver authority and the [employer] General Act. Patient identification was verified, and a caregiver was present when appropriate. The patient was located at home in a state where the provider was licensed to provide care. Total time spent on this encounter: Not billed by time    --QI Becerra CNP on 1/26/2022 at 12:07 PM    An electronic signature was used to authenticate this note.

## 2022-01-31 ENCOUNTER — PATIENT MESSAGE (OUTPATIENT)
Dept: FAMILY MEDICINE CLINIC | Age: 42
End: 2022-01-31

## 2022-01-31 RX ORDER — BLOOD PRESSURE TEST KIT-LARGE
KIT MISCELLANEOUS
Qty: 1 EACH | Refills: 0 | Status: SHIPPED | OUTPATIENT
Start: 2022-01-31

## 2022-01-31 NOTE — TELEPHONE ENCOUNTER
From: Tad Calderón  To: Dr. Heredia Pascale: 1/31/2022 12:19 PM EST  Subject: Blood Pressure Monitor    I want to know if you can send an order for a blood pressure monitor to the pharmacy? I want to monitor my blood pressure more.    Thank you Parainfluenza infection Parainfluenza infection

## 2022-02-11 ENCOUNTER — VIRTUAL VISIT (OUTPATIENT)
Dept: PULMONOLOGY | Age: 42
End: 2022-02-11
Payer: COMMERCIAL

## 2022-02-11 DIAGNOSIS — J45.51 SEVERE PERSISTENT ASTHMA WITH ACUTE EXACERBATION: Chronic | ICD-10-CM

## 2022-02-11 DIAGNOSIS — G47.33 OBSTRUCTIVE SLEEP APNEA (ADULT) (PEDIATRIC): Primary | Chronic | ICD-10-CM

## 2022-02-11 DIAGNOSIS — K21.9 CHRONIC GERD: ICD-10-CM

## 2022-02-11 DIAGNOSIS — E66.9 NON MORBID OBESITY, UNSPECIFIED OBESITY TYPE: Chronic | ICD-10-CM

## 2022-02-11 DIAGNOSIS — I10 ESSENTIAL HYPERTENSION: Chronic | ICD-10-CM

## 2022-02-11 DIAGNOSIS — F43.12 CHRONIC POST-TRAUMATIC STRESS DISORDER (PTSD): ICD-10-CM

## 2022-02-11 DIAGNOSIS — G47.19 EXCESSIVE DAYTIME SLEEPINESS: ICD-10-CM

## 2022-02-11 PROCEDURE — 99214 OFFICE O/P EST MOD 30 MIN: CPT | Performed by: NURSE PRACTITIONER

## 2022-02-11 RX ORDER — MODAFINIL 200 MG/1
200 TABLET ORAL 2 TIMES DAILY
Qty: 180 TABLET | Refills: 1 | Status: SHIPPED | OUTPATIENT
Start: 2022-02-11 | End: 2022-08-12 | Stop reason: SDUPTHER

## 2022-02-11 ASSESSMENT — SLEEP AND FATIGUE QUESTIONNAIRES
HOW LIKELY ARE YOU TO NOD OFF OR FALL ASLEEP WHILE SITTING QUIETLY AFTER LUNCH WITHOUT ALCOHOL: 0
HOW LIKELY ARE YOU TO NOD OFF OR FALL ASLEEP WHILE SITTING INACTIVE IN A PUBLIC PLACE: 1
HOW LIKELY ARE YOU TO NOD OFF OR FALL ASLEEP WHILE SITTING AND READING: 3
HOW LIKELY ARE YOU TO NOD OFF OR FALL ASLEEP WHEN YOU ARE A PASSENGER IN A CAR FOR AN HOUR WITHOUT A BREAK: 3
HOW LIKELY ARE YOU TO NOD OFF OR FALL ASLEEP IN A CAR, WHILE STOPPED FOR A FEW MINUTES IN TRAFFIC: 0
ESS TOTAL SCORE: 12
HOW LIKELY ARE YOU TO NOD OFF OR FALL ASLEEP WHILE WATCHING TV: 2
HOW LIKELY ARE YOU TO NOD OFF OR FALL ASLEEP WHILE LYING DOWN TO REST IN THE AFTERNOON WHEN CIRCUMSTANCES PERMIT: 3
HOW LIKELY ARE YOU TO NOD OFF OR FALL ASLEEP WHILE SITTING AND TALKING TO SOMEONE: 0

## 2022-02-11 NOTE — PROGRESS NOTES
Diagnosis: [x] BIBIANA (G47.33) [] CSA (G47.31) [] Apnea (G47.30)   Length of Need: [x] 15 Months [] 99 Months [] Other:   Machine (KAVIN!): [] Respironics Dream Station      Auto [] ResMed AirSense     Auto [] Other:     []  CPAP () [] Bilevel ()   Mode: [] Auto [] Spontaneous    Mode: [] Auto [] Spontaneous            Comfort Settings:      Humidifier: [] Heated ()        [x] Water chamber replacement ()/ 1 per 6 months        Mask:   [x] Nasal () /1 per 3 months [] Full Face () /1 per 3 months   [x] Patient choice -Size and fit mask [] Patient Choice - Size and fit mask   [] Dispense: [] Dispense:   [x] Headgear () / 1 per 3 months [] Headgear () / 1 per 3 months   [x] Replacement Nasal Cushion ()/2 per month [] Interface Replacement ()/1 per month   [] Replacement Nasal Pillows ()/2 per month         Tubing: [x] Heated ()/1 per 3 months    [] Standard ()/1 per 3 months [] Other:           Filters: [x] Non-disposable ()/1 per 6 months     [x] Ultra-Fine, Disposable ()/2 per month        Miscellaneous: [] Chin Strap ()/ 1 per 6 months [] O2 bleed-in:        LPM   [] Oxymetry on CPAP/Bilevel []  Other:         Start Order Date: 02/11/22    MEDICAL JUSTIFICATION:  I, the undersigned, certify that the above prescribed supplies are medically necessary for this patients wellbeing. In my opinion, the supplies are both reasonable and necessary in reference to accepted standards of medicalpractice in treatment of this patients condition. Mukesh Lay NP    NPI: [id number]       Order Signed Date: 02/11/22  [facility]  Pulmonary, Sleep, and Critical Care    Pulmonary, Sleep, and Critical Care  [facility] [address] [place] [address].  Suite [facility], [address] [facility] , [address] [facility]  Phone: [phone]    Fax: 700 [facility]  1980  [address]  [phone] (home)   [phone] (mobile)      Insurance Info (confirm with patient if correct):  Payor/Plan Subscr  Sex Relation Sub.  Ins. ID Effective Group Num

## 2022-02-11 NOTE — PROGRESS NOTES
Phil Anguiano MD, [facility], [facility]  [address]  [address] [facility], [address] [phone]   [facility] [address] Yazmin Ordoñez. [address] [place] [address]. Raul Salvador 35 [phone]     Video Visit- Follow up    Thalia Vela, was evaluated through a synchronous (real-time) audio-video  encounter. The patient (or guardian if applicable) is aware that this is a billable  service, which includes applicable co-pays. This Virtual Visit was conducted with  patient's (and/or legal guardian's) consent. The visit was conducted pursuant to  the emergency declaration under the [address] [place] [address], [address] [place] [address] waiver authority and the [employer] General Act. Patient identification was verified,  and a caregiver was present when appropriate. The patient was located in a  state where the provider was licensed to provide care. Assessment/Plan:      1. Obstructive sleep apnea (adult) (pediatric)  Assessment & Plan:  Chronic-Stable: Reviewed and analyzed results of physiologic download from patient's machine and reviewed with patient. Supplies and parts as needed for her machine. These are medically necessary. Limit caffeine use after 3pm. Based on the analyzed data will continue with current settings. Stable on her machine at current settings, getting benefit from the use, and having minimal side effects. Encouraged her to get more sleep and machine use during the night to help with Excessive daytime sleepiness. Discussed compliance with machine and stimulant medication. Will see back in 6 months or sooner if issues arise. Discussed the recall of Repironics devices with patient and the severity of her sleep apnea. Discussed the risks of untreated sleep apnea including but not limited to car accidents, heart attacks, strokes, and death. Alternative therapy may not exist or may be severely limited.   Felt the benefits of continued usage of current dose. No driving if sleepy. Discussed working on sleep hygiene. Keep a consistent sleep schedule. Get up at the same time every day, even on weekends or during vacations. Set a bedtime that is early enough for you to get at least 7 hours of sleep. Dont go to bed unless you are sleepy. If you dont fall asleep after 20 minutes, get out of bed. Establish a relaxing bedtime routine. Use your bed only for sleep and sex. Make your bedroom quiet and relaxing. Keep the room at a comfortable, cool temperature. Limit exposure to bright light in the evenings. Turn off electronic devices at least 30 minutes before bedtime. Dont eat a large meal before bedtime. If you are hungry at night, eat a light, healthy snack. Exercise regularly and maintain a healthy diet. Avoid consuming caffeine in the late afternoon or evening. Avoid consuming alcohol before bedtime. Reduce your fluid intake before bedtime. Orders:  -     modafinil (PROVIGIL) 200 MG tablet; Take 1 tablet by mouth 2 times daily for 90 days. , Disp-180 tablet, R-1Normal      Reviewed, analyzed, and documented physiologic data from patient's PAP machine. This information was analyzed to assess complexity and medical decision making in regards to further testing and management. The primary encounter diagnosis was Obstructive sleep apnea (adult) (pediatric). Diagnoses of Essential hypertension, Severe persistent asthma with acute exacerbation, Chronic post-traumatic stress disorder (PTSD) with anxiety, Non morbid obesity, unspecified obesity type, Chronic GERD, and Excessive daytime sleepiness were also pertinent to this visit. The chronic medical conditions listed are directly related to the primary diagnosis listed above. The management of the primary diagnosis affects the secondary diagnosis and vice versa. Subjective:     Patient ID: Florencio Castle is a 35 y.o. female.     Chief Complaint   Patient presents with    Sleep Apnea     Subjective HPI:    Machine Modem/Download Info:  Compliance (hours/night): 6.5 hrs/night  % of nights >= 4 hrs: 80 %  Download AHI (/hour): 2.9 /HR   Average IPAP Pressure: 13.3 cmH2O  Average EPAP Pressure: 9.4 cmH2O         AUTO BIPAP - Settings (Cesar)  IPAP Max: 25 cmH2O  EPAP Min: 8 cmH2O  Pressure Support Min: 2  Pressure Support Max: 8             Comfort Settings  Humidity Level (0-8): 2  Heated Tubing (Yes/No): Yes  Flex/EPR (0-3): 3 PAP Mask  Clinically Relevant Leak: No     Maral Stevens is doing well with her machine. Had sinus surgery and was not able touse her machine for 4 weeks. Pressure feels good. she denies headaches, congestion, nosebleeds, dryness, aerophagia, or drowsiness while driving. She has registered her machine for the  recall and is currently awaiting replacement. Excessive daytime sleepiness: She is taking Provigil 200mg BID around 0530 and 12pm. Her symptoms are controlled at the current doses. She reports she still has some sleepiness during the day. She wakes up at 415am. She works for [employer] and mostly is on the computer. Goes to bed around 1030pm. Sometimes will watch television and will got to bed once she feels sleepy. Most nights she is able to fall asleep quickly. Some nights when she goes to bed she will be very tired but once she gets into bed she is unable to fall asleep for 1-2 hours. This usually happens 1-2 days per week. While she is trying to fall asleep she will just lay in bed or she will play on her phone. Her mind will sometimes race due to anxiety.      [facility] AnTuTu    [place] - Total score: 12    Current Outpatient Medications   Medication Instructions    albuterol sulfate  (90 Base) MCG/ACT inhaler INHALE TWO PUFFS BY MOUTH EVERY 6 HOURS AS NEEDED FOR WHEEZING OR FOR SHORTNESS OF BREATH    atorvastatin (LIPITOR) 40 MG tablet TAKE ONE TABLET BY MOUTH DAILY    Blood Pressure Monitoring (OMRON 3 SERIES BP MONITOR) GABRIELLE Test blood pressure weekly or PRN    busPIRone (BUSPAR) 10 MG tablet TAKE TWO TABLETS BY MOUTH TWICE A DAY    DUPIXENT 300 MG/2ML SOPN injection No dose, route, or frequency recorded.  lisinopril (PRINIVIL;ZESTRIL) 5 MG tablet TAKE ONE TABLET BY MOUTH DAILY    LORazepam (ATIVAN) 0.5 MG tablet TAKE ONE TABLET BY MOUTH EVERY 8 HOURS AS NEEDED FOR ANXIETY WITH FOR SHORTNESS OF BREATH    modafinil (PROVIGIL) 200 mg, Oral, 2 TIMES DAILY    Mometasone Furoate POWD Add 10ml of medication to 240 ml of saline in rinse bottle; irrigate sinuses with 120ml through each nostril twice daily    montelukast (SINGULAIR) 10 mg, Oral, NIGHTLY    Multiple Vitamins-Minerals (THERAPEUTIC MULTIVITAMIN-MINERALS) tablet 1 tablet, DAILY    mupirocin (BACTROBAN NASAL) 2 % nasal ointment Dissolve a thin strip of ointment in your irrigation bottle and irrigate. Irrigate twice per day.     omeprazole (PRILOSEC) 40 MG delayed release capsule TAKE ONE CAPSULE BY MOUTH TWICE A DAY    ondansetron (ZOFRAN ODT) 4 mg, Oral, EVERY 4 HOURS PRN    predniSONE (DELTASONE) 10 MG tablet 40 mg for 3 days 30 mg for 3 days 20 mg for 3 days 10 mg for 3 days    Spacer/Aero-Holding Chambers GABRIELLE 1 Device, Does not apply, DAILY PRN    spironolactone (ALDACTONE) 100 MG tablet TAKE ONE TABLET BY MOUTH DAILY    TRELEGY ELLIPTA 200-62.5-25 MCG/INH AEPB INHALE ONE PUFF BY MOUTH DAILY    VILAZODONE HCL 20 MG Tablet TAKE ONE TABLET BY MOUTH DAILY    VILAZODONE HCL 40 MG Tablet TAKE ONE TABLET BY MOUTH DAILY    VORTIoxetine (TRINTELLIX) 10 mg, Oral, DAILY        Electronically signed by Franklyn Monday, APRN on 2/11/2022 at 1:35 PM

## 2022-02-11 NOTE — ASSESSMENT & PLAN NOTE
Chronic-Stable: Reviewed and analyzed results of physiologic download from patient's machine and reviewed with patient. Supplies and parts as needed for her machine. These are medically necessary. Limit caffeine use after 3pm. Based on the analyzed data will continue with current settings. Stable on her machine at current settings, getting benefit from the use, and having minimal side effects. Encouraged her to get more sleep and machine use during the night to help with Excessive daytime sleepiness. Discussed compliance with machine and stimulant medication. Will see back in 6 months or sooner if issues arise. Discussed the recall of Repironics devices with patient and the severity of her sleep apnea. Discussed the risks of untreated sleep apnea including but not limited to car accidents, heart attacks, strokes, and death. Alternative therapy may not exist or may be severely limited. Felt the benefits of continued usage of these devices may outweigh the risks identified in the recall notification. Advised to avoid use of unproven cleaning methods, such as ozone. Due to the unknown variables each patient will have to make a determination in his/her own. Please contact your equipment company for further instruction until an alternative is found. Encouraged patient to register her machine for the recall and provided phone number.

## 2022-02-11 NOTE — ASSESSMENT & PLAN NOTE
Chronic- Stable: Discussed the importance of treating obstructive sleep apnea as part of the management of this disorder. PDMP reviewed. Continue taking Provigil 200mg BID. She denies side effects from the medication. Will refill at the current dose. No driving if sleepy. Discussed working on sleep hygiene. Keep a consistent sleep schedule. Get up at the same time every day, even on weekends or during vacations. Set a bedtime that is early enough for you to get at least 7 hours of sleep. Dont go to bed unless you are sleepy. If you dont fall asleep after 20 minutes, get out of bed. Establish a relaxing bedtime routine. Use your bed only for sleep and sex. Make your bedroom quiet and relaxing. Keep the room at a comfortable, cool temperature. Limit exposure to bright light in the evenings. Turn off electronic devices at least 30 minutes before bedtime. Dont eat a large meal before bedtime. If you are hungry at night, eat a light, healthy snack. Exercise regularly and maintain a healthy diet. Avoid consuming caffeine in the late afternoon or evening. Avoid consuming alcohol before bedtime. Reduce your fluid intake before bedtime.

## 2022-02-17 RX ORDER — BUSPIRONE HYDROCHLORIDE 10 MG/1
TABLET ORAL
Qty: 120 TABLET | Refills: 2 | Status: SHIPPED | OUTPATIENT
Start: 2022-02-17 | End: 2022-05-20

## 2022-02-25 ENCOUNTER — TELEMEDICINE (OUTPATIENT)
Dept: PULMONOLOGY | Age: 42
End: 2022-02-25
Payer: COMMERCIAL

## 2022-02-25 DIAGNOSIS — J45.50 SEVERE PERSISTENT ASTHMA WITHOUT COMPLICATION: Primary | ICD-10-CM

## 2022-02-25 PROCEDURE — 99214 OFFICE O/P EST MOD 30 MIN: CPT | Performed by: INTERNAL MEDICINE

## 2022-02-25 ASSESSMENT — ENCOUNTER SYMPTOMS
DIARRHEA: 0
CHOKING: 0
WHEEZING: 0
CHEST TIGHTNESS: 0
VOICE CHANGE: 0
SINUS PRESSURE: 0
ANAL BLEEDING: 0
STRIDOR: 0
APNEA: 0
COUGH: 0
BACK PAIN: 0
CONSTIPATION: 0
SORE THROAT: 0
RHINORRHEA: 0
ABDOMINAL PAIN: 0
BLOOD IN STOOL: 0
ABDOMINAL DISTENTION: 0
SHORTNESS OF BREATH: 0

## 2022-02-25 NOTE — PROGRESS NOTES
Deena Iverson     Pulmonology Video Visit    Pursuant to the emergency declaration under the [facility], 6177 waiver authority and the [employer] Supplemental Appropriations Act this Video Visit was insisted, with patient's consent, to reduce the patient's risk of exposure to COVID-19 and provide continuity of care for an established patient. The patient was at home, while the provider was at the clinic. Services were provided through a synchronous discussion through a Video Visit to substitute for in-person clinic visit, and coded as such. YOB: 1980     Date of Service:  2/25/2022     Chief Complaint   Patient presents with    Asthma         HPI Doxy visit. Patient started using Dupixent since early January and states that she has not required to use albuterol inhaler since then. Still has postnasal drip from sinus infection, on occasions but no symptoms of cough, shortness of breath or wheezing. Allergies   Allergen Reactions    Ensure      EGGS    Peanut-Containing Drug Products      PEANUTS    Food      Certain foods (chocolate, tea, soy, eggs, tree nuts, coffee, tomatoes, black pepper)     Outpatient Medications Marked as Taking for the 2/25/22 encounter (Telemedicine) with Danii Miller MD   Medication Sig Dispense Refill    busPIRone (BUSPAR) 10 MG tablet TAKE TWO TABLETS BY MOUTH TWICE A  tablet 2    modafinil (PROVIGIL) 200 MG tablet Take 1 tablet by mouth 2 times daily for 90 days. 180 tablet 1    Blood Pressure Monitoring (OMRON 3 SERIES BP MONITOR) GABRIELLE Test blood pressure weekly or PRN 1 each 0    DUPIXENT 300 MG/2ML SOPN injection       mupirocin (BACTROBAN NASAL) 2 % nasal ointment Dissolve a thin strip of ointment in your irrigation bottle and irrigate. Irrigate twice per day.  1 each 3    Mometasone Furoate POWD Add 10ml of medication to 240 ml of saline in rinse bottle; irrigate sinuses with 120ml through each nostril twice daily 1 each 6    VORTIoxetine (TRINTELLIX) 10 MG TABS tablet Take 1 tablet by mouth daily 30 tablet 2    spironolactone (ALDACTONE) 100 MG tablet TAKE ONE TABLET BY MOUTH DAILY 30 tablet 2    lisinopril (PRINIVIL;ZESTRIL) 5 MG tablet TAKE ONE TABLET BY MOUTH DAILY 30 tablet 5    albuterol sulfate  (90 Base) MCG/ACT inhaler INHALE TWO PUFFS BY MOUTH EVERY 6 HOURS AS NEEDED FOR WHEEZING OR FOR SHORTNESS OF BREATH 8.5 g 0    atorvastatin (LIPITOR) 40 MG tablet TAKE ONE TABLET BY MOUTH DAILY 30 tablet 5    TRELEGY ELLIPTA 200-62.5-25 MCG/INH AEPB INHALE ONE PUFF BY MOUTH DAILY 1 each 5    omeprazole (PRILOSEC) 40 MG delayed release capsule TAKE ONE CAPSULE BY MOUTH TWICE A DAY 60 capsule 5    montelukast (SINGULAIR) 10 MG tablet Take 1 tablet by mouth nightly 30 tablet 5    Spacer/Aero-Holding Chambers GABRIELLE 1 Device by Does not apply route daily as needed (with HFA inhalers) 1 Device 0    Multiple Vitamins-Minerals (THERAPEUTIC MULTIVITAMIN-MINERALS) tablet Take 1 tablet by mouth daily.          Immunization History   Administered Date(s) Administered    COVID-19, J&J, PF, 0.5 mL 04/03/2021    COVID-19, Pfizer Purple top, DILUTE for use, 12+ yrs, 30mcg/0.3mL dose 12/24/2021    Influenza Virus Vaccine 10/01/2010, 09/09/2014    Influenza, Intradermal, Preservative free 10/01/2010, 11/28/2012, 09/23/2013    Influenza, Intradermal, Quadrivalent, Preservative Free 10/05/2016    Influenza, MDCK Quadv, IM, PF (Flucelvax 2 yrs and older) 09/27/2019    Influenza, MDCK Quadv, with preserv IM (Flucelvax 2 yrs and older) 11/09/2020    Influenza, Quadv, IM, (6 mo and older Fluzone, Flulaval, Fluarix and 3 yrs and older Afluria) 10/08/2021    Influenza, Quadv, IM, PF (6 mo and older Fluzone, Flulaval, Fluarix, and 3 yrs and older Afluria) 10/21/2015, 10/28/2017    Influenza, Dillan Morocho, IM PF (Flublok 18 yrs and older) 09/27/2018    Pneumococcal Polysaccharide ([id number]) 01/19/2018    Tdap (Boostrix, Adacel) 08/20/2012       Past Medical History:   Diagnosis Date    Allergic rhinitis     Anxiety     Asthma     Environmental allergies     food    Hyperlipidemia     Hypertension     Lumbar disc disease L4-5, L5-S1 Dr Bon Hernández [facility]) Epidural injections 2011 8/20/2012    Obstructive sleep apnea     Obstructive sleep apnea (adult) (pediatric) 6/18/2018    PCOS (polycystic ovarian syndrome)     S/P colonoscopy 1/17/13    normal. Dr Shahida Nunez     Past Surgical History:   Procedure Laterality Date    BREAST REDUCTION SURGERY  01/01/2004    FOOT SURGERY  01/01/2000    hallux fx    KNEE ARTHROSCOPY Right     NOSE SURGERY N/A 10/29/2021    SEPTOPLASTY, INFERIOR TURBINATE REDUCTION, REPAIR OF NASAL VALVE COLLAPSE, ABLATION OF POSTERIOR NASAL NERVES performed by Katrin Silva MD at [facility] ARTHROSCOPY Left     SINUS SURGERY  10/29/2021    UPPER GASTROINTESTINAL ENDOSCOPY  01/17/2013    gastritis; Dr Shahida Nunez     Family History   Problem Relation Age of Onset    High Blood Pressure Mother     Other Mother         hypothyroid    Diabetes Sister     Diabetes Maternal Grandmother     Diabetes Maternal Grandfather        Review of Systems:  Review of Systems   Constitutional: Negative for activity change, appetite change, fatigue and fever. HENT: Positive for postnasal drip. Negative for congestion, ear discharge, ear pain, rhinorrhea, sinus pressure, sneezing, sore throat, tinnitus and voice change. Respiratory: Negative for apnea, cough, choking, chest tightness, shortness of breath, wheezing and stridor. Cardiovascular: Negative for chest pain, palpitations and leg swelling. Gastrointestinal: Negative for abdominal distention, abdominal pain, anal bleeding, blood in stool, constipation and diarrhea. Musculoskeletal: Negative for arthralgias, back pain and gait problem. Skin: Negative for pallor and rash. Allergic/Immunologic: Negative for environmental allergies. Neurological: Negative for dizziness, tremors, seizures, syncope, speech difficulty, weakness, light-headedness, numbness and headaches. Hematological: Negative for adenopathy. Does not bruise/bleed easily. Psychiatric/Behavioral: Negative for sleep disturbance. There were no vitals filed for this visit. Patient-Reported Vitals 2/25/2022   Patient-Reported Weight 235lb   Patient-Reported Height 5ft6in   Patient-Reported Systolic -   Patient-Reported Diastolic -   Patient-Reported Pulse -      There is no height or weight on file to calculate BMI. Wt Readings from Last 3 Encounters:   01/20/22 235 lb 12.8 oz (107 kg)   12/03/21 235 lb (106.6 kg)   11/04/21 235 lb (106.6 kg)     BP Readings from Last 3 Encounters:   01/20/22 116/77   12/03/21 108/76   10/29/21 (!) 105/57         Physical Exam    Due to the current efforts to prevent transmission of COVID-19 and also the need to preserve PPE for other caregivers, a face-to-face encounter with the patient was not performed. That being said, all relevant records and diagnostic tests were reviewed, including laboratory results and imaging. Please reference any relevant documentation elsewhere. Care will be coordinated with the primary service.       Health Maintenance   Topic Date Due    Hepatitis C screen  Never done    Lipid screen  12/18/2021    Depression Screen  04/23/2022    Potassium monitoring  08/06/2022    Creatinine monitoring  08/06/2022    DTaP/Tdap/Td vaccine (2 - Td or Tdap) 08/20/2022    A1C test (Diabetic or Prediabetic)  09/20/2022    Cervical cancer screen  11/04/2022    Pneumococcal 0-64 years Vaccine (2 of 2 - PPSV23) 02/21/2043    Flu vaccine  Completed    COVID-19 Vaccine  Completed    HIV screen  Completed    Hepatitis A vaccine  Aged Out    Hepatitis B vaccine  Aged Out    Hib vaccine  Aged Out    Meningococcal (ACWY) vaccine  Aged Out

## 2022-03-08 ENCOUNTER — OFFICE VISIT (OUTPATIENT)
Dept: ENT CLINIC | Age: 42
End: 2022-03-08
Payer: COMMERCIAL

## 2022-03-08 VITALS
HEART RATE: 97 BPM | DIASTOLIC BLOOD PRESSURE: 82 MMHG | HEIGHT: 66 IN | BODY MASS INDEX: 38.89 KG/M2 | WEIGHT: 242 LBS | SYSTOLIC BLOOD PRESSURE: 123 MMHG

## 2022-03-08 DIAGNOSIS — J34.89 NASAL VALVE COLLAPSE: ICD-10-CM

## 2022-03-08 DIAGNOSIS — J32.0 CHRONIC MAXILLARY SINUSITIS: Primary | ICD-10-CM

## 2022-03-08 DIAGNOSIS — J31.0 CHRONIC RHINITIS: ICD-10-CM

## 2022-03-08 DIAGNOSIS — H92.03 OTALGIA OF BOTH EARS: ICD-10-CM

## 2022-03-08 DIAGNOSIS — R43.8 HYPOSMIA: ICD-10-CM

## 2022-03-08 DIAGNOSIS — J34.2 DEVIATED NASAL SEPTUM: ICD-10-CM

## 2022-03-08 PROCEDURE — 99214 OFFICE O/P EST MOD 30 MIN: CPT | Performed by: STUDENT IN AN ORGANIZED HEALTH CARE EDUCATION/TRAINING PROGRAM

## 2022-03-08 NOTE — PROGRESS NOTES
Paul Carlos (:  1980) is a 38 y.o. female, here for evaluation of the following chief complaint(s):  Ear Problem      ASSESSMENT/PLAN:  1. Chronic maxillary sinusitis  -     CT SINUS FOR IMAGE GUIDANCE; Future  2. Deviated nasal septum  3. Nasal valve collapse  4. Hyposmia  5. Chronic rhinitis  6. Otalgia of both ears      This is a very pleasant 38 y.o. female here today for evaluation of the the above-noted complaints. Patient is status post open septorhinoplasty, septoplasty, ITR and posterior ablation of her nasal nerves on 10/29/2021. During the patient's prior visits, she has been noted to have a straight nasal septum, good reduction of her inferior turbinates and improvement of her nasal valve collapse. Her symptoms may be related to recurrent acute sinusitis, but it may in fact represent underlying allergic or inflammatory changes causing her symptoms. I would like to obtain a CT scan of her sinuses to evaluate for underlying chronic sinusitis or acute sinusitis. May consider mupirocin irrigations versus mometasone irrigations. Already seeing allergy. SUBJECTIVE/OBJECTIVE:  Other    Ear Problem        Blu Patel is here today for evaluation of evaluation of issues related to her nose. She gets multiple sinus infections per year. She requires antibiotics for these. She think she has a sinus infection right now. She also has over the last 3 months had a loss of sense of smell. She has not had Covid recently. She has had some issues related to smelling gasoline and cigarette smoke that is not actually present. She gets difficulty breathing through her nose it is worse on the left side. She will get constant nasal drainage that is clear and green. She gets very rare nosebleeds. Update 3/17/2012:    She is still having issues breathing out of her nose and with nasal congestion.   She is still getting intermittent nasal drainage. Update 4/28/2021:    Patient presents today for follow-up. She is still having the same issues regarded to difficulty breathing out of her nose. She gets intermittent nasal drainage which is clear. She thinks she is getting sinus infections. Update 8/26/2021:    Patient presents today for follow-up regarding issues related to her chronic sinonasal complaints. She is still having significant difficulty breathing through her nose. This is exacerbated by exercise. 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 Update 11/4/2021:    Patient presents today for follow-up regarding her chronic sinonasal complaints. She is already breathing better through her nose. She didn't have any significant pain postoperatively. She is getting some blood clots and crusting out of her nose. Update 11/23/2021:    Patient presents today for follow-up regarding her chronic sinonasal complaints. She feels like there is some obstruction on the left side. She has been irrigating once to twice per day. She is using Vaseline around her incision site. She is occasionally getting yellow chunks out of her nose. Update 1/20/2022:    Patient presents today for follow-up regarding issues related to her nose. Overall she is breathing significantly better through her nose. She is using fluticasone until recently she developed a nosebleed on the left side so she stopped. She still feels like she gets nasal congestion and like she has a sinus infection. She has been tested for allergies in the past.  She is getting some black things out of her nose when she blows her nose. She feels like her sense of smell is worse than prior to surgery.     Update 3/8/2022:  -Doing BID irrigations and Flonase  -Still having significant nasal congestion  -Using Zyrtec, singulair, Dupixent  -Feels like her asthma is well controlled  -Still feels like she has nasal congestion and ear fullness/pain    The patient is reporting hyposmia that is worse after surgery. I reviewed with the patient that prior to surgery she did discuss with me that she had a significant loss of smell and parosmia's. We did not operate anywhere near the olfactory neurons for her most recent procedure, so I do not feel it is necessarily related to the surgery. REVIEW OF SYSTEMS  The following systems were reviewed and revealed the following in addition to any already discussed in the HPI:    PHYSICAL EXAM    GENERAL: No acute distress, alert and oriented, no hoarseness, strong voice  EYES: EOMI, Anti-icteric  HENT:   Well-healed inverted V incision of the nasal columella. PROCEDURE  Nasal Endoscopy (CPT code 22679) from prior visit  Preop: chronic rhinitis  Postop: Same    Verbal consent was received. After topical anesthesia and decongestion had been obtained using aerosolized 1% lidocaine and oxymetazoline, a 45 degree rigid endoscope was placed into both nares with the patient in a sitting position. The following was observed:      Septum: intact and midline  Other:   -The inferior and middle turbinates were examined. The middle meatus, and sphenoethmoid recess was examined bilaterally.    -There is improvement in the narrow nasal valves bilaterally. The turbinates are well reduced. The crusting present along the length of the turbinates has essentially resolved. There is an area of bright red blood along the left anterior nasal septum. There is some crusting at the site of the septoplasty incision. The nose is healing very well and there is no septal perforation. There is improved external valve collapse with deep inhalation.  -There were no complications. Tolerated well without complication. I attest that I was present for and did the entire procedure myself.       This note was generated completely or in part utilizing Dragon dictation speech recognition software. Occasionally, words are mistranscribed and despite editing, the text may contain inaccuracies due to incorrect word recognition. If further clarification is needed please contact the office at [phone]. An electronic signature was used to authenticate this note.     --Luis Piedra MD

## 2022-03-14 ENCOUNTER — HOSPITAL ENCOUNTER (OUTPATIENT)
Dept: CT IMAGING | Age: 42
Discharge: HOME | End: 2022-03-14
Payer: COMMERCIAL

## 2022-03-14 DIAGNOSIS — J32.0 CHRONIC MAXILLARY SINUSITIS: ICD-10-CM

## 2022-03-14 PROCEDURE — 70486 CT MAXILLOFACIAL W/O DYE: CPT

## 2022-03-15 ENCOUNTER — OFFICE VISIT (OUTPATIENT)
Dept: ENT CLINIC | Age: 42
End: 2022-03-15
Payer: COMMERCIAL

## 2022-03-15 VITALS
BODY MASS INDEX: 38.57 KG/M2 | HEIGHT: 66 IN | DIASTOLIC BLOOD PRESSURE: 80 MMHG | SYSTOLIC BLOOD PRESSURE: 123 MMHG | HEART RATE: 93 BPM | WEIGHT: 240 LBS

## 2022-03-15 DIAGNOSIS — R43.8 HYPOSMIA: ICD-10-CM

## 2022-03-15 DIAGNOSIS — J32.2 CHRONIC ETHMOIDAL SINUSITIS: Primary | ICD-10-CM

## 2022-03-15 DIAGNOSIS — H93.8X3 SENSATION OF FULLNESS IN BOTH EARS: ICD-10-CM

## 2022-03-15 DIAGNOSIS — J31.0 CHRONIC RHINITIS: ICD-10-CM

## 2022-03-15 PROCEDURE — 99213 OFFICE O/P EST LOW 20 MIN: CPT | Performed by: STUDENT IN AN ORGANIZED HEALTH CARE EDUCATION/TRAINING PROGRAM

## 2022-03-15 RX ORDER — AZELASTINE 1 MG/ML
2 SPRAY, METERED NASAL 2 TIMES DAILY
Qty: 30 ML | Refills: 3 | Status: SHIPPED
Start: 2022-03-15 | End: 2022-06-10 | Stop reason: ALTCHOICE

## 2022-03-15 NOTE — PROGRESS NOTES
Paul Carlos (:  1980) is a 38 y.o. female, here for evaluation of the following chief complaint(s):  No chief complaint on file. ASSESSMENT/PLAN:  1. Chronic ethmoidal sinusitis  2. Chronic rhinitis  3. Hyposmia  4. Sensation of fullness in both ears      This is a very pleasant 38 y.o. female here today for evaluation of the the above-noted complaints. Patient is status post open septorhinoplasty, septoplasty, ITR and posterior ablation of her nasal nerves on 10/29/2021. During the patient's prior visits, she has been noted to have a straight nasal septum, good reduction of her inferior turbinates and improvement of her nasal valve collapse. I independently reviewed her updated CT maxillofacial scan from 3/20/2022. Shows evidence of interval worsening of her ethmoidal sinusitis as well as new maxillary sinus mucosal thickening. This could certainly be contributing to her complaints    Her symptoms may be related to recurrent acute sinusitis is interval development of chronic sinusitis.      -We will start azelastine.  -If no improvement, we will then have her start the double strength mometasone irrigations. This was previously prescribed but the patient never followed through and had this ordered  -We will consider starting her on mupirocin irrigations.  -Patient is still symptomatic then we will consider an extended course of oral antibiotics. May consider mupirocin irrigations versus mometasone irrigations. Already seeing allergy. On Dupixent, multiple inhalers. She of asthma    SUBJECTIVE/OBJECTIVE:  Ear Problem    Dena Salcedo is here today for evaluation of evaluation of issues related to her nose. She gets multiple sinus infections per year. She requires antibiotics for these. She think she has a sinus infection right now.   She also has over the last 3 months had a loss of sense of smell. She has not had Covid recently. She has had some issues related to smelling gasoline and cigarette smoke that is not actually present. She gets difficulty breathing through her nose it is worse on the left side. She will get constant nasal drainage that is clear and green. She gets very rare nosebleeds. Update 3/17/2012:    She is still having issues breathing out of her nose and with nasal congestion. She is still getting intermittent nasal drainage. Update 4/28/2021:    Patient presents today for follow-up. She is still having the same issues regarded to difficulty breathing out of her nose. She gets intermittent nasal drainage which is clear. She thinks she is getting sinus infections. Update 8/26/2021:    Patient presents today for follow-up regarding issues related to her chronic sinonasal complaints. She is still having significant difficulty breathing through her nose. This is exacerbated by exercise. 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 Update 11/4/2021:    Patient presents today for follow-up regarding her chronic sinonasal complaints. She is already breathing better through her nose. She didn't have any significant pain postoperatively. She is getting some blood clots and crusting out of her nose. Update 11/23/2021:    Patient presents today for follow-up regarding her chronic sinonasal complaints. She feels like there is some obstruction on the left side. She has been irrigating once to twice per day. She is using Vaseline around her incision site. She is occasionally getting yellow chunks out of her nose. Update 1/20/2022:    Patient presents today for follow-up regarding issues related to her nose. Overall she is breathing significantly better through her nose. She is using fluticasone until recently she developed a nosebleed on the left side so she stopped.   She still feels like she gets nasal congestion and like she has a sinus infection. She has been tested for allergies in the past.  She is getting some black things out of her nose when she blows her nose. She feels like her sense of smell is worse than prior to surgery. Update 3/8/2022:  -Doing BID irrigations and Flonase  -Still having significant nasal congestion  -Using Zyrtec, singulair, Dupixent  -Feels like her asthma is well controlled  -Still feels like she has nasal congestion and ear fullness/pain    The patient is reporting hyposmia that is worse after surgery. I reviewed with the patient that prior to surgery she did discuss with me that she had a significant loss of smell and parosmia's. We did not operate anywhere near the olfactory neurons for her most recent procedure, so I do not feel it is necessarily related to the surgery. Update 3/15/2022:    Patient presents today for follow-up. She is still having significant nasal congestion, ear fullness and pain. She had a CT scan which I independently reviewed. Shows interval worsening of her sinus disease. She now has inflammation of the bilateral maxillary sinuses and subtotal opacifications of her ethmoids. REVIEW OF SYSTEMS  The following systems were reviewed and revealed the following in addition to any already discussed in the HPI:    PHYSICAL EXAM    GENERAL: No acute distress, alert and oriented, no hoarseness, strong voice  EYES: EOMI, Anti-icteric  HENT:   Well-healed inverted V incision of the nasal columella. PROCEDURE  Nasal Endoscopy (CPT code 39950) from prior visit  Preop: chronic rhinitis  Postop: Same    Verbal consent was received. After topical anesthesia and decongestion had been obtained using aerosolized 1% lidocaine and oxymetazoline, a 45 degree rigid endoscope was placed into both nares with the patient in a sitting position.  The following was observed:      Septum: intact and midline  Other:   -The inferior and middle turbinates were examined. The middle meatus, and sphenoethmoid recess was examined bilaterally.    -There is improvement in the narrow nasal valves bilaterally. The turbinates are well reduced. The crusting present along the length of the turbinates has essentially resolved. There is an area of bright red blood along the left anterior nasal septum. There is some crusting at the site of the septoplasty incision. The nose is healing very well and there is no septal perforation. There is improved external valve collapse with deep inhalation.  -There were no complications. Tolerated well without complication. I attest that I was present for and did the entire procedure myself. This note was generated completely or in part utilizing Dragon dictation speech recognition software. Occasionally, words are mistranscribed and despite editing, the text may contain inaccuracies due to incorrect word recognition. If further clarification is needed please contact the office at [phone]. An electronic signature was used to authenticate this note.     --Yohannes Ramírez MD

## 2022-03-16 ENCOUNTER — TELEPHONE (OUTPATIENT)
Dept: ENT CLINIC | Age: 42
End: 2022-03-16

## 2022-03-16 NOTE — TELEPHONE ENCOUNTER
Yes, we can give Dymista, the issue with [employer] Fields is is typically very expensive and not always covered by insurance.

## 2022-03-17 RX ORDER — AZELASTINE HYDROCHLORIDE, FLUTICASONE PROPIONATE 137; 50 UG/1; UG/1
1 SPRAY, METERED NASAL 2 TIMES DAILY
Qty: 1 EACH | Refills: 0 | Status: SHIPPED | OUTPATIENT
Start: 2022-03-17 | End: 2022-05-20

## 2022-03-23 DIAGNOSIS — J45.51 SEVERE PERSISTENT ASTHMA WITH ACUTE EXACERBATION: Primary | ICD-10-CM

## 2022-03-23 RX ORDER — PREDNISONE 10 MG/1
TABLET ORAL
Qty: 30 TABLET | Refills: 0 | Status: SHIPPED | OUTPATIENT
Start: 2022-03-23 | End: 2022-04-02

## 2022-03-23 RX ORDER — AZITHROMYCIN 250 MG/1
250 TABLET, FILM COATED ORAL SEE ADMIN INSTRUCTIONS
Qty: 6 TABLET | Refills: 0 | Status: SHIPPED | OUTPATIENT
Start: 2022-03-23 | End: 2022-03-28

## 2022-04-04 RX ORDER — VORTIOXETINE 10 MG/1
TABLET, FILM COATED ORAL
Qty: 30 TABLET | Refills: 2 | OUTPATIENT
Start: 2022-04-04

## 2022-04-06 ENCOUNTER — HOSPITAL ENCOUNTER (OUTPATIENT)
Age: 42
Discharge: HOME | End: 2022-04-06
Payer: COMMERCIAL

## 2022-04-06 ENCOUNTER — HOSPITAL ENCOUNTER (OUTPATIENT)
Dept: GENERAL RADIOLOGY | Age: 42
Discharge: HOME | End: 2022-04-06
Payer: COMMERCIAL

## 2022-04-06 ENCOUNTER — OFFICE VISIT (OUTPATIENT)
Dept: PULMONOLOGY | Age: 42
End: 2022-04-06
Payer: COMMERCIAL

## 2022-04-06 VITALS — DIASTOLIC BLOOD PRESSURE: 80 MMHG | OXYGEN SATURATION: 99 % | HEART RATE: 86 BPM | SYSTOLIC BLOOD PRESSURE: 136 MMHG

## 2022-04-06 DIAGNOSIS — J45.51 SEVERE PERSISTENT ASTHMA WITH ACUTE EXACERBATION: ICD-10-CM

## 2022-04-06 DIAGNOSIS — J20.9 ACUTE BRONCHITIS, UNSPECIFIED ORGANISM: ICD-10-CM

## 2022-04-06 DIAGNOSIS — J20.9 ACUTE BRONCHITIS, UNSPECIFIED ORGANISM: Primary | ICD-10-CM

## 2022-04-06 PROCEDURE — 71046 X-RAY EXAM CHEST 2 VIEWS: CPT

## 2022-04-06 PROCEDURE — 99213 OFFICE O/P EST LOW 20 MIN: CPT | Performed by: INTERNAL MEDICINE

## 2022-04-06 RX ORDER — PREDNISONE 20 MG/1
20 TABLET ORAL DAILY
Qty: 5 TABLET | Refills: 0 | Status: SHIPPED | OUTPATIENT
Start: 2022-04-06 | End: 2022-04-11

## 2022-04-06 ASSESSMENT — ENCOUNTER SYMPTOMS
STRIDOR: 0
RHINORRHEA: 0
CHEST TIGHTNESS: 1
SORE THROAT: 0
WHEEZING: 0
BACK PAIN: 0
SINUS PRESSURE: 0
ABDOMINAL DISTENTION: 0
CHOKING: 0
ABDOMINAL PAIN: 0
VOICE CHANGE: 0
BLOOD IN STOOL: 0
DIARRHEA: 0
COUGH: 1
APNEA: 0
ANAL BLEEDING: 0
SHORTNESS OF BREATH: 1
CONSTIPATION: 0

## 2022-04-06 NOTE — PROGRESS NOTES
Gulshan Schneider    YOB: 1980     Date of Service:  4/6/2022     Chief Complaint   Patient presents with    Asthma         HPI patient has had asthma flareup for the last 2 weeks or so-initial improvement noted with steroids, but now symptoms worse. Increased chest tightness and a dry cough. Also has symptoms of postnasal drip. No fevers or flulike symptoms. Currently using albuterol rescue inhaler 3-4 times daily.     Allergies   Allergen Reactions    Ensure      EGGS    Peanut-Containing Drug Products      PEANUTS    Food      Certain foods (chocolate, tea, soy, eggs, tree nuts, coffee, tomatoes, black pepper)     Outpatient Medications Marked as Taking for the 4/6/22 encounter (Office Visit) with Kalyani Valdez MD   Medication Sig Dispense Refill    predniSONE (DELTASONE) 20 MG tablet Take 1 tablet by mouth daily for 5 days 5 tablet 0       Immunization History   Administered Date(s) Administered    COVID-19, J&J, PF, 0.5 mL 04/03/2021    COVID-19, Pfizer Purple top, DILUTE for use, 12+ yrs, 30mcg/0.3mL dose 12/24/2021    Influenza Virus Vaccine 10/01/2010, 09/09/2014    Influenza, Intradermal, Preservative free 10/01/2010, 11/28/2012, 09/23/2013    Influenza, Intradermal, Quadrivalent, Preservative Free 10/05/2016    Influenza, MDCK Quadv, IM, PF (Flucelvax 2 yrs and older) 09/27/2019    Influenza, MDCK Quadv, with preserv IM (Flucelvax 2 yrs and older) 11/09/2020    Influenza, Quadv, IM, (6 mo and older Fluzone, Flulaval, Fluarix and 3 yrs and older Afluria) 10/08/2021    Influenza, Quadv, IM, PF (6 mo and older Fluzone, Flulaval, Fluarix, and 3 yrs and older Afluria) 10/21/2015, 10/28/2017    Influenza, Laney Broody, Recombinant, IM PF (Flublok 18 yrs and older) 09/27/2018    Pneumococcal Polysaccharide ([id number]) 01/19/2018    Tdap (Boostrix, Adacel) 08/20/2012       Past Medical History:   Diagnosis Date    Allergic rhinitis     Anxiety     Asthma     Environmental allergies     food    Hyperlipidemia     Hypertension     Lumbar disc disease L4-5, L5-S1 Dr Cori Mendezfield) Epidural injections 2011 8/20/2012    Obstructive sleep apnea     Obstructive sleep apnea (adult) (pediatric) 6/18/2018    PCOS (polycystic ovarian syndrome)     S/P colonoscopy 1/17/13    normal. Dr Salas Porras     Past Surgical History:   Procedure Laterality Date    BREAST REDUCTION SURGERY  01/01/2004    FOOT SURGERY  01/01/2000    hallux fx    KNEE ARTHROSCOPY Right     NOSE SURGERY N/A 10/29/2021    SEPTOPLASTY, INFERIOR TURBINATE REDUCTION, REPAIR OF NASAL VALVE COLLAPSE, ABLATION OF POSTERIOR NASAL NERVES performed by Maribel Lopez MD at [facility] ARTHROSCOPY Left     SINUS SURGERY  10/29/2021    UPPER GASTROINTESTINAL ENDOSCOPY  01/17/2013    gastritis; Dr Salas Porras     Family History   Problem Relation Age of Onset    High Blood Pressure Mother     Other Mother         hypothyroid    Diabetes Sister     Diabetes Maternal Grandmother     Diabetes Maternal Grandfather        Review of Systems:  Review of Systems   Constitutional: Negative for activity change, appetite change, fatigue and fever. HENT: Positive for congestion and postnasal drip. Negative for ear discharge, ear pain, rhinorrhea, sinus pressure, sneezing, sore throat, tinnitus and voice change. Respiratory: Positive for cough, chest tightness and shortness of breath. Negative for apnea, choking, wheezing and stridor. Cardiovascular: Negative for chest pain, palpitations and leg swelling. Gastrointestinal: Negative for abdominal distention, abdominal pain, anal bleeding, blood in stool, constipation and diarrhea. Musculoskeletal: Negative for arthralgias, back pain and gait problem. Skin: Negative for pallor and rash. Allergic/Immunologic: Negative for environmental allergies.    Neurological: Negative for dizziness, tremors, seizures, syncope, speech difficulty, weakness, light-headedness, numbness and headaches. Hematological: Negative for adenopathy. Does not bruise/bleed easily. Psychiatric/Behavioral: Negative for sleep disturbance. Vitals:    04/06/22 1319   BP: 136/80   Pulse: 86   SpO2: 99%     Patient-Reported Vitals 2/25/2022   Patient-Reported Weight 235lb   Patient-Reported Height 5ft6in   Patient-Reported Systolic -   Patient-Reported Diastolic -   Patient-Reported Pulse -      There is no height or weight on file to calculate BMI. Wt Readings from Last 3 Encounters:   03/15/22 240 lb (108.9 kg)   03/08/22 242 lb (109.8 kg)   01/20/22 235 lb 12.8 oz (107 kg)     BP Readings from Last 3 Encounters:   04/06/22 136/80   03/15/22 123/80   03/08/22 123/82         Physical Exam  Constitutional:       General: She is not in acute distress. Appearance: She is well-developed. She is not diaphoretic. HENT:      Mouth/Throat:      Pharynx: No oropharyngeal exudate. Cardiovascular:      Rate and Rhythm: Normal rate and regular rhythm. Heart sounds: Normal heart sounds. No murmur heard. Pulmonary:      Effort: No respiratory distress. Breath sounds: Normal breath sounds. No wheezing, rhonchi or rales. Chest:      Chest wall: No tenderness. Abdominal:      General: There is no distension. Palpations: There is no mass. Tenderness: There is no abdominal tenderness. There is no guarding or rebound. Musculoskeletal:         General: No swelling, tenderness or deformity. Skin:     Coloration: Skin is not pale. Findings: No erythema or rash. Neurological:      Mental Status: She is alert and oriented to person, place, and time. Cranial Nerves: No cranial nerve deficit. Motor: No abnormal muscle tone.       Coordination: Coordination normal.      Deep Tendon Reflexes: Reflexes normal.             Health Maintenance   Topic Date Due    Hepatitis C screen  Never done    Lipid screen  12/18/2021    Depression Screen 04/23/2022    Potassium monitoring  08/06/2022    Creatinine monitoring  08/06/2022    DTaP/Tdap/Td vaccine (2 - Td or Tdap) 08/20/2022    A1C test (Diabetic or Prediabetic)  09/20/2022    Cervical cancer screen  11/04/2022    Pneumococcal 0-64 years Vaccine (2 of 2 - PPSV23) 02/21/2043    Flu vaccine  Completed    COVID-19 Vaccine  Completed    HIV screen  Completed    Hepatitis A vaccine  Aged Out    Hepatitis B vaccine  Aged Out    Hib vaccine  Aged Out    Meningococcal (ACWY) vaccine  Aged Out          Assessment/Plan:     Diagnosis Orders   1. Acute bronchitis, unspecified organism  XR CHEST STANDARD (2 VW)   2.  Severe persistent asthma, with acute exacerbation    Acute bronchitis/asthma exacerbation. No clear triggers, patient thinks it might be related to change in seasons. Also has postnasal drip-recently Flonase was switched over to [address] [place] [address]. Completed 12-day prednisone taper and 5-day Z-Terrence. Will extend prednisone course by another 5 days - 20 mg daily. Obtain chest x-ray. Patient already on Trelegy 200, Singulair, albuterol as needed. Patient already has an appointment to see me on 4/14.

## 2022-04-13 RX ORDER — DOXYCYCLINE HYCLATE 100 MG
100 TABLET ORAL 2 TIMES DAILY
Qty: 14 TABLET | Refills: 0 | Status: SHIPPED | OUTPATIENT
Start: 2022-04-13 | End: 2022-04-20

## 2022-05-20 DIAGNOSIS — I10 ESSENTIAL HYPERTENSION: ICD-10-CM

## 2022-05-20 RX ORDER — BUSPIRONE HYDROCHLORIDE 10 MG/1
TABLET ORAL
Qty: 120 TABLET | Refills: 1 | Status: SHIPPED | OUTPATIENT
Start: 2022-05-20 | End: 2022-09-16

## 2022-05-20 RX ORDER — OMEPRAZOLE 40 MG/1
CAPSULE, DELAYED RELEASE ORAL
Qty: 60 CAPSULE | Refills: 1 | Status: SHIPPED | OUTPATIENT
Start: 2022-05-20 | End: 2022-08-02

## 2022-05-20 RX ORDER — AZELASTINE HYDROCHLORIDE, FLUTICASONE PROPIONATE 137; 50 UG/1; UG/1
SPRAY, METERED NASAL
Qty: 23 G | Refills: 1 | Status: SHIPPED | OUTPATIENT
Start: 2022-05-20 | End: 2022-08-02

## 2022-05-20 RX ORDER — SPIRONOLACTONE 100 MG/1
TABLET, FILM COATED ORAL
Qty: 30 TABLET | Refills: 1 | Status: SHIPPED | OUTPATIENT
Start: 2022-05-20 | End: 2022-08-03

## 2022-05-20 NOTE — TELEPHONE ENCOUNTER
Refill Request:     Last Office Visit:  3/15/2022     Next Scheduled Visit : Visit date not found     Medication Requested:    Pharmacy:    [facility] [id number] [facility] [place] Lino Lo [place] [phone] - F [phone]  [address] [place] [address]  Phone: [phone] Fax: [place] [address]. [place] [address]  Phone: [phone] Fax: [address] [place], [place] - [address] [phone] - [id number] [place] [address]  Phone: [phone] Fax: [phone]

## 2022-05-20 NOTE — TELEPHONE ENCOUNTER
Due for follow up with Dr Umana Quiet- please call them to schedule at their earliest convenience. (meds have been refilled this time)

## 2022-05-31 ENCOUNTER — TELEPHONE (OUTPATIENT)
Dept: PULMONOLOGY | Age: 42
End: 2022-05-31

## 2022-06-10 ENCOUNTER — OFFICE VISIT (OUTPATIENT)
Dept: FAMILY MEDICINE CLINIC | Age: 42
End: 2022-06-10
Payer: COMMERCIAL

## 2022-06-10 VITALS
WEIGHT: 238 LBS | HEIGHT: 66 IN | HEART RATE: 76 BPM | BODY MASS INDEX: 38.25 KG/M2 | SYSTOLIC BLOOD PRESSURE: 120 MMHG | OXYGEN SATURATION: 98 % | DIASTOLIC BLOOD PRESSURE: 84 MMHG

## 2022-06-10 DIAGNOSIS — B96.89 ACUTE BACTERIAL SINUSITIS: ICD-10-CM

## 2022-06-10 DIAGNOSIS — R73.03 PREDIABETES: ICD-10-CM

## 2022-06-10 DIAGNOSIS — I10 ESSENTIAL HYPERTENSION: Chronic | ICD-10-CM

## 2022-06-10 DIAGNOSIS — L68.0 HIRSUTISM: ICD-10-CM

## 2022-06-10 DIAGNOSIS — G47.19 EXCESSIVE DAYTIME SLEEPINESS: ICD-10-CM

## 2022-06-10 DIAGNOSIS — E28.2 POLYCYSTIC OVARIAN DISEASE: ICD-10-CM

## 2022-06-10 DIAGNOSIS — Z11.59 ENCOUNTER FOR HEPATITIS C SCREENING TEST FOR LOW RISK PATIENT: ICD-10-CM

## 2022-06-10 DIAGNOSIS — J45.20 MILD INTERMITTENT ASTHMA WITHOUT COMPLICATION: ICD-10-CM

## 2022-06-10 DIAGNOSIS — J01.90 ACUTE BACTERIAL SINUSITIS: ICD-10-CM

## 2022-06-10 DIAGNOSIS — E78.5 HYPERLIPIDEMIA, UNSPECIFIED HYPERLIPIDEMIA TYPE: ICD-10-CM

## 2022-06-10 DIAGNOSIS — F43.12 CHRONIC POST-TRAUMATIC STRESS DISORDER (PTSD): Primary | ICD-10-CM

## 2022-06-10 LAB
A/G RATIO: 1.6 (ref 1.1–2.2)
ALBUMIN SERPL-MCNC: 4.4 G/DL (ref 3.4–5)
ALP BLD-CCNC: 102 U/L (ref 40–129)
ALT SERPL-CCNC: 21 U/L (ref 10–40)
ANION GAP SERPL CALCULATED.3IONS-SCNC: 12 MMOL/L (ref 3–16)
AST SERPL-CCNC: 17 U/L (ref 15–37)
BILIRUB SERPL-MCNC: <0.2 MG/DL (ref 0–1)
BUN BLDV-MCNC: 11 MG/DL (ref 7–20)
CALCIUM SERPL-MCNC: 9.1 MG/DL (ref 8.3–10.6)
CHLORIDE BLD-SCNC: 103 MMOL/L (ref 99–110)
CHOLESTEROL, TOTAL: 161 MG/DL (ref 0–199)
CO2: 25 MMOL/L (ref 21–32)
CREAT SERPL-MCNC: 0.6 MG/DL (ref 0.6–1.1)
GFR AFRICAN AMERICAN: >60
GFR NON-AFRICAN AMERICAN: >60
GLUCOSE BLD-MCNC: 102 MG/DL (ref 70–99)
HDLC SERPL-MCNC: 43 MG/DL (ref 40–60)
HEPATITIS C ANTIBODY INTERPRETATION: NORMAL
LDL CHOLESTEROL CALCULATED: 105 MG/DL
POTASSIUM SERPL-SCNC: 4.5 MMOL/L (ref 3.5–5.1)
SODIUM BLD-SCNC: 140 MMOL/L (ref 136–145)
TOTAL PROTEIN: 7.1 G/DL (ref 6.4–8.2)
TRIGL SERPL-MCNC: 64 MG/DL (ref 0–150)
VLDLC SERPL CALC-MCNC: 13 MG/DL

## 2022-06-10 PROCEDURE — 99214 OFFICE O/P EST MOD 30 MIN: CPT | Performed by: FAMILY MEDICINE

## 2022-06-10 RX ORDER — MONTELUKAST SODIUM 10 MG/1
10 TABLET ORAL NIGHTLY
Qty: 90 TABLET | Refills: 1 | Status: SHIPPED | OUTPATIENT
Start: 2022-06-10

## 2022-06-10 RX ORDER — LISINOPRIL 5 MG/1
TABLET ORAL
Qty: 90 TABLET | Refills: 1 | Status: SHIPPED | OUTPATIENT
Start: 2022-06-10

## 2022-06-10 RX ORDER — AMOXICILLIN 500 MG/1
1000 CAPSULE ORAL 2 TIMES DAILY
Qty: 40 CAPSULE | Refills: 0 | Status: SHIPPED | OUTPATIENT
Start: 2022-06-10 | End: 2022-06-20

## 2022-06-10 RX ORDER — ATORVASTATIN CALCIUM 40 MG/1
TABLET, FILM COATED ORAL
Qty: 90 TABLET | Refills: 1 | Status: SHIPPED | OUTPATIENT
Start: 2022-06-10

## 2022-06-10 ASSESSMENT — PATIENT HEALTH QUESTIONNAIRE - PHQ9
SUM OF ALL RESPONSES TO PHQ QUESTIONS 1-9: 0
2. FEELING DOWN, DEPRESSED OR HOPELESS: 0
SUM OF ALL RESPONSES TO PHQ9 QUESTIONS 1 & 2: 0
SUM OF ALL RESPONSES TO PHQ QUESTIONS 1-9: 0
SUM OF ALL RESPONSES TO PHQ QUESTIONS 1-9: 0
1. LITTLE INTEREST OR PLEASURE IN DOING THINGS: 0
SUM OF ALL RESPONSES TO PHQ QUESTIONS 1-9: 0

## 2022-06-10 NOTE — PROGRESS NOTES
6/10/2022    Blood pressure 120/84, pulse 76, height 5' 6\" (1.676 m), weight 238 lb (108 kg), last menstrual period 2022, SpO2 98 %, not currently breastfeeding. Christina Darden (:  1980) is a 38 y.o. female, here for evaluation of the following medical concerns:    Chief Complaint   Patient presents with    Follow-up     f/u for med check    Other     poss sinus problem - was doing well since surgery, but now has some symptoms     Here for routing check up.  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 Anxiety: on trintellix and buspar. When she goes to stop one of these, she feels sx return. Anxiety and depression is worst premenstrually- this is fairly new for her. She tracks cycles on mattie. Has PCOS. At least monthly. Cycles starting to shorten (she thinks could be perimenopausal changes). Tracks cycles on mattie. Only on aldactone for pcos. For hair, not acne. Future  has had a vasectomy. Has been working with DR Alfonzo Ramos for chronic sinusitis/rhinitis. Is on daily nasal saline rinses/nasal sprays  She has noted increased cough, mucus production, PND for past 10-14 days. Colored mucus. No fever or SOB. Asthma well controlled on Trelegy + Singulair. Uses albuterol very rarely. Does not affect exercise. Exercises 2-3 days a week- cardio and weights. HTN: takes lisinopril,  No hx of CAD, TIA, CVA or PVD. Last renal function test:   Lab Results   Component Value Date     2021    K 4.1 2021    BUN 10 2021    CREATININE 0.7 2021     CrCl cannot be calculated (Patient's most recent lab result is older than the maximum 180 days allowed. ). Hx very high LDL - on meds sie age 11. On atorva.   Last lipid test:  Lab Results   Component Value Date    CHOL 149 2019    TRIG 145 2019    HDL 39 (L) 2020 LDLCALC 101 (H) 12/18/2020     Lab Results   Component Value Date    ALT 26 12/18/2020    AST 17 12/18/2020     Remains on provigil for daytime somnolence. This dose continues to be effective. She does use CPAP also. She has been working with DR Elizabeth Lees on GI issues. Thinks it may be her gall bladder. Plans to see surgeon about this. Dr Babatunde Vazquez for consideration of surgery. (episodes of nausea, bloating, lasting a week at a time, once a month). PPI's did not help (still on ppi BID)    Patient Active Problem List   Diagnosis    Polycystic ovarian disease    Hyperlipidemia    Hirsutism    Allergic rhinitis due to pollen    Nasal septal deviation    Asthma    Lumbar disc disease L4-5, L5-S1 Dr Germain Ordoñez [facility]) Epidural injections 2011    Essential hypertension    Prediabetes A1c 5.7 6/1/16    Obstructive sleep apnea (adult) (pediatric)    Excessive daytime sleepiness    Other viral warts    Non morbid obesity, unspecified obesity type    Chronic post-traumatic stress disorder (PTSD) with anxiety    Chronic GERD    Severe persistent asthma without complication    Nasal valve collapse    Chronic rhinitis    Hypertrophy of inferior nasal turbinate        Body mass index is 38.41 kg/m². Wt Readings from Last 3 Encounters:   06/10/22 238 lb (108 kg)   03/15/22 240 lb (108.9 kg)   03/08/22 242 lb (109.8 kg)       BP Readings from Last 3 Encounters:   06/10/22 120/84   04/06/22 136/80   03/15/22 123/80       Allergies   Allergen Reactions    Ensure      EGGS    Peanut-Containing Drug Products      PEANUTS    Food      Certain foods (chocolate, tea, soy, eggs, tree nuts, coffee, tomatoes, black pepper)       Prior to Visit Medications    Medication Sig Taking?  Authorizing Provider   omeprazole (PRILOSEC) 40 MG delayed release capsule TAKE ONE CAPSULE BY MOUTH TWICE A DAY Yes Fausto Sagastume MD   busPIRone (BUSPAR) 10 MG tablet TAKE TWO TABLETS BY MOUTH TWICE A DAY Yes Frida Sanz Brittni Brian MD   spironolactone (ALDACTONE) 100 MG tablet TAKE ONE TABLET BY MOUTH DAILY Yes Arlene Garrett MD   Azelastine-Fluticasone 137-50 MCG/ACT SUSP SPRAY 1 SPRAY IN EACH NOSTRIL TWICE DAILY Yes Chalo Whitmore MD   VORTIoxetine HBr (TRINTELLIX) 20 MG TABS tablet Take 1 tablet by mouth daily Yes Arlene Garrett MD   Blood Pressure Monitoring (OMRON 3 SERIES BP MONITOR) GABRIELLE Test blood pressure weekly or PRN Yes Arlene Garrett MD   DUPIXENT 300 MG/2ML SOPN injection  Yes Historical Provider, MD   lisinopril (PRINIVIL;ZESTRIL) 5 MG tablet TAKE ONE TABLET BY MOUTH DAILY Yes Arlene Garrett MD   albuterol sulfate  (90 Base) MCG/ACT inhaler INHALE TWO PUFFS BY MOUTH EVERY 6 HOURS AS NEEDED FOR WHEEZING OR FOR SHORTNESS OF BREATH Yes Arlene Garrett MD   atorvastatin (LIPITOR) 40 MG tablet TAKE ONE TABLET BY MOUTH DAILY Yes Arlene Garrett MD   TRELEGY ELLIPTA 200-62.5-25 MCG/INH AEPB INHALE ONE PUFF BY MOUTH DAILY Yes Emilia Kidd MD   montelukast (SINGULAIR) 10 MG tablet Take 1 tablet by mouth nightly Yes QI Hawkins - CNP   Spacer/Aero-Holding Marnee Dub GABRIELLE 1 Device by Does not apply route daily as needed (with HFA inhalers) Yes Arlene Garrett MD   Multiple Vitamins-Minerals (THERAPEUTIC MULTIVITAMIN-MINERALS) tablet Take 1 tablet by mouth daily. Yes Historical Provider, MD   modafinil (PROVIGIL) 200 MG tablet Take 1 tablet by mouth 2 times daily for 90 days. QI Clark        Social History     Tobacco Use    Smoking status: Never Smoker    Smokeless tobacco: Never Used    Tobacco comment: congratulated on nonsmoking status   Vaping Use    Vaping Use: Never used   Substance Use Topics    Alcohol use: No    Drug use: No       Review of Systems As above     Physical Exam  Vitals and nursing note reviewed. Constitutional:       Appearance: Normal appearance. She is well-developed. She is obese. Neck:      Thyroid: No thyromegaly. Cardiovascular:      Rate and Rhythm: Normal rate and regular rhythm. Pulses: Normal pulses. Heart sounds: Normal heart sounds. No murmur heard. Pulmonary:      Effort: Pulmonary effort is normal. No respiratory distress. Breath sounds: Normal breath sounds. No wheezing or rales. Musculoskeletal:         General: Normal range of motion. Cervical back: Normal range of motion. Skin:     General: Skin is warm and dry. Neurological:      General: No focal deficit present. Mental Status: She is alert and oriented to person, place, and time. Mental status is at baseline. Psychiatric:         Mood and Affect: Mood normal.         Behavior: Behavior normal.         Thought Content: Thought content normal.         Judgment: Judgment normal.         ASSESSMENT/PLAN:    1. Chronic post-traumatic stress disorder (PTSD) with anxiety  - mood stable; Continue current medications. 2. Mild intermittent asthma without complication  - Stable; continue trelegy/singulair.  - montelukast (SINGULAIR) 10 MG tablet; Take 1 tablet by mouth nightly  Dispense: 90 tablet; Refill: 1    3. Essential hypertension  - Blood pressure is at goal on current therapy; continue meds and monitoring. Regular physical activity and healthy diet (low sodium, multiple servings of produce daily) was recommended. Renal function to be assessed yearly. - Comprehensive Metabolic Panel; Future    4. Polycystic ovarian disease  - gyn consequences of this are increasingly less, but metabolic aspects are more important. Continue aldactone for hirsutism. Monitor for diabetes. Encouraged healthy diet and to continue regular exercise. 5. Hyperlipidemia, unspecified hyperlipidemia type  - long term statin use due to high LDL (LDL is >200 when not on statin)  - Lipid Panel; Future    6. Hirsutism  - controlled with aldactone- continue; monitor lytes.     7. Prediabetes A1c 5.7 6/1/16  - continue lifestyle management; retest a1c  - Hemoglobin A1C; Future    8. Excessive daytime sleepiness  - well controlled with provigil; continue. 9. Acute bacterial sinusitis  - rx amoxl    10. Encounter for hepatitis C screening test for low risk patient  - Hepatitis C Antibody; Future      Return in about 6 months (around 12/10/2022) for follow up HTN. An  OPPRTUNITYignature was used to authenticate this note.     --Tim Yan MD on 6/10/2022 at 11:26 AM

## 2022-06-11 LAB
ESTIMATED AVERAGE GLUCOSE: 111.2 MG/DL
HBA1C MFR BLD: 5.5 %

## 2022-07-25 ENCOUNTER — PATIENT MESSAGE (OUTPATIENT)
Dept: PULMONOLOGY | Age: 42
End: 2022-07-25

## 2022-07-25 NOTE — TELEPHONE ENCOUNTER
From: Dheeraj Yun  To: Dr. Rojelio Guillaume: 7/25/2022 12:49 PM EDT  Subject: Upper respiratory infection     Good afternoon Dr. David Aldridge,    I am still having yellowish white mucous. I also have a cough. I know it's not covid. I took a couple of test and they were negative. I haven't been running a fever but I still feel congested. What should I do?

## 2022-07-26 DIAGNOSIS — J45.40 MODERATE PERSISTENT ASTHMA WITHOUT COMPLICATION: Primary | ICD-10-CM

## 2022-07-26 RX ORDER — PREDNISONE 10 MG/1
TABLET ORAL
Qty: 18 TABLET | Refills: 0 | Status: SHIPPED | OUTPATIENT
Start: 2022-07-26 | End: 2022-09-16

## 2022-07-29 DIAGNOSIS — J45.40 MODERATE PERSISTENT ASTHMA WITHOUT COMPLICATION: ICD-10-CM

## 2022-07-29 LAB
BASOPHILS ABSOLUTE: 0 K/UL (ref 0–0.2)
BASOPHILS RELATIVE PERCENT: 0.4 %
EOSINOPHILS ABSOLUTE: 0 K/UL (ref 0–0.6)
EOSINOPHILS RELATIVE PERCENT: 0 %
HCT VFR BLD CALC: 38.5 % (ref 36–48)
HEMOGLOBIN: 12.7 G/DL (ref 12–16)
LYMPHOCYTES ABSOLUTE: 0.6 K/UL (ref 1–5.1)
LYMPHOCYTES RELATIVE PERCENT: 9.1 %
MCH RBC QN AUTO: 29.9 PG (ref 26–34)
MCHC RBC AUTO-ENTMCNC: 33.1 G/DL (ref 31–36)
MCV RBC AUTO: 90.3 FL (ref 80–100)
MONOCYTES ABSOLUTE: 0.2 K/UL (ref 0–1.3)
MONOCYTES RELATIVE PERCENT: 2.9 %
NEUTROPHILS ABSOLUTE: 6.2 K/UL (ref 1.7–7.7)
NEUTROPHILS RELATIVE PERCENT: 87.6 %
PDW BLD-RTO: 13.3 % (ref 12.4–15.4)
PLATELET # BLD: 372 K/UL (ref 135–450)
PMV BLD AUTO: 6.8 FL (ref 5–10.5)
RBC # BLD: 4.27 M/UL (ref 4–5.2)
WBC # BLD: 7.1 K/UL (ref 4–11)

## 2022-08-02 ENCOUNTER — OFFICE VISIT (OUTPATIENT)
Dept: PULMONOLOGY | Age: 42
End: 2022-08-02
Payer: COMMERCIAL

## 2022-08-02 VITALS
HEIGHT: 66 IN | DIASTOLIC BLOOD PRESSURE: 80 MMHG | OXYGEN SATURATION: 98 % | WEIGHT: 236 LBS | SYSTOLIC BLOOD PRESSURE: 128 MMHG | HEART RATE: 82 BPM | BODY MASS INDEX: 37.93 KG/M2

## 2022-08-02 DIAGNOSIS — I10 ESSENTIAL HYPERTENSION: ICD-10-CM

## 2022-08-02 DIAGNOSIS — J45.50 SEVERE PERSISTENT ASTHMA WITHOUT COMPLICATION: Primary | ICD-10-CM

## 2022-08-02 PROCEDURE — 99213 OFFICE O/P EST LOW 20 MIN: CPT | Performed by: INTERNAL MEDICINE

## 2022-08-02 RX ORDER — FLUTICASONE FUROATE, UMECLIDINIUM BROMIDE AND VILANTEROL TRIFENATATE 200; 62.5; 25 UG/1; UG/1; UG/1
POWDER RESPIRATORY (INHALATION)
Qty: 60 EACH | Refills: 5 | Status: SHIPPED | OUTPATIENT
Start: 2022-08-02

## 2022-08-02 RX ORDER — OMEPRAZOLE 40 MG/1
CAPSULE, DELAYED RELEASE ORAL
Qty: 60 CAPSULE | Refills: 1 | Status: SHIPPED | OUTPATIENT
Start: 2022-08-02 | End: 2022-09-16

## 2022-08-02 RX ORDER — AZELASTINE HYDROCHLORIDE, FLUTICASONE PROPIONATE 137; 50 UG/1; UG/1
SPRAY, METERED NASAL
Qty: 23 G | Refills: 1 | Status: SHIPPED | OUTPATIENT
Start: 2022-08-02

## 2022-08-02 RX ORDER — PREDNISONE 20 MG/1
20 TABLET ORAL DAILY
Qty: 5 TABLET | Refills: 0 | Status: SHIPPED | OUTPATIENT
Start: 2022-08-02 | End: 2022-08-07

## 2022-08-02 ASSESSMENT — ENCOUNTER SYMPTOMS
CONSTIPATION: 0
ABDOMINAL DISTENTION: 0
BLOOD IN STOOL: 0
APNEA: 0
STRIDOR: 0
RHINORRHEA: 0
DIARRHEA: 0
WHEEZING: 0
CHEST TIGHTNESS: 0
SHORTNESS OF BREATH: 0
SINUS PRESSURE: 0
COUGH: 0
ABDOMINAL PAIN: 0
VOICE CHANGE: 0
CHOKING: 0
ANAL BLEEDING: 0
SORE THROAT: 0

## 2022-08-02 NOTE — TELEPHONE ENCOUNTER
Refill Request:     Last Office Visit:  3/15/2022     Next Scheduled Visit : Visit date not found     Medication Requested:    Pharmacy:    [facility] [id number] [facility] [place] L[place] [phone] - F [phone]  [address] [facility] [address]  Phone: [phone] Fax: [address] [address]  Phone: [phone] Fax: [address] [place] [address] - [address] [phone] - [address] [address]  Phone: [phone]: [phone]

## 2022-08-02 NOTE — PROGRESS NOTES
Chriss Lenz    YOB: 1980     Date of Service:  8/2/2022     Chief Complaint   Patient presents with    Asthma    Shortness of Breath    Cough     Yellow mucus    Wheezing         HPI patient currently having recurrent exacerbations of asthma-April and June. Still feels some symptoms of postnasal drip. Has symptoms related to cholecystitis, due for laparoscopic surgery on 8/10. Currently she states that she has no significant cough, shortness of breath or wheezing. Allergies   Allergen Reactions    Ensure      EGGS    Peanut-Containing Drug Products      PEANUTS    Food      Certain foods (chocolate, tea, soy, eggs, tree nuts, coffee, tomatoes, black pepper)     Outpatient Medications Marked as Taking for the 8/2/22 encounter (Office Visit) with Abelardo Godoy MD   Medication Sig Dispense Refill    omeprazole (PRILOSEC) 40 MG delayed release capsule TAKE ONE CAPSULE BY MOUTH TWICE A DAY 60 capsule 1    TRELEGY ELLIPTA 200-62.5-25 MCG/INH AEPB INHALE ONE PUFF BY MOUTH DAILY 60 each 5    Azelastine-Fluticasone 137-50 MCG/ACT SUSP SPRAY ONE SPRAY IN EACH NOSTRIL TWICE A DAY 23 g 1    predniSONE (DELTASONE) 20 MG tablet Take 1 tablet by mouth in the morning for 5 days.  5 tablet 0    predniSONE (DELTASONE) 10 MG tablet 30 mg for 3 days 20 mg for 3 days 10 mg for 3 days 18 tablet 0    montelukast (SINGULAIR) 10 MG tablet Take 1 tablet by mouth nightly 90 tablet 1    VORTIoxetine HBr (TRINTELLIX) 20 MG TABS tablet Take 1 tablet by mouth daily 90 tablet 1    lisinopril (PRINIVIL;ZESTRIL) 5 MG tablet TAKE 1 TAB DAILY 90 tablet 1    atorvastatin (LIPITOR) 40 MG tablet TAKE ONE TABLET BY MOUTH DAILY 90 tablet 1    busPIRone (BUSPAR) 10 MG tablet TAKE TWO TABLETS BY MOUTH TWICE A  tablet 1    spironolactone (ALDACTONE) 100 MG tablet TAKE ONE TABLET BY MOUTH DAILY 30 tablet 1    Blood Pressure Monitoring (OMRON 3 SERIES BP MONITOR) GABRIELLE Test blood pressure weekly or PRN 1 each 0 DUPIXENT 300 MG/2ML SOPN injection       albuterol sulfate  (90 Base) MCG/ACT inhaler INHALE TWO PUFFS BY MOUTH EVERY 6 HOURS AS NEEDED FOR WHEEZING OR FOR SHORTNESS OF BREATH 8.5 g 0    Spacer/Aero-Holding Chambers GABRIELLE 1 Device by Does not apply route daily as needed (with HFA inhalers) 1 Device 0    Multiple Vitamins-Minerals (THERAPEUTIC MULTIVITAMIN-MINERALS) tablet Take 1 tablet by mouth daily.          Immunization History   Administered Date(s) Administered    COVID-19, J&J, (age 18y+), IM, 0.5 mL 04/03/2021    COVID-19, PFIZER PURPLE top, DILUTE for use, (age 15 y+), 30mcg/0.3mL 12/24/2021    Influenza Virus Vaccine 10/01/2010, 09/09/2014    Influenza, Intradermal, Preservative free 10/01/2010, 11/28/2012, 09/23/2013    Influenza, Intradermal, Quadrivalent, Preservative Free 10/05/2016    Influenza, MDCK Quadv, IM, PF (Flucelvax 2 yrs and older) 09/27/2019    Influenza, MDCK Quadv, with preserv IM (Flucelvax 2 yrs and older) 11/09/2020    Influenza, Quadv, IM, (6 mo and older Fluzone, Flulaval, Fluarix and 3 yrs and older Afluria) 10/08/2021    Influenza, Quadv, IM, PF (6 mo and older Fluzone, Flulaval, Fluarix, and 3 yrs and older Afluria) 10/21/2015, 10/28/2017    Influenza, Quadv, Recombinant, IM PF (Flublok 18 yrs and older) 09/27/2018    Pneumococcal Polysaccharide ([id number]) 01/19/2018    Tdap (Boostrix, Adacel) 08/20/2012       Past Medical History:   Diagnosis Date    Allergic rhinitis     Anxiety     Asthma     Environmental allergies     food    Hyperlipidemia     Hypertension     Lumbar disc disease L4-5, L5-S1 Dr Mely Guzmán) Epidural injections 2011 8/20/2012    Obstructive sleep apnea     Obstructive sleep apnea (adult) (pediatric) 6/18/2018    PCOS (polycystic ovarian syndrome)     S/P colonoscopy 1/17/13    normal. Dr Loyda Kaur     Past Surgical History:   Procedure Laterality Date    BREAST REDUCTION SURGERY  01/01/2004    FOOT SURGERY  01/01/2000    hallux fx    KNEE ARTHROSCOPY Right     NOSE SURGERY N/A 10/29/2021    SEPTOPLASTY, INFERIOR TURBINATE REDUCTION, REPAIR OF NASAL VALVE COLLAPSE, ABLATION OF POSTERIOR NASAL NERVES performed by Kristin Alexander MD at [facility] ARTHROSCOPY Left     SINUS SURGERY  10/29/2021    UPPER GASTROINTESTINAL ENDOSCOPY  01/17/2013    gastritis; Dr Kim Michaud     Family History   Problem Relation Age of Onset    High Blood Pressure Mother     Other Mother         hypothyroid    Diabetes Sister     Diabetes Maternal Grandmother     Diabetes Maternal Grandfather        Review of Systems:  Review of Systems   Constitutional:  Negative for activity change, appetite change, fatigue and fever. HENT:  Negative for congestion, ear discharge, ear pain, postnasal drip, rhinorrhea, sinus pressure, sneezing, sore throat, tinnitus and voice change. Respiratory:  Negative for apnea, cough, choking, chest tightness, shortness of breath, wheezing and stridor. Cardiovascular:  Negative for chest pain, palpitations and leg swelling. Gastrointestinal:  Negative for abdominal distention, abdominal pain, anal bleeding, blood in stool, constipation and diarrhea. Skin:  Negative for pallor and rash. Allergic/Immunologic: Negative for environmental allergies. Neurological:  Negative for dizziness, tremors, seizures, syncope, speech difficulty, weakness, light-headedness, numbness and headaches. Hematological:  Negative for adenopathy. Does not bruise/bleed easily. Psychiatric/Behavioral:  Negative for sleep disturbance. Vitals:    08/02/22 1518   BP: 128/80   Pulse: 82   SpO2: 98%   Weight: 236 lb (107 kg)   Height: 5' 6\" (1.676 m)     Patient-Reported Vitals 2/25/2022   Patient-Reported Weight 235lb   Patient-Reported Height 5ft6in   Patient-Reported Systolic -   Patient-Reported Diastolic -   Patient-Reported Pulse -      Body mass index is 38.09 kg/m².      Wt Readings from Last 3 Encounters:   08/02/22 236 lb (107 kg)   06/10/22 238 lb (108 kg)   03/15/22 240 lb (108.9 kg)     BP Readings from Last 3 Encounters:   08/02/22 128/80   06/10/22 120/84   04/06/22 136/80         Physical Exam  Constitutional:       General: She is not in acute distress. Appearance: She is well-developed. She is not ill-appearing or diaphoretic. HENT:      Mouth/Throat:      Pharynx: No oropharyngeal exudate. Cardiovascular:      Rate and Rhythm: Normal rate and regular rhythm. Heart sounds: Normal heart sounds. No murmur heard. No friction rub. Pulmonary:      Effort: No respiratory distress. Breath sounds: Wheezing present. No rhonchi or rales. Chest:      Chest wall: No tenderness. Abdominal:      General: There is no distension. Palpations: There is no mass. Tenderness: There is no abdominal tenderness. There is no guarding or rebound. Musculoskeletal:         General: No swelling. Skin:     Coloration: Skin is not pale. Findings: No erythema or rash. Neurological:      Mental Status: She is alert and oriented to person, place, and time. Cranial Nerves: No cranial nerve deficit. Motor: No abnormal muscle tone. Coordination: Coordination normal.      Deep Tendon Reflexes: Reflexes normal.           Health Maintenance   Topic Date Due    Pneumococcal 0-64 years Vaccine (2 - PCV) 01/19/2019    DTaP/Tdap/Td vaccine (2 - Td or Tdap) 08/20/2022    Flu vaccine (1) 09/01/2022    Cervical cancer screen  11/04/2022    A1C test (Diabetic or Prediabetic)  06/10/2023    Lipids  06/10/2023    Depression Screen  06/10/2023    COVID-19 Vaccine  Completed    Hepatitis C screen  Completed    HIV screen  Completed    Hepatitis A vaccine  Aged Out    Hepatitis B vaccine  Aged Out    Hib vaccine  Aged Out    Meningococcal (ACWY) vaccine  Aged Out          Assessment/Plan:    Severe persistent asthma, currently not in exacerbation. Only mild wheeze with poor air entry at the bases-prednisone 20 mg x 5 days.   Patient is planned for laparoscopic cholecystectomy on 8/10, for which patient is okay to proceed with. Currently on Trelegy, Singulair and albuterol as needed. Dupixent biweekly. She also has been using Dymista for sinus drainage. Return in about 6 weeks (around 9/13/2022).

## 2022-08-03 RX ORDER — SPIRONOLACTONE 100 MG/1
TABLET, FILM COATED ORAL
Qty: 30 TABLET | Refills: 5 | Status: SHIPPED | OUTPATIENT
Start: 2022-08-03

## 2022-08-03 RX ORDER — ALBUTEROL SULFATE 90 UG/1
AEROSOL, METERED RESPIRATORY (INHALATION)
Qty: 8.5 G | Refills: 0 | Status: SHIPPED | OUTPATIENT
Start: 2022-08-03 | End: 2022-09-29

## 2022-08-04 LAB
2000687N OAK TREE IGE: <0.1 KU/L (ref 0–0.34)
ALLERGEN BERMUDA GRASS IGE: <0.1 KU/L (ref 0–0.34)
ALLERGEN BIRCH IGE: <0.1 KU/L (ref 0–0.34)
ALLERGEN DOG DANDER IGE: <0.1 KU/L (ref 0–0.34)
ALLERGEN GERMAN COCKROACH IGE: <0.1 KU/L (ref 0–0.34)
ALLERGEN HORMODENDRUM IGE: <0.1 KUL/L (ref 0–0.34)
ALLERGEN MOUSE EPITHELIA IGE: <0.1 KU/L (ref 0–0.34)
ALLERGEN PECAN TREE IGE: <0.1 KU/L (ref 0–0.34)
ALLERGEN PIGWEED ROUGH IGE: <0.1 KU/L (ref 0–0.34)
ALLERGEN SHEEP SORREL (W18) IGE: <0.1 KU/L (ref 0–0.34)
ALLERGEN TREE SYCAMORE: <0.1 KU/L (ref 0–0.34)
ALLERGEN WALNUT TREE IGE: <0.1 KU/L (ref 0–0.34)
ALLERGEN WHITE MULBERRY TREE, IGE: <0.1 KU/L (ref 0–0.34)
ALLERGEN, TREE, WHITE ASH IGE: <0.1 KU/L (ref 0–0.34)
ALTERNARIA ALTERNATA: <0.1 KU/L (ref 0–0.34)
ASPERGILLUS FUMIGATUS: <0.1 KU/L (ref 0–0.34)
CAT DANDER ANTIBODY: <0.1 KU/L (ref 0–0.34)
COTTONWOOD TREE: <0.1 KU/L (ref 0–0.34)
D. FARINAE: <0.1 KU/L (ref 0–0.34)
D. PTERONYSSINUS: <0.1 KU/L (ref 0–0.34)
ELM TREE: <0.1 KU/L (ref 0–0.34)
IGE: 16 IU/ML
MAPLE/BOXELDER TREE: <0.1 KU/L (ref 0–0.34)
MOUNTAIN CEDAR TREE: <0.1 KU/L (ref 0–0.34)
MUCOR RACEMOSUS: <0.1 KU/L (ref 0–0.34)
P. NOTATUM: <0.1 KU/L (ref 0–0.34)
RUSSIAN THISTLE: <0.1 KU/L (ref 0–0.34)
SHORT RAGWD(A ARTEMIS.) IGE: <0.1 KU/L (ref 0–0.34)
TIMOTHY GRASS: <0.1 KU/L (ref 0–0.34)

## 2022-08-12 ENCOUNTER — TELEMEDICINE (OUTPATIENT)
Dept: PULMONOLOGY | Age: 42
End: 2022-08-12
Payer: COMMERCIAL

## 2022-08-12 DIAGNOSIS — E66.9 NON MORBID OBESITY, UNSPECIFIED OBESITY TYPE: Chronic | ICD-10-CM

## 2022-08-12 DIAGNOSIS — I10 ESSENTIAL HYPERTENSION: Chronic | ICD-10-CM

## 2022-08-12 DIAGNOSIS — J45.50 SEVERE PERSISTENT ASTHMA WITHOUT COMPLICATION: ICD-10-CM

## 2022-08-12 DIAGNOSIS — J45.20 MILD INTERMITTENT ASTHMA WITHOUT COMPLICATION: Chronic | ICD-10-CM

## 2022-08-12 DIAGNOSIS — G47.33 OBSTRUCTIVE SLEEP APNEA (ADULT) (PEDIATRIC): Primary | Chronic | ICD-10-CM

## 2022-08-12 DIAGNOSIS — G47.19 EXCESSIVE DAYTIME SLEEPINESS: ICD-10-CM

## 2022-08-12 DIAGNOSIS — K21.9 CHRONIC GERD: ICD-10-CM

## 2022-08-12 PROCEDURE — 99214 OFFICE O/P EST MOD 30 MIN: CPT | Performed by: NURSE PRACTITIONER

## 2022-08-12 RX ORDER — IBUPROFEN 200 MG
200 TABLET ORAL EVERY 6 HOURS PRN
COMMUNITY

## 2022-08-12 RX ORDER — HYDROCODONE BITARTRATE AND ACETAMINOPHEN 5; 325 MG/1; MG/1
TABLET ORAL
COMMUNITY
Start: 2022-08-10 | End: 2022-09-16

## 2022-08-12 RX ORDER — MODAFINIL 200 MG/1
200 TABLET ORAL 2 TIMES DAILY
Qty: 180 TABLET | Refills: 1 | Status: SHIPPED | OUTPATIENT
Start: 2022-08-12 | End: 2022-11-10

## 2022-08-12 ASSESSMENT — SLEEP AND FATIGUE QUESTIONNAIRES
HOW LIKELY ARE YOU TO NOD OFF OR FALL ASLEEP WHILE SITTING QUIETLY AFTER LUNCH WITHOUT ALCOHOL: 1
ESS TOTAL SCORE: 11
HOW LIKELY ARE YOU TO NOD OFF OR FALL ASLEEP WHILE LYING DOWN TO REST IN THE AFTERNOON WHEN CIRCUMSTANCES PERMIT: 3
HOW LIKELY ARE YOU TO NOD OFF OR FALL ASLEEP WHILE WATCHING TV: 1
HOW LIKELY ARE YOU TO NOD OFF OR FALL ASLEEP WHEN YOU ARE A PASSENGER IN A CAR FOR AN HOUR WITHOUT A BREAK: 3
HOW LIKELY ARE YOU TO NOD OFF OR FALL ASLEEP IN A CAR, WHILE STOPPED FOR A FEW MINUTES IN TRAFFIC: 0
HOW LIKELY ARE YOU TO NOD OFF OR FALL ASLEEP WHILE SITTING AND TALKING TO SOMEONE: 0
HOW LIKELY ARE YOU TO NOD OFF OR FALL ASLEEP WHILE SITTING AND READING: 3
HOW LIKELY ARE YOU TO NOD OFF OR FALL ASLEEP WHILE SITTING INACTIVE IN A PUBLIC PLACE: 0

## 2022-08-12 NOTE — LETTER
[facility]  [id number] [facility]  [address] [address]  Phone: [phone]  Fax: [phone]    August 12, 2022       Patient: Mitch Burns   MR Number: [id number]   YOB: 1980   Date of Visit: 8/12/2022       Ranjit Snyder was seen for a follow up visit today. Here is my assessment and plan as well as an attached copy of her visit today:    Essential hypertension  Chronic- Stable. Discussed the importance of treating obstructive sleep apnea as part of the management of this disorder. Cont any meds per PCP and other physicians. Severe persistent asthma without complication  Chronic- Stable. Discussed the importance of treating obstructive sleep apnea as part of the management of this disorder. Cont any meds per PCP and other physicians. Asthma   Chronic- Stable. Discussed the importance of treating obstructive sleep apnea as part of the management of this disorder. Cont any meds per PCP and other physicians. Chronic GERD   Chronic- Stable. Discussed the importance of treating obstructive sleep apnea as part of the management of this disorder. Cont any meds per PCP and other physicians. Excessive daytime sleepiness   Chronic- Stable. Discussed the importance of treating obstructive sleep apnea as part of the management of this disorder. Cont any meds per PCP and other physicians. PDMP reviewed. Continue taking Provigil 200mg BID. She denies side effects from the medication. Will refill at the current dose. No driving if sleepy. Needs updated medication agreement will mail to her home address and have her sign and return. Non morbid obesity, unspecified obesity type   Chronic-not stable:  Discussed importance of treating obstructive sleep apnea and getting sufficient sleep to assist with weight control. Encouraged her to work on weight loss through diet and exercise. Recommended DASH or Mediterranean diets.       Obstructive sleep apnea (adult) (pediatric)   Chronic-Stable: Reviewed and analyzed results of physiologic download from patient's machine and reviewed with patient. Supplies and parts as needed for her machine. These are medically necessary. Limit caffeine use after 3pm. Based on the analyzed data will continue with current settings. Stable on her machine at current settings, getting benefit from the use, and having minimal side effects. Will see her back in 6 months. Encouraged her to contact the office sooner with any questions or concerns. If you have questions or concerns, please do not hesitate to call me. I look forward to following Delores along with you.     Sincerely,    QI Garcia    CC providers:  Berta Ochoa MD  [facility] [facility] Mcpherson

## 2022-08-12 NOTE — PROGRESS NOTES
Jori Marie [address], [address] [facility] [phone]   [facility] Jose Alejandro Goff. [address] [place] [address]. Raul Salvador [address] [phone]     Video Visit- Follow up    Chriss Lenz, was evaluated through a synchronous (real-time) audio-video  encounter. The patient (or guardian if applicable) is aware that this is a billable  service, which includes applicable co-pays. This Virtual Visit was conducted with  patient's (and/or legal guardian's) consent. The visit was conducted pursuant to  the emergency declaration under the [address] [place] [address] waiver authority and the [employer] and  [employer] General Act. Patient identification was verified,  and a caregiver was present when appropriate. The patient was located in a  state where the provider was licensed to provide care. Assessment/Plan:      1. Obstructive sleep apnea (adult) (pediatric)  Assessment & Plan:   Chronic-Stable: Reviewed and analyzed results of physiologic download from patient's machine and reviewed with patient. Supplies and parts as needed for her machine. These are medically necessary. Limit caffeine use after 3pm. Based on the analyzed data will continue with current settings. Stable on her machine at current settings, getting benefit from the use, and having minimal side effects. Will see her back in 6 months. Encouraged her to contact the office sooner with any questions or concerns. 2. Essential hypertension  Assessment & Plan:  Chronic- Stable. Discussed the importance of treating obstructive sleep apnea as part of the management of this disorder. Cont any meds per PCP and other physicians. 3. Severe persistent asthma without complication  Assessment & Plan:  Chronic- Stable. Discussed the importance of treating obstructive sleep apnea as part of the management of this disorder.   Cont any meds per PCP and other physicians. 4. Mild intermittent asthma without complication  Assessment & Plan:   Chronic- Stable. Discussed the importance of treating obstructive sleep apnea as part of the management of this disorder. Cont any meds per PCP and other physicians. 5. Chronic GERD  Assessment & Plan:   Chronic- Stable. Discussed the importance of treating obstructive sleep apnea as part of the management of this disorder. Cont any meds per PCP and other physicians. 6. Excessive daytime sleepiness  Assessment & Plan:   Chronic- Stable. Discussed the importance of treating obstructive sleep apnea as part of the management of this disorder. Cont any meds per PCP and other physicians. PDMP reviewed. Continue taking Provigil 200mg BID. She denies side effects from the medication. Will refill at the current dose. No driving if sleepy. Needs updated medication agreement will mail to her home address and have her sign and return. Orders:  -     modafinil (PROVIGIL) 200 MG tablet; Take 1 tablet by mouth in the morning and 1 tablet before bedtime. Do all this for 90 days. , Disp-180 tablet, R-1Normal  7. Non morbid obesity, unspecified obesity type  Assessment & Plan:   Chronic-not stable:  Discussed importance of treating obstructive sleep apnea and getting sufficient sleep to assist with weight control. Encouraged her to work on weight loss through diet and exercise. Recommended DASH or Mediterranean diets. Reviewed, analyzed, and documented physiologic data from patient's PAP machine. This information was analyzed to assess complexity and medical decision making in regards to further testing and management. The primary encounter diagnosis was Obstructive sleep apnea (adult) (pediatric).  Diagnoses of Essential hypertension, Severe persistent asthma without complication, Mild intermittent asthma without complication, Chronic GERD, Excessive daytime sleepiness, and Non morbid obesity, unspecified obesity type were also pertinent to this visit. The chronic medical conditions listed are directly related to the primary diagnosis listed above. The management of the primary diagnosis affects the secondary diagnosis and vice versa. Subjective:     Patient ID: Rex Harris is a 38 y.o. female. Chief Complaint   Patient presents with    Sleep Apnea     Subjective   HPI:    Machine Modem/Download Info:  Compliance (hours/night): 6.8 hrs/night  % of nights >= 4 hrs: 87.8 %  Download AHI (/hour): 3.1 /HR   Average IPAP Pressure: 13 cmH2O  Average EPAP Pressure: 9.5 cmH2O         AUTO BIPAP - Settings (Cesar)  IPAP Max: 25 cmH2O  EPAP Min: 8 cmH2O  Pressure Support Min: 2  Pressure Support Max: 8             Comfort Settings  Humidity Level (0-8): 4  Flex/EPR (0-3): 3 PAP Mask  Mask Type: Nasal mask     Rex Harris reports she is doing well with her machine. She is recovering from a cholecystectomy on 8/10/22. She has received her replacement machine for the  recall. The pressure on her machine is comfortable and she is waking rested for the most part. she denies headaches, congestion, nosebleeds, dryness, aerophagia, or drowsiness while driving. Her mask is comfortable and is fitting well. Excessive daytime sleepiness: She is taking Provigil 200mg BID around 0530 and 12pm. Her symptoms are controlled at the current doses. Reports she is trying to work on getting to bed sooner and has been averaging  5.5-7 hours of sleep each night.      [facility]    Lindsay - Total score: 11    Current Outpatient Medications   Medication Instructions    albuterol sulfate HFA (PROVENTIL;VENTOLIN;PROAIR) 108 (90 Base) MCG/ACT inhaler INHALE TWO PUFFS BY MOUTH EVERY 6 HOURS AS NEEDED FOR WHEEZING OR SHORTNESS OF BREATH    atorvastatin (LIPITOR) 40 MG tablet TAKE ONE TABLET BY MOUTH DAILY    Azelastine-Fluticasone 137-50 MCG/ACT SUSP SPRAY ONE SPRAY IN EACH NOSTRIL TWICE A DAY    Blood Pressure Monitoring (OMRON 3 SERIES BP MONITOR) GABRIELLE Test blood pressure weekly or PRN    busPIRone (BUSPAR) 10 MG tablet TAKE TWO TABLETS BY MOUTH TWICE A DAY    DUPIXENT 300 MG/2ML SOPN injection No dose, route, or frequency recorded. HYDROcodone-acetaminophen (NORCO) 5-325 MG per tablet No dose, route, or frequency recorded.     ibuprofen (ADVIL;MOTRIN) 200 mg, Oral, EVERY 6 HOURS PRN    lisinopril (PRINIVIL;ZESTRIL) 5 MG tablet TAKE 1 TAB DAILY    modafinil (PROVIGIL) 200 mg, Oral, 2 TIMES DAILY    montelukast (SINGULAIR) 10 mg, Oral, NIGHTLY    Multiple Vitamins-Minerals (THERAPEUTIC MULTIVITAMIN-MINERALS) tablet 1 tablet, DAILY    omeprazole (PRILOSEC) 40 MG delayed release capsule TAKE ONE CAPSULE BY MOUTH TWICE A DAY    predniSONE (DELTASONE) 10 MG tablet 30 mg for 3 days 20 mg for 3 days 10 mg for 3 days    Spacer/Aero-Holding Chambers GABRIELLE 1 Device, Does not apply, DAILY PRN    spironolactone (ALDACTONE) 100 MG tablet TAKE ONE TABLET BY MOUTH DAILY    TRELEGY ELLIPTA 200-62.5-25 MCG/INH AEPB INHALE ONE PUFF BY MOUTH DAILY    VORTIoxetine HBr (TRINTELLIX) 20 mg, Oral, DAILY        Electronically signed by QI Holly on 8/12/2022 at 2:07 PM

## 2022-08-12 NOTE — ASSESSMENT & PLAN NOTE
Chronic- Stable. Discussed the importance of treating obstructive sleep apnea as part of the management of this disorder. Cont any meds per PCP and other physicians. PDMP reviewed. Continue taking Provigil 200mg BID. She denies side effects from the medication. Will refill at the current dose. No driving if sleepy. Needs updated medication agreement will mail to her home address and have her sign and return.

## 2022-08-26 ENCOUNTER — TELEPHONE (OUTPATIENT)
Dept: PULMONOLOGY | Age: 42
End: 2022-08-26

## 2022-08-26 NOTE — TELEPHONE ENCOUNTER
LM to let pt know that her prescription is ready for 30 days, her insurance doesn't paid for 90 days.

## 2022-09-16 ENCOUNTER — OFFICE VISIT (OUTPATIENT)
Dept: PULMONOLOGY | Age: 42
End: 2022-09-16
Payer: COMMERCIAL

## 2022-09-16 VITALS
WEIGHT: 233 LBS | OXYGEN SATURATION: 98 % | BODY MASS INDEX: 37.45 KG/M2 | HEART RATE: 71 BPM | HEIGHT: 66 IN | SYSTOLIC BLOOD PRESSURE: 110 MMHG | DIASTOLIC BLOOD PRESSURE: 70 MMHG

## 2022-09-16 DIAGNOSIS — J45.50 SEVERE PERSISTENT ASTHMA WITHOUT COMPLICATION: Primary | ICD-10-CM

## 2022-09-16 PROCEDURE — 99213 OFFICE O/P EST LOW 20 MIN: CPT | Performed by: INTERNAL MEDICINE

## 2022-09-16 RX ORDER — FAMOTIDINE 40 MG/1
40 TABLET, FILM COATED ORAL DAILY
COMMUNITY
Start: 2022-08-25

## 2022-09-16 RX ORDER — PANTOPRAZOLE SODIUM 40 MG/1
40 TABLET, DELAYED RELEASE ORAL DAILY
COMMUNITY
Start: 2022-08-25

## 2022-09-16 ASSESSMENT — ENCOUNTER SYMPTOMS
CHEST TIGHTNESS: 0
BLOOD IN STOOL: 0
VOICE CHANGE: 0
ABDOMINAL PAIN: 0
CONSTIPATION: 0
BACK PAIN: 0
APNEA: 0
ABDOMINAL DISTENTION: 0
WHEEZING: 0
ANAL BLEEDING: 0
SHORTNESS OF BREATH: 0
RHINORRHEA: 0
CHOKING: 0
SORE THROAT: 0
STRIDOR: 0
DIARRHEA: 0
COUGH: 0
SINUS PRESSURE: 0

## 2022-09-16 NOTE — PROGRESS NOTES
DAILY 90 tablet 1    Blood Pressure Monitoring (OMRON 3 SERIES BP MONITOR) GABRIELLE Test blood pressure weekly or PRN 1 each 0    DUPIXENT 300 MG/2ML SOPN injection       Spacer/Aero-Holding Chambers GABRIELLE 1 Device by Does not apply route daily as needed (with HFA inhalers) 1 Device 0    Multiple Vitamins-Minerals (THERAPEUTIC MULTIVITAMIN-MINERALS) tablet Take 1 tablet by mouth daily.          Immunization History   Administered Date(s) Administered    COVID-19, J&J, (age 18y+), IM, 0.5 mL 04/03/2021    COVID-19, PFIZER PURPLE top, DILUTE for use, (age 15 y+), 30mcg/0.3mL 12/24/2021    INFLUENZA, INTRADERMAL, QUADRIVALENT, PRESERVATIVE FREE 10/05/2016    Influenza Virus Vaccine 10/01/2010, 09/09/2014    Influenza, AFLURIA (age 1 yrs+), FLUZONE, (age 10 mo+), MDV, 0.5mL 10/08/2021    Influenza, FLUARIX, FLULAVAL, FLUZONE (age 10 mo+) AND AFLURIA, (age 1 y+), PF, 0.5mL 10/21/2015, 10/28/2017    Influenza, FLUBLOK, (age 25 y+), PF, 0.5mL 09/27/2018    Influenza, FLUCELVAX, (age 10 mo+), MDCK, MDV, 0.5mL 11/09/2020    Influenza, FLUCELVAX, (age 10 mo+), MDCK, PF, 0.5mL 09/27/2019    Influenza, Intradermal, Preservative free 10/01/2010, 11/28/2012, 09/23/2013    Pneumococcal Polysaccharide (Sirkokuey25) 01/19/2018    Tdap (Boostrix, Adacel) 08/20/2012       Past Medical History:   Diagnosis Date    Allergic rhinitis     Anxiety     Asthma     Environmental allergies     food    Hyperlipidemia     Hypertension     Lumbar disc disease L4-5, L5-S1 Dr Mitch Ovalle [facility]) Epidural injections 2011 8/20/2012    Obstructive sleep apnea     Obstructive sleep apnea (adult) (pediatric) 6/18/2018    PCOS (polycystic ovarian syndrome)     S/P colonoscopy 1/17/13    normal. Dr Lee Bautista     Past Surgical History:   Procedure Laterality Date    BREAST REDUCTION SURGERY  01/01/2004    FOOT SURGERY  01/01/2000    hallux fx    KNEE ARTHROSCOPY Right     NOSE SURGERY N/A 10/29/2021    SEPTOPLASTY, INFERIOR TURBINATE REDUCTION, REPAIR OF NASAL VALVE COLLAPSE, ABLATION OF POSTERIOR NASAL NERVES performed by Pearletha Primrose, MD at [facility] ARTHROSCOPY Left     SINUS SURGERY  10/29/2021    UPPER GASTROINTESTINAL ENDOSCOPY  01/17/2013    gastritis; Dr Vera Salcido     Family History   Problem Relation Age of Onset    High Blood Pressure Mother     Other Mother         hypothyroid    Diabetes Sister     Diabetes Maternal Grandmother     Diabetes Maternal Grandfather        Review of Systems:  Review of Systems   Constitutional:  Negative for activity change, appetite change, fatigue and fever. HENT:  Positive for postnasal drip. Negative for congestion, ear discharge, ear pain, rhinorrhea, sinus pressure, sneezing, sore throat, tinnitus and voice change. Respiratory:  Negative for apnea, cough, choking, chest tightness, shortness of breath, wheezing and stridor. Cardiovascular:  Negative for chest pain, palpitations and leg swelling. Gastrointestinal:  Negative for abdominal distention, abdominal pain, anal bleeding, blood in stool, constipation and diarrhea. Musculoskeletal:  Negative for arthralgias, back pain and gait problem. Skin:  Negative for pallor and rash. Allergic/Immunologic: Negative for environmental allergies. Neurological:  Negative for dizziness, tremors, seizures, syncope, speech difficulty, weakness, light-headedness, numbness and headaches. Hematological:  Negative for adenopathy. Does not bruise/bleed easily. Psychiatric/Behavioral:  Negative for sleep disturbance. Vitals:    09/16/22 0912   BP: 110/70   Pulse: 71   SpO2: 98%   Weight: 233 lb (105.7 kg)   Height: 5' 6\" (1.676 m)     Patient-Reported Vitals 8/12/2022   Patient-Reported Weight 239   Patient-Reported Height 5'6\"   Patient-Reported Systolic -   Patient-Reported Diastolic -   Patient-Reported Pulse -      Body mass index is 37.61 kg/m².      Wt Readings from Last 3 Encounters:   09/16/22 233 lb (105.7 kg)   08/02/22 236 lb (107 kg)   06/10/22 238 lb (108 kg)     BP Readings from Last 3 Encounters:   09/16/22 110/70   08/02/22 128/80   06/10/22 120/84         Physical Exam  Constitutional:       General: She is not in acute distress. Appearance: She is well-developed. She is not ill-appearing or diaphoretic. HENT:      Mouth/Throat:      Pharynx: No oropharyngeal exudate. Cardiovascular:      Rate and Rhythm: Normal rate and regular rhythm. Heart sounds: Normal heart sounds. No murmur heard. No friction rub. Pulmonary:      Effort: No respiratory distress. Breath sounds: Normal breath sounds. No wheezing, rhonchi or rales. Chest:      Chest wall: No tenderness. Abdominal:      General: There is no distension. Palpations: There is no mass. Tenderness: There is no abdominal tenderness. There is no guarding or rebound. Musculoskeletal:         General: No swelling or tenderness. Skin:     Coloration: Skin is not pale. Findings: No erythema or rash. Neurological:      Mental Status: She is alert and oriented to person, place, and time. Cranial Nerves: No cranial nerve deficit. Motor: No abnormal muscle tone. Coordination: Coordination normal.      Deep Tendon Reflexes: Reflexes normal.           Health Maintenance   Topic Date Due    Pneumococcal 0-64 years Vaccine (2 - PCV) 01/19/2019    DTaP/Tdap/Td vaccine (2 - Td or Tdap) 08/20/2022    Flu vaccine (1) 09/01/2022    Cervical cancer screen  11/04/2022    A1C test (Diabetic or Prediabetic)  06/10/2023    Lipids  06/10/2023    Depression Screen  06/10/2023    COVID-19 Vaccine  Completed    Hepatitis C screen  Completed    HIV screen  Completed    Hepatitis A vaccine  Aged Out    Hepatitis B vaccine  Aged Out    Hib vaccine  Aged Out    Meningococcal (ACWY) vaccine  Aged Out          Assessment/Plan:    Severe persistent asthma, currently not in exacerbation. Currently well controlled with use of Trelegy 200, Singulair and albuterol inhaler as needed.   Using Dupixent biweekly for type II/hypereosinophilic asthma. Also uses [facility] as needed for sinus drainage. Patient has noticed some workplace related exacerbations, which I have asked her to refrain from these areas at work. Works with [employer]     Follow-up in 3 months

## 2022-09-29 DIAGNOSIS — F43.12 CHRONIC POST-TRAUMATIC STRESS DISORDER (PTSD): Primary | ICD-10-CM

## 2022-09-29 RX ORDER — ALBUTEROL SULFATE 90 UG/1
AEROSOL, METERED RESPIRATORY (INHALATION)
Qty: 8.5 G | Refills: 0 | Status: SHIPPED | OUTPATIENT
Start: 2022-09-29

## 2022-09-29 RX ORDER — LORAZEPAM 0.5 MG/1
TABLET ORAL
Qty: 20 TABLET | Refills: 0 | Status: SHIPPED | OUTPATIENT
Start: 2022-09-29 | End: 2022-10-29

## 2022-09-30 ENCOUNTER — TELEPHONE (OUTPATIENT)
Dept: PULMONOLOGY | Age: 42
End: 2022-09-30

## 2022-09-30 NOTE — TELEPHONE ENCOUNTER
PA approved for Modafinil Trammell : Sukhi Torres  Approved today 09/30/22   CaseId:[id number];Status:Approved; Review Type:Prior Auth; Coverage Start Date:08/31/2022; Coverage End Date:09/30/2023;

## 2022-10-26 ENCOUNTER — TELEMEDICINE (OUTPATIENT)
Dept: FAMILY MEDICINE CLINIC | Age: 42
End: 2022-10-26
Payer: COMMERCIAL

## 2022-10-26 DIAGNOSIS — R05.1 ACUTE COUGH: ICD-10-CM

## 2022-10-26 DIAGNOSIS — J01.90 ACUTE SINUSITIS, RECURRENCE NOT SPECIFIED, UNSPECIFIED LOCATION: Primary | ICD-10-CM

## 2022-10-26 DIAGNOSIS — R06.2 WHEEZING: ICD-10-CM

## 2022-10-26 DIAGNOSIS — J45.51 SEVERE PERSISTENT ASTHMA WITH ACUTE EXACERBATION: ICD-10-CM

## 2022-10-26 PROCEDURE — 99213 OFFICE O/P EST LOW 20 MIN: CPT | Performed by: FAMILY MEDICINE

## 2022-10-26 RX ORDER — BENZONATATE 100 MG/1
100 CAPSULE ORAL 3 TIMES DAILY PRN
Qty: 30 CAPSULE | Refills: 0 | Status: SHIPPED | OUTPATIENT
Start: 2022-10-26 | End: 2022-11-02

## 2022-10-26 RX ORDER — PREDNISONE 10 MG/1
TABLET ORAL
Qty: 6 TABLET | Refills: 0 | Status: SHIPPED | OUTPATIENT
Start: 2022-10-26

## 2022-10-26 RX ORDER — METHYLPREDNISOLONE 4 MG/1
TABLET ORAL
COMMUNITY
Start: 2022-10-22

## 2022-10-26 RX ORDER — AMOXICILLIN AND CLAVULANATE POTASSIUM 875; 125 MG/1; MG/1
TABLET, FILM COATED ORAL
COMMUNITY
Start: 2022-10-22

## 2022-10-26 SDOH — ECONOMIC STABILITY: FOOD INSECURITY: WITHIN THE PAST 12 MONTHS, YOU WORRIED THAT YOUR FOOD WOULD RUN OUT BEFORE YOU GOT MONEY TO BUY MORE.: NEVER TRUE

## 2022-10-26 SDOH — ECONOMIC STABILITY: FOOD INSECURITY: WITHIN THE PAST 12 MONTHS, THE FOOD YOU BOUGHT JUST DIDN'T LAST AND YOU DIDN'T HAVE MONEY TO GET MORE.: NEVER TRUE

## 2022-10-26 ASSESSMENT — SOCIAL DETERMINANTS OF HEALTH (SDOH): HOW HARD IS IT FOR YOU TO PAY FOR THE VERY BASICS LIKE FOOD, HOUSING, MEDICAL CARE, AND HEATING?: NOT HARD AT ALL

## 2022-10-26 NOTE — PROGRESS NOTES
10/26/2022    This is a 38 y.o. female   Chief Complaint   Patient presents with    Cough     Pt was on vacation, she was in [place] and then a cruise. Everyone got sick. They all got better but hers is lingering. She went to urgent care. She didn't have covid, not the flu, they told her it was a virus going around, they gave her antibiotics. She feels a little better but still very drained and diarrhea, and a cough that is non stop, and it's productive. This all started x1.5 weeks     HPI    Virtual visit for not feeling well    Has been traveling recently, went to [facility] and then on a cruise. Family members were also sick. - had congestion, drainage, chills, green/yellow nasal discharge, sinus pain more so on the right, and a cough  - Went to urgent care in [place] on Saturday (4 days ago) where she was flu and COVID negative. She was prescribed augmentin and a medrol dose pack    Has hx of asthma. Notes she is congested and wheezing more, but not feeling short of breath. Symptoms have improved some but not completely. She is on day 4-5 of her antibiotic and Medrol Dosepak.     Review of Systems     Current Outpatient Medications   Medication Sig Dispense Refill    amoxicillin-clavulanate (AUGMENTIN) 875-125 MG per tablet       methylPREDNISolone (MEDROL DOSEPACK) 4 MG tablet       predniSONE (DELTASONE) 10 MG tablet 2 tabs for 2 days, 1 tab for 2 days 6 tablet 0    benzonatate (TESSALON) 100 MG capsule Take 1 capsule by mouth 3 times daily as needed for Cough 30 capsule 0    albuterol sulfate HFA (PROVENTIL;VENTOLIN;PROAIR) 108 (90 Base) MCG/ACT inhaler INHALE TWO PUFFS BY MOUTH EVERY 6 HOURS AS NEEDED FOR WHEEZING OR SHORTNESS OF BREATH 8.5 g 0    LORazepam (ATIVAN) 0.5 MG tablet TAKE ONE TABLET BY MOUTH EVERY 8 HOURS AS NEEDED FOR ANXIETY WITH SHORTNESS OF BREATH 20 tablet 0    pantoprazole (PROTONIX) 40 MG tablet Take 40 mg by mouth daily      famotidine (PEPCID) 40 MG tablet Take 40 mg by mouth daily ibuprofen (ADVIL;MOTRIN) 200 MG tablet Take 200 mg by mouth every 6 hours as needed      modafinil (PROVIGIL) 200 MG tablet Take 1 tablet by mouth in the morning and 1 tablet before bedtime. Do all this for 90 days. 180 tablet 1    spironolactone (ALDACTONE) 100 MG tablet TAKE ONE TABLET BY MOUTH DAILY 30 tablet 5    TRELEGY ELLIPTA 200-62.5-25 MCG/INH AEPB INHALE ONE PUFF BY MOUTH DAILY 60 each 5    Azelastine-Fluticasone 137-50 MCG/ACT SUSP SPRAY ONE SPRAY IN EACH NOSTRIL TWICE A DAY 23 g 1    montelukast (SINGULAIR) 10 MG tablet Take 1 tablet by mouth nightly 90 tablet 1    VORTIoxetine HBr (TRINTELLIX) 20 MG TABS tablet Take 1 tablet by mouth daily 90 tablet 1    lisinopril (PRINIVIL;ZESTRIL) 5 MG tablet TAKE 1 TAB DAILY 90 tablet 1    atorvastatin (LIPITOR) 40 MG tablet TAKE ONE TABLET BY MOUTH DAILY 90 tablet 1    Blood Pressure Monitoring (OMRON 3 SERIES BP MONITOR) GABRIELLE Test blood pressure weekly or PRN 1 each 0    DUPIXENT 300 MG/2ML SOPN injection       Spacer/Aero-Holding Chambers GABRIELLE 1 Device by Does not apply route daily as needed (with HFA inhalers) 1 Device 0    Multiple Vitamins-Minerals (THERAPEUTIC MULTIVITAMIN-MINERALS) tablet Take 1 tablet by mouth daily. No current facility-administered medications for this visit. There were no vitals taken for this visit. Physical Exam    Wt Readings from Last 3 Encounters:   09/16/22 233 lb (105.7 kg)   08/02/22 236 lb (107 kg)   06/10/22 238 lb (108 kg)     BP Readings from Last 3 Encounters:   09/16/22 110/70   08/02/22 128/80   06/10/22 120/84     Assessment/Plan:  1. Acute sinusitis, recurrence not specified, unspecified location    2. Wheezing  - predniSONE (DELTASONE) 10 MG tablet; 2 tabs for 2 days, 1 tab for 2 days  Dispense: 6 tablet; Refill: 0    3. Severe persistent asthma with acute exacerbation  - predniSONE (DELTASONE) 10 MG tablet; 2 tabs for 2 days, 1 tab for 2 days  Dispense: 6 tablet; Refill: 0    4.  Acute cough  - benzonatate (TESSALON) 100 MG capsule; Take 1 capsule by mouth 3 times daily as needed for Cough  Dispense: 30 capsule; Refill: 0    She is currently on Augmentin, day 4-5. Discussed symptoms will likely take some more time to improve. Given asthma history and persistent wheezing reported, I am sending in a few more days of steroids to more slowly taper off for alleviation of symptoms. Call if symptoms are not improving next week    Alliecain Mancini, was evaluated through a synchronous (real-time) audio-video encounter. The patient (or guardian if applicable) is aware that this is a billable service, which includes applicable co-pays. This Virtual Visit was conducted with patient's (and/or legal guardian's) consent. The visit was conducted pursuant to the emergency declaration under the [address] [place] [address], [address] [place] [address] authority and the [employer] General Act. Patient identification was verified, and a caregiver was present when appropriate. The patient was located at Home: [address] [place] [address]. Provider was located at [facility] (Appt Dept): [address] [place] [address]. Total time spent for this encounter: Not billed by time    --Clemente Whipple MD on 10/27/2022 at 7:58 AM    An electronic signature was used to authenticate this note.

## 2022-10-26 NOTE — LETTER
[address] [address]  Phone: [phone]  Fax: [phone]    Richard Sue MD        October 26, 2022     Patient: Sulaiman Dahl   YOB: 1980   Date of Visit: 10/26/2022     To Whom it May Concern:    Tre Watson was seen by me on 10/26/2022. She is under treatment for anxiety and so is not able to work at significant heights above 10 feet. If you have any questions or concerns, please don't hesitate to call.     Sincerely,     Richard Sue MD

## 2022-11-02 ENCOUNTER — OFFICE VISIT (OUTPATIENT)
Dept: ENT CLINIC | Age: 42
End: 2022-11-02
Payer: COMMERCIAL

## 2022-11-02 VITALS
SYSTOLIC BLOOD PRESSURE: 128 MMHG | WEIGHT: 229 LBS | HEART RATE: 80 BPM | HEIGHT: 66 IN | RESPIRATION RATE: 16 BRPM | BODY MASS INDEX: 36.8 KG/M2 | DIASTOLIC BLOOD PRESSURE: 79 MMHG

## 2022-11-02 DIAGNOSIS — Z98.890 HISTORY OF NASAL SEPTOPLASTY: ICD-10-CM

## 2022-11-02 DIAGNOSIS — J32.0 CHRONIC MAXILLARY SINUSITIS: ICD-10-CM

## 2022-11-02 DIAGNOSIS — J32.3 CHRONIC SPHENOIDAL SINUSITIS: ICD-10-CM

## 2022-11-02 DIAGNOSIS — J32.1 CHRONIC FRONTAL SINUSITIS: ICD-10-CM

## 2022-11-02 DIAGNOSIS — R43.8 HYPOSMIA: ICD-10-CM

## 2022-11-02 DIAGNOSIS — J02.9 SORE THROAT: ICD-10-CM

## 2022-11-02 DIAGNOSIS — R09.81 NASAL CONGESTION: ICD-10-CM

## 2022-11-02 DIAGNOSIS — J34.89 PURULENT RHINORRHEA: ICD-10-CM

## 2022-11-02 DIAGNOSIS — J32.2 CHRONIC ETHMOIDAL SINUSITIS: Primary | ICD-10-CM

## 2022-11-02 PROCEDURE — 3078F DIAST BP <80 MM HG: CPT | Performed by: STUDENT IN AN ORGANIZED HEALTH CARE EDUCATION/TRAINING PROGRAM

## 2022-11-02 PROCEDURE — 3074F SYST BP LT 130 MM HG: CPT | Performed by: STUDENT IN AN ORGANIZED HEALTH CARE EDUCATION/TRAINING PROGRAM

## 2022-11-02 PROCEDURE — 99214 OFFICE O/P EST MOD 30 MIN: CPT | Performed by: STUDENT IN AN ORGANIZED HEALTH CARE EDUCATION/TRAINING PROGRAM

## 2022-11-02 ASSESSMENT — ENCOUNTER SYMPTOMS: SINUS COMPLAINT: 1

## 2022-11-02 NOTE — PROGRESS NOTES
Paul Carlos (:  1980) is a 38 y.o. female, here for evaluation of the following chief complaint(s):  Sinus Problem and Pharyngitis      ASSESSMENT/PLAN:  1. Chronic ethmoidal sinusitis  2. Chronic maxillary sinusitis  3. Chronic frontal sinusitis  4. Chronic sphenoidal sinusitis  5. Purulent rhinorrhea  6. Hyposmia  7. Sore throat  8. History of nasal septoplasty  9. Nasal congestion      This is a very pleasant 38 y.o. female here today for evaluation of the the above-noted complaints. Patient is status post open septorhinoplasty, septoplasty, ITR and posterior ablation of her nasal nerves on 10/29/2021. During the patient's prior visits, she has been noted to have a straight nasal septum, good reduction of her inferior turbinates and improvement of her nasal valve collapse. I independently reviewed her updated CT maxillofacial scan from 3/20/2022. Shows evidence of interval worsening of her ethmoidal sinusitis as well as new maxillary sinus mucosal thickening. The patient has now failed extended medical therapy including multiple rounds of antibiotics, oral steroids, intranasal corticosteroid sprays and saline lavages. She has evidence of severe pansinusitis with purulent drainage into the nasopharynx. We discussed different treatment options including another round of antibiotics versus doing nothing versus surgery. The patient is interested in pursuing surgery.     -Given the above, will plan for Bilateral functional endoscopic sinus surgery with possible maxillary antrostomy, anterior/posterior ethmoidectomies, frontal sinusotomy and sphenoidotomy with removal of tissue  -Risks of sinus surgery include anosmia/hyposmia, dysgeusia, hemorrhage, pain, infection/inflammation, numbness to the maxillary tissues or dentition, CSF leak (with risk of meningitis or intracranial infection), orbital complications (diplopia, blurry vision, blindness), need for further procedures  -Anesthesia carries its own complications which will need to be discussed with the Anesthesiology team on the day of surgery  -Patient will need to obtain PCP clearance prior to surgery, will need to be off anticoagulants prior to surgery       Already seeing allergy. On Dupixent, multiple inhalers. She of asthma    SUBJECTIVE/OBJECTIVE:  Ear Problem    Other    Sinus Problem    Pharyngitis      Purnima Manuel is here today for evaluation of evaluation of issues related to her nose. She gets multiple sinus infections per year. She requires antibiotics for these. She think she has a sinus infection right now. She also has over the last 3 months had a loss of sense of smell. She has not had Covid recently. She has had some issues related to smelling gasoline and cigarette smoke that is not actually present. She gets difficulty breathing through her nose it is worse on the left side. She will get constant nasal drainage that is clear and green. She gets very rare nosebleeds. Update 3/17/2012:    She is still having issues breathing out of her nose and with nasal congestion. She is still getting intermittent nasal drainage. Update 4/28/2021:    Patient presents today for follow-up. She is still having the same issues regarded to difficulty breathing out of her nose. She gets intermittent nasal drainage which is clear. She thinks she is getting sinus infections. Update 8/26/2021:    Patient presents today for follow-up regarding issues related to her chronic sinonasal complaints. She is still having significant difficulty breathing through her nose. This is exacerbated by exercise. 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 Update 11/4/2021:    Patient presents today for follow-up regarding her chronic sinonasal complaints.   She is already breathing better through her nose.  She didn't have any significant pain postoperatively. She is getting some blood clots and crusting out of her nose. Update 11/23/2021:    Patient presents today for follow-up regarding her chronic sinonasal complaints. She feels like there is some obstruction on the left side. She has been irrigating once to twice per day. She is using Vaseline around her incision site. She is occasionally getting yellow chunks out of her nose. Update 1/20/2022:    Patient presents today for follow-up regarding issues related to her nose. Overall she is breathing significantly better through her nose. She is using fluticasone until recently she developed a nosebleed on the left side so she stopped. She still feels like she gets nasal congestion and like she has a sinus infection. She has been tested for allergies in the past.  She is getting some black things out of her nose when she blows her nose. She feels like her sense of smell is worse than prior to surgery. Update 3/8/2022:  -Doing BID irrigations and Flonase  -Still having significant nasal congestion  -Using Zyrtec, singulair, Dupixent  -Feels like her asthma is well controlled  -Still feels like she has nasal congestion and ear fullness/pain    The patient is reporting hyposmia that is worse after surgery. I reviewed with the patient that prior to surgery she did discuss with me that she had a significant loss of smell and parosmia's. We did not operate anywhere near the olfactory neurons for her most recent procedure, so I do not feel it is necessarily related to the surgery. Update 3/15/2022:    Patient presents today for follow-up. She is still having significant nasal congestion, ear fullness and pain. She had a CT scan which I independently reviewed. Shows interval worsening of her sinus disease. She now has inflammation of the bilateral maxillary sinuses and subtotal opacifications of her ethmoids.     Update 11/2/22:    -Has had multiple rounds of antibiotics with no improvement in symptoms. Most recent one was Augmentin. Also got a round of oral steroids.   -Still with facial pain and pressure, nasal congestion, lots of green and yellow discharge, decreased sense of smell, general feeling of malaise and increased fatigue    REVIEW OF SYSTEMS  The following systems were reviewed and revealed the following in addition to any already discussed in the HPI:    PHYSICAL EXAM    GENERAL: No acute distress, alert and oriented, no hoarseness, strong voice  EYES: EOMI, Anti-icteric  HENT:   Well-healed inverted V incision of the nasal columella. PROCEDURE  Nasal Endoscopy (CPT code 89708) no charge   Preop: chronic rhinitis  Postop: Same    Verbal consent was received. After topical anesthesia and decongestion had been obtained using aerosolized 1% lidocaine and oxymetazoline, a 45 degree rigid endoscope was placed into both nares with the patient in a sitting position. The following was observed:      Septum: intact and midline  Other:   -The inferior and middle turbinates were examined. The middle meatus, and sphenoethmoid recess was examined bilaterally.    -There is evidence of pansinusitis with purulent drainage from the ostiomeatal complexes and sphenoethmoidal recesses, massive mucosal inflammation with erythema. There is inflammation of the nasopharynx from persistent purulent posterior rhinorrhea  -There were no complications. Tolerated well without complication. I attest that I was present for and did the entire procedure myself. This note was generated completely or in part utilizing Dragon dictation speech recognition software. Occasionally, words are mistranscribed and despite editing, the text may contain inaccuracies due to incorrect word recognition. If further clarification is needed please contact the office at [phone]. An electronic signature was used to authenticate this note.     --Jose Luis Goldman MD

## 2022-11-08 DIAGNOSIS — J32.2 CHRONIC ETHMOIDAL SINUSITIS: Primary | ICD-10-CM

## 2022-11-08 DIAGNOSIS — J32.0 CHRONIC MAXILLARY SINUSITIS: ICD-10-CM

## 2022-11-08 RX ORDER — DOXYCYCLINE HYCLATE 100 MG
100 TABLET ORAL 2 TIMES DAILY
Qty: 14 TABLET | Refills: 0 | Status: SHIPPED | OUTPATIENT
Start: 2022-11-08 | End: 2022-11-15

## 2022-11-08 NOTE — PROGRESS NOTES
Patient with persistent chronic sinusitis symptoms. Will prescribe her doxycycline, advised her to use fluticasone and saline irrigations. If symptoms worsen, may need to move up her surgery.

## 2022-12-02 RX ORDER — DUPILUMAB 300 MG/2ML
INJECTION, SOLUTION SUBCUTANEOUS
Qty: 4 ML | Refills: 3 | Status: SHIPPED | OUTPATIENT
Start: 2022-12-02

## 2022-12-14 ENCOUNTER — OFFICE VISIT (OUTPATIENT)
Dept: FAMILY MEDICINE CLINIC | Age: 42
End: 2022-12-14

## 2022-12-14 VITALS
BODY MASS INDEX: 36.8 KG/M2 | HEART RATE: 78 BPM | OXYGEN SATURATION: 98 % | RESPIRATION RATE: 16 BRPM | SYSTOLIC BLOOD PRESSURE: 114 MMHG | DIASTOLIC BLOOD PRESSURE: 74 MMHG | WEIGHT: 229 LBS | HEIGHT: 66 IN

## 2022-12-14 DIAGNOSIS — Z23 NEEDS FLU SHOT: ICD-10-CM

## 2022-12-14 DIAGNOSIS — J32.9 CHRONIC SINUSITIS, UNSPECIFIED LOCATION: ICD-10-CM

## 2022-12-14 DIAGNOSIS — Z01.818 PREOP EXAMINATION: Primary | ICD-10-CM

## 2022-12-14 RX ORDER — AMITRIPTYLINE HYDROCHLORIDE 10 MG/1
10 TABLET, FILM COATED ORAL NIGHTLY
COMMUNITY
Start: 2022-12-08

## 2022-12-14 NOTE — PROGRESS NOTES
[facility] time___0600_________        Surgery time____0730________    Take the following medications with a sip of water: Follow your MD/Surgeons pre-procedure instructions regarding your medications     Do not eat or drink anything after 12:00 midnight prior to your surgery. This includes water chewing gum, mints and ice chips. You may brush your teeth and gargle the morning of your surgery, but do not swallow the water     Please see your family doctor/pediatrician for a history and physical and/or concerning medications. Bring any test results/reports from your physicians office. If you are under the care of a heart doctor or specialist doctor, please be aware that you may be asked to them for clearance    You may be asked to stop blood thinners such as Coumadin, Plavix, Fragmin, Lovenox, etc., or any anti-inflammatories such as:  Aspirin, Ibuprofen, Advil, Naproxen prior to your surgery. We also ask that you stop any OTC medications such as fish oil, vitamin E, glucosamine, garlic, Multivitamins, COQ 10, etc.    We ask that you do not smoke 24 hours prior to surgery  We ask that you do not  drink any alcoholic beverages 24 hours prior to surgery     You must make arrangements for a responsible adult to take you home after your surgery. For your safety you will not be allowed to leave alone or drive yourself home. Your surgery will be cancelled if you do not have a ride home. Also for your safety, it is strongly suggested that someone stay with you the first 24 hours after your surgery. A parent or legal guardian must accompany a child scheduled for surgery and plan to stay at the hospital until the child is discharged. Please do not bring other children with you. For your comfort, please wear simple loose fitting clothing to the hospital.  Please do not bring valuables.     Do not wear any make-up or nail polish on your fingers or toes      For your safety, please do not wear any jewelry or body piercing's on the day of surgery. All jewelry must be removed. If you have dentures, they will be removed before going to operating room. For your convenience, we will provide you with a container. If you wear contact lenses or glasses, they will be removed, please bring a case for them. If you have a living will and a durable power of  for healthcare, please bring in a copy. As part of our patient safety program to minimize surgical site infections, we ask you to do the following:    Please notify your surgeon if you develop any illness between         now and the  day of your surgery. This includes a cough, cold, fever, sore throat, nausea,         or vomiting, and diarrhea, etc.   Please notify your surgeon if you experience dizziness, shortness         of breath or blurred vision between now and the time of your surgery. Do not shave your operative site 96 hours prior to surgery. For face and neck surgery, men may use an electric razor 48 hours   prior to surgery. You may shower the night before surgery or the morning of   your surgery with an antibacterial soap. You will need to bring a photo ID and insurance card    [facility] has an onsite pharmacy, would you like to utilize our pharmacy     If you will be staying overnight and use a C-pap machine, please bring   your C-pap to hospital     Our goal is to provide you with excellent care, therefore, visitors will be limited to two(2) in the room at a time so that we may focus on providing this care for you. Please contact pre-admission testing if you have any further questions. [facility] phone number:  [phone] PAT fax number:  [phone]  Please note these are generalized instructions for all surgical cases, you may be provided with more specific instructions according to your surgery.     C-Difficile admission screening and protocol:       * Admitted with diarrhea? [] YES    [x]  NO     *Prior history of C-Diff. In last 3 months? [] YES    [x]  NO     *Antibiotic use in the past 6-8 weeks? []  NO    [x]  YES                 If yes, which ANTIBIOTIC AND REASON____SINUS INFECTION-AMOXICILLIN__     *Prior hospitalization or nursing home in the last month? []  YES    [x]  NO        SAFETY FIRST. .call before you fall

## 2022-12-14 NOTE — PROGRESS NOTES
Subjective:     Chief Complaint   Patient presents with    Pre-op Exam     Endoscopic sinus surgery 12/16/2022 Dr Edmar Krishnamurthy is a 38 y.o.  female who presents to the office today for a preoperative consultation at the request of surgeon Dr. Izabela Reynoso who plans on performing endoscopic sinus surgery on 12/16/22. The problem warranting surgery is chronic sinusitis    Risk factors include:    Diabetes: no  Coronary Artery Disease: none known  COPD: no, does have asthma  Tobacco use? no    Planned anesthesia is General.   History of anesthesia problems?  No, has tolerated general anesthesia well in the past  No history of bleeding disorder or clotting disorder    Patient Active Problem List   Diagnosis    Polycystic ovarian disease    Hyperlipidemia    Hirsutism    Allergic rhinitis due to pollen    Nasal septal deviation    Asthma    Lumbar disc disease L4-5, L5-S1 Dr Mirta [facility]) Epidural injections 2011    Essential hypertension    Prediabetes A1c 5.7 6/1/16    Obstructive sleep apnea (adult) (pediatric)    Excessive daytime sleepiness    Other viral warts    Non morbid obesity, unspecified obesity type    Chronic post-traumatic stress disorder (PTSD) with anxiety    Chronic GERD    Severe persistent asthma without complication    Nasal valve collapse    Chronic rhinitis    Hypertrophy of inferior nasal turbinate     Past Surgical History:   Procedure Laterality Date    BREAST REDUCTION SURGERY  01/01/2004    FOOT SURGERY  01/01/2000    hallux fx    KNEE ARTHROSCOPY Right     NOSE SURGERY N/A 10/29/2021    SEPTOPLASTY, INFERIOR TURBINATE REDUCTION, REPAIR OF NASAL VALVE COLLAPSE, ABLATION OF POSTERIOR NASAL NERVES performed by Izabela Reynoso MD at [facility] ARTHROSCOPY Left     SINUS SURGERY  10/29/2021    UPPER GASTROINTESTINAL ENDOSCOPY  01/17/2013    gastritis; Dr Joselito Simpson     Family History   Problem Relation Age of Onset    High Blood Pressure Mother     Other Mother hypothyroid    Diabetes Sister     Diabetes Maternal Grandmother     Diabetes Maternal Grandfather      Allergies   Allergen Reactions    Ensure      EGGS    Peanut-Containing Drug Products      PEANUTS    Food      Certain foods (chocolate, tea, soy, eggs, tree nuts, coffee, tomatoes, black pepper)     Prior to Visit Medications    Medication Sig Taking? Authorizing Provider   amitriptyline (ELAVIL) 10 MG tablet  Yes Historical Provider, MD   DUPIXENT 300 MG/2ML SOPN injection INJECT THE CONTENTS OF 1 PEN (300 MG) UNDER THE SKIN EVERY 2 WEEKS Yes Wesley Clark MD   albuterol sulfate HFA (PROVENTIL;VENTOLIN;PROAIR) 108 (90 Base) MCG/ACT inhaler INHALE TWO PUFFS BY MOUTH EVERY 6 HOURS AS NEEDED FOR WHEEZING OR SHORTNESS OF BREATH Yes Pauline Muro MD   pantoprazole (PROTONIX) 40 MG tablet Take 40 mg by mouth daily Yes Historical Provider, MD   famotidine (PEPCID) 40 MG tablet Take 40 mg by mouth daily Yes Historical Provider, MD   ibuprofen (ADVIL;MOTRIN) 200 MG tablet Take 200 mg by mouth every 6 hours as needed Yes Historical Provider, MD   modafinil (PROVIGIL) 200 MG tablet Take 1 tablet by mouth in the morning and 1 tablet before bedtime. Do all this for 90 days.  Yes Geofm Favre, APRN   spironolactone (ALDACTONE) 100 MG tablet TAKE ONE TABLET BY MOUTH DAILY Yes Pauline Muro MD   TRELEGY ELLIPTA 200-62.5-25 MCG/INH AEPB INHALE ONE PUFF BY MOUTH DAILY Yes Wesley Clark MD   Azelastine-Fluticasone 137-50 MCG/ACT SUSP SPRAY ONE SPRAY IN EACH NOSTRIL TWICE A DAY Yes Kleber Garcia MD   montelukast (SINGULAIR) 10 MG tablet Take 1 tablet by mouth nightly Yes Pauline Muro MD   VORTIoxetine HBr (TRINTELLIX) 20 MG TABS tablet Take 1 tablet by mouth daily Yes Pauline Muro MD   lisinopril (PRINIVIL;ZESTRIL) 5 MG tablet TAKE 1 TAB DAILY Yes Pauline Muro MD   atorvastatin (LIPITOR) 40 MG tablet [facility] Yes Paulien Muro MD Blood Pressure Monitoring (OMRON 3 SERIES BP MONITOR) GABRIELLE Test blood pressure weekly or PRN Yes Yoel Casanova MD   Spacer/Aero-Holding Chambers GABRIELLE 1 Device by Does not apply route daily as needed (with HFA inhalers) Yes Yoel Casanova MD   Multiple Vitamins-Minerals (THERAPEUTIC MULTIVITAMIN-MINERALS) tablet Take 1 tablet by mouth daily. Yes Historical Provider, MD     Review of Systems  As per HPI, otherwise negative  Objective:     Vitals:    12/14/22 1130   BP: 114/74   Pulse: 78   Resp: 16   SpO2: 98%     Physical Exam  Constitutional:       Appearance: She is well-developed. HENT:      Head: Normocephalic and atraumatic. Eyes:      Conjunctiva/sclera: Conjunctivae normal.   Neck:      Thyroid: No thyromegaly. Cardiovascular:      Rate and Rhythm: Normal rate and regular rhythm. Heart sounds: Normal heart sounds. No murmur heard. Pulmonary:      Effort: Pulmonary effort is normal.      Breath sounds: Normal breath sounds. No wheezing. Musculoskeletal:         General: Normal range of motion. Cervical back: Normal range of motion and neck supple. Lymphadenopathy:      Cervical: No cervical adenopathy. Skin:     General: Skin is warm and dry. Findings: No rash. Comments: Normal turgor   Neurological:      Mental Status: She is alert and oriented to person, place, and time. Deep Tendon Reflexes: Reflexes normal.   Psychiatric:         Thought Content: Thought content normal.       Cardiographics  ECG: not indicated. Negative stress test in 2021      Assessment:      Anthony Smith with planned surgery as above. Cardiac Risk Estimation: per the Revised Cardiac Risk Index (Circ. 100:1043, 1999), the patient's risk factors for cardiac complications include 0putting the patient in: RCI RISK CLASS I (0 risk factors, risk of major cardiac compl. appr. 0.5%)       Plan:   1. Preop examination    2. Chronic sinusitis, unspecified location    3.  Needs flu shot  - Influenza, FLUCELVAX, (age 10 mo+), IM, Preservative Free, 0.5 mL    Ok to proceed with the procedure. - Low cardiac risk   - Has tolerated general anesthesia well in the past  - No history of bleeding disorder or DVT     Pt advised to stop all Rx except anti-hypertensives the a.m. of surgery. Hold lisinopril the day of surgery    Patient advised to hold blood thinning medication (including aspirin and NSAIDs) 7 days prior to procedure. Prophylaxis for cardiac events with perioperative beta-blockers: not indicated    Deep vein thrombosis prophylaxis postoperatively: regimen to be chosen by surgical team if applicable. Other measures: Postoperative incentive spirometry to prevent pneumonia. Thank you for assisting me in the care of this patient.

## 2022-12-15 ENCOUNTER — ANESTHESIA EVENT (OUTPATIENT)
Dept: OPERATING ROOM | Age: 42
End: 2022-12-15
Payer: COMMERCIAL

## 2022-12-16 ENCOUNTER — ANESTHESIA (OUTPATIENT)
Dept: OPERATING ROOM | Age: 42
End: 2022-12-16
Payer: COMMERCIAL

## 2022-12-16 ENCOUNTER — HOSPITAL ENCOUNTER (OUTPATIENT)
Age: 42
Setting detail: OUTPATIENT SURGERY
Discharge: HOME | End: 2022-12-16
Attending: STUDENT IN AN ORGANIZED HEALTH CARE EDUCATION/TRAINING PROGRAM | Admitting: STUDENT IN AN ORGANIZED HEALTH CARE EDUCATION/TRAINING PROGRAM
Payer: COMMERCIAL

## 2022-12-16 VITALS
TEMPERATURE: 97 F | OXYGEN SATURATION: 96 % | HEART RATE: 76 BPM | HEIGHT: 66 IN | BODY MASS INDEX: 36.8 KG/M2 | WEIGHT: 229 LBS | RESPIRATION RATE: 16 BRPM | SYSTOLIC BLOOD PRESSURE: 136 MMHG | DIASTOLIC BLOOD PRESSURE: 82 MMHG

## 2022-12-16 DIAGNOSIS — G89.18 POST-OP PAIN: Primary | ICD-10-CM

## 2022-12-16 DIAGNOSIS — J32.4 CHRONIC PANSINUSITIS: ICD-10-CM

## 2022-12-16 PROCEDURE — 88311 DECALCIFY TISSUE: CPT

## 2022-12-16 PROCEDURE — 2580000003 HC RX 258: Performed by: NURSE ANESTHETIST, CERTIFIED REGISTERED

## 2022-12-16 PROCEDURE — 2500000003 HC RX 250 WO HCPCS: Performed by: NURSE ANESTHETIST, CERTIFIED REGISTERED

## 2022-12-16 PROCEDURE — 6360000002 HC RX W HCPCS: Performed by: NURSE ANESTHETIST, CERTIFIED REGISTERED

## 2022-12-16 PROCEDURE — 7100000010 HC PHASE II RECOVERY - FIRST 15 MIN: Performed by: STUDENT IN AN ORGANIZED HEALTH CARE EDUCATION/TRAINING PROGRAM

## 2022-12-16 PROCEDURE — 2780000010 HC IMPLANT OTHER: Performed by: STUDENT IN AN ORGANIZED HEALTH CARE EDUCATION/TRAINING PROGRAM

## 2022-12-16 PROCEDURE — 2580000003 HC RX 258: Performed by: ANESTHESIOLOGY

## 2022-12-16 PROCEDURE — 31276 NSL/SINS NDSC FRNT TISS RMVL: CPT | Performed by: STUDENT IN AN ORGANIZED HEALTH CARE EDUCATION/TRAINING PROGRAM

## 2022-12-16 PROCEDURE — 2720000010 HC SURG SUPPLY STERILE: Performed by: STUDENT IN AN ORGANIZED HEALTH CARE EDUCATION/TRAINING PROGRAM

## 2022-12-16 PROCEDURE — 3600000014 HC SURGERY LEVEL 4 ADDTL 15MIN: Performed by: STUDENT IN AN ORGANIZED HEALTH CARE EDUCATION/TRAINING PROGRAM

## 2022-12-16 PROCEDURE — 88305 TISSUE EXAM BY PATHOLOGIST: CPT

## 2022-12-16 PROCEDURE — 2580000003 HC RX 258: Performed by: STUDENT IN AN ORGANIZED HEALTH CARE EDUCATION/TRAINING PROGRAM

## 2022-12-16 PROCEDURE — 2500000003 HC RX 250 WO HCPCS: Performed by: STUDENT IN AN ORGANIZED HEALTH CARE EDUCATION/TRAINING PROGRAM

## 2022-12-16 PROCEDURE — 7100000001 HC PACU RECOVERY - ADDTL 15 MIN: Performed by: STUDENT IN AN ORGANIZED HEALTH CARE EDUCATION/TRAINING PROGRAM

## 2022-12-16 PROCEDURE — 6370000000 HC RX 637 (ALT 250 FOR IP): Performed by: ANESTHESIOLOGY

## 2022-12-16 PROCEDURE — 2709999900 HC NON-CHARGEABLE SUPPLY: Performed by: STUDENT IN AN ORGANIZED HEALTH CARE EDUCATION/TRAINING PROGRAM

## 2022-12-16 PROCEDURE — 31267 ENDOSCOPY MAXILLARY SINUS: CPT | Performed by: STUDENT IN AN ORGANIZED HEALTH CARE EDUCATION/TRAINING PROGRAM

## 2022-12-16 PROCEDURE — 3700000000 HC ANESTHESIA ATTENDED CARE: Performed by: STUDENT IN AN ORGANIZED HEALTH CARE EDUCATION/TRAINING PROGRAM

## 2022-12-16 PROCEDURE — 6360000002 HC RX W HCPCS: Performed by: STUDENT IN AN ORGANIZED HEALTH CARE EDUCATION/TRAINING PROGRAM

## 2022-12-16 PROCEDURE — 61782 SCAN PROC CRANIAL EXTRA: CPT | Performed by: STUDENT IN AN ORGANIZED HEALTH CARE EDUCATION/TRAINING PROGRAM

## 2022-12-16 PROCEDURE — 7100000000 HC PACU RECOVERY - FIRST 15 MIN: Performed by: STUDENT IN AN ORGANIZED HEALTH CARE EDUCATION/TRAINING PROGRAM

## 2022-12-16 PROCEDURE — 3700000001 HC ADD 15 MINUTES (ANESTHESIA): Performed by: STUDENT IN AN ORGANIZED HEALTH CARE EDUCATION/TRAINING PROGRAM

## 2022-12-16 PROCEDURE — 30520 REPAIR OF NASAL SEPTUM: CPT | Performed by: STUDENT IN AN ORGANIZED HEALTH CARE EDUCATION/TRAINING PROGRAM

## 2022-12-16 PROCEDURE — 31259 NSL/SINS NDSC SPHN TISS RMVL: CPT | Performed by: STUDENT IN AN ORGANIZED HEALTH CARE EDUCATION/TRAINING PROGRAM

## 2022-12-16 PROCEDURE — C2625 STENT, NON-COR, TEM W/DEL SY: HCPCS | Performed by: STUDENT IN AN ORGANIZED HEALTH CARE EDUCATION/TRAINING PROGRAM

## 2022-12-16 PROCEDURE — 3600000004 HC SURGERY LEVEL 4 BASE: Performed by: STUDENT IN AN ORGANIZED HEALTH CARE EDUCATION/TRAINING PROGRAM

## 2022-12-16 PROCEDURE — A4217 STERILE WATER/SALINE, 500 ML: HCPCS | Performed by: STUDENT IN AN ORGANIZED HEALTH CARE EDUCATION/TRAINING PROGRAM

## 2022-12-16 PROCEDURE — 7100000011 HC PHASE II RECOVERY - ADDTL 15 MIN: Performed by: STUDENT IN AN ORGANIZED HEALTH CARE EDUCATION/TRAINING PROGRAM

## 2022-12-16 DEVICE — PROPEL MINI SINUS IMPLANT
Type: IMPLANTABLE DEVICE | Site: SINUS | Status: FUNCTIONAL
Brand: PROPEL MINI

## 2022-12-16 DEVICE — DURA 62100 SUBSTITUTE DUREPAIR 2X2IN NCE
Type: IMPLANTABLE DEVICE | Site: NOSE | Status: FUNCTIONAL
Brand: DUREPAIR®

## 2022-12-16 RX ORDER — SODIUM CHLORIDE 9 MG/ML
INJECTION, SOLUTION INTRAVENOUS PRN
Status: DISCONTINUED | OUTPATIENT
Start: 2022-12-16 | End: 2022-12-16 | Stop reason: HOSPADM

## 2022-12-16 RX ORDER — MAGNESIUM HYDROXIDE 1200 MG/15ML
LIQUID ORAL CONTINUOUS PRN
Status: DISCONTINUED | OUTPATIENT
Start: 2022-12-16 | End: 2022-12-16 | Stop reason: HOSPADM

## 2022-12-16 RX ORDER — DEXAMETHASONE SODIUM PHOSPHATE 4 MG/ML
INJECTION, SOLUTION INTRA-ARTICULAR; INTRALESIONAL; INTRAMUSCULAR; INTRAVENOUS; SOFT TISSUE PRN
Status: DISCONTINUED | OUTPATIENT
Start: 2022-12-16 | End: 2022-12-16 | Stop reason: SDUPTHER

## 2022-12-16 RX ORDER — MEPERIDINE HYDROCHLORIDE 25 MG/ML
12.5 INJECTION INTRAMUSCULAR; INTRAVENOUS; SUBCUTANEOUS
Status: DISCONTINUED | OUTPATIENT
Start: 2022-12-16 | End: 2022-12-16 | Stop reason: HOSPADM

## 2022-12-16 RX ORDER — SODIUM CHLORIDE 9 MG/ML
INJECTION, SOLUTION INTRAVENOUS CONTINUOUS PRN
Status: DISCONTINUED | OUTPATIENT
Start: 2022-12-16 | End: 2022-12-16 | Stop reason: SDUPTHER

## 2022-12-16 RX ORDER — LIDOCAINE HYDROCHLORIDE 20 MG/ML
INJECTION, SOLUTION EPIDURAL; INFILTRATION; INTRACAUDAL; PERINEURAL PRN
Status: DISCONTINUED | OUTPATIENT
Start: 2022-12-16 | End: 2022-12-16 | Stop reason: SDUPTHER

## 2022-12-16 RX ORDER — ONDANSETRON 2 MG/ML
INJECTION INTRAMUSCULAR; INTRAVENOUS PRN
Status: DISCONTINUED | OUTPATIENT
Start: 2022-12-16 | End: 2022-12-16 | Stop reason: SDUPTHER

## 2022-12-16 RX ORDER — DOXYCYCLINE HYCLATE 100 MG
100 TABLET ORAL 2 TIMES DAILY
Qty: 42 TABLET | Refills: 0 | Status: SHIPPED | OUTPATIENT
Start: 2022-12-16 | End: 2022-12-21 | Stop reason: ALTCHOICE

## 2022-12-16 RX ORDER — SODIUM CHLORIDE 0.9 % (FLUSH) 0.9 %
5-40 SYRINGE (ML) INJECTION PRN
Status: DISCONTINUED | OUTPATIENT
Start: 2022-12-16 | End: 2022-12-16 | Stop reason: HOSPADM

## 2022-12-16 RX ORDER — PROPOFOL 10 MG/ML
INJECTION, EMULSION INTRAVENOUS PRN
Status: DISCONTINUED | OUTPATIENT
Start: 2022-12-16 | End: 2022-12-16 | Stop reason: SDUPTHER

## 2022-12-16 RX ORDER — LIDOCAINE HYDROCHLORIDE AND EPINEPHRINE 10; 10 MG/ML; UG/ML
INJECTION, SOLUTION INFILTRATION; PERINEURAL
Status: COMPLETED | OUTPATIENT
Start: 2022-12-16 | End: 2022-12-16

## 2022-12-16 RX ORDER — HYDROCODONE BITARTRATE AND ACETAMINOPHEN 5; 325 MG/1; MG/1
1 TABLET ORAL ONCE
Status: COMPLETED | OUTPATIENT
Start: 2022-12-16 | End: 2022-12-16

## 2022-12-16 RX ORDER — FENTANYL CITRATE 50 UG/ML
INJECTION, SOLUTION INTRAMUSCULAR; INTRAVENOUS PRN
Status: DISCONTINUED | OUTPATIENT
Start: 2022-12-16 | End: 2022-12-16 | Stop reason: SDUPTHER

## 2022-12-16 RX ORDER — ROCURONIUM BROMIDE 10 MG/ML
INJECTION, SOLUTION INTRAVENOUS PRN
Status: DISCONTINUED | OUTPATIENT
Start: 2022-12-16 | End: 2022-12-16 | Stop reason: SDUPTHER

## 2022-12-16 RX ORDER — SUCCINYLCHOLINE/SOD CL,ISO/PF 200MG/10ML
SYRINGE (ML) INTRAVENOUS PRN
Status: DISCONTINUED | OUTPATIENT
Start: 2022-12-16 | End: 2022-12-16 | Stop reason: SDUPTHER

## 2022-12-16 RX ORDER — SODIUM CHLORIDE 9 MG/ML
INJECTION, SOLUTION INTRAVENOUS CONTINUOUS
Status: DISCONTINUED | OUTPATIENT
Start: 2022-12-16 | End: 2022-12-16 | Stop reason: HOSPADM

## 2022-12-16 RX ORDER — HYDROCODONE BITARTRATE AND ACETAMINOPHEN 5; 325 MG/1; MG/1
1 TABLET ORAL EVERY 6 HOURS PRN
Qty: 12 TABLET | Refills: 0 | Status: SHIPPED | OUTPATIENT
Start: 2022-12-16 | End: 2022-12-19

## 2022-12-16 RX ORDER — EPINEPHRINE 1 MG/ML
INJECTION, SOLUTION, CONCENTRATE INTRAVENOUS
Status: COMPLETED | OUTPATIENT
Start: 2022-12-16 | End: 2022-12-16

## 2022-12-16 RX ORDER — ONDANSETRON 2 MG/ML
4 INJECTION INTRAMUSCULAR; INTRAVENOUS
Status: DISCONTINUED | OUTPATIENT
Start: 2022-12-16 | End: 2022-12-16 | Stop reason: HOSPADM

## 2022-12-16 RX ORDER — AMOXICILLIN 250 MG
2 CAPSULE ORAL DAILY PRN
Qty: 14 TABLET | Refills: 1 | Status: SHIPPED | OUTPATIENT
Start: 2022-12-16 | End: 2022-12-23

## 2022-12-16 RX ORDER — FENTANYL CITRATE 50 UG/ML
50 INJECTION, SOLUTION INTRAMUSCULAR; INTRAVENOUS EVERY 5 MIN PRN
Status: DISCONTINUED | OUTPATIENT
Start: 2022-12-16 | End: 2022-12-16 | Stop reason: HOSPADM

## 2022-12-16 RX ORDER — FENTANYL CITRATE 50 UG/ML
25 INJECTION, SOLUTION INTRAMUSCULAR; INTRAVENOUS EVERY 5 MIN PRN
Status: DISCONTINUED | OUTPATIENT
Start: 2022-12-16 | End: 2022-12-16 | Stop reason: HOSPADM

## 2022-12-16 RX ORDER — MIDAZOLAM HYDROCHLORIDE 1 MG/ML
INJECTION INTRAMUSCULAR; INTRAVENOUS PRN
Status: DISCONTINUED | OUTPATIENT
Start: 2022-12-16 | End: 2022-12-16 | Stop reason: SDUPTHER

## 2022-12-16 RX ORDER — SODIUM CHLORIDE 0.9 % (FLUSH) 0.9 %
5-40 SYRINGE (ML) INJECTION EVERY 12 HOURS SCHEDULED
Status: DISCONTINUED | OUTPATIENT
Start: 2022-12-16 | End: 2022-12-16 | Stop reason: HOSPADM

## 2022-12-16 RX ADMIN — ONDANSETRON 4 MG: 2 INJECTION INTRAMUSCULAR; INTRAVENOUS at 10:02

## 2022-12-16 RX ADMIN — Medication 120 MG: at 07:35

## 2022-12-16 RX ADMIN — SODIUM CHLORIDE: 9 INJECTION, SOLUTION INTRAVENOUS at 07:30

## 2022-12-16 RX ADMIN — FENTANYL CITRATE 50 MCG: 50 INJECTION INTRAMUSCULAR; INTRAVENOUS at 07:30

## 2022-12-16 RX ADMIN — PROPOFOL 175 MG: 10 INJECTION, EMULSION INTRAVENOUS at 07:35

## 2022-12-16 RX ADMIN — DEXAMETHASONE SODIUM PHOSPHATE 8 MG: 4 INJECTION, SOLUTION INTRAMUSCULAR; INTRAVENOUS at 07:51

## 2022-12-16 RX ADMIN — FENTANYL CITRATE 50 MCG: 50 INJECTION INTRAMUSCULAR; INTRAVENOUS at 07:35

## 2022-12-16 RX ADMIN — HYDROCODONE BITARTRATE AND ACETAMINOPHEN 1 TABLET: 5; 325 TABLET ORAL at 12:14

## 2022-12-16 RX ADMIN — MIDAZOLAM 1 MG: 1 INJECTION INTRAMUSCULAR; INTRAVENOUS at 07:35

## 2022-12-16 RX ADMIN — ROCURONIUM BROMIDE 45 MG: 50 INJECTION INTRAVENOUS at 07:42

## 2022-12-16 RX ADMIN — ROCURONIUM BROMIDE 10 MG: 50 INJECTION INTRAVENOUS at 08:22

## 2022-12-16 RX ADMIN — ROCURONIUM BROMIDE 5 MG: 50 INJECTION INTRAVENOUS at 07:35

## 2022-12-16 RX ADMIN — ROCURONIUM BROMIDE 10 MG: 50 INJECTION INTRAVENOUS at 09:32

## 2022-12-16 RX ADMIN — HYDROMORPHONE HYDROCHLORIDE 1 MG: 1 INJECTION, SOLUTION INTRAMUSCULAR; INTRAVENOUS; SUBCUTANEOUS at 08:40

## 2022-12-16 RX ADMIN — MIDAZOLAM 1 MG: 1 INJECTION INTRAMUSCULAR; INTRAVENOUS at 07:30

## 2022-12-16 RX ADMIN — SODIUM CHLORIDE: 9 INJECTION, SOLUTION INTRAVENOUS at 06:43

## 2022-12-16 RX ADMIN — LIDOCAINE HYDROCHLORIDE 100 MG: 20 INJECTION, SOLUTION EPIDURAL; INFILTRATION; INTRACAUDAL; PERINEURAL at 10:06

## 2022-12-16 RX ADMIN — Medication 1000 MG: at 08:28

## 2022-12-16 RX ADMIN — LIDOCAINE HYDROCHLORIDE 100 MG: 20 INJECTION, SOLUTION EPIDURAL; INFILTRATION; INTRACAUDAL; PERINEURAL at 07:35

## 2022-12-16 RX ADMIN — SUGAMMADEX 200 MG: 100 INJECTION, SOLUTION INTRAVENOUS at 10:12

## 2022-12-16 RX ADMIN — ROCURONIUM BROMIDE 10 MG: 50 INJECTION INTRAVENOUS at 08:52

## 2022-12-16 ASSESSMENT — PAIN DESCRIPTION - FREQUENCY
FREQUENCY: CONTINUOUS

## 2022-12-16 ASSESSMENT — PAIN DESCRIPTION - DESCRIPTORS
DESCRIPTORS: DISCOMFORT
DESCRIPTORS: ACHING
DESCRIPTORS: ACHING;DULL
DESCRIPTORS: DISCOMFORT

## 2022-12-16 ASSESSMENT — PAIN DESCRIPTION - ONSET
ONSET: ON-GOING

## 2022-12-16 ASSESSMENT — PAIN - FUNCTIONAL ASSESSMENT
PAIN_FUNCTIONAL_ASSESSMENT: PREVENTS OR INTERFERES SOME ACTIVE ACTIVITIES AND ADLS
PAIN_FUNCTIONAL_ASSESSMENT: 0-10
PAIN_FUNCTIONAL_ASSESSMENT: ACTIVITIES ARE NOT PREVENTED
PAIN_FUNCTIONAL_ASSESSMENT: PREVENTS OR INTERFERES SOME ACTIVE ACTIVITIES AND ADLS
PAIN_FUNCTIONAL_ASSESSMENT: ACTIVITIES ARE NOT PREVENTED

## 2022-12-16 ASSESSMENT — PAIN DESCRIPTION - PAIN TYPE
TYPE: ACUTE PAIN
TYPE: ACUTE PAIN
TYPE: SURGICAL PAIN

## 2022-12-16 ASSESSMENT — PAIN DESCRIPTION - ORIENTATION
ORIENTATION: MID
ORIENTATION: MID

## 2022-12-16 ASSESSMENT — LIFESTYLE VARIABLES: SMOKING_STATUS: 0

## 2022-12-16 ASSESSMENT — ENCOUNTER SYMPTOMS
SINUS COMPLAINT: 1
SHORTNESS OF BREATH: 0

## 2022-12-16 ASSESSMENT — PAIN DESCRIPTION - LOCATION
LOCATION: HEAD

## 2022-12-16 ASSESSMENT — PAIN SCALES - GENERAL
PAINLEVEL_OUTOF10: 3
PAINLEVEL_OUTOF10: 4

## 2022-12-16 NOTE — PROGRESS NOTES
Patient admitted to PACU from OR. Patient lifts up head from bed to name, very drowsy. Follows commands and drifts back asleep during conversation. Resp shallow unlabored on 4L NC with SaO2 92%. Bilat Damico splints intact to nares. NO drainage noted. HOB up. VSS. IV patent to right AC.

## 2022-12-16 NOTE — PROGRESS NOTES
Patient more awake and alert. Resp easy unlabored on room air O2 with SaO2 97%. Patient C/O mild headache at 3 of 10 and tolerable. VSS. IV patent. Patient denies C/O nausea. Taking ice chips prn. VSS.

## 2022-12-16 NOTE — ANESTHESIA POSTPROCEDURE EVALUATION
Department of Anesthesiology  Postprocedure Note    Patient: Lyndsay Estevez  MRN: [id number]  YOB: 1980  Date of evaluation: 12/16/2022      Procedure Summary     Date: 12/16/22 Room / Location: [address]    Anesthesia Start: 0730 Anesthesia Stop: 1027    Procedure: ENDOSCOPIC SINUS SURGERY WITH IMAGE GUIDANCE, SEPTOPLASTY (Nose) Diagnosis:       Chronic pansinusitis      (Chronic pansinusitis)    Surgeons: Candelario White MD Responsible Provider: Efrain Barrientos MD    Anesthesia Type: general ASA Status: 3          Anesthesia Type: No value filed.     Adriel Phase I: Adriel Score: 10    Adriel Phase II: Adriel Score: 10      Anesthesia Post Evaluation    Patient location during evaluation: PACU  Patient participation: complete - patient participated  Level of consciousness: awake and alert  Pain score: 2  Airway patency: patent  Nausea & Vomiting: no nausea and no vomiting  Complications: no  Cardiovascular status: blood pressure returned to baseline  Respiratory status: acceptable  Hydration status: euvolemic

## 2022-12-16 NOTE — DISCHARGE INSTRUCTIONS
SINUS SURGERY / SEPTOPLASTY / [address] [facility] Operative Instructions   [facility] ENT [phone]    The Surgery Itself   Sinus surgery and/or Septoplasty and turbinate reduction involves general anesthesia. Patients may be sedated for several hours after surgery and may remain sleepy for the better part of the day. Nausea and vomiting is occasionally seen, and usually resolves by the evening of surgery - even without additional medications. Almost all patients can go home the day of surgery. After Surgery  You may have plastic splints in your nose following surgery; this will make breathing through your nose difficult. A humidifier or vaporizer can be used in the bedroom to prevent throat pain with mouth breathing. Bloody nasal drainage is normal after this surgery for 5-7 days, usually decreasing in volume with each day that passes. Drainage will flow from the front of the nose and down the back of the throat. Make sure you spit out blood drainage that drips down the back of your throat to prevent nausea/vomiting. You will have a nasal drip pad/sling with gauze to catch drainage from the front of your nose. The dressing may need to be changed frequently during the first 24 hours following surgery. In case of profuse nasal bleeding, you may apply ice to the bridge of the nose and pinch the nose just above the tip and hold for 10 minutes; if profuse bleeding continues, contact the doctor's office. You may notice facial pressure and fullness similar to a mild sinus infection. This is more common if splints are in place. Frequent hot showers, breathing in steam from a pot of boiling water, or simply sniffing a small amount of water through your nose will help break up congestion and clear any clot or mucus that builds up within the nose after surgery. Do not pull at the splints, gauze or sutures in your nose after surgery.     It is more comfortable to sleep with extra pillows or in a recliner for the first few days after surgery until the drainage begins to resolve. Do not blow your nose for 2 weeks after surgery. Avoid lifting > 10 lbs. and no vigorous exercise for 2 weeks after surgery. Sense of smell and taste are often diminished for several weeks after surgery. There may be some tenderness or numbness in your upper front teeth, which is normal after surgery. You may express old clot, discolored mucus or very large nasal crusts from your nose for up to 3-4 weeks after surgery; depending on how frequently and how effectively you irrigate your nose with the saltwater spray. Medications     You should take 200mg of ibuprofen combined with 500mg of acetaminophen every 6 hours for pain. This will control most of the pain. Only use the narcotic pain medication for breakthrough pain. You can take this with or without the tylenol and ibuprofen. Pain medication should be used for pain as prescribed. Pain and pressure in the nose is expected after surgery - the nasal splints cause most of this. As the surgical site heals, pain will resolve over the course of a week. Pain medications can cause nausea, which can be prevented if you take them with food or milk. Take your antibiotics and any other medications as prescribed  Do not use nasal steroid sprays until instructed to do so at a follow up appointment. You can purchase 2 nasal sprays for use after surgery:   Afrin can be used up to 2 times a day (best before bed) to reduce bloody drainage from the nose for the first few days after surgery. DO NOT USE FOR MORE THAN 3 DAYS  Saline/salt water spray can be used once the nasal splints/gauze are removed to prevent crusting inside of the nose. Take all of your routine medications as prescribed, unless told otherwise by your doctor. SINUS IRRIGATIONS INSTRUCTIONS    Started the day after sinus surgery use sinus irrigations at least 3 times per day (full bottle).    Fill the bottle with distilled water and mixed the salt bicarbonate packet. An instructional video can be seen at: http://petar-aneudy.info/  The goal is to gently irrigate both nostrils so that any residual blood clots can be flushed from your nose. If you start having ear pain after irrigations do not squeeze the bottle as firmly.

## 2022-12-16 NOTE — H&P
Paul Carlos (:  1980) is a 38 y.o. female, here for evaluation of the following chief complaint(s):  Sinus Problem and Pharyngitis      ASSESSMENT/PLAN:  1. Chronic ethmoidal sinusitis  2. Chronic maxillary sinusitis  3. Chronic frontal sinusitis  4. Chronic sphenoidal sinusitis  5. Purulent rhinorrhea  6. Hyposmia  7. Sore throat  8. History of nasal septoplasty  9. Nasal congestion      This is a very pleasant 38 y.o. female here today for evaluation of the the above-noted complaints.           -Given the above, will plan for Bilateral functional endoscopic sinus surgery with possible maxillary antrostomy, anterior/posterior ethmoidectomies, frontal sinusotomy and sphenoidotomy with removal of tissue  -Risks of sinus surgery include anosmia/hyposmia, dysgeusia, hemorrhage, pain, infection/inflammation, numbness to the maxillary tissues or dentition, CSF leak (with risk of meningitis or intracranial infection), orbital complications (diplopia, blurry vision, blindness), need for further procedures  -Anesthesia carries its own complications which will need to be discussed with the Anesthesiology team on the day of surgery  -Patient will need to obtain PCP clearance prior to surgery, will need to be off anticoagulants prior to surgery

## 2022-12-16 NOTE — OP NOTE
[address] [facility] SURGERY  OPERATIVE REPORT    Patient Name: Dawn Grewal  YOB: 1980  Medical Record Number:  [id number]  Billing Number:  [id number]  Time: 0730    Pre Operative Diagnoses:   1) Chronic pansinusitis  2) Septal deviation  3) Chronic rhinitis  4) Facial pain and pressure  5) Hyposmia  6) Nasal obstruction    Post Operative Diagnoses:    Same        Procedure:    1) Bilateral nasal endoscopy with sphenoethmoidectomies with tissue removal- (CPT 68081-49)  2) Bilateral nasal endoscopy with maxillary antrostomies with tissue removal- (CPT 72335-67)  3) Bilateral frontal sinusotomies with tissue removal- (CPT Y0493284)  4) Endoscopic septoplasty (CPT 82545)  5) Stereotactic extradural image guidance (CPT 30022)    Surgeon: Kleber Garcia MD    OR Staff/ Assistant:  Circulator: Amisha Allen RN  Scrub Person First: Juancarlos Falk    Anesthesia:  General anesthesia. Findings:    Diffuse mucosal edema, erythema and thick mucoid secretions filling maxillary, sphenoidal, ethmoidal and frontal sinuses. Consistent with chronic pansinusitis  Partial resection of bilateral middle turbinates due to instability  Revision endoscopic septoplasty performed due to high nasal obstruction on the left    Indications: This is a 38 y.o. female with chronic pansinusitis, facial pain and pressure, chronic purulent rhinorrhea. Risks and benefits were discussed with the patient including alternate treatment options. Informed consent was obtained and the patient elected to proceed with the planned procedure. DETAILS OF PROCEDURE(S):   The patient was brought to the operating room and placed supine on the operating room table. After general anesthesia was obtained, epinephrine  soaked pledgets were placed into both nasal cavities. The Fusion headset was placed and registered for use. Prior to the procedure, the pre-op planning station was used to plan our approach. Instruments were then calibrated and registered for use. It should be noted that the imaging system was utilized throughout the entirety of the procedure. The patient was then prepped and draped in standard sterile fashion. A time out confirming correct patient and procedure was then performed. 0 degree endoscope was inserted into the nose. There is noted to be improvement of the nasal valves bilaterally since her prior septorhinoplasty. There was noted to be a high leftward septal deviation completely blocking access to the middle meatus, therefore a revision septoplasty was performed. Mucosal Lew's incision was made. Submucosal pockets were elevated. The remnant cartilage and bone superiorly were then dissected free and resected in piecemeal fashion. There is noted to be a small opposing tear on the right mucosal flap along the Adrian's incision. Therefore a piece of dura repair was placed in mattress sutures were placed to secure it in place and facilitate healing. Left Maxillary Antrostomy with Mucus Membrane removal:  A 30 degree endoscope was used to examine the nasal cavity. The natural os was identified and probed with a maxillary sinus seeker and enlarged. Next, a backbiter was used to remove the uncinate process and the maxillary ostium was enlarged using thru cuts and microdebriders. Then navigation was used to ensure the maxillary os was enlarged maximally. The microdebrider and angled instrumentation were utilized to remove diseased tissue from the maxillary sinus on that side. Left Endoscopic Complete Ethmoidectomy:  Using a 0 degree endoscope, the ethmoidal bulla was identified and removed and the basal lamella was incised to remove the posterior ethmoid air cells. The medial orbital wall and anterior skull base on the right was skeletonized using a 30 degree scope.   Inflammatory tissue and superior ethmoid cells were removed off of the skull base with a combination of an angled debrider and through biting forceps. Left Endoscopic Sphenoidotomy with tissue removal:   A 0 degree scope was used to identify the superior turbinate was identified on the right side and a through cutting forcep was used to resect the lower half of the superior turbinate. The natural ostium was then identified and enlarged with the J currette after confirmation with the navigation instruments. The anterior face of the sphenoid sinus was removed enlarging the sphenoid ostium. Thickened secretions due to obstruction were suctioned from the sinus. Inflammatory tissue was removed with the microdebrider. Left Skull Base Dissection: The medial orbital wall and anterior skull base skeletonized using a 30 degree scope using through biting forceps and kerrison rongeurs. With the skull base visible and free of disease the anterior ethmoid artery was identified and confirmed with the navigation instruments. Care was taken to ensure the skull base was not violated while periodically checking with the Pandood[employer] navigation. Left Frontal Sinus Dissection: The frontal recess was dissected with a 70 degree endoscope and frontal recess instruments. The superior uncinate process was resected and the agger nasi cell was dissected out. The frontal recess was enlarged by removing the bony partition between the frontal recess and supraorbital ethmoid air cell. Inflammatory tissue and thickened mucus consistent with chronic frontal sinusitis was identified and evacuated with the microdebrider and angled instrumentation. Right Maxillary Antrostomy with Mucus Membrane removal:  A 30 degree endoscope was used to examine the nasal cavity. The natural os was identified and probed with a maxillary sinus seeker and enlarged. Next, a backbiter was used to remove the uncinate process and the maxillary ostium was enlarged using thru cuts and microdebriders.  Then navigation was used to ensure the maxillary os was enlarged maximally. Rober Heal cell was identified and resected. Inflammatory tissue was removed from about the natural ostium. There is noted to be thickened mucosa of the sinus consistent with chronic sinusitis. This was removed with a microdebrider. Right Endoscopic Complete Ethmoidectomy:  Using a 0 degree endoscope, the ethmoidal bulla was identified and removed and the basal lamella was incised to remove the posterior ethmoid air cells. The medial orbital wall and anterior skull base on the right was skeletonized using a 30 degree scope. Significant mucosal edema and inflamed tissue was noted. Superior ethmoid cells were removed off of the skull base with a combination of an angled debrider and through biting forceps. Right Endoscopic Sphenoidotomy with tissue removal:   A 0 degree scope was used to identify the superior turbinate was identified on the right side and a through cutting forcep was used to resect the lower half of the superior turbinate. The natural ostium was then identified and enlarged with the J currette after confirmation with the navigation instruments. There was noted to be and then Onodi cell on that side which was entered into and incorporated into the sphenoid dissection cavity. Thickened mucus stasis and inflammatory tissue was removed from the sinus. The natural sphenoid sinus ostium was widened maximally. Right Endoscopic Skull Base Dissection with tissue removal:   The medial orbital wall and anterior skull base skeletonized using a 30 degree scope using through biting forceps and kerrison rongeurs. With the skull base visible and free of disease the anterior ethmoid artery was identified and confirmed with the navigation instruments. Care was taken to ensure the skull base was not violated while periodically checking with the [employer] navigation.     Right Endoscopic Frontal Sinus Dissection with tissue removal:   The frontal recess was dissected with a 70 degree endoscope and frontal recess instruments. The superior uncinate process was resected and the agger nasi cell was dissected out. The frontal recess was enlarged by removing the bony partition between the frontal recess and supraorbital ethmoid air cell. As on the left, there is significant mucosal disease obstructing the natural sinus outflow tract. A combination of angled instruments was utilized to remove this tissue maximally enlarge the ostium. There is noted to be significant instability of the middle turbinates. Partial resection of the middle portion of the turbinates was been performed. The turbinate was transected and followed in an anterior to posterior fashion. Posterior attachments were transected and the blood supply was ligated on both the right and left. Gentle cauterization along the cut edges was then performed    Closure: The mucopericondrial flaps were reapproximated with a mattress stitch. Damico splints were placed and and secured with 3-0 Prolene. Propel mini stents were placed into the bilateral frontal sinus recesses. The nasal cavities where irrigated and hemostasis was achieved. An OG tube was passed to reduce post operative nausea. This concluded the procedure and the patient was turned back over to the anesthesia team.       I attest that I was present for and did the entire procedure myself. Estimated Blood Loss: less than 100 ml                 Specimens: * No specimens in log *            Complications: There were no complications.     Cesar Franco MD

## 2022-12-16 NOTE — ANESTHESIA PRE PROCEDURE
Department of Anesthesiology  Preprocedure Note       Name:  Allie Mancini   Age:  38 y.o.  :  1980                                          MRN:  [id number]         Date:  2022      Surgeon: Senia Mcleod):  Heidy Tomas MD    Procedure: Procedure(s):  ENDOSCOPIC SINUS SURGERY WITH IMAGE GUIDANCE    Medications prior to admission:   Prior to Admission medications    Medication Sig Start Date End Date Taking? Authorizing Provider   amitriptyline (ELAVIL) 10 MG tablet Take 10 mg by mouth nightly 22   Historical Provider, MD   DUPIXENT 300 MG/2ML SOPN injection INJECT THE CONTENTS OF 1 PEN (300 MG) UNDER THE SKIN EVERY 2 WEEKS 22   Verner Claude, MD   albuterol sulfate HFA (PROVENTIL;VENTOLIN;PROAIR) 108 (90 Base) MCG/ACT inhaler INHALE TWO PUFFS BY MOUTH EVERY 6 HOURS AS NEEDED FOR WHEEZING OR SHORTNESS OF BREATH 22   Ok Moreno MD   pantoprazole (PROTONIX) 40 MG tablet Take 40 mg by mouth daily 22   Historical Provider, MD   famotidine (PEPCID) 40 MG tablet Take 40 mg by mouth at bedtime 22   Historical Provider, MD   ibuprofen (ADVIL;MOTRIN) 200 MG tablet Take 200 mg by mouth every 6 hours as needed    Historical Provider, MD   modafinil (PROVIGIL) 200 MG tablet Take 1 tablet by mouth in the morning and 1 tablet before bedtime. Do all this for 90 days. Patient taking differently: Take 200 mg by mouth daily.  22  QI Jordan   spironolactone (ALDACTONE) 100 MG tablet TAKE ONE TABLET BY MOUTH DAILY 8/3/22   Ok Moreno MD   TRELEGY ELLIPTA 828-44.4-32 MCG/INH AEPB INHALE ONE PUFF BY MOUTH DAILY 22   Verner Claude, MD   Azelastine-Fluticasone 137-50 MCG/ACT SUSP SPRAY ONE SPRAY IN EACH NOSTRIL TWICE A DAY 22   Heidy Tomas MD   montelukast (SINGULAIR) 10 MG tablet Take 1 tablet by mouth nightly 6/10/22   Ok Moreno MD   VORTIoxetine HBr (TRINTELLIX) 20 MG TABS tablet Take 1 tablet by mouth daily  Patient taking differently: Take 20 mg by mouth at bedtime 6/10/22   Ok Moreno MD   lisinopril (PRINIVIL;ZESTRIL) 5 MG tablet TAKE 1 TAB DAILY 6/10/22   Ok Moreno MD   atorvastatin (LIPITOR) 40 MG tablet TAKE ONE TABLET BY MOUTH DAILY  Patient taking differently: Take 40 mg by mouth at bedtime TAKE ONE TABLET BY MOUTH DAILY 6/10/22   Ok Moreno MD   Blood Pressure Monitoring (OMRON 3 SERIES BP MONITOR) GABRIELLE Test blood pressure weekly or PRN 1/31/22   Ok Moreno MD   Spacer/Aero-Holding Chambers GABRIELLE 1 Device by Does not apply route daily as needed (with [facility] inhalers) 12/15/20   Ok Moreno MD   Multiple Vitamins-Minerals (THERAPEUTIC MULTIVITAMIN-MINERALS) tablet Take 1 tablet by mouth daily. Historical Provider, MD       Current medications:    No current facility-administered medications for this visit. No current outpatient medications on file. Facility-Administered Medications Ordered in Other Visits   Medication Dose Route Frequency Provider Last Rate Last Admin    0.9 % sodium chloride infusion   IntraVENous Continuous Trevor Guerrero MD        sodium chloride flush 0.9 % injection 5-40 mL  5-40 mL IntraVENous 2 times per day Trevor Guerrero MD        sodium chloride flush 0.9 % injection 5-40 mL  5-40 mL IntraVENous PRN Trevor Guerrero MD        0.9 % sodium chloride infusion   IntraVENous PRN Trevor Guerrero MD           Allergies:     Allergies   Allergen Reactions    Ensure Hives and Itching     EGGS-EYE SWELLING    Peanut-Containing Drug Products Itching     PEANUTS-RUNNY NOSE,ITCHING IN THROAT    Food      Certain foods (chocolate, tea, soy, eggs, tree nuts, coffee, tomatoes, black pepper)-THROAT ITCHING,NOSE RUNNING       Problem List:    Patient Active Problem List   Diagnosis Code    Polycystic ovarian disease E28.2    Hyperlipidemia E78.5    Hirsutism L68.0    Allergic rhinitis due to pollen J30.1    Nasal septal deviation J34.2    Asthma J45.909    Lumbar [facility]4-5, [facility] 2011 M51.9    Essential hypertension I10    Prediabetes A1c 5.7 6/1/16 R73.03    Obstructive sleep apnea (adult) (pediatric) G47.33    Excessive daytime sleepiness G47.19    Other viral warts B07.8    Non morbid obesity, unspecified obesity type E66.9    Chronic post-traumatic stress disorder (PTSD) with anxiety F43.12    Chronic GERD K21.9    Severe persistent asthma without complication B40.00    Nasal valve collapse J34.89    Chronic rhinitis J31.0    Hypertrophy of inferior nasal turbinate J34.3       Past Medical History:        Diagnosis Date    Acid reflux     Allergic rhinitis     Anxiety     Anxiety     Asthma     Environmental allergies     food    Eosinophilic asthma     Hyperlipidemia     Hypertension     Lumbar disc disease L4-5, L5-S1 [facility] Epid[facility] 2011 08/20/2012    Obstructive sleep apnea     USES C-PAP    Obstructive sleep apnea (adult) (pediatric) 06/18/2018    USES C-PAP    PCOS (polycystic ovarian syndrome)     S/P colonoscopy 01/17/2013    normal. Dr Esteban Wayne       Past Surgical History:        Procedure Laterality Date    BREAST REDUCTION SURGERY  01/01/2004    COLONOSCOPY      FOOT SURGERY Left 01/01/2000    hallux fx    FOOT SURGERY Left     HEEL SURGERY X2-FASCIA    KNEE ARTHROSCOPY Right     NOSE SURGERY N/A 10/29/2021    SEPTOPLASTY, INFERIOR TURBINATE REDUCTION, REPAIR OF NASAL VALVE COLLAPSE, ABLATION OF POSTERIOR NASAL NERVES performed by Robinson Lovell MD at [facility] ARTHROSCOPY Left     SINUS SURGERY  10/29/2021    UPPER GASTROINTESTINAL ENDOSCOPY  01/17/2013    gastritis; Dr Adonay Anguiano History:    Social History     Tobacco Use    Smoking status: Never    Smokeless tobacco: Never    Tobacco comments:     congratulated on nonsmoking status   Substance Use Topics    Alcohol use: No                                Counseling given: Not Answered  Tobacco comments: congratulated on nonsmoking status      Vital Signs (Current): There were no vitals filed for this visit. BP Readings from Last 3 Encounters:   12/16/22 112/74   12/14/22 114/74   11/02/22 128/79       NPO Status:  >8h                                                                               BMI:   Wt Readings from Last 3 Encounters:   12/16/22 229 lb (103.9 kg)   12/14/22 229 lb (103.9 kg)   11/02/22 229 lb (103.9 kg)     There is no height or weight on file to calculate BMI.    CBC:   Lab Results   Component Value Date/Time    WBC 7.1 07/29/2022 09:31 AM    RBC 4.27 07/29/2022 09:31 AM    HGB 12.7 07/29/2022 09:31 AM    HCT 38.5 07/29/2022 09:31 AM    MCV 90.3 07/29/2022 09:31 AM    RDW 13.3 07/29/2022 09:31 AM     07/29/2022 09:31 AM       CMP:   Lab Results   Component Value Date/Time     06/10/2022 12:07 PM    K 4.5 06/10/2022 12:07 PM     06/10/2022 12:07 PM    CO2 25 06/10/2022 12:07 PM    BUN 11 06/10/2022 12:07 PM    CREATININE 0.6 06/10/2022 12:07 PM    GFRAA >60 06/10/2022 12:07 PM    GFRAA >60 01/02/2013 07:20 AM    AGRATIO 1.6 06/10/2022 12:07 PM    LABGLOM >60 06/10/2022 12:07 PM    GLUCOSE 102 06/10/2022 12:07 PM    PROT 7.1 06/10/2022 12:07 PM    PROT 9.3 01/02/2013 07:20 AM    CALCIUM 9.1 06/10/2022 12:07 PM    BILITOT <0.2 06/10/2022 12:07 PM    ALKPHOS 102 06/10/2022 12:07 PM    AST 17 06/10/2022 12:07 PM    ALT 21 06/10/2022 12:07 PM       POC Tests: No results for input(s): POCGLU, POCNA, POCK, POCCL, POCBUN, POCHEMO, POCHCT in the last 72 hours.     Coags:   Lab Results   Component Value Date/Time    PROTIME 11.4 04/25/2014 06:44 AM    INR 1.02 04/25/2014 06:44 AM       HCG (If Applicable):   Lab Results   Component Value Date    PREGTESTUR Negative 10/29/2021        ABGs: No results found for: PHART, PO2ART, BAC1BEJ, XZR2GIV, BEART, F4MUZATP     Type & Screen (If Applicable):  No results found for: LABABO, LABRH    Drug/Infectious Status (If Applicable):  No results found for: HIV, HEPCAB    COVID-19 Screening (If Applicable):   Lab Results   Component Value Date/Time    COVID19 Not Detected 05/07/2021 11:43 AM           Anesthesia Evaluation  Patient summary reviewed no history of anesthetic complications:   Airway: Mallampati: II  TM distance: >3 FB   Neck ROM: full  Mouth opening: > = 3 FB   Dental: normal exam         Pulmonary:   (+) sleep apnea:  asthma:     (-) pneumonia, COPD, shortness of breath, recent URI and not a current smoker                           Cardiovascular:    (+) hypertension:, hyperlipidemia    (-) pacemaker, valvular problems/murmurs, past MI, CAD, CABG/stent, dysrhythmias,  angina,  CHF, orthopnea, PND,  ESCOBEDO and no pulmonary hypertension      Rhythm: regular                      Neuro/Psych:   (+) psychiatric history:   (-) seizures, neuromuscular disease, TIA, CVA, headaches and depression/anxiety            GI/Hepatic/Renal:   (+) GERD:,      (-) hiatal hernia, PUD, hepatitis, liver disease, no renal disease, bowel prep and no morbid obesity      ROS comment: BMI 39. Endo/Other:    (+) no malignancy/cancer. (-) diabetes mellitus, hypothyroidism, hyperthyroidism, blood dyscrasia, arthritis, no electrolyte abnormalities, no malignancy/cancer                ROS comment: PCOS Abdominal:             Vascular:     - PVD, DVT and PE. Other Findings:             Anesthesia Plan      general     ASA 3       Induction: intravenous. MIPS: Postoperative opioids intended and Prophylactic antiemetics administered. Anesthetic plan and risks discussed with patient. Plan discussed with CRNA.                     Timmy Batista MD   12/16/2022        This pre-anesthesia assessment may be used as a history and physical.    DOS STAFF ADDENDUM:    Pt seen and examined, chart reviewed (including anesthesia, drug and allergy history). No interval changes to history and physical examination. Anesthetic plan, risks, benefits, alternatives, and personnel involved discussed with patient. Patient verbalized an understanding and agrees to proceed.       Lou Yates MD  December 16, 2022  6:38 AM

## 2022-12-17 ENCOUNTER — TELEPHONE (OUTPATIENT)
Dept: ENT CLINIC | Age: 42
End: 2022-12-17

## 2022-12-17 RX ORDER — DOXYCYCLINE HYCLATE 100 MG
100 TABLET ORAL 2 TIMES DAILY
Qty: 42 TABLET | Refills: 0 | Status: SHIPPED | OUTPATIENT
Start: 2022-12-17 | End: 2022-12-21 | Stop reason: ALTCHOICE

## 2022-12-21 ENCOUNTER — OFFICE VISIT (OUTPATIENT)
Dept: ENT CLINIC | Age: 42
End: 2022-12-21
Payer: COMMERCIAL

## 2022-12-21 VITALS — BODY MASS INDEX: 36.8 KG/M2 | TEMPERATURE: 97.8 F | WEIGHT: 229 LBS | RESPIRATION RATE: 16 BRPM | HEIGHT: 66 IN

## 2022-12-21 DIAGNOSIS — J32.0 CHRONIC MAXILLARY SINUSITIS: ICD-10-CM

## 2022-12-21 DIAGNOSIS — J32.3 CHRONIC SPHENOIDAL SINUSITIS: ICD-10-CM

## 2022-12-21 DIAGNOSIS — J32.2 CHRONIC ETHMOIDAL SINUSITIS: Primary | ICD-10-CM

## 2022-12-21 DIAGNOSIS — J34.89 NASAL CRUSTING: ICD-10-CM

## 2022-12-21 DIAGNOSIS — J32.1 CHRONIC FRONTAL SINUSITIS: ICD-10-CM

## 2022-12-21 PROCEDURE — 99024 POSTOP FOLLOW-UP VISIT: CPT | Performed by: STUDENT IN AN ORGANIZED HEALTH CARE EDUCATION/TRAINING PROGRAM

## 2022-12-21 PROCEDURE — 31237 NSL/SINS NDSC SURG BX POLYPC: CPT | Performed by: STUDENT IN AN ORGANIZED HEALTH CARE EDUCATION/TRAINING PROGRAM

## 2022-12-21 RX ORDER — DOXYCYCLINE HYCLATE 100 MG
100 TABLET ORAL 2 TIMES DAILY
Qty: 14 TABLET | Refills: 0 | Status: SHIPPED | OUTPATIENT
Start: 2022-12-21 | End: 2022-12-28

## 2022-12-21 RX ORDER — METHYLPREDNISOLONE 4 MG/1
TABLET ORAL
Qty: 1 KIT | Refills: 0 | Status: SHIPPED | OUTPATIENT
Start: 2022-12-21 | End: 2022-12-27

## 2022-12-21 ASSESSMENT — ENCOUNTER SYMPTOMS: SINUS COMPLAINT: 1

## 2022-12-21 NOTE — PROGRESS NOTES
Paul Carlos (:  1980) is a 38 y.o. female, here for evaluation of the following chief complaint(s):  Post-Op Check      ASSESSMENT/PLAN:  1. Chronic ethmoidal sinusitis  2. Chronic maxillary sinusitis  3. Chronic frontal sinusitis  4. Chronic sphenoidal sinusitis  5. Nasal crusting      This is a very pleasant 38 y.o. female here today for evaluation of the the above-noted complaints. Patient is status post open septorhinoplasty, septoplasty, ITR and posterior ablation of her nasal nerves on 10/29/2021. Patient is status postendoscopic sinus surgery, revision septoplasty for CRSsNP on 12/15/22.    -Continue perioperative antibiotics  -We will prescribe Medrol Dosepak  -Continue irrigating 2-3 times per day  -Follow up in 2 weeks    Already seeing allergy. On Dupixent, multiple inhalers. She of asthma    SUBJECTIVE/OBJECTIVE:  Ear Problem    Other    Sinus Problem    Pharyngitis      Venu Arriaza is here today for evaluation of evaluation of issues related to her nose. She gets multiple sinus infections per year. She requires antibiotics for these. She think she has a sinus infection right now. She also has over the last 3 months had a loss of sense of smell. She has not had Covid recently. She has had some issues related to smelling gasoline and cigarette smoke that is not actually present. She gets difficulty breathing through her nose it is worse on the left side. She will get constant nasal drainage that is clear and green. She gets very rare nosebleeds. Update 3/17/2012:    She is still having issues breathing out of her nose and with nasal congestion. She is still getting intermittent nasal drainage. Update 2021:    Patient presents today for follow-up. She is still having the same issues regarded to difficulty breathing out of her nose. She gets intermittent nasal drainage which is clear. She thinks she is getting sinus infections. Update 8/26/2021:    Patient presents today for follow-up regarding issues related to her chronic sinonasal complaints. She is still having significant difficulty breathing through her nose. This is exacerbated by exercise. 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 Update 11/4/2021:    Patient presents today for follow-up regarding her chronic sinonasal complaints. She is already breathing better through her nose. She didn't have any significant pain postoperatively. She is getting some blood clots and crusting out of her nose. Update 11/23/2021:    Patient presents today for follow-up regarding her chronic sinonasal complaints. She feels like there is some obstruction on the left side. She has been irrigating once to twice per day. She is using Vaseline around her incision site. She is occasionally getting yellow chunks out of her nose. Update 1/20/2022:    Patient presents today for follow-up regarding issues related to her nose. Overall she is breathing significantly better through her nose. She is using fluticasone until recently she developed a nosebleed on the left side so she stopped. She still feels like she gets nasal congestion and like she has a sinus infection. She has been tested for allergies in the past.  She is getting some black things out of her nose when she blows her nose. She feels like her sense of smell is worse than prior to surgery. Update 3/8/2022:  -Doing BID irrigations and Flonase  -Still having significant nasal congestion  -Using Zyrtec, singulair, Dupixent  -Feels like her asthma is well controlled  -Still feels like she has nasal congestion and ear fullness/pain    The patient is reporting hyposmia that is worse after surgery.   I reviewed with the patient that prior to surgery she did discuss with me that she had a significant loss of smell and parosmia's. We did not operate anywhere near the olfactory neurons for her most recent procedure, so I do not feel it is necessarily related to the surgery. Update 3/15/2022:    Patient presents today for follow-up. She is still having significant nasal congestion, ear fullness and pain. She had a CT scan which I independently reviewed. Shows interval worsening of her sinus disease. She now has inflammation of the bilateral maxillary sinuses and subtotal opacifications of her ethmoids. Update 11/2/22:    -Has had multiple rounds of antibiotics with no improvement in symptoms. Most recent one was Augmentin. Also got a round of oral steroids.   -Still with facial pain and pressure, nasal congestion, lots of green and yellow discharge, decreased sense of smell, general feeling of malaise and increased fatigue. Update 12/21/2022:    Patient presents today for follow-up for issues related to her nose. She is getting scant bleeding. Feels a lot of head pressure and congestion. Sense of smell is diminished. Has been taking the antibiotics as prescribed. Op note 12/15/2022:  1) Bilateral nasal endoscopy with sphenoethmoidectomies with tissue removal- (CPT 86397-65)  2) Bilateral nasal endoscopy with maxillary antrostomies with tissue removal- (CPT 83682-26)  3) Bilateral frontal sinusotomies with tissue removal- (CPT 24338-63)  4) Endoscopic septoplasty (CPT 68134)  5) Stereotactic extradural image guidance (CPT 83781)          Findings:    Diffuse mucosal edema, erythema and thick mucoid secretions filling maxillary, sphenoidal, ethmoidal and frontal sinuses.   Consistent with chronic pansinusitis  Partial resection of bilateral middle turbinates due to instability  Revision endoscopic septoplasty performed due to high nasal obstruction on the left    REVIEW OF SYSTEMS  The following systems were reviewed and revealed the following in addition to any already discussed in the HPI:    PHYSICAL EXAM    GENERAL: No acute distress, alert and oriented, no hoarseness, strong voice  EYES: EOMI, Anti-icteric  HENT:   Well-healed inverted V incision of the nasal columella. PROCEDURE  Nasal Endoscopy with Debridement- CPT 19443       Due to the patient's chronic sinus disease and/or history of sinonasal neoplasm for surveillance a nasal endoscopy with or without debridement will be performed to complete a significant physical examination of the patient which cannot be performed by anterior rhinoscopy alone (failure of complete examination of the paranasal sinuses). Failure to provide this procedure may lead to late detection of significant chronic benign disease, acute exacerbation, resolution or failure of early diagnosis of recurrent cancer. The procedure report is present in the body of the chart. Procedure: Nasal endoscopy with debridement  Anesthesia: 4% lidocaine with afrin topical  EBL: Minimal  Specimens: None    Procedure:    After the application of afrin and lidocaine to the nasal sinus cavities, the sinuses were debrided utilizing a 30 degree nasal endoscope with cutting instruments, Blakesely andTobey Forceps and gautam suctions on both sides. Right Nasal Cavity: Mucous and fibrin was removed from the right nasal cavity. All sinuses where patent and healing well. Propel stent left in place    Left Nasal Cavity: Mucous and fibrin was removed from the left nasal cavity. All sinuses where patent and healing well. Propel stent left in place    The patient tolerated the procedure well. Complications: None      This note was generated completely or in part utilizing Dragon dictation speech recognition software. Occasionally, words are mistranscribed and despite editing, the text may contain inaccuracies due to incorrect word recognition. If further clarification is needed please contact the office at [phone].     An electronic signature was used to authenticate this note.    --Carry MD Gwen

## 2022-12-24 DIAGNOSIS — J45.20 MILD INTERMITTENT ASTHMA WITHOUT COMPLICATION: ICD-10-CM

## 2022-12-27 RX ORDER — VORTIOXETINE 20 MG/1
TABLET, FILM COATED ORAL
Qty: 30 TABLET | Refills: 1 | Status: SHIPPED | OUTPATIENT
Start: 2022-12-27

## 2022-12-27 RX ORDER — LISINOPRIL 5 MG/1
TABLET ORAL
Qty: 30 TABLET | Refills: 1 | Status: SHIPPED | OUTPATIENT
Start: 2022-12-27

## 2022-12-27 RX ORDER — ATORVASTATIN CALCIUM 40 MG/1
TABLET, FILM COATED ORAL
Qty: 30 TABLET | Refills: 1 | Status: SHIPPED | OUTPATIENT
Start: 2022-12-27

## 2022-12-27 RX ORDER — MONTELUKAST SODIUM 10 MG/1
TABLET ORAL
Qty: 30 TABLET | Refills: 1 | Status: SHIPPED | OUTPATIENT
Start: 2022-12-27

## 2022-12-27 NOTE — TELEPHONE ENCOUNTER
Due for follow up with Dr Marvin Platt- please call them to schedule at their earliest convenience. (meds have been refilled this time)

## 2023-01-04 ENCOUNTER — OFFICE VISIT (OUTPATIENT)
Dept: ENT CLINIC | Age: 43
End: 2023-01-04
Payer: COMMERCIAL

## 2023-01-04 VITALS — BODY MASS INDEX: 36.16 KG/M2 | WEIGHT: 225 LBS | HEIGHT: 66 IN

## 2023-01-04 DIAGNOSIS — R43.8 HYPOSMIA: ICD-10-CM

## 2023-01-04 DIAGNOSIS — J32.0 CHRONIC MAXILLARY SINUSITIS: ICD-10-CM

## 2023-01-04 DIAGNOSIS — J32.1 CHRONIC FRONTAL SINUSITIS: ICD-10-CM

## 2023-01-04 DIAGNOSIS — Z98.890 HISTORY OF NASAL SEPTOPLASTY: ICD-10-CM

## 2023-01-04 DIAGNOSIS — J32.3 CHRONIC SPHENOIDAL SINUSITIS: ICD-10-CM

## 2023-01-04 DIAGNOSIS — J32.2 CHRONIC ETHMOIDAL SINUSITIS: Primary | ICD-10-CM

## 2023-01-04 DIAGNOSIS — J34.89 NASAL CRUSTING: ICD-10-CM

## 2023-01-04 PROCEDURE — 31237 NSL/SINS NDSC SURG BX POLYPC: CPT | Performed by: STUDENT IN AN ORGANIZED HEALTH CARE EDUCATION/TRAINING PROGRAM

## 2023-01-04 PROCEDURE — 99024 POSTOP FOLLOW-UP VISIT: CPT | Performed by: STUDENT IN AN ORGANIZED HEALTH CARE EDUCATION/TRAINING PROGRAM

## 2023-01-04 ASSESSMENT — ENCOUNTER SYMPTOMS: SINUS COMPLAINT: 1

## 2023-01-04 NOTE — PROGRESS NOTES
Paul Carlos (:  1980) is a 38 y.o. female, here for evaluation of the following chief complaint(s):  Post-Op Check      ASSESSMENT/PLAN:  1. Chronic ethmoidal sinusitis  2. Chronic maxillary sinusitis  3. Chronic frontal sinusitis  4. Chronic sphenoidal sinusitis  5. Nasal crusting  6. Hyposmia  7. History of nasal septoplasty      This is a very pleasant 38 y.o. female here today for evaluation of the the above-noted complaints. Patient is status post open septorhinoplasty, septoplasty, ITR and posterior ablation of her nasal nerves on 10/29/2021. Patient is status postendoscopic sinus surgery, revision septoplasty for CRSsNP on 12/15/22.    -Discussed with the patient that she is experiencing expected postoperative recovery. I suspect her forehead pressure and congestion are related to perioperative swelling. Reassurance provided.  -Start mometasone irrigations or fluticasone 2 sprays twice daily  -Follow-up in 1 month    Already seeing allergy. On Dupixent, multiple inhalers. Patient has asthma    SUBJECTIVE/OBJECTIVE:  Ear Problem    Other    Sinus Problem    Pharyngitis      Cris Lewis is here today for evaluation of evaluation of issues related to her nose. She gets multiple sinus infections per year. She requires antibiotics for these. She think she has a sinus infection right now. She also has over the last 3 months had a loss of sense of smell. She has not had Covid recently. She has had some issues related to smelling gasoline and cigarette smoke that is not actually present. She gets difficulty breathing through her nose it is worse on the left side. She will get constant nasal drainage that is clear and green. She gets very rare nosebleeds. Update 3/17/2012:    She is still having issues breathing out of her nose and with nasal congestion.   She is still getting intermittent nasal drainage. Update 4/28/2021:    Patient presents today for follow-up. She is still having the same issues regarded to difficulty breathing out of her nose. She gets intermittent nasal drainage which is clear. She thinks she is getting sinus infections. Update 8/26/2021:    Patient presents today for follow-up regarding issues related to her chronic sinonasal complaints. She is still having significant difficulty breathing through her nose. This is exacerbated by exercise. 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 Update 11/4/2021:    Patient presents today for follow-up regarding her chronic sinonasal complaints. She is already breathing better through her nose. She didn't have any significant pain postoperatively. She is getting some blood clots and crusting out of her nose. Update 11/23/2021:    Patient presents today for follow-up regarding her chronic sinonasal complaints. She feels like there is some obstruction on the left side. She has been irrigating once to twice per day. She is using Vaseline around her incision site. She is occasionally getting yellow chunks out of her nose. Update 1/20/2022:    Patient presents today for follow-up regarding issues related to her nose. Overall she is breathing significantly better through her nose. She is using fluticasone until recently she developed a nosebleed on the left side so she stopped. She still feels like she gets nasal congestion and like she has a sinus infection. She has been tested for allergies in the past.  She is getting some black things out of her nose when she blows her nose. She feels like her sense of smell is worse than prior to surgery.     Update 3/8/2022:  -Doing BID irrigations and Flonase  -Still having significant nasal congestion  -Using Zyrtec, singulair, Dupixent  -Feels like her asthma is well controlled  -Still feels like she has nasal congestion and ear fullness/pain    The patient is reporting hyposmia that is worse after surgery. I reviewed with the patient that prior to surgery she did discuss with me that she had a significant loss of smell and parosmia's. We did not operate anywhere near the olfactory neurons for her most recent procedure, so I do not feel it is necessarily related to the surgery. Update 3/15/2022:    Patient presents today for follow-up. She is still having significant nasal congestion, ear fullness and pain. She had a CT scan which I independently reviewed. Shows interval worsening of her sinus disease. She now has inflammation of the bilateral maxillary sinuses and subtotal opacifications of her ethmoids. Update 11/2/22:    -Has had multiple rounds of antibiotics with no improvement in symptoms. Most recent one was Augmentin. Also got a round of oral steroids.   -Still with facial pain and pressure, nasal congestion, lots of green and yellow discharge, decreased sense of smell, general feeling of malaise and increased fatigue. Update 12/21/2022:    Patient presents today for follow-up for issues related to her nose. She is getting scant bleeding. Feels a lot of head pressure and congestion. Sense of smell is diminished. Has been taking the antibiotics as prescribed. Op note 12/15/2022:  1) Bilateral nasal endoscopy with sphenoethmoidectomies with tissue removal- (CPT 73574-82)  2) Bilateral nasal endoscopy with maxillary antrostomies with tissue removal- (CPT 69728-10)  3) Bilateral frontal sinusotomies with tissue removal- (CPT 74763-64)  4) Endoscopic septoplasty (CPT 24359)  5) Stereotactic extradural image guidance (CPT 05473)          Findings:    Diffuse mucosal edema, erythema and thick mucoid secretions filling maxillary, sphenoidal, ethmoidal and frontal sinuses.   Consistent with chronic pansinusitis  Partial resection of bilateral middle turbinates due to instability  Revision endoscopic septoplasty performed due to high nasal obstruction on the left      Update 1/4/2023:    Patient presents today for follow-up. She has been irrigating several times a day. Overall her drainage and pressure are better than prior to surgery but they are still present. She is concerned about her ability to focus at work. Portions of the propel stent were removed. REVIEW OF SYSTEMS  The following systems were reviewed and revealed the following in addition to any already discussed in the HPI:    PHYSICAL EXAM    GENERAL: No acute distress, alert and oriented, no hoarseness, strong voice  EYES: EOMI, Anti-icteric  HENT:   Well-healed inverted V incision of the nasal columella. PROCEDURE  Nasal Endoscopy with Debridement- CPT 09264       Due to the patient's chronic sinus disease and/or history of sinonasal neoplasm for surveillance a nasal endoscopy with or without debridement will be performed to complete a significant physical examination of the patient which cannot be performed by anterior rhinoscopy alone (failure of complete examination of the paranasal sinuses). Failure to provide this procedure may lead to late detection of significant chronic benign disease, acute exacerbation, resolution or failure of early diagnosis of recurrent cancer. The procedure report is present in the body of the chart. Procedure: Nasal endoscopy with debridement  Anesthesia: 4% lidocaine with afrin topical  EBL: Minimal  Specimens: None    Procedure:    After the application of afrin and lidocaine to the nasal sinus cavities, the sinuses were debrided utilizing a 30 degree nasal endoscope with cutting instruments, Blakesely andTobey Forceps and gautam suctions on both sides. Right Nasal Cavity: Mucous and fibrin was removed from the right nasal cavity. All sinuses where patent and healing well. Propel stent left in place    Left Nasal Cavity: Mucous and fibrin was removed from the left nasal cavity.  All sinuses where patent and healing well. Patients of the remnant propel stent were removed. Expected perioperative edema. Partial resection of the middle turbinates. No septal perforation. The patient tolerated the procedure well. Complications: None      This note was generated completely or in part utilizing Dragon dictation speech recognition software. Occasionally, words are mistranscribed and despite editing, the text may contain inaccuracies due to incorrect word recognition. If further clarification is needed please contact the office at [phone]. An electronic signature was used to authenticate this note.     --Linda Khoury MD

## 2023-01-07 ENCOUNTER — PATIENT MESSAGE (OUTPATIENT)
Dept: FAMILY MEDICINE CLINIC | Age: 43
End: 2023-01-07

## 2023-01-09 RX ORDER — PREDNISONE 10 MG/1
TABLET ORAL
Qty: 42 TABLET | Refills: 0 | Status: SHIPPED | OUTPATIENT
Start: 2023-01-09 | End: 2023-01-19

## 2023-01-09 NOTE — TELEPHONE ENCOUNTER
Went to urgent care on Saturday the 7th with asthma flare up. Started on 40 mg pred plus amoxil. She has not felt improvement. Still SOB, without significant improvement from MARILOU use. Using dupixent plus Trelegy. She has not contacted her pulm doctor yet. Will call in pred taper (overlap 3 days with her previous prednisone)    Urged follow up with pulm after she is through this flare up. Sooner if not feeling better.

## 2023-01-09 NOTE — TELEPHONE ENCOUNTER
From: Gabriela Ramirez  To: Dr. Heart Rued: 1/7/2023 8:09 AM EST  Subject: Respiratory Infection     Good morning Dr. Jericho Antunez,  For the past couple. of days I've been producing a lot of yellow, green and greenish-brown mucous. My chest is sore and I am wheezing a lot. I had sinus surgery 12/16 and PRK eye surgery on 1/4. Everything was good until a few days ago when I started wheezing and coughing up stuff. I wanted to see if I could schedule an E-Visit with you or you call something in for me.     Thanks,  Stratavia

## 2023-02-08 ENCOUNTER — OFFICE VISIT (OUTPATIENT)
Dept: ENT CLINIC | Age: 43
End: 2023-02-08
Payer: COMMERCIAL

## 2023-02-08 VITALS — HEIGHT: 66 IN | BODY MASS INDEX: 36.96 KG/M2 | RESPIRATION RATE: 16 BRPM | WEIGHT: 230 LBS

## 2023-02-08 DIAGNOSIS — J32.3 CHRONIC SPHENOIDAL SINUSITIS: ICD-10-CM

## 2023-02-08 DIAGNOSIS — J32.1 CHRONIC FRONTAL SINUSITIS: ICD-10-CM

## 2023-02-08 DIAGNOSIS — J34.89 NASAL CRUSTING: ICD-10-CM

## 2023-02-08 DIAGNOSIS — J32.2 CHRONIC ETHMOIDAL SINUSITIS: Primary | ICD-10-CM

## 2023-02-08 DIAGNOSIS — J32.0 CHRONIC MAXILLARY SINUSITIS: ICD-10-CM

## 2023-02-08 DIAGNOSIS — R43.8 HYPOSMIA: ICD-10-CM

## 2023-02-08 DIAGNOSIS — J31.0 CHRONIC RHINITIS: ICD-10-CM

## 2023-02-08 PROCEDURE — 31231 NASAL ENDOSCOPY DX: CPT | Performed by: STUDENT IN AN ORGANIZED HEALTH CARE EDUCATION/TRAINING PROGRAM

## 2023-02-08 PROCEDURE — 99024 POSTOP FOLLOW-UP VISIT: CPT | Performed by: STUDENT IN AN ORGANIZED HEALTH CARE EDUCATION/TRAINING PROGRAM

## 2023-02-13 ASSESSMENT — ENCOUNTER SYMPTOMS: SINUS COMPLAINT: 1

## 2023-02-13 NOTE — PROGRESS NOTES
Paul Carlos (:  1980) is a 38 y.o. female, here for evaluation of the following chief complaint(s):  Post-Op Check      ASSESSMENT/PLAN:  1. Chronic ethmoidal sinusitis  2. Chronic maxillary sinusitis  3. Chronic frontal sinusitis  4. Chronic sphenoidal sinusitis  5. Nasal crusting  6. Hyposmia  7. Chronic rhinitis      This is a very pleasant 38 y.o. female here today for evaluation of the the above-noted complaints. Patient is status post open septorhinoplasty, septoplasty, ITR and posterior ablation of her nasal nerves on 10/29/2021. Patient is status postendoscopic sinus surgery, revision septoplasty for CRSsNP on 12/15/22.    -Start Mupirocin irrigations  -If no improvement, we will start the patient on Xhance or mometasone irrigations  -Follow-up in 1 month    Already seeing allergy. On Dupixent, multiple inhalers. Patient has asthma    SUBJECTIVE/OBJECTIVE:  Ear Problem    Other    Sinus Problem    Pharyngitis      Cris Lewis is here today for evaluation of evaluation of issues related to her nose. She gets multiple sinus infections per year. She requires antibiotics for these. She think she has a sinus infection right now. She also has over the last 3 months had a loss of sense of smell. She has not had Covid recently. She has had some issues related to smelling gasoline and cigarette smoke that is not actually present. She gets difficulty breathing through her nose it is worse on the left side. She will get constant nasal drainage that is clear and green. She gets very rare nosebleeds. Update 3/17/2012:    She is still having issues breathing out of her nose and with nasal congestion. She is still getting intermittent nasal drainage. Update 2021:    Patient presents today for follow-up. She is still having the same issues regarded to difficulty breathing out of her nose.   She gets intermittent nasal drainage which is clear. She thinks she is getting sinus infections. Update 8/26/2021:    Patient presents today for follow-up regarding issues related to her chronic sinonasal complaints. She is still having significant difficulty breathing through her nose. This is exacerbated by exercise. 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 Update 11/4/2021:    Patient presents today for follow-up regarding her chronic sinonasal complaints. She is already breathing better through her nose. She didn't have any significant pain postoperatively. She is getting some blood clots and crusting out of her nose. Update 11/23/2021:    Patient presents today for follow-up regarding her chronic sinonasal complaints. She feels like there is some obstruction on the left side. She has been irrigating once to twice per day. She is using Vaseline around her incision site. She is occasionally getting yellow chunks out of her nose. Update 1/20/2022:    Patient presents today for follow-up regarding issues related to her nose. Overall she is breathing significantly better through her nose. She is using fluticasone until recently she developed a nosebleed on the left side so she stopped. She still feels like she gets nasal congestion and like she has a sinus infection. She has been tested for allergies in the past.  She is getting some black things out of her nose when she blows her nose. She feels like her sense of smell is worse than prior to surgery. Update 3/8/2022:  -Doing BID irrigations and Flonase  -Still having significant nasal congestion  -Using Zyrtec, singulair, Dupixent  -Feels like her asthma is well controlled  -Still feels like she has nasal congestion and ear fullness/pain    The patient is reporting hyposmia that is worse after surgery.   I reviewed with the patient that prior to surgery she did discuss with me that she had a significant loss of smell and parosmia's. We did not operate anywhere near the olfactory neurons for her most recent procedure, so I do not feel it is necessarily related to the surgery. Update 3/15/2022:    Patient presents today for follow-up. She is still having significant nasal congestion, ear fullness and pain. She had a CT scan which I independently reviewed. Shows interval worsening of her sinus disease. She now has inflammation of the bilateral maxillary sinuses and subtotal opacifications of her ethmoids. Update 11/2/22:    -Has had multiple rounds of antibiotics with no improvement in symptoms. Most recent one was Augmentin. Also got a round of oral steroids.   -Still with facial pain and pressure, nasal congestion, lots of green and yellow discharge, decreased sense of smell, general feeling of malaise and increased fatigue. Update 12/21/2022:    Patient presents today for follow-up for issues related to her nose. She is getting scant bleeding. Feels a lot of head pressure and congestion. Sense of smell is diminished. Has been taking the antibiotics as prescribed. Op note 12/15/2022:  1) Bilateral nasal endoscopy with sphenoethmoidectomies with tissue removal- (CPT 34336-95)  2) Bilateral nasal endoscopy with maxillary antrostomies with tissue removal- (CPT 29494-33)  3) Bilateral frontal sinusotomies with tissue removal- (CPT 38633-99)  4) Endoscopic septoplasty (CPT 45667)  5) Stereotactic extradural image guidance (CPT 07257)          Findings:    Diffuse mucosal edema, erythema and thick mucoid secretions filling maxillary, sphenoidal, ethmoidal and frontal sinuses. Consistent with chronic pansinusitis  Partial resection of bilateral middle turbinates due to instability  Revision endoscopic septoplasty performed due to high nasal obstruction on the left      Update 1/4/2023:    Patient presents today for follow-up. She has been irrigating several times a day.   Overall her drainage and pressure are better than prior to surgery but they are still present. She is concerned about her ability to focus at work. Portions of the propel stent were removed. Update 2/8/2023:    No getting a lot of facial pain and pressure as well as continued drainage. She gets some intermittent improvement of her smell, but other times has a difficult time smelling. She is still irrigating. She was using Flonase. REVIEW OF SYSTEMS  The following systems were reviewed and revealed the following in addition to any already discussed in the HPI:    PHYSICAL EXAM    GENERAL: No acute distress, alert and oriented, no hoarseness, strong voice  EYES: EOMI, Anti-icteric  HENT:   Well-healed inverted V incision of the nasal columella. PROCEDURE  Nasal Endoscopy        Due to the patient's chronic sinus disease and/or history of sinonasal neoplasm for surveillance a nasal endoscopy with or without debridement will be performed to complete a significant physical examination of the patient which cannot be performed by anterior rhinoscopy alone (failure of complete examination of the paranasal sinuses). Failure to provide this procedure may lead to late detection of significant chronic benign disease, acute exacerbation, resolution or failure of early diagnosis of recurrent cancer. The procedure report is present in the body of the chart. Procedure: Nasal endoscopy with debridement  Anesthesia: 4% lidocaine with afrin topical  EBL: Minimal  Specimens: None    Procedure:    After the application of afrin and lidocaine to the nasal sinus cavities, the sinuses were debrided utilizing a 30 degree nasal endoscope with cutting instruments, Blakesely andTobey Forceps and gautam suctions on both sides. Right Nasal Cavity: Persistent perioperative edema involving the ethmoid and maxillary sinuses. Moderate amount of crusting.     Propel stent left in place    Left Nasal Cavity:   Perioperative edema involving the ethmoid and maxillary sinuses. Expected perioperative edema. Partial resection of the middle turbinates. No septal perforation. The patient tolerated the procedure well. Complications: None      This note was generated completely or in part utilizing Dragon dictation speech recognition software. Occasionally, words are mistranscribed and despite editing, the text may contain inaccuracies due to incorrect word recognition. If further clarification is needed please contact the office at [phone]. An electronic signature was used to authenticate this note.     --Mary Ramírez MD

## 2023-02-26 DIAGNOSIS — J45.20 MILD INTERMITTENT ASTHMA WITHOUT COMPLICATION: ICD-10-CM

## 2023-02-27 RX ORDER — MONTELUKAST SODIUM 10 MG/1
TABLET ORAL
Qty: 30 TABLET | Refills: 1 | Status: SHIPPED | OUTPATIENT
Start: 2023-02-27

## 2023-02-27 RX ORDER — LISINOPRIL 5 MG/1
TABLET ORAL
Qty: 30 TABLET | Refills: 1 | Status: SHIPPED | OUTPATIENT
Start: 2023-02-27

## 2023-02-27 RX ORDER — VORTIOXETINE 20 MG/1
TABLET, FILM COATED ORAL
Qty: 30 TABLET | Refills: 1 | Status: SHIPPED | OUTPATIENT
Start: 2023-02-27

## 2023-02-27 RX ORDER — ATORVASTATIN CALCIUM 40 MG/1
TABLET, FILM COATED ORAL
Qty: 30 TABLET | Refills: 1 | Status: SHIPPED | OUTPATIENT
Start: 2023-02-27

## 2023-03-22 ENCOUNTER — OFFICE VISIT (OUTPATIENT)
Dept: ENT CLINIC | Age: 43
End: 2023-03-22
Payer: COMMERCIAL

## 2023-03-22 VITALS
BODY MASS INDEX: 36 KG/M2 | HEART RATE: 87 BPM | DIASTOLIC BLOOD PRESSURE: 80 MMHG | SYSTOLIC BLOOD PRESSURE: 129 MMHG | WEIGHT: 224 LBS | HEIGHT: 66 IN

## 2023-03-22 DIAGNOSIS — J32.2 CHRONIC ETHMOIDAL SINUSITIS: Primary | ICD-10-CM

## 2023-03-22 DIAGNOSIS — J32.3 CHRONIC SPHENOIDAL SINUSITIS: ICD-10-CM

## 2023-03-22 DIAGNOSIS — J31.0 CHRONIC RHINITIS: ICD-10-CM

## 2023-03-22 DIAGNOSIS — J32.0 CHRONIC MAXILLARY SINUSITIS: ICD-10-CM

## 2023-03-22 DIAGNOSIS — J32.1 CHRONIC FRONTAL SINUSITIS: ICD-10-CM

## 2023-03-22 DIAGNOSIS — K21.9 CHRONIC GERD: ICD-10-CM

## 2023-03-22 PROCEDURE — 3074F SYST BP LT 130 MM HG: CPT | Performed by: STUDENT IN AN ORGANIZED HEALTH CARE EDUCATION/TRAINING PROGRAM

## 2023-03-22 PROCEDURE — 99213 OFFICE O/P EST LOW 20 MIN: CPT | Performed by: STUDENT IN AN ORGANIZED HEALTH CARE EDUCATION/TRAINING PROGRAM

## 2023-03-22 PROCEDURE — 3079F DIAST BP 80-89 MM HG: CPT | Performed by: STUDENT IN AN ORGANIZED HEALTH CARE EDUCATION/TRAINING PROGRAM

## 2023-03-22 ASSESSMENT — ENCOUNTER SYMPTOMS: SINUS COMPLAINT: 1

## 2023-03-22 NOTE — PROGRESS NOTES
1/4/2023:    Patient presents today for follow-up. She has been irrigating several times a day. Overall her drainage and pressure are better than prior to surgery but they are still present. She is concerned about her ability to focus at work. Portions of the propel stent were removed. Update 2/8/2023:    No getting a lot of facial pain and pressure as well as continued drainage. She gets some intermittent improvement of her smell, but other times has a difficult time smelling. She is still irrigating. She was using Flonase. Update 3/22/2023:    Patient presents today for recurrent sinus complaints. Overall her congestion and obstruction is improving. She is still getting some mucoid and at times purulent drainage. REVIEW OF SYSTEMS  The following systems were reviewed and revealed the following in addition to any already discussed in the HPI:    PHYSICAL EXAM    GENERAL: No acute distress, alert and oriented, no hoarseness, strong voice  EYES: EOMI, Anti-icteric  HENT:   Well-healed inverted V incision of the nasal columella. PROCEDURE  Nasal Endoscopy        Due to the patient's chronic sinus disease and/or history of sinonasal neoplasm for surveillance a nasal endoscopy with or without debridement will be performed to complete a significant physical examination of the patient which cannot be performed by anterior rhinoscopy alone (failure of complete examination of the paranasal sinuses). Failure to provide this procedure may lead to late detection of significant chronic benign disease, acute exacerbation, resolution or failure of early diagnosis of recurrent cancer. The procedure report is present in the body of the chart.      Procedure: Nasal endoscopy with debridement  Anesthesia: 4% lidocaine with afrin topical  EBL: Minimal  Specimens: None    Procedure:    After the application of afrin and lidocaine to the nasal sinus cavities, the sinuses were debrided utilizing a 30 degree nasal

## 2023-03-24 DIAGNOSIS — G47.19 EXCESSIVE DAYTIME SLEEPINESS: ICD-10-CM

## 2023-03-27 RX ORDER — MODAFINIL 200 MG/1
200 TABLET ORAL DAILY
Qty: 30 TABLET | Refills: 2 | Status: SHIPPED | OUTPATIENT
Start: 2023-03-27 | End: 2023-06-25

## 2023-03-28 RX ORDER — DUPILUMAB 300 MG/2ML
INJECTION, SOLUTION SUBCUTANEOUS
Qty: 4 ML | Refills: 0 | Status: SHIPPED | OUTPATIENT
Start: 2023-03-28

## 2023-04-03 RX ORDER — DUPILUMAB 300 MG/2ML
INJECTION, SOLUTION SUBCUTANEOUS
Qty: 12 ML | Refills: 0 | Status: SHIPPED | OUTPATIENT
Start: 2023-04-03

## 2023-04-06 ENCOUNTER — OFFICE VISIT (OUTPATIENT)
Dept: PULMONOLOGY | Age: 43
End: 2023-04-06
Payer: COMMERCIAL

## 2023-04-06 VITALS
HEIGHT: 66 IN | BODY MASS INDEX: 36.64 KG/M2 | SYSTOLIC BLOOD PRESSURE: 116 MMHG | HEART RATE: 85 BPM | OXYGEN SATURATION: 98 % | DIASTOLIC BLOOD PRESSURE: 80 MMHG | WEIGHT: 228 LBS

## 2023-04-06 DIAGNOSIS — R09.82 POST-NASAL DRIP: ICD-10-CM

## 2023-04-06 DIAGNOSIS — Z98.890 H/O SINUS SURGERY: ICD-10-CM

## 2023-04-06 DIAGNOSIS — J45.51 SEVERE PERSISTENT ASTHMA WITH ACUTE EXACERBATION: Primary | ICD-10-CM

## 2023-04-06 PROCEDURE — 3074F SYST BP LT 130 MM HG: CPT | Performed by: INTERNAL MEDICINE

## 2023-04-06 PROCEDURE — 3079F DIAST BP 80-89 MM HG: CPT | Performed by: INTERNAL MEDICINE

## 2023-04-06 PROCEDURE — 99214 OFFICE O/P EST MOD 30 MIN: CPT | Performed by: INTERNAL MEDICINE

## 2023-04-06 RX ORDER — PREDNISONE 10 MG/1
10 TABLET ORAL DAILY
COMMUNITY

## 2023-04-06 RX ORDER — AMOXICILLIN AND CLAVULANATE POTASSIUM 875; 125 MG/1; MG/1
1 TABLET, FILM COATED ORAL 2 TIMES DAILY
Qty: 14 TABLET | Refills: 0 | Status: SHIPPED | OUTPATIENT
Start: 2023-04-06 | End: 2023-04-13

## 2023-04-06 ASSESSMENT — ENCOUNTER SYMPTOMS
DIARRHEA: 0
VOICE CHANGE: 0
BACK PAIN: 0
CHEST TIGHTNESS: 1
SINUS PRESSURE: 0
ABDOMINAL DISTENTION: 0
CHOKING: 0
BLOOD IN STOOL: 0
ANAL BLEEDING: 0
RHINORRHEA: 0
APNEA: 0
WHEEZING: 1
SORE THROAT: 0
SHORTNESS OF BREATH: 1
CONSTIPATION: 0
COUGH: 1
ABDOMINAL PAIN: 0
STRIDOR: 0

## 2023-04-06 NOTE — LETTER
April 6, 2023      Kurt Weeks, [address] [facility] [address]      Patient: Titi Lopez   MR Number: [id number]   YOB: 1980   Date of Visit: 4/6/2023       Dear Kurt Weeks: Thank you for referring Abhay Chambers to me for evaluation/treatment. Below are the relevant portions of my assessment and plan of care. If you have questions, please do not hesitate to call me. I look forward to following Delores along with you.     Sincerely,        Charles Bhatt MD

## 2023-04-06 NOTE — PROGRESS NOTES
Reese Dakin    YOB: 1980     Date of Service:  4/6/2023     Chief Complaint   Patient presents with    Asthma    Cough     Productive - yellow/green phlegm      Shortness of Breath    Wheezing         HPI patient underwent sinus/nasal surgery on 12/16 by Dr. Sariah Lee, has been noticing worsening shortness of breath, wheezing and a cough with mucoid-purulent phlegm over the last 4 to 6 weeks. Patient is concerned about frequent exacerbations of asthma and is here for an evaluation. Allergies   Allergen Reactions    Ensure Hives and Itching     EGGS-EYE SWELLING    Peanut-Containing Drug Products Itching     PEANUTS-RUNNY NOSE,ITCHING IN THROAT    Food      Certain foods (chocolate, tea, soy, eggs, tree nuts, coffee, tomatoes, black pepper)-THROAT ITCHING,NOSE RUNNING     Outpatient Medications Marked as Taking for the 4/6/23 encounter (Office Visit) with Maulik Harmon MD   Medication Sig Dispense Refill    predniSONE (DELTASONE) 10 MG tablet Take 1 tablet by mouth daily 12 day taper      amoxicillin-clavulanate (AUGMENTIN) 875-125 MG per tablet Take 1 tablet by mouth 2 times daily for 7 days 14 tablet 0    dupilumab (DUPIXENT) 300 MG/2ML SOPN injection Inject the contents of 1 pen (300 mg) under the skin every 2 weeks. 12 mL 0    modafinil (PROVIGIL) 200 MG tablet Take 1 tablet by mouth daily for 90 doses.  Max Daily Amount: 200 mg 30 tablet 2    Sodium Chloride-Xylitol (XLEAR SINUS CARE SPRAY) SOLN 1 spray by Nasal route in the morning and at bedtime 45 mL 0    lisinopril (PRINIVIL;ZESTRIL) 5 MG tablet TAKE ONE TABLET BY MOUTH DAILY 30 tablet 1    atorvastatin (LIPITOR) 40 MG tablet TAKE ONE TABLET BY MOUTH DAILY 30 tablet 1    TRINTELLIX 20 MG TABS tablet TAKE ONE TABLET BY MOUTH DAILY 30 tablet 1    montelukast (SINGULAIR) 10 MG tablet TAKE ONE TABLET BY MOUTH ONCE NIGHTLY 30 tablet 1    mupirocin (BACTROBAN) 2 % ointment Dissolve a thin strip of ointment in your irrigation bottle

## 2023-04-29 DIAGNOSIS — J45.20 MILD INTERMITTENT ASTHMA WITHOUT COMPLICATION: ICD-10-CM

## 2023-05-01 RX ORDER — LISINOPRIL 5 MG/1
TABLET ORAL
Qty: 30 TABLET | Refills: 1 | Status: SHIPPED | OUTPATIENT
Start: 2023-05-01 | End: 2023-06-28

## 2023-05-01 RX ORDER — MONTELUKAST SODIUM 10 MG/1
TABLET ORAL
Qty: 30 TABLET | Refills: 1 | Status: SHIPPED | OUTPATIENT
Start: 2023-05-01 | End: 2023-06-28

## 2023-05-01 RX ORDER — VORTIOXETINE 20 MG/1
TABLET, FILM COATED ORAL
Qty: 30 TABLET | Refills: 1 | Status: SHIPPED | OUTPATIENT
Start: 2023-05-01 | End: 2023-06-28

## 2023-05-01 RX ORDER — ATORVASTATIN CALCIUM 40 MG/1
TABLET, FILM COATED ORAL
Qty: 30 TABLET | Refills: 1 | Status: SHIPPED | OUTPATIENT
Start: 2023-05-01 | End: 2023-06-28

## 2023-05-02 ENCOUNTER — OFFICE VISIT (OUTPATIENT)
Dept: ENT CLINIC | Age: 43
End: 2023-05-02
Payer: COMMERCIAL

## 2023-05-02 VITALS
RESPIRATION RATE: 16 BRPM | WEIGHT: 228 LBS | HEART RATE: 82 BPM | DIASTOLIC BLOOD PRESSURE: 85 MMHG | SYSTOLIC BLOOD PRESSURE: 141 MMHG | BODY MASS INDEX: 36.64 KG/M2 | TEMPERATURE: 98.3 F | OXYGEN SATURATION: 98 % | HEIGHT: 66 IN

## 2023-05-02 DIAGNOSIS — J31.0 CHRONIC RHINITIS: ICD-10-CM

## 2023-05-02 DIAGNOSIS — J32.2 CHRONIC ETHMOIDAL SINUSITIS: Primary | ICD-10-CM

## 2023-05-02 DIAGNOSIS — J32.3 CHRONIC SPHENOIDAL SINUSITIS: ICD-10-CM

## 2023-05-02 DIAGNOSIS — J32.1 CHRONIC FRONTAL SINUSITIS: ICD-10-CM

## 2023-05-02 DIAGNOSIS — J32.0 CHRONIC MAXILLARY SINUSITIS: ICD-10-CM

## 2023-05-02 PROCEDURE — 99213 OFFICE O/P EST LOW 20 MIN: CPT | Performed by: STUDENT IN AN ORGANIZED HEALTH CARE EDUCATION/TRAINING PROGRAM

## 2023-05-02 PROCEDURE — 3079F DIAST BP 80-89 MM HG: CPT | Performed by: STUDENT IN AN ORGANIZED HEALTH CARE EDUCATION/TRAINING PROGRAM

## 2023-05-02 PROCEDURE — 31231 NASAL ENDOSCOPY DX: CPT | Performed by: STUDENT IN AN ORGANIZED HEALTH CARE EDUCATION/TRAINING PROGRAM

## 2023-05-02 PROCEDURE — 3077F SYST BP >= 140 MM HG: CPT | Performed by: STUDENT IN AN ORGANIZED HEALTH CARE EDUCATION/TRAINING PROGRAM

## 2023-05-02 RX ORDER — FLUTICASONE PROPIONATE 93 UG/1
93 SPRAY, METERED NASAL 2 TIMES DAILY
Qty: 6.4 ML | Refills: 0 | Status: SHIPPED | OUTPATIENT
Start: 2023-05-02

## 2023-05-02 ASSESSMENT — ENCOUNTER SYMPTOMS: SINUS COMPLAINT: 1

## 2023-05-02 NOTE — PROGRESS NOTES
Paul Carlos (:  1980) is a 37 y.o. female, here for evaluation of the following chief complaint(s):  Sinus Problem      ASSESSMENT/PLAN:  1. Chronic ethmoidal sinusitis  -     Fluticasone Propionate (XHANCE) 93 MCG/ACT EXHU; 93 mcg by Nasal route in the morning and at bedtime, Disp-6.4 mL, R-0Normal  2. Chronic maxillary sinusitis  -     Fluticasone Propionate (XHANCE) 93 MCG/ACT EXHU; 93 mcg by Nasal route in the morning and at bedtime, Disp-6.4 mL, R-0Normal  -     Culture, Aerobic and Anaerobic  3. Chronic sphenoidal sinusitis  4. Chronic frontal sinusitis  5. Chronic rhinitis        This is a very pleasant 37 y.o. female here today for evaluation of the the above-noted complaints. Patient is status post open septorhinoplasty, septoplasty, ITR and posterior ablation of her nasal nerves on 10/29/2021. Patient is status postendoscopic sinus surgery, revision septoplasty for CRSsNP on 12/15/22.    -Previously has tried antibiotics, Dymista, Xlear  -Follow-up culture results. Referral to ID?  -Start Xhance for chronic sinusitis  -Follow-up in 2 months    Already seeing allergy. On Dupixent, multiple inhalers. Patient has asthma    SUBJECTIVE/OBJECTIVE:  Ear Problem    Other    Sinus Problem    Pharyngitis      [place] is here today for evaluation of evaluation of issues related to her nose. She gets multiple sinus infections per year. She requires antibiotics for these. She think she has a sinus infection right now. She also has over the last 3 months had a loss of sense of smell. She has not had Covid recently. She has had some issues related to smelling gasoline and cigarette smoke that is not actually present. She gets difficulty breathing through her nose it is worse on the left side. She will get constant nasal drainage that is clear and green. She gets very rare nosebleeds.     Update

## 2023-05-04 ENCOUNTER — TELEPHONE (OUTPATIENT)
Dept: FAMILY MEDICINE CLINIC | Age: 43
End: 2023-05-04

## 2023-05-04 ENCOUNTER — TELEPHONE (OUTPATIENT)
Dept: ENT CLINIC | Age: 43
End: 2023-05-04

## 2023-05-04 LAB
BACTERIA SPEC AEROBE CULT: ABNORMAL
BACTERIA SPEC AEROBE CULT: ABNORMAL
GRAM STN SPEC: ABNORMAL
ORGANISM: ABNORMAL
ORGANISM: ABNORMAL

## 2023-05-04 RX ORDER — CIPROFLOXACIN 500 MG/1
500 TABLET, FILM COATED ORAL 2 TIMES DAILY
Qty: 20 TABLET | Refills: 0 | Status: SHIPPED | OUTPATIENT
Start: 2023-05-04 | End: 2023-05-14

## 2023-05-04 NOTE — TELEPHONE ENCOUNTER
Pt has been sick for months she had a swab and a culture done she called Dr Verner Lei office  they said he is in surgery all day and they will get back to her she is concerned this could be septic .    Pt is hoping Dr Milady Naranjo can take a look and call her

## 2023-05-04 NOTE — TELEPHONE ENCOUNTER
Her sinus culture has grown pseudomonas bacteria. Will need careful consideration about best approach for treatment- DR Peter Mills will need to do this. Sepsis involves whole body symptoms (rapid heart rate, fever, low blood pressure, etc). If she is feeling that, then it may be best to go to the ER now. If not, best to wait for guidance on treatment by Dr Peter Mills.

## 2023-05-24 ENCOUNTER — OFFICE VISIT (OUTPATIENT)
Dept: FAMILY MEDICINE CLINIC | Age: 43
End: 2023-05-24
Payer: COMMERCIAL

## 2023-05-24 VITALS
RESPIRATION RATE: 16 BRPM | DIASTOLIC BLOOD PRESSURE: 80 MMHG | SYSTOLIC BLOOD PRESSURE: 116 MMHG | WEIGHT: 224 LBS | HEART RATE: 84 BPM | BODY MASS INDEX: 36 KG/M2 | HEIGHT: 66 IN | OXYGEN SATURATION: 99 %

## 2023-05-24 DIAGNOSIS — L30.9 DERMATITIS: Primary | ICD-10-CM

## 2023-05-24 PROCEDURE — 99213 OFFICE O/P EST LOW 20 MIN: CPT | Performed by: FAMILY MEDICINE

## 2023-05-24 PROCEDURE — 3074F SYST BP LT 130 MM HG: CPT | Performed by: FAMILY MEDICINE

## 2023-05-24 PROCEDURE — 3079F DIAST BP 80-89 MM HG: CPT | Performed by: FAMILY MEDICINE

## 2023-05-24 RX ORDER — PREDNISONE 10 MG/1
10 TABLET ORAL 2 TIMES DAILY
Qty: 10 TABLET | Refills: 0 | Status: SHIPPED | OUTPATIENT
Start: 2023-05-24 | End: 2023-05-29

## 2023-05-24 SDOH — ECONOMIC STABILITY: FOOD INSECURITY: WITHIN THE PAST 12 MONTHS, THE FOOD YOU BOUGHT JUST DIDN'T LAST AND YOU DIDN'T HAVE MONEY TO GET MORE.: NEVER TRUE

## 2023-05-24 SDOH — ECONOMIC STABILITY: FOOD INSECURITY: WITHIN THE PAST 12 MONTHS, YOU WORRIED THAT YOUR FOOD WOULD RUN OUT BEFORE YOU GOT MONEY TO BUY MORE.: NEVER TRUE

## 2023-05-24 SDOH — ECONOMIC STABILITY: HOUSING INSECURITY
IN THE LAST 12 MONTHS, WAS THERE A TIME WHEN YOU DID NOT HAVE A STEADY PLACE TO SLEEP OR SLEPT IN A SHELTER (INCLUDING NOW)?: NO

## 2023-05-24 SDOH — ECONOMIC STABILITY: INCOME INSECURITY: HOW HARD IS IT FOR YOU TO PAY FOR THE VERY BASICS LIKE FOOD, HOUSING, MEDICAL CARE, AND HEATING?: NOT HARD AT ALL

## 2023-05-24 ASSESSMENT — PATIENT HEALTH QUESTIONNAIRE - PHQ9
SUM OF ALL RESPONSES TO PHQ QUESTIONS 1-9: 0
SUM OF ALL RESPONSES TO PHQ QUESTIONS 1-9: 0
SUM OF ALL RESPONSES TO PHQ9 QUESTIONS 1 & 2: 0
SUM OF ALL RESPONSES TO PHQ QUESTIONS 1-9: 0
SUM OF ALL RESPONSES TO PHQ QUESTIONS 1-9: 0
2. FEELING DOWN, DEPRESSED OR HOPELESS: 0
1. LITTLE INTEREST OR PLEASURE IN DOING THINGS: 0

## 2023-05-24 NOTE — PROGRESS NOTES
RASH  Subjective:     Chief Complaint   Patient presents with    Urticaria     Pt states hives started x1 day ago. It's only on her arms and legs. Could be something from work she works at [employer] and has very sensative skin      Dorie Oshea is a 37 y.o. female who presents for evaluation of rash involving the lower extremity and upper extremity. Rash started 1 day ago. Rash is pruritic. Associated symptoms: none, no associated symptoms. Patient denies: fever, sore throat. Patient has not had contacts with similar rash. Patient has not had new exposures (soaps, lotions, laundry detergents, foods, medications, plants, insects or animals.) Patient has not had any unusual travels. CHART REVIEW  Health Maintenance Due   Topic Date Due    COVID-19 Vaccine (3 - Booster for Agustin series) 02/18/2022    DTaP/Tdap/Td vaccine (2 - Td or Tdap) 08/20/2022    Cervical cancer screen  11/04/2022    A1C test (Diabetic or Prediabetic)  06/10/2023    Lipids  06/10/2023    Depression Screen  06/10/2023     Prior to Visit Medications    Medication Sig Taking? Authorizing Provider   predniSONE (DELTASONE) 10 MG tablet Take 1 tablet by mouth 2 times daily for 5 days Yes Loyda Michelle MD   Fluticasone Propionate (XHANCE) 93 MCG/ACT EXHU 93 mcg by Nasal route in the morning and at bedtime Yes Danial De Leon MD   montelukast (SINGULAIR) 10 MG tablet TAKE ONE TABLET BY MOUTH ONCE NIGHTLY Yes Elvia Farrar MD   atorvastatin (LIPITOR) 40 MG tablet TAKE ONE TABLET BY MOUTH DAILY Yes Elvia Farrar MD   TRINTELLIX 20 MG TABS tablet TAKE ONE TABLET BY MOUTH DAILY Yes Elvia Farrar MD   lisinopril (PRINIVIL;ZESTRIL) 5 MG tablet TAKE ONE TABLET BY MOUTH DAILY Yes Elvia Farrar MD   dupilumab (DUPIXENT) 300 MG/2ML SOPN injection Inject the contents of 1 pen (300 mg) under the skin every 2 weeks. Yes Alexis Pope MD   modafinil (PROVIGIL) 200 MG tablet Take 1 tablet by mouth daily for 90 doses.  Max

## 2023-06-06 ENCOUNTER — TELEPHONE (OUTPATIENT)
Dept: PULMONOLOGY | Age: 43
End: 2023-06-06

## 2023-06-06 RX ORDER — DUPILUMAB 300 MG/2ML
INJECTION, SOLUTION SUBCUTANEOUS
Qty: 2 ADJUSTABLE DOSE PRE-FILLED PEN SYRINGE | Refills: 5 | Status: SHIPPED | OUTPATIENT
Start: 2023-06-06

## 2023-06-06 NOTE — TELEPHONE ENCOUNTER
Pt called and said that [address] [place] [address] said they need a PA to refill the Dupixent.

## 2023-06-23 RX ORDER — DUPILUMAB 300 MG/2ML
INJECTION, SOLUTION SUBCUTANEOUS
Qty: 2 ADJUSTABLE DOSE PRE-FILLED PEN SYRINGE | Refills: 5 | Status: SHIPPED | OUTPATIENT
Start: 2023-06-23

## 2023-06-28 DIAGNOSIS — J45.20 MILD INTERMITTENT ASTHMA WITHOUT COMPLICATION: ICD-10-CM

## 2023-06-28 RX ORDER — LISINOPRIL 5 MG/1
TABLET ORAL
Qty: 30 TABLET | Refills: 1 | Status: SHIPPED | OUTPATIENT
Start: 2023-06-28

## 2023-06-28 RX ORDER — MONTELUKAST SODIUM 10 MG/1
TABLET ORAL
Qty: 30 TABLET | Refills: 1 | Status: SHIPPED | OUTPATIENT
Start: 2023-06-28

## 2023-06-28 RX ORDER — VORTIOXETINE 20 MG/1
TABLET, FILM COATED ORAL
Qty: 30 TABLET | Refills: 1 | Status: SHIPPED | OUTPATIENT
Start: 2023-06-28

## 2023-06-28 RX ORDER — ATORVASTATIN CALCIUM 40 MG/1
TABLET, FILM COATED ORAL
Qty: 30 TABLET | Refills: 1 | Status: SHIPPED | OUTPATIENT
Start: 2023-06-28

## 2023-07-07 ENCOUNTER — OFFICE VISIT (OUTPATIENT)
Dept: FAMILY MEDICINE CLINIC | Age: 43
End: 2023-07-07
Payer: COMMERCIAL

## 2023-07-07 VITALS
HEART RATE: 96 BPM | DIASTOLIC BLOOD PRESSURE: 84 MMHG | HEIGHT: 66 IN | BODY MASS INDEX: 36.64 KG/M2 | SYSTOLIC BLOOD PRESSURE: 120 MMHG | OXYGEN SATURATION: 97 % | WEIGHT: 228 LBS | RESPIRATION RATE: 16 BRPM

## 2023-07-07 DIAGNOSIS — J32.9 CHRONIC SINUSITIS, UNSPECIFIED LOCATION: ICD-10-CM

## 2023-07-07 DIAGNOSIS — E78.5 HYPERLIPIDEMIA, UNSPECIFIED HYPERLIPIDEMIA TYPE: ICD-10-CM

## 2023-07-07 DIAGNOSIS — R73.03 PREDIABETES: ICD-10-CM

## 2023-07-07 DIAGNOSIS — I10 ESSENTIAL HYPERTENSION: ICD-10-CM

## 2023-07-07 DIAGNOSIS — J45.51 SEVERE PERSISTENT ASTHMA WITH ACUTE EXACERBATION: Primary | ICD-10-CM

## 2023-07-07 DIAGNOSIS — F43.12 CHRONIC POST-TRAUMATIC STRESS DISORDER (PTSD): ICD-10-CM

## 2023-07-07 LAB
ALBUMIN SERPL-MCNC: 4.3 G/DL (ref 3.4–5)
ALBUMIN/GLOB SERPL: 1.5 {RATIO} (ref 1.1–2.2)
ALP SERPL-CCNC: 110 U/L (ref 40–129)
ALT SERPL-CCNC: 20 U/L (ref 10–40)
ANION GAP SERPL CALCULATED.3IONS-SCNC: 12 MMOL/L (ref 3–16)
AST SERPL-CCNC: 19 U/L (ref 15–37)
BILIRUB SERPL-MCNC: <0.2 MG/DL (ref 0–1)
BUN SERPL-MCNC: 10 MG/DL (ref 7–20)
CALCIUM SERPL-MCNC: 9.4 MG/DL (ref 8.3–10.6)
CHLORIDE SERPL-SCNC: 102 MMOL/L (ref 99–110)
CHOLEST SERPL-MCNC: 201 MG/DL (ref 0–199)
CO2 SERPL-SCNC: 27 MMOL/L (ref 21–32)
CREAT SERPL-MCNC: 0.7 MG/DL (ref 0.6–1.1)
GFR SERPLBLD CREATININE-BSD FMLA CKD-EPI: >60 ML/MIN/{1.73_M2}
GLUCOSE SERPL-MCNC: 89 MG/DL (ref 70–99)
HDLC SERPL-MCNC: 49 MG/DL (ref 40–60)
LDLC SERPL CALC-MCNC: 111 MG/DL
POTASSIUM SERPL-SCNC: 4.2 MMOL/L (ref 3.5–5.1)
PROT SERPL-MCNC: 7.1 G/DL (ref 6.4–8.2)
SODIUM SERPL-SCNC: 141 MMOL/L (ref 136–145)
TRIGL SERPL-MCNC: 203 MG/DL (ref 0–150)
VLDLC SERPL CALC-MCNC: 41 MG/DL

## 2023-07-07 PROCEDURE — 90715 TDAP VACCINE 7 YRS/> IM: CPT | Performed by: FAMILY MEDICINE

## 2023-07-07 PROCEDURE — 99215 OFFICE O/P EST HI 40 MIN: CPT | Performed by: FAMILY MEDICINE

## 2023-07-07 PROCEDURE — 3074F SYST BP LT 130 MM HG: CPT | Performed by: FAMILY MEDICINE

## 2023-07-07 PROCEDURE — 3079F DIAST BP 80-89 MM HG: CPT | Performed by: FAMILY MEDICINE

## 2023-07-07 PROCEDURE — 90471 IMMUNIZATION ADMIN: CPT | Performed by: FAMILY MEDICINE

## 2023-07-07 NOTE — PROGRESS NOTES
2023    Blood pressure 120/84, pulse 96, resp. rate 16, height 5' 6\" (1.676 m), weight 228 lb (103.4 kg), last menstrual period 2023, SpO2 97 %, not currently breastfeeding. Keely Quach (:  1980) is a 37 y.o. female, here for evaluation of the following medical concerns:    Chief Complaint   Patient presents with    Medication Check    URI     Patient is here for a med follow up. She would also like to discuss having an URI that has been going off and on for a few months. She did call pulmonology, however her Dr. Is out of the country      Here for routine follow up of anxiety/PTSD. She is more concerned about respiratory symptoms for the past couple months. She has pulm doctor, but cannot see them as he is out of the country. She has hx of pseudomonas sinusitis, dx by DR Elizabeth John after substantial and persistent drainage after having sinus surgery in December. Eventually cultured her nasal drainage to reveal the pseudomonas. At this point she was treated with cipro 500 bid for 10 days. This helped substantially, but felt symptoms return after a week off antibiotics (this is early ). Now is seeing the green nasal mucus again. She contacted her Pulm and was treated with 7 days of levaquin 750 and prednisone (on )  This did help improve her sinus and breathing symptoms a little. But symptoms did return again after. She has also been using saline rinses, nasal steroids. She is producing lung sputum also. No f/c  Has ESCOBEDO. Using TRelegy, Singulair, dupixent for asthma. Uses albuterol less than she should because it makes her jittery. Daily use currently. Using trintellix 200 for anxiety. Reels it is 'ok' and has fewer side effects than other optoins. Much better than cymblata/Effexor. Sleep is good. Denies depressive symptoms. Is getting ready for a big life change as her daughter prepares to leave for college. Dr Aleksey Shi is her OB.   Has not had

## 2023-07-08 DIAGNOSIS — J45.51 SEVERE PERSISTENT ASTHMA WITH ACUTE EXACERBATION: ICD-10-CM

## 2023-07-08 LAB
EST. AVERAGE GLUCOSE BLD GHB EST-MCNC: 111.2 MG/DL
HBA1C MFR BLD: 5.5 %

## 2023-07-09 LAB
REASON FOR REJECTION: NORMAL
REJECTED TEST: NORMAL

## 2023-07-10 DIAGNOSIS — J32.9 CHRONIC SINUSITIS, UNSPECIFIED LOCATION: ICD-10-CM

## 2023-07-10 DIAGNOSIS — J45.51 SEVERE PERSISTENT ASTHMA WITH ACUTE EXACERBATION: Primary | ICD-10-CM

## 2023-07-12 DIAGNOSIS — J32.9 CHRONIC SINUSITIS, UNSPECIFIED LOCATION: ICD-10-CM

## 2023-07-12 DIAGNOSIS — J45.51 SEVERE PERSISTENT ASTHMA WITH ACUTE EXACERBATION: ICD-10-CM

## 2023-07-14 DIAGNOSIS — G47.19 EXCESSIVE DAYTIME SLEEPINESS: ICD-10-CM

## 2023-07-14 RX ORDER — FLUTICASONE FUROATE, UMECLIDINIUM BROMIDE AND VILANTEROL TRIFENATATE 200; 62.5; 25 UG/1; UG/1; UG/1
POWDER RESPIRATORY (INHALATION)
Qty: 180 EACH | Refills: 3 | Status: SHIPPED | OUTPATIENT
Start: 2023-07-14

## 2023-07-14 RX ORDER — MODAFINIL 200 MG/1
TABLET ORAL
Qty: 30 TABLET | OUTPATIENT
Start: 2023-07-14

## 2023-07-16 LAB
BACTERIA SPEC RESP CULT: ABNORMAL
BACTERIA SPEC RESP CULT: ABNORMAL
GRAM STN SPEC: ABNORMAL
ORGANISM: ABNORMAL

## 2023-07-17 ENCOUNTER — PATIENT MESSAGE (OUTPATIENT)
Dept: FAMILY MEDICINE CLINIC | Age: 43
End: 2023-07-17

## 2023-07-17 DIAGNOSIS — G47.19 EXCESSIVE DAYTIME SLEEPINESS: ICD-10-CM

## 2023-07-17 RX ORDER — TOBRAMYCIN INHALATION SOLUTION 300 MG/5ML
150 INHALANT RESPIRATORY (INHALATION) 2 TIMES DAILY
Qty: 150 ML | Refills: 0 | Status: SHIPPED | OUTPATIENT
Start: 2023-07-17 | End: 2023-08-16

## 2023-07-17 NOTE — PROGRESS NOTES
Please let Sveta Junior know that her culture was positive for a bacteria called Pseudomonas. I am sending in a specialized inhaler for her to use for 2 weeks that has a medication in it that should help clear this up. Please have her follow-up with either Dr. Flaca Peters or her pulmonologist when she has completed the 2 weeks of therapy.   Thank you

## 2023-07-17 NOTE — TELEPHONE ENCOUNTER
From: Bhavna Perales  To: Dr. Haily Orozco: 7/17/2023 9:39 AM EDT  Subject: Test results     I saw that my results came back. What will the course of treatment be?

## 2023-07-17 NOTE — TELEPHONE ENCOUNTER
Culture showed heavy growth with normal respiratory esther. Please inquire what symptoms she is having.   Was she able to make an appointment with a different pulmonologist?

## 2023-07-18 ENCOUNTER — TELEPHONE (OUTPATIENT)
Dept: FAMILY MEDICINE CLINIC | Age: 43
End: 2023-07-18

## 2023-07-18 RX ORDER — MODAFINIL 200 MG/1
TABLET ORAL
Qty: 30 TABLET | OUTPATIENT
Start: 2023-07-18

## 2023-07-18 NOTE — TELEPHONE ENCOUNTER
Pt called in, stated that pharm will not fill nebulizer because there is no specification on what kind of nebulizer. Pt does not know what specific wording is needing and pharm did not tell her what kind they would need to fill for the tobramycin nebulizer solution. Pt stated that she really does not feel well and needs this fixed asap. Called [employer], Pharmacist stated that [facility] does not carry nebulizer but they can be sent to [facility] on [address] in [place], [place].    Pt agreed and would like to know if rx can be sent so she can pick it up.     Please Advise

## 2023-07-18 NOTE — PROGRESS NOTES
I don't see any other pathogens isolated  It states \"respiratory esther\" which are bacteria that live normally in the breathing tract

## 2023-07-25 ENCOUNTER — TELEMEDICINE (OUTPATIENT)
Dept: PULMONOLOGY | Age: 43
End: 2023-07-25
Payer: COMMERCIAL

## 2023-07-25 DIAGNOSIS — G47.19 EXCESSIVE DAYTIME SLEEPINESS: ICD-10-CM

## 2023-07-25 DIAGNOSIS — G47.33 OBSTRUCTIVE SLEEP APNEA (ADULT) (PEDIATRIC): Primary | Chronic | ICD-10-CM

## 2023-07-25 DIAGNOSIS — I10 ESSENTIAL HYPERTENSION: Chronic | ICD-10-CM

## 2023-07-25 DIAGNOSIS — E66.9 NON MORBID OBESITY, UNSPECIFIED OBESITY TYPE: Chronic | ICD-10-CM

## 2023-07-25 PROCEDURE — 99214 OFFICE O/P EST MOD 30 MIN: CPT | Performed by: NURSE PRACTITIONER

## 2023-07-25 RX ORDER — MODAFINIL 200 MG/1
200 TABLET ORAL 2 TIMES DAILY
Qty: 180 TABLET | Refills: 1 | Status: SHIPPED | OUTPATIENT
Start: 2023-07-25 | End: 2024-01-21

## 2023-07-25 ASSESSMENT — SLEEP AND FATIGUE QUESTIONNAIRES
HOW LIKELY ARE YOU TO NOD OFF OR FALL ASLEEP WHILE WATCHING TV: 2
HOW LIKELY ARE YOU TO NOD OFF OR FALL ASLEEP WHILE SITTING QUIETLY AFTER LUNCH WITHOUT ALCOHOL: 2
HOW LIKELY ARE YOU TO NOD OFF OR FALL ASLEEP WHEN YOU ARE A PASSENGER IN A CAR FOR AN HOUR WITHOUT A BREAK: 3
HOW LIKELY ARE YOU TO NOD OFF OR FALL ASLEEP WHILE SITTING AND READING: 3
HOW LIKELY ARE YOU TO NOD OFF OR FALL ASLEEP WHILE SITTING INACTIVE IN A PUBLIC PLACE: 0
HOW LIKELY ARE YOU TO NOD OFF OR FALL ASLEEP WHILE LYING DOWN TO REST IN THE AFTERNOON WHEN CIRCUMSTANCES PERMIT: 3
ESS TOTAL SCORE: 13
HOW LIKELY ARE YOU TO NOD OFF OR FALL ASLEEP IN A CAR, WHILE STOPPED FOR A FEW MINUTES IN TRAFFIC: 0
HOW LIKELY ARE YOU TO NOD OFF OR FALL ASLEEP WHILE SITTING AND TALKING TO SOMEONE: 0

## 2023-07-25 NOTE — ASSESSMENT & PLAN NOTE
Chronic-Stable: Reviewed and analyzed results of physiologic download from patient's machine and reviewed with patient. Supplies and parts as needed for her machine. These are medically necessary. Limit caffeine use after 3pm. Based on the analyzed data will continue with current settings. Stable on her machine at current settings, getting benefit from the use, and having minimal side effects. Encouraged consistent use of her machine. Will see her back in 6 months. Encouraged her to contact the office with any questions or concerns. Encouraged consistent use of her machine each night, all night. Discussed the importance of treating BIBIANA from a physiological standpoint. Instructed not to drive unless had 4 hrs of effective therapy for her BIBIANA the night before. No driving when sleepy. Did review the risks of under or untreated BIBIANA including, but not limited to, higher risks of motor vehicle accidents, stroke, heart attacks, and death. She understands and accepts all these risks.

## 2023-07-25 NOTE — PROGRESS NOTES
[address] [facility]  [address]  [phone] (home)   [phone] (mobile)      Insurance Info (confirm with patient if correct):  Payer/Plan Subscr  Sex Relation Sub.  Ins. ID Effective Group Num

## 2023-07-25 NOTE — PROGRESS NOTES
Sulma Dunn CNP [facility] [address] [facility], [address] [facility] [phone]   [address] [place] [address], [address] [place] [address] [phone]     Video Visit- Follow up      Assessment/Plan:      1. Obstructive sleep apnea (adult) (pediatric)  Assessment & Plan:  Chronic-Stable: Reviewed and analyzed results of physiologic download from patient's machine and reviewed with patient. Supplies and parts as needed for her machine. These are medically necessary. Limit caffeine use after 3pm. Based on the analyzed data will continue with current settings. Stable on her machine at current settings, getting benefit from the use, and having minimal side effects. Encouraged consistent use of her machine. Will see her back in 6 months. Encouraged her to contact the office with any questions or concerns. Encouraged consistent use of her machine each night, all night. Discussed the importance of treating BIBIANA from a physiological standpoint. Instructed not to drive unless had 4 hrs of effective therapy for her BIBIANA the night before. No driving when sleepy. Did review the risks of under or untreated BIBIANA including, but not limited to, higher risks of motor vehicle accidents, stroke, heart attacks, and death. She understands and accepts all these risks. 2. Essential hypertension  Assessment & Plan:  Chronic- Stable. Discussed the importance of treating obstructive sleep apnea as part of the management of this disorder. Cont any meds per PCP and other physicians. 3. Excessive daytime sleepiness  Assessment & Plan:   Chronic- Stable. Discussed the importance of treating obstructive sleep apnea as part of the management of this disorder. Will return to Provigil 200 mg BID dosing since symptoms were better controlled. PDMP report reviewed. Will mail medication agreement to her home address for her to complete and return.  Next f/u apt will be in

## 2023-07-25 NOTE — ASSESSMENT & PLAN NOTE
Chronic- Stable. Discussed the importance of treating obstructive sleep apnea as part of the management of this disorder. Will return to Provigil 200 mg BID dosing since symptoms were better controlled. PDMP report reviewed. Will mail medication agreement to her home address for her to complete and return. Next f/u apt will be in office. Discussed avoid taking too late in the afternoon to prevent insomnia. Discussed using 2 forms of contraceptives.

## 2023-07-27 RX ORDER — GENTAMICIN SULFATE 590MCG/MG
POWDER (GRAM) MISCELLANEOUS
Qty: 1000 G | Refills: 0 | Status: SHIPPED | OUTPATIENT
Start: 2023-07-27

## 2023-07-31 ENCOUNTER — TELEPHONE (OUTPATIENT)
Dept: ENT CLINIC | Age: 43
End: 2023-07-31

## 2023-07-31 NOTE — TELEPHONE ENCOUNTER
Pt calling, was prescribed tobramycin and the compounding pharmacy cannot get this done for her until Wed. She has an appt with Dr Alma Rosa Mojica on Thurs; she wants to make sure it's ok to wait until Wed to start using this or is there another compounding pharmacy she should try so she can get it sooner. Also, since she is seeing Dr Rubén Hart, does he want her to use this for longer before he sees her, or does he want her to keep her appt on Thurs? Please call to advise.

## 2023-08-02 ENCOUNTER — TELEPHONE (OUTPATIENT)
Dept: PULMONOLOGY | Age: 43
End: 2023-08-02

## 2023-08-02 NOTE — TELEPHONE ENCOUNTER
FYI, pt returned signed controlled substance medication agreement if anything was waiting on that, scanned in media.

## 2023-08-03 ENCOUNTER — TELEPHONE (OUTPATIENT)
Dept: FAMILY MEDICINE CLINIC | Age: 43
End: 2023-08-03

## 2023-08-03 ENCOUNTER — OFFICE VISIT (OUTPATIENT)
Dept: ENT CLINIC | Age: 43
End: 2023-08-03
Payer: COMMERCIAL

## 2023-08-03 VITALS
HEART RATE: 97 BPM | RESPIRATION RATE: 16 BRPM | SYSTOLIC BLOOD PRESSURE: 137 MMHG | OXYGEN SATURATION: 97 % | BODY MASS INDEX: 36.48 KG/M2 | HEIGHT: 66 IN | DIASTOLIC BLOOD PRESSURE: 79 MMHG | WEIGHT: 227 LBS

## 2023-08-03 DIAGNOSIS — J34.89 NASAL CRUSTING: ICD-10-CM

## 2023-08-03 DIAGNOSIS — J32.3 CHRONIC SPHENOIDAL SINUSITIS: ICD-10-CM

## 2023-08-03 DIAGNOSIS — J32.0 CHRONIC MAXILLARY SINUSITIS: ICD-10-CM

## 2023-08-03 DIAGNOSIS — J32.2 CHRONIC ETHMOIDAL SINUSITIS: ICD-10-CM

## 2023-08-03 DIAGNOSIS — J32.1 CHRONIC FRONTAL SINUSITIS: Primary | ICD-10-CM

## 2023-08-03 PROCEDURE — 3075F SYST BP GE 130 - 139MM HG: CPT | Performed by: STUDENT IN AN ORGANIZED HEALTH CARE EDUCATION/TRAINING PROGRAM

## 2023-08-03 PROCEDURE — 3078F DIAST BP <80 MM HG: CPT | Performed by: STUDENT IN AN ORGANIZED HEALTH CARE EDUCATION/TRAINING PROGRAM

## 2023-08-03 PROCEDURE — 99214 OFFICE O/P EST MOD 30 MIN: CPT | Performed by: STUDENT IN AN ORGANIZED HEALTH CARE EDUCATION/TRAINING PROGRAM

## 2023-08-03 ASSESSMENT — ENCOUNTER SYMPTOMS: SINUS COMPLAINT: 1

## 2023-08-03 NOTE — TELEPHONE ENCOUNTER
The instructions I sent to the pharmacy said for 40 ml nasal rinse twice daily. They are supposed to put the instructions on the product.   Can use neti pot, neilmed sinus rinse bottle, or I found this on [address] [place] [address]:

## 2023-08-03 NOTE — PROGRESS NOTES
[address] [address] (:  1980) is a 37 y.o. female, here for evaluation of the following chief complaint(s):  Sinus Problem      ASSESSMENT/PLAN:  1. Chronic frontal sinusitis  2. Chronic ethmoidal sinusitis  3. Chronic maxillary sinusitis  4. Chronic sphenoidal sinusitis  5. Nasal crusting          This is a very pleasant 37 y.o. female here today for evaluation of the the above-noted complaints. Patient is status post open septorhinoplasty, septoplasty, ITR and posterior ablation of her nasal nerves on 10/29/2021. Patient is status postendoscopic sinus surgery, revision septoplasty for CRSsNP on 12/15/22.    -On exam, there is evidence of persistent purulence and mild mucosal edema. While her clinical picture is improved from prior evaluations, she still has persistent infection present.  -Patient has had persistent infections requiring 2 rounds of oral antibiotics. Her sputum was cultured for pansensitive Pseudomonas again. I have asked the patient to use gentamicin irrigations. This is already been prescribed. If the patient is unable to get these, we discussed that we will use tobramycin irrigations.  -The next step would be to have her evaluated by infectious disease if she does not respond to topical irrigations. I will see her back in 2 weeks.  -Risk benefits alternatives were discussed with the patient about topical therapy.  -Tano Mantilla for now    Already seeing allergy. On Dupixent, multiple inhalers. Patient has asthma    SUBJECTIVE/OBJECTIVE:  Ear Problem    Other    Sinus Problem    Pharyngitis      Matt Crespo is here today for evaluation of evaluation of issues related to her nose. She gets multiple sinus infections per year. She requires antibiotics for these. She think she has a sinus infection right now. She also has over the last 3 months had a loss of sense of smell.   She has not had Covid

## 2023-08-03 NOTE — TELEPHONE ENCOUNTER
Pt called in picked up a prescription nasal called gentamicin sulfate   Pt stated the pharmacy could not answer her questions so the pt wants to know how much to use and how often?   Pt wants to start using today     Please advise

## 2023-08-03 NOTE — TELEPHONE ENCOUNTER
Patient called and instruction given on solution. Patient states she is using neilmed for her rinse and thanks us for calling back.

## 2023-08-10 ENCOUNTER — OFFICE VISIT (OUTPATIENT)
Dept: PULMONOLOGY | Age: 43
End: 2023-08-10
Payer: COMMERCIAL

## 2023-08-10 VITALS
HEIGHT: 66 IN | BODY MASS INDEX: 36.96 KG/M2 | OXYGEN SATURATION: 96 % | HEART RATE: 106 BPM | SYSTOLIC BLOOD PRESSURE: 128 MMHG | WEIGHT: 230 LBS | DIASTOLIC BLOOD PRESSURE: 74 MMHG

## 2023-08-10 DIAGNOSIS — J32.4 CHRONIC PANSINUSITIS: ICD-10-CM

## 2023-08-10 DIAGNOSIS — Z87.01 HISTORY OF PSEUDOMONAS PNEUMONIA: ICD-10-CM

## 2023-08-10 DIAGNOSIS — R06.02 SHORTNESS OF BREATH: Primary | ICD-10-CM

## 2023-08-10 PROCEDURE — 3074F SYST BP LT 130 MM HG: CPT | Performed by: INTERNAL MEDICINE

## 2023-08-10 PROCEDURE — 3078F DIAST BP <80 MM HG: CPT | Performed by: INTERNAL MEDICINE

## 2023-08-10 PROCEDURE — 99214 OFFICE O/P EST MOD 30 MIN: CPT | Performed by: INTERNAL MEDICINE

## 2023-08-10 ASSESSMENT — ENCOUNTER SYMPTOMS
STRIDOR: 0
CHEST TIGHTNESS: 0
COUGH: 1
ABDOMINAL DISTENTION: 0
DIARRHEA: 0
SORE THROAT: 0
CHOKING: 0
SINUS PRESSURE: 1
CONSTIPATION: 0
BACK PAIN: 0
ANAL BLEEDING: 0
VOICE CHANGE: 0
ABDOMINAL PAIN: 0
BLOOD IN STOOL: 0
SHORTNESS OF BREATH: 1
WHEEZING: 0
RHINORRHEA: 0
APNEA: 0

## 2023-08-12 ENCOUNTER — HOSPITAL ENCOUNTER (OUTPATIENT)
Dept: CT IMAGING | Age: 43
Discharge: HOME | End: 2023-08-12
Attending: INTERNAL MEDICINE
Payer: COMMERCIAL

## 2023-08-12 DIAGNOSIS — R06.02 SHORTNESS OF BREATH: ICD-10-CM

## 2023-08-12 DIAGNOSIS — Z87.01 HISTORY OF PSEUDOMONAS PNEUMONIA: ICD-10-CM

## 2023-08-12 PROCEDURE — 71250 CT THORAX DX C-: CPT

## 2023-08-14 DIAGNOSIS — J45.20 MILD INTERMITTENT ASTHMA WITHOUT COMPLICATION: ICD-10-CM

## 2023-08-14 RX ORDER — MONTELUKAST SODIUM 10 MG/1
TABLET ORAL
Qty: 90 TABLET | Refills: 1 | Status: SHIPPED | OUTPATIENT
Start: 2023-08-14

## 2023-08-16 ENCOUNTER — OFFICE VISIT (OUTPATIENT)
Dept: ENT CLINIC | Age: 43
End: 2023-08-16
Payer: COMMERCIAL

## 2023-08-16 VITALS
TEMPERATURE: 97.6 F | SYSTOLIC BLOOD PRESSURE: 138 MMHG | BODY MASS INDEX: 36.48 KG/M2 | RESPIRATION RATE: 16 BRPM | OXYGEN SATURATION: 98 % | HEIGHT: 66 IN | HEART RATE: 73 BPM | DIASTOLIC BLOOD PRESSURE: 84 MMHG | WEIGHT: 227 LBS

## 2023-08-16 DIAGNOSIS — J32.2 CHRONIC ETHMOIDAL SINUSITIS: ICD-10-CM

## 2023-08-16 DIAGNOSIS — A49.8 PSEUDOMONAS AERUGINOSA INFECTION: Primary | ICD-10-CM

## 2023-08-16 DIAGNOSIS — J32.0 CHRONIC MAXILLARY SINUSITIS: ICD-10-CM

## 2023-08-16 DIAGNOSIS — J32.1 CHRONIC FRONTAL SINUSITIS: ICD-10-CM

## 2023-08-16 DIAGNOSIS — J32.3 CHRONIC SPHENOIDAL SINUSITIS: ICD-10-CM

## 2023-08-16 PROCEDURE — 3075F SYST BP GE 130 - 139MM HG: CPT | Performed by: STUDENT IN AN ORGANIZED HEALTH CARE EDUCATION/TRAINING PROGRAM

## 2023-08-16 PROCEDURE — 31231 NASAL ENDOSCOPY DX: CPT | Performed by: STUDENT IN AN ORGANIZED HEALTH CARE EDUCATION/TRAINING PROGRAM

## 2023-08-16 PROCEDURE — 99213 OFFICE O/P EST LOW 20 MIN: CPT | Performed by: STUDENT IN AN ORGANIZED HEALTH CARE EDUCATION/TRAINING PROGRAM

## 2023-08-16 PROCEDURE — 3079F DIAST BP 80-89 MM HG: CPT | Performed by: STUDENT IN AN ORGANIZED HEALTH CARE EDUCATION/TRAINING PROGRAM

## 2023-08-16 ASSESSMENT — ENCOUNTER SYMPTOMS: SINUS COMPLAINT: 1

## 2023-08-16 NOTE — PROGRESS NOTES
improvement and of all the nasal steroid sprays and combination sprays that she has tried. Would like to continue this medication. Update 8/3/2023:    Patient presents today for follow-up for issues related to chronic sinusitis and nasal drainage. She initially was doing very well after her round of ciprofloxacin. She then had an exacerbation of her symptoms and was treated with another round of oral medication and inhaled tobramycin. Her lung symptoms have resolved, however she has persistent sinonasal issues including facial pain and pressure and nasal drainage. Update 8/16/2023:    Patient presents today for follow-up for issues related to chronic sinusitis and Pseudomonas in her sputum. She did the topical gentamicin irrigations but still feels symptomatic including facial pain and pressure, headaches, purulent rhinorrhea. REVIEW OF SYSTEMS  The following systems were reviewed and revealed the following in addition to any already discussed in the HPI:    PHYSICAL EXAM    GENERAL: No acute distress, alert and oriented, no hoarseness, strong voice  EYES: EOMI, Anti-icteric  HENT:   Well-healed inverted V incision of the nasal columella. PROCEDURE  Nasal Endoscopy no charge       Due to the patient's chronic sinus disease and/or history of sinonasal neoplasm for surveillance a nasal endoscopy with or without debridement will be performed to complete a significant physical examination of the patient which cannot be performed by anterior rhinoscopy alone (failure of complete examination of the paranasal sinuses). Failure to provide this procedure may lead to late detection of significant chronic benign disease, acute exacerbation, resolution or failure of early diagnosis of recurrent cancer. The procedure report is present in the body of the chart.      Procedure: Nasal endoscopy   Anesthesia: 4% lidocaine with afrin topical  EBL: Minimal  Specimens: None    Procedure:    After the application of

## 2023-08-17 ENCOUNTER — TELEPHONE (OUTPATIENT)
Dept: INFECTIOUS DISEASES | Age: 43
End: 2023-08-17

## 2023-08-18 RX ORDER — ATORVASTATIN CALCIUM 40 MG/1
TABLET, FILM COATED ORAL
Qty: 90 TABLET | Refills: 1 | Status: SHIPPED | OUTPATIENT
Start: 2023-08-18

## 2023-08-20 LAB
BACTERIA SPEC AEROBE CULT: ABNORMAL
BACTERIA SPEC ANAEROBE CULT: ABNORMAL
BACTERIA SPEC ANAEROBE CULT: ABNORMAL
GRAM STN SPEC: ABNORMAL
ORGANISM: ABNORMAL
ORGANISM: ABNORMAL

## 2023-08-24 RX ORDER — VORTIOXETINE 20 MG/1
TABLET, FILM COATED ORAL
Qty: 30 TABLET | Refills: 2 | Status: SHIPPED | OUTPATIENT
Start: 2023-08-24

## 2023-08-24 RX ORDER — LISINOPRIL 5 MG/1
TABLET ORAL
Qty: 30 TABLET | Refills: 2 | Status: SHIPPED | OUTPATIENT
Start: 2023-08-24 | End: 2023-09-19 | Stop reason: SDUPTHER

## 2023-09-01 ENCOUNTER — TELEMEDICINE (OUTPATIENT)
Dept: INFECTIOUS DISEASES | Age: 43
End: 2023-09-01
Payer: COMMERCIAL

## 2023-09-01 DIAGNOSIS — A49.8 PSEUDOMONAS INFECTION: ICD-10-CM

## 2023-09-01 DIAGNOSIS — J34.2 NASAL SEPTAL DEVIATION: ICD-10-CM

## 2023-09-01 DIAGNOSIS — G47.33 OSA (OBSTRUCTIVE SLEEP APNEA): ICD-10-CM

## 2023-09-01 DIAGNOSIS — J32.4 CHRONIC PANSINUSITIS: Primary | ICD-10-CM

## 2023-09-01 DIAGNOSIS — J45.20 MILD INTERMITTENT ASTHMA WITHOUT COMPLICATION: Chronic | ICD-10-CM

## 2023-09-01 DIAGNOSIS — J31.0 CHRONIC RHINITIS: ICD-10-CM

## 2023-09-01 PROCEDURE — 99204 OFFICE O/P NEW MOD 45 MIN: CPT | Performed by: INTERNAL MEDICINE

## 2023-09-01 RX ORDER — METRONIDAZOLE 500 MG/1
500 TABLET ORAL 3 TIMES DAILY
Qty: 42 TABLET | Refills: 0 | Status: SHIPPED | OUTPATIENT
Start: 2023-09-01 | End: 2023-09-15

## 2023-09-01 RX ORDER — LEVOFLOXACIN 750 MG/1
750 TABLET ORAL DAILY
Qty: 14 TABLET | Refills: 0 | Status: SHIPPED | OUTPATIENT
Start: 2023-09-01 | End: 2023-09-15

## 2023-09-01 NOTE — PROGRESS NOTES
Infectious Diseases Clinic Consult Note Virtual Visit        Reason for Consult:   Recurrent Pseudomonas sinusitis    Requesting Physician:    Jennifer Herrera   Primary Care Physician:  Tone Witt MD  History Obtained From:   Patient , Medical Records     CHIEF COMPLAINT:    Chief Complaint   Patient presents with    Other     J32.1 (ICD-10-CM) - Chronic frontal sinusitis       HISTORY OF PRESENT ILLNESS: 37 y.o. female here for evaluation of recurrent sinusitis. Patient is status post open septorhinoplasty with posterior ablation of nasal nerves on 10/29/21. She also underwent post endoscopic sinus surgery with revision on 12/15/22. Sinus cx from 5/2/23 with Pseudomonas and repeat sputum cx from 7/12/23 with PSEUDOMONAS again-she is followed by Jennifer Herrera and recent office visit sinu exam with on going purulence noted. She was treated with Tobramycin Nebulization in July and was on a course of oral Cipro in May  2023. She is on Dupixent  for eosinophilic asthma and has h/o BIBIANA. She had CT chest on  8/12/23 and It was clear had a very small nodule felt not to be significant. She is here to discuss her treatment options.        Past Medical History:    Past Medical History:   Diagnosis Date    Acid reflux     Allergic rhinitis     Anxiety     Anxiety     Asthma     Environmental allergies     food    Eosinophilic asthma     Hyperlipidemia     Hypertension     Lumbar disc disease L4-5, L5-S1 Dr Mar[facility]) Epidural injections 2011 08/20/2012    Obstructive sleep apnea     USES C-PAP    Obstructive sleep apnea (adult) (pediatric) 06/18/2018    USES C-PAP    PCOS (polycystic ovarian syndrome)     S/P colonoscopy 01/17/2013    normal. Dr Jaye Doe       Past Surgical History:    Past Surgical History:   Procedure Laterality Date    BREAST REDUCTION SURGERY  01/01/2004    COLONOSCOPY      FOOT SURGERY Left 01/01/2000    hallux fx    FOOT SURGERY Left     HEEL SURGERY X2-FASCIA    KNEE ARTHROSCOPY Right     NOSE

## 2023-09-19 ENCOUNTER — TELEPHONE (OUTPATIENT)
Dept: FAMILY MEDICINE CLINIC | Age: 43
End: 2023-09-19

## 2023-09-19 DIAGNOSIS — I10 ESSENTIAL HYPERTENSION: Primary | Chronic | ICD-10-CM

## 2023-09-19 RX ORDER — LISINOPRIL 5 MG/1
TABLET ORAL
Qty: 90 TABLET | Refills: 1 | Status: SHIPPED | OUTPATIENT
Start: 2023-09-19

## 2023-10-04 DIAGNOSIS — J32.4 CHRONIC PANSINUSITIS: ICD-10-CM

## 2023-10-04 LAB
IGA SERPL-MCNC: 161 MG/DL (ref 70–400)
IGG SERPL-MCNC: 1308 MG/DL (ref 700–1600)
IGM SERPL-MCNC: 134 MG/DL (ref 40–230)

## 2023-10-05 ENCOUNTER — PATIENT MESSAGE (OUTPATIENT)
Dept: INFECTIOUS DISEASES | Age: 43
End: 2023-10-05

## 2023-10-05 DIAGNOSIS — J32.4 CHRONIC PANSINUSITIS: Primary | ICD-10-CM

## 2023-10-05 DIAGNOSIS — A49.8 PSEUDOMONAS INFECTION: ICD-10-CM

## 2023-10-05 DIAGNOSIS — I10 ESSENTIAL HYPERTENSION: ICD-10-CM

## 2023-10-05 RX ORDER — SPIRONOLACTONE 100 MG/1
TABLET, FILM COATED ORAL
Qty: 30 TABLET | Refills: 5 | Status: SHIPPED | OUTPATIENT
Start: 2023-10-05

## 2023-10-07 NOTE — TELEPHONE ENCOUNTER
I put for resp cultures see if she can submit sputum sample for testing thx   Her immune globulin levels came back normal

## 2023-10-09 ENCOUNTER — TELEPHONE (OUTPATIENT)
Dept: INFECTIOUS DISEASES | Age: 43
End: 2023-10-09

## 2023-10-11 ENCOUNTER — OFFICE VISIT (OUTPATIENT)
Dept: ENT CLINIC | Age: 43
End: 2023-10-11
Payer: COMMERCIAL

## 2023-10-11 VITALS
SYSTOLIC BLOOD PRESSURE: 133 MMHG | HEART RATE: 82 BPM | BODY MASS INDEX: 36.9 KG/M2 | RESPIRATION RATE: 16 BRPM | TEMPERATURE: 97.2 F | OXYGEN SATURATION: 98 % | WEIGHT: 229.6 LBS | DIASTOLIC BLOOD PRESSURE: 84 MMHG | HEIGHT: 66 IN

## 2023-10-11 DIAGNOSIS — J34.89 NASAL SEPTAL PERFORATION: ICD-10-CM

## 2023-10-11 DIAGNOSIS — J34.89 NASAL VESTIBULITIS: Primary | ICD-10-CM

## 2023-10-11 PROCEDURE — 99213 OFFICE O/P EST LOW 20 MIN: CPT | Performed by: OTOLARYNGOLOGY

## 2023-10-11 PROCEDURE — 3079F DIAST BP 80-89 MM HG: CPT | Performed by: OTOLARYNGOLOGY

## 2023-10-11 PROCEDURE — 3075F SYST BP GE 130 - 139MM HG: CPT | Performed by: OTOLARYNGOLOGY

## 2023-10-11 NOTE — PROGRESS NOTES
[address] & NECK SURGERY  Follow up      Patient Name: [facility] Record Number:  [id number]  Primary Care Physician:  Isatu Lyn MD  Date of Consultation: 10/11/2023    Chief Complaint: Nasal issues        Interval History  Patient is following up for her nose. She is a long-term patient of my partner Chyna Sifuentes. She has had a rhinoplasty as well as endoscopic sinus surgery. She required prolonged antibiotics for Pseudomonas infection recently. She was doing fairly well and then says that the tip of her nose started hurting and got red. It is gotten a little bit better, but she still having some crusting and occasional bleeding. REVIEW OF SYSTEMS  As above    PHYSICAL EXAM  GENERAL: No Acute Distress, Alert and Oriented, no Hoarseness, strong voice  EYES: EOMI, Anti-icteric  HENT:   Head: Normocephalic and atraumatic. Face:  Symmetric, facial nerve intact, no sinus tenderness  Right Ear: Normal external ear  Left Ear: Normal external ear  Mouth/Oral Cavity:  normal lips, Uvula is midline, no mucosal lesions, no trismus  Oropharynx/Larynx:  normal oropharynx  Nose:Normal external nasal appearance. Anterior rhinoscopy shows   NECK: Normal range of motion, no thyromegaly, trachea is midline, no lymphadenopathy, no neck masses, no crepitus              ASSESSMENT/PLAN  1. Nasal vestibulitis  She appears to have a vestibulitis associated with a small perforation. I do not appreciate any obvious ongoing sinusitis today. I would have her use Bactroban ointment twice a day for the next 10 days. 2. Nasal septal perforation  Is small, but is crusting. She will follow-up with Dr. Venita Power long-term. I have performed a head and neck physical exam personally or was physically present during the key or critical portions of the service.     This note was generated completely or in part utilizing Dragon dictation speech recognition

## 2023-10-14 DIAGNOSIS — A49.8 PSEUDOMONAS INFECTION: ICD-10-CM

## 2023-10-14 DIAGNOSIS — J32.4 CHRONIC PANSINUSITIS: ICD-10-CM

## 2023-10-16 ENCOUNTER — TELEPHONE (OUTPATIENT)
Dept: INFECTIOUS DISEASES | Age: 43
End: 2023-10-16

## 2023-10-16 LAB
REASON FOR REJECTION: NORMAL
REJECTED TEST: NORMAL

## 2023-10-16 NOTE — TELEPHONE ENCOUNTER
Received call from lab pt's sputum specimen needs to be recollected. Message left on pt's voicemail. Return call requested. Thanks.

## 2023-10-20 ENCOUNTER — TELEPHONE (OUTPATIENT)
Dept: INFECTIOUS DISEASES | Age: 43
End: 2023-10-20

## 2023-10-20 LAB
BACTERIA SPEC RESP CULT: NORMAL
GRAM STN SPEC: NORMAL

## 2023-11-09 DIAGNOSIS — J32.1 CHRONIC FRONTAL SINUSITIS: Primary | ICD-10-CM

## 2023-11-14 ENCOUNTER — OFFICE VISIT (OUTPATIENT)
Dept: PULMONOLOGY | Age: 43
End: 2023-11-14
Payer: COMMERCIAL

## 2023-11-14 VITALS
BODY MASS INDEX: 36.8 KG/M2 | DIASTOLIC BLOOD PRESSURE: 80 MMHG | HEIGHT: 66 IN | HEART RATE: 94 BPM | WEIGHT: 229 LBS | SYSTOLIC BLOOD PRESSURE: 130 MMHG | OXYGEN SATURATION: 97 %

## 2023-11-14 DIAGNOSIS — J32.4 CHRONIC PANSINUSITIS: Primary | ICD-10-CM

## 2023-11-14 DIAGNOSIS — R91.1 LUNG NODULE, SOLITARY: ICD-10-CM

## 2023-11-14 DIAGNOSIS — J45.50 SEVERE PERSISTENT ASTHMA WITHOUT COMPLICATION: ICD-10-CM

## 2023-11-14 PROCEDURE — 99215 OFFICE O/P EST HI 40 MIN: CPT | Performed by: INTERNAL MEDICINE

## 2023-11-14 PROCEDURE — 3075F SYST BP GE 130 - 139MM HG: CPT | Performed by: INTERNAL MEDICINE

## 2023-11-14 PROCEDURE — 3079F DIAST BP 80-89 MM HG: CPT | Performed by: INTERNAL MEDICINE

## 2023-11-14 ASSESSMENT — ENCOUNTER SYMPTOMS
COUGH: 1
APNEA: 0
DIARRHEA: 0
CHEST TIGHTNESS: 1
SINUS PRESSURE: 1
VOMITING: 0
RHINORRHEA: 1
CONSTIPATION: 0
STRIDOR: 0
ABDOMINAL PAIN: 0
SORE THROAT: 0
ABDOMINAL DISTENTION: 0
SHORTNESS OF BREATH: 1
WHEEZING: 1
COLOR CHANGE: 0
CHOKING: 0
BACK PAIN: 0
BLOOD IN STOOL: 0
VOICE CHANGE: 0

## 2023-11-14 NOTE — PROGRESS NOTES
mg) under the skin every 2 weeks. 2 Adjustable Dose Pre-filled Pen Syringe 5    Fluticasone Propionate (XHANCE) 93 MCG/ACT EXHU 1 spray by Nasal route 2 times daily 1 mL 3    Sodium Chloride-Xylitol (XLEAR SINUS CARE SPRAY) SOLN 1 spray by Nasal route in the morning and at bedtime 45 mL 0    mupirocin (BACTROBAN) 2 % ointment Dissolve a thin strip of ointment in your irrigation bottle and irrigate. Irrigate twice per day.  30 g 5    amitriptyline (ELAVIL) 10 MG tablet Take 1 tablet by mouth nightly      albuterol sulfate HFA (PROVENTIL;VENTOLIN;PROAIR) 108 (90 Base) MCG/ACT inhaler INHALE TWO PUFFS BY MOUTH EVERY 6 HOURS AS NEEDED FOR WHEEZING OR SHORTNESS OF BREATH 8.5 g 0    pantoprazole (PROTONIX) 40 MG tablet Take 1 tablet by mouth in the morning and at bedtime      famotidine (PEPCID) 40 MG tablet Take 1 tablet by mouth at bedtime      ibuprofen (ADVIL;MOTRIN) 200 MG tablet Take 1 tablet by mouth every 6 hours as needed      Blood Pressure Monitoring (OMRON 3 SERIES BP MONITOR) GABRIELLE Test blood pressure weekly or PRN 1 each 0    Spacer/Aero-Holding Chambers GABRIELLE 1 Device by Does not apply route daily as needed (with HFA inhalers) 1 Device 0    Multiple Vitamins-Minerals (THERAPEUTIC MULTIVITAMIN-MINERALS) tablet Take 1 tablet by mouth daily         Immunization History   Administered Date(s) Administered    COVID-19, J&J, (age 18y+), IM, 0.5 mL 04/03/2021    COVID-19, PFIZER PURPLE top, DILUTE for use, (age 15 y+), 30mcg/0.3mL 12/24/2021    INFLUENZA, INTRADERMAL, QUADRIVALENT, PRESERVATIVE FREE 10/05/2016    Influenza Virus Vaccine 10/01/2010, 09/09/2014    Influenza, AFLURIA (age 1 yrs+), FLUZONE, (age 10 mo+), MDV, 0.5mL 10/08/2021    Influenza, FLUARIX, FLULAVAL, FLUZONE (age 10 mo+) AND AFLURIA, (age 1 y+), PF, 0.5mL 10/21/2015, 10/28/2017    Influenza, FLUBLOK, (age 25 y+), PF, 0.5mL 09/27/2018    Influenza, FLUCELVAX, (age 10 mo+), MDCK, MDV, 0.5mL 11/09/2020    Influenza, FLUCELVAX, (age 10 mo+), MDCK, PF,

## 2023-11-16 ENCOUNTER — HOSPITAL ENCOUNTER (OUTPATIENT)
Dept: CT IMAGING | Age: 43
Discharge: HOME | End: 2023-11-16
Attending: STUDENT IN AN ORGANIZED HEALTH CARE EDUCATION/TRAINING PROGRAM
Payer: COMMERCIAL

## 2023-11-16 DIAGNOSIS — J32.1 CHRONIC FRONTAL SINUSITIS: ICD-10-CM

## 2023-11-16 PROCEDURE — 70486 CT MAXILLOFACIAL W/O DYE: CPT

## 2023-11-17 DIAGNOSIS — J32.4 CHRONIC PANSINUSITIS: ICD-10-CM

## 2023-11-20 LAB
MYELOPEROXIDASE AB SER-ACNC: 1 AU/ML (ref 0–19)
PROTEINASE3 AB SER-ACNC: 0 AU/ML (ref 0–19)

## 2023-11-27 NOTE — PROGRESS NOTES
[facility] Pre-Admission Testing Electronic Communication Worksheet for OR/ENDO Procedures        Patient: Pascual Coelho    DOS: 12/11/23    Arrival Time: 10:30AM    Surgery Time: 12:30PM    Meds to Bed:  [x] YES    []  NO    Transportation Confirmed: [x] YES    []  NO SISTER    History and Physical:  [] YES    []  NO  [] N/A  If yes, please list doctor or Urgent Care and date of H&P: DR. ADDISON-12/4/23    Additional Clearance(Cardiac, Pulmonary, etc):  [] YES    [x]  NO    Pre-Admission Testing Visit:  [] YES    [x]  NO If no, do labs/testing need to be done DOS?   [] YES    [x]  NO    Medication Reconciliation Complete:  [x] YES    []  NO        Additional Notes:      USES C-PAP          Interview Complete: [x] YES    []  NO          Brown Butler RN  5:57 PM

## 2023-11-27 NOTE — PROGRESS NOTES
[address]  time_10:30_______        Surgery time____12:30________    Take the following medications with a sip of water: Follow your MD/Surgeons pre-procedure instructions regarding your medications     Do not eat or drink anything after 12:00 midnight prior to your surgery. This includes water chewing gum, mints and ice chips. You may brush your teeth and gargle the morning of your surgery, but do not swallow the water     Please see your family doctor/pediatrician for a history and physical and/or concerning medications. Bring any test results/reports from your physicians office. If you are under the care of a heart doctor or specialist doctor, please be aware that you may be asked to them for clearance    You may be asked to stop blood thinners such as Coumadin, Plavix, Fragmin, Lovenox, etc., or any anti-inflammatories such as:  Aspirin, Ibuprofen, Advil, Naproxen prior to your surgery. We also ask that you stop any OTC medications such as fish oil, vitamin E, glucosamine, garlic, Multivitamins, COQ 10, etc. MAY TAKE TYLENOL    We ask that you do not smoke 24 hours prior to surgery  We ask that you do not  drink any alcoholic beverages 24 hours prior to surgery     You must make arrangements for a responsible adult to take you home after your surgery. For your safety you will not be allowed to leave alone or drive yourself home. Your surgery will be cancelled if you do not have a ride home. Also for your safety, it is strongly suggested that someone stay with you the first 24 hours after your surgery. A parent or legal guardian must accompany a child scheduled for surgery and plan to stay at the hospital until the child is discharged. Please do not bring other children with you. For your comfort, please wear simple loose fitting clothing to the hospital.  Please do not bring valuables.     Do not wear any make-up or nail polish on

## 2023-11-27 NOTE — PROGRESS NOTES
Follow Up Prior to Surgery    DOS: 23  :1980      History and Physical: APPOINTMENT WITH DR. ADDISON ([facility] PHYSICIAN) ON 23 @ 3PM    UPDATE:: PRE-OP H&P IN [facility] PATIENT CLEARED FOR SURGERY

## 2023-12-04 ENCOUNTER — OFFICE VISIT (OUTPATIENT)
Dept: FAMILY MEDICINE CLINIC | Age: 43
End: 2023-12-04
Payer: COMMERCIAL

## 2023-12-04 VITALS
OXYGEN SATURATION: 98 % | BODY MASS INDEX: 37.28 KG/M2 | HEIGHT: 66 IN | SYSTOLIC BLOOD PRESSURE: 122 MMHG | RESPIRATION RATE: 18 BRPM | TEMPERATURE: 97.6 F | HEART RATE: 76 BPM | DIASTOLIC BLOOD PRESSURE: 88 MMHG | WEIGHT: 232 LBS

## 2023-12-04 DIAGNOSIS — J32.9 CHRONIC SINUSITIS, UNSPECIFIED LOCATION: ICD-10-CM

## 2023-12-04 DIAGNOSIS — K21.9 CHRONIC GERD: ICD-10-CM

## 2023-12-04 DIAGNOSIS — I10 ESSENTIAL HYPERTENSION: ICD-10-CM

## 2023-12-04 DIAGNOSIS — R05.3 CHRONIC COUGH: ICD-10-CM

## 2023-12-04 DIAGNOSIS — F43.12 CHRONIC POST-TRAUMATIC STRESS DISORDER (PTSD): Primary | ICD-10-CM

## 2023-12-04 DIAGNOSIS — E78.5 HYPERLIPIDEMIA, UNSPECIFIED HYPERLIPIDEMIA TYPE: ICD-10-CM

## 2023-12-04 DIAGNOSIS — Z01.818 PREOP EXAMINATION: Primary | ICD-10-CM

## 2023-12-04 DIAGNOSIS — L68.0 HIRSUTISM: ICD-10-CM

## 2023-12-04 DIAGNOSIS — R73.03 PREDIABETES: ICD-10-CM

## 2023-12-04 DIAGNOSIS — J45.20 MILD INTERMITTENT ASTHMA WITHOUT COMPLICATION: ICD-10-CM

## 2023-12-04 LAB
ANION GAP SERPL CALCULATED.3IONS-SCNC: 9 MMOL/L (ref 3–16)
BUN SERPL-MCNC: 8 MG/DL (ref 7–20)
CALCIUM SERPL-MCNC: 9 MG/DL (ref 8.3–10.6)
CHLORIDE SERPL-SCNC: 104 MMOL/L (ref 99–110)
CO2 SERPL-SCNC: 28 MMOL/L (ref 21–32)
CREAT SERPL-MCNC: 0.6 MG/DL (ref 0.6–1.1)
GFR SERPLBLD CREATININE-BSD FMLA CKD-EPI: >60 ML/MIN/{1.73_M2}
GLUCOSE SERPL-MCNC: 86 MG/DL (ref 70–99)
POTASSIUM SERPL-SCNC: 3.9 MMOL/L (ref 3.5–5.1)
SODIUM SERPL-SCNC: 141 MMOL/L (ref 136–145)

## 2023-12-04 PROCEDURE — 3079F DIAST BP 80-89 MM HG: CPT | Performed by: FAMILY MEDICINE

## 2023-12-04 PROCEDURE — 3074F SYST BP LT 130 MM HG: CPT | Performed by: FAMILY MEDICINE

## 2023-12-04 PROCEDURE — 99214 OFFICE O/P EST MOD 30 MIN: CPT | Performed by: FAMILY MEDICINE

## 2023-12-04 PROCEDURE — 93000 ELECTROCARDIOGRAM COMPLETE: CPT | Performed by: FAMILY MEDICINE

## 2023-12-04 RX ORDER — LOSARTAN POTASSIUM 25 MG/1
25 TABLET ORAL DAILY
Qty: 90 TABLET | Refills: 1 | Status: SHIPPED | OUTPATIENT
Start: 2023-12-04

## 2023-12-04 ASSESSMENT — ENCOUNTER SYMPTOMS
ALLERGIC/IMMUNOLOGIC NEGATIVE: 1
EYES NEGATIVE: 1
RESPIRATORY NEGATIVE: 1
GASTROINTESTINAL NEGATIVE: 1
RHINORRHEA: 1
SINUS PAIN: 1

## 2023-12-04 NOTE — PATIENT INSTRUCTIONS
Meds to take the AM of surgery:  Protonix  Trelegy    Take CPAP with you to surgery center. Take albuterol with you to surgery center.

## 2023-12-04 NOTE — PROGRESS NOTES
Subjective:   PREOPERATIVE EVALUATION     Connie Roman is a 37 y.o.  female who presents to the office today for a preoperative consultation at the request of surgeon Dr Rosangela Duong who plans on performing revision of sinus surgery on December 11 with revision antrectomy and     Duration of problem: chronic sinusitis and airway obstruction  Assoc sx are: congestion, sinus pain, chronic drainage and infectino  Alleviating factors are: steroids and antibiotics no longer helping. Multiple meds for Aquilino. This consultation is requested for the specific conditions prompting preoperative evaluation (i.e. because of potential affect on operative risk):    Diabetes: no.     Lab Results   Component Value Date/Time    LABA1C 5.5 07/07/2023 02:55 PM    LABA1C 5.5 06/10/2022 12:07 PM    LABA1C 5.7 09/20/2021 08:53 AM      Coronary Artery Disease: no  COPD: no  Asthma: eosinophilic. Managed by pulm. On Trelegy, Singulair, Dupixent, zyrtec. History of Pseudomonas, sputum culture from 7/12 +, seen by ID and treated with a prolonged course of levofloxacin x14 days. She also had immunoglobulin checked which was negative. Repeat sputum cultures from 10/18 was negative for infection. Tobacco use? no  Recent illness? Recent sputum cultures negative in October and has done well since treatment with levoflox and nasal irrigation with gentamicin, nebulized tobramycin. Other notable conditions:    Uses Trulance for chronic constipation. GERD: on both pepcid and Protonix (Protonix in AM, pepcid at PM)    Elavil is for IBS. Aldactone is for hirsutism, not HTN, but lisinopril is prescribed for HTN.   Last renal function test:   Lab Results   Component Value Date/Time     07/07/2023 02:55 PM    K 4.2 07/07/2023 02:55 PM     07/07/2023 02:55 PM    CO2 27 07/07/2023 02:55 PM    BUN 10 07/07/2023 02:55 PM    CREATININE 0.7 07/07/2023 02:55 PM    GLUCOSE 89 07/07/2023 02:55 PM    CALCIUM 9.4 07/07/2023

## 2023-12-08 ENCOUNTER — ANESTHESIA EVENT (OUTPATIENT)
Dept: OPERATING ROOM | Age: 43
End: 2023-12-08
Payer: COMMERCIAL

## 2023-12-10 ASSESSMENT — LIFESTYLE VARIABLES: SMOKING_STATUS: 0

## 2023-12-10 NOTE — ANESTHESIA PRE PROCEDURE
Department of Anesthesiology  Preprocedure Note       Name:  Sue Segura   Age:  37 y.o.  :  1980                                          MRN:  [id number]         Date:  2023      Surgeon: Tierra Irby):  Meli Tate MD    Procedure: Procedure(s):  REVISION ENDOSCOPIC SINUS SURGERY IMAGE GUIDANCE    Medications prior to admission:   Prior to Admission medications    Medication Sig Start Date End Date Taking? Authorizing Provider   VORTIoxetine HBr (TRINTELLIX) 20 MG TABS tablet Take 1 tablet by mouth at bedtime 23   Myriam Wynne MD   losartan (COZAAR) 25 MG tablet Take 1 tablet by mouth daily 23   Myriam Wynne MD   spironolactone (ALDACTONE) 100 MG tablet TAKE ONE TABLET BY MOUTH DAILY 10/5/23   Myriam Wynne MD   Plecanatide (TRULANCE PO) Take 5 mg by mouth daily    Provider, MD Dana   atorvastatin (LIPITOR) 40 MG tablet TAKE 1 TABLET BY MOUTH DAILY  Patient taking differently: Take 1 tablet by mouth nightly TAKE 1 TABLET BY MOUTH DAILY 23   Myriam Wynne MD   montelukast (SINGULAIR) 10 MG tablet TAKE ONE TABLET BY MOUTH ONCE NIGHTLY 23   Myriam Wynne MD   modafinil (PROVIGIL) 200 MG tablet Take 1 tablet by mouth 2 times daily for 180 days. Max Daily Amount: 400 mg  Patient taking differently: Take 1 tablet by mouth 2 times daily. AM AND 11AM 23  QI Berry ELLIPTA 185-88.2-70 MCG/ACT AEPB inhaler INHALE ONE PUFF BY MOUTH DAILY 23   Lee Oneal MD   dupilumab (DUPIXENT) 300 MG/2ML SOPN injection Inject the contents of 1 pen (300 mg) under the skin every 2 weeks. 23   Shelah Dandy, MD   mupirocin (BACTROBAN) 2 % ointment Dissolve a thin strip of ointment in your irrigation bottle and irrigate. Irrigate twice per day. Patient taking differently: Apply topically as needed Dissolve a thin strip of ointment in your irrigation bottle and irrigate. Irrigate twice per day.

## 2023-12-11 ENCOUNTER — HOSPITAL ENCOUNTER (OUTPATIENT)
Age: 43
Setting detail: OUTPATIENT SURGERY
Discharge: HOME | End: 2023-12-11
Attending: STUDENT IN AN ORGANIZED HEALTH CARE EDUCATION/TRAINING PROGRAM | Admitting: STUDENT IN AN ORGANIZED HEALTH CARE EDUCATION/TRAINING PROGRAM
Payer: COMMERCIAL

## 2023-12-11 ENCOUNTER — ANESTHESIA (OUTPATIENT)
Dept: OPERATING ROOM | Age: 43
End: 2023-12-11
Payer: COMMERCIAL

## 2023-12-11 VITALS
RESPIRATION RATE: 18 BRPM | WEIGHT: 226.99 LBS | HEIGHT: 66 IN | TEMPERATURE: 97.4 F | HEART RATE: 75 BPM | OXYGEN SATURATION: 96 % | SYSTOLIC BLOOD PRESSURE: 110 MMHG | BODY MASS INDEX: 36.48 KG/M2 | DIASTOLIC BLOOD PRESSURE: 60 MMHG

## 2023-12-11 DIAGNOSIS — J32.0 CHRONIC MAXILLARY SINUSITIS: ICD-10-CM

## 2023-12-11 DIAGNOSIS — J32.3 CHRONIC SPHENOIDAL SINUSITIS: ICD-10-CM

## 2023-12-11 DIAGNOSIS — G89.18 POST-OP PAIN: Primary | ICD-10-CM

## 2023-12-11 DIAGNOSIS — J32.1 CHRONIC FRONTAL SINUSITIS: ICD-10-CM

## 2023-12-11 DIAGNOSIS — J32.2 CHRONIC ETHMOIDAL SINUSITIS: ICD-10-CM

## 2023-12-11 LAB — HCG UR QL: NEGATIVE

## 2023-12-11 PROCEDURE — 2500000003 HC RX 250 WO HCPCS: Performed by: NURSE ANESTHETIST, CERTIFIED REGISTERED

## 2023-12-11 PROCEDURE — 84703 CHORIONIC GONADOTROPIN ASSAY: CPT

## 2023-12-11 PROCEDURE — A4217 STERILE WATER/SALINE, 500 ML: HCPCS | Performed by: STUDENT IN AN ORGANIZED HEALTH CARE EDUCATION/TRAINING PROGRAM

## 2023-12-11 PROCEDURE — 3600000014 HC SURGERY LEVEL 4 ADDTL 15MIN: Performed by: STUDENT IN AN ORGANIZED HEALTH CARE EDUCATION/TRAINING PROGRAM

## 2023-12-11 PROCEDURE — 6360000002 HC RX W HCPCS: Performed by: NURSE ANESTHETIST, CERTIFIED REGISTERED

## 2023-12-11 PROCEDURE — 3700000000 HC ANESTHESIA ATTENDED CARE: Performed by: STUDENT IN AN ORGANIZED HEALTH CARE EDUCATION/TRAINING PROGRAM

## 2023-12-11 PROCEDURE — 87070 CULTURE OTHR SPECIMN AEROBIC: CPT

## 2023-12-11 PROCEDURE — 7100000011 HC PHASE II RECOVERY - ADDTL 15 MIN: Performed by: STUDENT IN AN ORGANIZED HEALTH CARE EDUCATION/TRAINING PROGRAM

## 2023-12-11 PROCEDURE — 6360000002 HC RX W HCPCS: Performed by: STUDENT IN AN ORGANIZED HEALTH CARE EDUCATION/TRAINING PROGRAM

## 2023-12-11 PROCEDURE — 88305 TISSUE EXAM BY PATHOLOGIST: CPT

## 2023-12-11 PROCEDURE — 3700000001 HC ADD 15 MINUTES (ANESTHESIA): Performed by: STUDENT IN AN ORGANIZED HEALTH CARE EDUCATION/TRAINING PROGRAM

## 2023-12-11 PROCEDURE — 6370000000 HC RX 637 (ALT 250 FOR IP): Performed by: NURSE ANESTHETIST, CERTIFIED REGISTERED

## 2023-12-11 PROCEDURE — 3600000004 HC SURGERY LEVEL 4 BASE: Performed by: STUDENT IN AN ORGANIZED HEALTH CARE EDUCATION/TRAINING PROGRAM

## 2023-12-11 PROCEDURE — 7100000001 HC PACU RECOVERY - ADDTL 15 MIN: Performed by: STUDENT IN AN ORGANIZED HEALTH CARE EDUCATION/TRAINING PROGRAM

## 2023-12-11 PROCEDURE — C2625 STENT, NON-COR, TEM W/DEL SY: HCPCS | Performed by: STUDENT IN AN ORGANIZED HEALTH CARE EDUCATION/TRAINING PROGRAM

## 2023-12-11 PROCEDURE — 31253 NSL/SINS NDSC TOTAL: CPT | Performed by: STUDENT IN AN ORGANIZED HEALTH CARE EDUCATION/TRAINING PROGRAM

## 2023-12-11 PROCEDURE — 87205 SMEAR GRAM STAIN: CPT

## 2023-12-11 PROCEDURE — 2720000010 HC SURG SUPPLY STERILE: Performed by: STUDENT IN AN ORGANIZED HEALTH CARE EDUCATION/TRAINING PROGRAM

## 2023-12-11 PROCEDURE — 87077 CULTURE AEROBIC IDENTIFY: CPT

## 2023-12-11 PROCEDURE — 2580000003 HC RX 258: Performed by: STUDENT IN AN ORGANIZED HEALTH CARE EDUCATION/TRAINING PROGRAM

## 2023-12-11 PROCEDURE — 7100000010 HC PHASE II RECOVERY - FIRST 15 MIN: Performed by: STUDENT IN AN ORGANIZED HEALTH CARE EDUCATION/TRAINING PROGRAM

## 2023-12-11 PROCEDURE — 2580000003 HC RX 258: Performed by: ANESTHESIOLOGY

## 2023-12-11 PROCEDURE — 31267 ENDOSCOPY MAXILLARY SINUS: CPT | Performed by: STUDENT IN AN ORGANIZED HEALTH CARE EDUCATION/TRAINING PROGRAM

## 2023-12-11 PROCEDURE — 7100000000 HC PACU RECOVERY - FIRST 15 MIN: Performed by: STUDENT IN AN ORGANIZED HEALTH CARE EDUCATION/TRAINING PROGRAM

## 2023-12-11 PROCEDURE — 61782 SCAN PROC CRANIAL EXTRA: CPT | Performed by: STUDENT IN AN ORGANIZED HEALTH CARE EDUCATION/TRAINING PROGRAM

## 2023-12-11 PROCEDURE — 2709999900 HC NON-CHARGEABLE SUPPLY: Performed by: STUDENT IN AN ORGANIZED HEALTH CARE EDUCATION/TRAINING PROGRAM

## 2023-12-11 DEVICE — PROPEL MINI SINUS IMPLANT
Type: IMPLANTABLE DEVICE | Site: SINUS | Status: FUNCTIONAL
Brand: PROPEL MINI

## 2023-12-11 RX ORDER — ROCURONIUM BROMIDE 10 MG/ML
INJECTION, SOLUTION INTRAVENOUS PRN
Status: DISCONTINUED | OUTPATIENT
Start: 2023-12-11 | End: 2023-12-11 | Stop reason: SDUPTHER

## 2023-12-11 RX ORDER — FENTANYL CITRATE 50 UG/ML
INJECTION, SOLUTION INTRAMUSCULAR; INTRAVENOUS PRN
Status: DISCONTINUED | OUTPATIENT
Start: 2023-12-11 | End: 2023-12-11 | Stop reason: SDUPTHER

## 2023-12-11 RX ORDER — MAGNESIUM HYDROXIDE 1200 MG/15ML
LIQUID ORAL CONTINUOUS PRN
Status: DISCONTINUED | OUTPATIENT
Start: 2023-12-11 | End: 2023-12-11 | Stop reason: HOSPADM

## 2023-12-11 RX ORDER — PROPOFOL 10 MG/ML
INJECTION, EMULSION INTRAVENOUS PRN
Status: DISCONTINUED | OUTPATIENT
Start: 2023-12-11 | End: 2023-12-11 | Stop reason: SDUPTHER

## 2023-12-11 RX ORDER — AMOXICILLIN 250 MG
2 CAPSULE ORAL DAILY PRN
Qty: 14 TABLET | Refills: 0 | Status: SHIPPED | OUTPATIENT
Start: 2023-12-11 | End: 2023-12-18

## 2023-12-11 RX ORDER — SODIUM CHLORIDE 9 MG/ML
INJECTION, SOLUTION INTRAVENOUS PRN
Status: DISCONTINUED | OUTPATIENT
Start: 2023-12-11 | End: 2023-12-11 | Stop reason: HOSPADM

## 2023-12-11 RX ORDER — IPRATROPIUM BROMIDE AND ALBUTEROL SULFATE 2.5; .5 MG/3ML; MG/3ML
1 SOLUTION RESPIRATORY (INHALATION)
Status: DISCONTINUED | OUTPATIENT
Start: 2023-12-11 | End: 2023-12-11 | Stop reason: HOSPADM

## 2023-12-11 RX ORDER — ONDANSETRON 2 MG/ML
INJECTION INTRAMUSCULAR; INTRAVENOUS PRN
Status: DISCONTINUED | OUTPATIENT
Start: 2023-12-11 | End: 2023-12-11 | Stop reason: SDUPTHER

## 2023-12-11 RX ORDER — EPINEPHRINE 1 MG/ML
INJECTION, SOLUTION, CONCENTRATE INTRAVENOUS
Status: COMPLETED | OUTPATIENT
Start: 2023-12-11 | End: 2023-12-11

## 2023-12-11 RX ORDER — SODIUM CHLORIDE 0.9 % (FLUSH) 0.9 %
5-40 SYRINGE (ML) INJECTION PRN
Status: DISCONTINUED | OUTPATIENT
Start: 2023-12-11 | End: 2023-12-11 | Stop reason: HOSPADM

## 2023-12-11 RX ORDER — PHENYLEPHRINE HCL IN 0.9% NACL 1 MG/10 ML
SYRINGE (ML) INTRAVENOUS PRN
Status: DISCONTINUED | OUTPATIENT
Start: 2023-12-11 | End: 2023-12-11 | Stop reason: SDUPTHER

## 2023-12-11 RX ORDER — MAGNESIUM HYDROXIDE 1200 MG/15ML
LIQUID ORAL
Status: COMPLETED | OUTPATIENT
Start: 2023-12-11 | End: 2023-12-11

## 2023-12-11 RX ORDER — OXYCODONE HYDROCHLORIDE 5 MG/1
5 TABLET ORAL PRN
Status: DISCONTINUED | OUTPATIENT
Start: 2023-12-11 | End: 2023-12-11 | Stop reason: HOSPADM

## 2023-12-11 RX ORDER — LIDOCAINE HYDROCHLORIDE 40 MG/ML
SOLUTION TOPICAL PRN
Status: DISCONTINUED | OUTPATIENT
Start: 2023-12-11 | End: 2023-12-11 | Stop reason: SDUPTHER

## 2023-12-11 RX ORDER — SUCCINYLCHOLINE/SOD CL,ISO/PF 200MG/10ML
SYRINGE (ML) INTRAVENOUS PRN
Status: DISCONTINUED | OUTPATIENT
Start: 2023-12-11 | End: 2023-12-11 | Stop reason: SDUPTHER

## 2023-12-11 RX ORDER — HYDRALAZINE HYDROCHLORIDE 20 MG/ML
10 INJECTION INTRAMUSCULAR; INTRAVENOUS
Status: DISCONTINUED | OUTPATIENT
Start: 2023-12-11 | End: 2023-12-11 | Stop reason: HOSPADM

## 2023-12-11 RX ORDER — SODIUM CHLORIDE 0.9 % (FLUSH) 0.9 %
5-40 SYRINGE (ML) INJECTION EVERY 12 HOURS SCHEDULED
Status: DISCONTINUED | OUTPATIENT
Start: 2023-12-11 | End: 2023-12-11 | Stop reason: HOSPADM

## 2023-12-11 RX ORDER — HYDROCODONE BITARTRATE AND ACETAMINOPHEN 5; 325 MG/1; MG/1
1 TABLET ORAL EVERY 6 HOURS PRN
Qty: 20 TABLET | Refills: 0 | Status: SHIPPED | OUTPATIENT
Start: 2023-12-11 | End: 2023-12-16

## 2023-12-11 RX ORDER — MIDAZOLAM HYDROCHLORIDE 1 MG/ML
INJECTION INTRAMUSCULAR; INTRAVENOUS PRN
Status: DISCONTINUED | OUTPATIENT
Start: 2023-12-11 | End: 2023-12-11 | Stop reason: SDUPTHER

## 2023-12-11 RX ORDER — DEXAMETHASONE SODIUM PHOSPHATE 4 MG/ML
INJECTION, SOLUTION INTRA-ARTICULAR; INTRALESIONAL; INTRAMUSCULAR; INTRAVENOUS; SOFT TISSUE PRN
Status: DISCONTINUED | OUTPATIENT
Start: 2023-12-11 | End: 2023-12-11 | Stop reason: SDUPTHER

## 2023-12-11 RX ORDER — GLYCOPYRROLATE 0.2 MG/ML
INJECTION INTRAMUSCULAR; INTRAVENOUS PRN
Status: DISCONTINUED | OUTPATIENT
Start: 2023-12-11 | End: 2023-12-11 | Stop reason: SDUPTHER

## 2023-12-11 RX ORDER — LIDOCAINE HYDROCHLORIDE 20 MG/ML
INJECTION, SOLUTION EPIDURAL; INFILTRATION; INTRACAUDAL; PERINEURAL PRN
Status: DISCONTINUED | OUTPATIENT
Start: 2023-12-11 | End: 2023-12-11 | Stop reason: SDUPTHER

## 2023-12-11 RX ORDER — PROCHLORPERAZINE EDISYLATE 5 MG/ML
5 INJECTION INTRAMUSCULAR; INTRAVENOUS
Status: DISCONTINUED | OUTPATIENT
Start: 2023-12-11 | End: 2023-12-11 | Stop reason: HOSPADM

## 2023-12-11 RX ORDER — ONDANSETRON 2 MG/ML
4 INJECTION INTRAMUSCULAR; INTRAVENOUS
Status: DISCONTINUED | OUTPATIENT
Start: 2023-12-11 | End: 2023-12-11 | Stop reason: HOSPADM

## 2023-12-11 RX ORDER — LORAZEPAM 2 MG/ML
1 INJECTION INTRAMUSCULAR
Status: DISCONTINUED | OUTPATIENT
Start: 2023-12-11 | End: 2023-12-11 | Stop reason: HOSPADM

## 2023-12-11 RX ORDER — LABETALOL HYDROCHLORIDE 5 MG/ML
10 INJECTION, SOLUTION INTRAVENOUS
Status: DISCONTINUED | OUTPATIENT
Start: 2023-12-11 | End: 2023-12-11 | Stop reason: HOSPADM

## 2023-12-11 RX ADMIN — MIDAZOLAM 2 MG: 1 INJECTION INTRAMUSCULAR; INTRAVENOUS at 12:28

## 2023-12-11 RX ADMIN — ROCURONIUM BROMIDE 5 MG: 10 INJECTION, SOLUTION INTRAVENOUS at 12:31

## 2023-12-11 RX ADMIN — ROCURONIUM BROMIDE 10 MG: 10 INJECTION, SOLUTION INTRAVENOUS at 13:36

## 2023-12-11 RX ADMIN — LIDOCAINE HYDROCHLORIDE 3 ML: 40 SOLUTION TOPICAL at 12:33

## 2023-12-11 RX ADMIN — ROCURONIUM BROMIDE 10 MG: 10 INJECTION, SOLUTION INTRAVENOUS at 12:59

## 2023-12-11 RX ADMIN — ROCURONIUM BROMIDE 45 MG: 10 INJECTION, SOLUTION INTRAVENOUS at 12:40

## 2023-12-11 RX ADMIN — FENTANYL CITRATE 50 MCG: 50 INJECTION INTRAMUSCULAR; INTRAVENOUS at 12:31

## 2023-12-11 RX ADMIN — LIDOCAINE HYDROCHLORIDE 80 MG: 20 INJECTION, SOLUTION EPIDURAL; INFILTRATION; INTRACAUDAL; PERINEURAL at 12:31

## 2023-12-11 RX ADMIN — DEXAMETHASONE SODIUM PHOSPHATE 10 MG: 4 INJECTION, SOLUTION INTRAMUSCULAR; INTRAVENOUS at 12:40

## 2023-12-11 RX ADMIN — Medication 100 MCG: at 12:42

## 2023-12-11 RX ADMIN — Medication 100 MG: at 12:32

## 2023-12-11 RX ADMIN — FENTANYL CITRATE 50 MCG: 50 INJECTION INTRAMUSCULAR; INTRAVENOUS at 12:59

## 2023-12-11 RX ADMIN — SUGAMMADEX 200 MG: 100 INJECTION, SOLUTION INTRAVENOUS at 14:13

## 2023-12-11 RX ADMIN — GLYCOPYRROLATE 0.2 MG: 0.2 INJECTION INTRAMUSCULAR; INTRAVENOUS at 12:28

## 2023-12-11 RX ADMIN — SODIUM CHLORIDE: 9 INJECTION, SOLUTION INTRAVENOUS at 11:18

## 2023-12-11 RX ADMIN — Medication 100 MCG: at 12:49

## 2023-12-11 RX ADMIN — PROPOFOL 200 MG: 10 INJECTION, EMULSION INTRAVENOUS at 12:31

## 2023-12-11 RX ADMIN — ONDANSETRON 4 MG: 2 INJECTION INTRAMUSCULAR; INTRAVENOUS at 12:40

## 2023-12-11 ASSESSMENT — PAIN - FUNCTIONAL ASSESSMENT
PAIN_FUNCTIONAL_ASSESSMENT: PREVENTS OR INTERFERES WITH MANY ACTIVE NOT PASSIVE ACTIVITIES
PAIN_FUNCTIONAL_ASSESSMENT: 0-10

## 2023-12-11 ASSESSMENT — PAIN DESCRIPTION - DESCRIPTORS: DESCRIPTORS: POUNDING

## 2023-12-11 NOTE — OP NOTE
confirming correct patient and procedure was then performed. I inserted the 0 degree endoscope and noted that there was purulence draining from the left maxillary sinus on that side. A culture was taken. Left Maxillary Antrostomy with Mucus Membrane removal:  I then began with the revision left maxillary antrostomy. There is diffuse mucosal edema with polypoid changes in the sinus. The angled microdebrider was utilized to remove the polypoid tissue. I then used a 90 degree Blakesley to remove inflammatory tissue about the natural ostium and remove a small amount of residual uncinate. There is no injury to the lacrimal duct. Left Endoscopic Complete Ethmoidectomy:  There is noted to be polypoid changes and inflammatory mucosa filling the prior ethmoid dissection cavity. A combination of curettes and rongeurs were utilized to remove residual ethmoid air cells. The lamina was skeletonized along with the remnant middle turbinate on the left side    Left Frontal Sinus Dissection: The frontal recess was dissected with a 70 degree endoscope and frontal recess instruments. There is significant inflammatory tissue completely obstructing the sinus was removed. Frontal sinus curettes and the Hosemann punch were utilized to widen the frontal sinus outflow tract and ostium on that side. The sinus was then copiously irrigated. Right Maxillary Antrostomy with Mucus Membrane removal:  There is a lesser degree of inflammation noted in the right maxillary sinus, however there is still inflammatory changes noted. The ankle microdebrider was utilized to remove tissue from the sinus. The natural ostium was further dissected out and a small amount of residual uncinate was removed. The sinus was then copiously irrigated. Right Endoscopic Complete Ethmoidectomy:  The lamina and middle turbinate were skeletonized using a combination of cutting instruments to remove residual ethmoid air cells.   The agger nasi

## 2023-12-11 NOTE — PROGRESS NOTES
Pt states ready to go home. Pt discharged in stable condition. Pt offers no c/o pain or nausea. musstache dressing applied for scant amt of drainage. Pt up to BR. Pt had soda and crackers. Pt has rx fm retail. Ice pack to pt and extra dressings and tape to pt.

## 2023-12-11 NOTE — DISCHARGE INSTRUCTIONS
SINUS SURGERY / SEPTOPLASTY / [address] Operative Instructions   [facility] ENT [phone]    The Surgery Itself   Sinus surgery and/or Septoplasty and turbinate reduction involves general anesthesia. Patients may be sedated for several hours after surgery and may remain sleepy for the better part of the day. Nausea and vomiting is occasionally seen, and usually resolves by the evening of surgery - even without additional medications. Almost all patients can go home the day of surgery. After Surgery  You may have plastic splints in your nose following surgery; this will make breathing through your nose difficult. A humidifier or vaporizer can be used in the bedroom to prevent throat pain with mouth breathing. Bloody nasal drainage is normal after this surgery for 5-7 days, usually decreasing in volume with each day that passes. Drainage will flow from the front of the nose and down the back of the throat. Make sure you spit out blood drainage that drips down the back of your throat to prevent nausea/vomiting. You will have a nasal drip pad/sling with gauze to catch drainage from the front of your nose. The dressing may need to be changed frequently during the first 24 hours following surgery. In case of profuse nasal bleeding, you may apply ice to the bridge of the nose and pinch the nose just above the tip and hold for 10 minutes; if profuse bleeding continues, contact the doctor's office. You may notice facial pressure and fullness similar to a mild sinus infection. This is more common if splints are in place. Frequent hot showers, breathing in steam from a pot of boiling water, or simply sniffing a small amount of water through your nose will help break up congestion and clear any clot or mucus that builds up within the nose after surgery. Do not pull at the splints, gauze or sutures in your nose after surgery.     It is more comfortable to sleep with extra pillows or in a

## 2023-12-11 NOTE — ANESTHESIA POSTPROCEDURE EVALUATION
Department of Anesthesiology  Postprocedure Note    Patient: Connie Roman  MRN: [id number]  YOB: 1980  Date of evaluation: 12/11/2023      Procedure Summary       Date: 12/11/23 Room / Location: [address] [place] [address]    Anesthesia Start: 5707 Anesthesia Stop: 0435    Procedure: REVISION ENDOSCOPIC SINUS SURGERY IMAGE GUIDANCE (Nose) Diagnosis:       Chronic frontal sinusitis      Chronic ethmoidal sinusitis      Chronic maxillary sinusitis      Chronic sphenoidal sinusitis      (Chronic frontal sinusitis [J32.1])      (Chronic ethmoidal sinusitis [J32.2])      (Chronic maxillary sinusitis [J32.0])      (Chronic sphenoidal sinusitis [J32.3])    Surgeons: Rosangela Duong MD Responsible Provider: Jewel Nolen MD    Anesthesia Type: general ASA Status: 3            Anesthesia Type: General    Adriel Phase I: Adriel Score: 10    Adriel Phase II:        Anesthesia Post Evaluation    Patient location during evaluation: PACU  Patient participation: complete - patient participated  Level of consciousness: awake  Airway patency: patent  Nausea & Vomiting: no nausea and no vomiting  Complications: no  Cardiovascular status: hemodynamically stable and blood pressure returned to baseline  Respiratory status: spontaneous ventilation and nonlabored ventilation  Hydration status: stable  Comments: Ms. Rajesh Dorantes was seen resting comfortably following procedure. Anticipate return to [facility] [place] for planned discharge home with .    Pain management: adequate

## 2023-12-11 NOTE — H&P
I had a lengthy discussion with the patient regarding her posttreatment CT scan. It shows severe obstruction of the frontal sinuses and ethmoids. We discussed different treatment options including traditional frontal sinusotomy versus a drill-out with a Draf III procedure. We discussed that we will wait to see what it is like intraoperatively to determine which route we will take. She understands the risks of surgery.   She understands that she is at increased risk of CSF leak and injury to the orbit causing vision changes or vision loss and other rare complications.      -Risks of sinus surgery include anosmia/hyposmia, dysgeusia, hemorrhage, pain, infection/inflammation, numbness to the maxillary tissues or dentition, CSF leak (with risk of meningitis or intracranial infection), orbital complications (diplopia, blurry vision, blindness), need for further procedures

## 2023-12-16 LAB
BACTERIA SPEC AEROBE CULT: ABNORMAL
BACTERIA SPEC AEROBE CULT: ABNORMAL
BACTERIA SPEC ANAEROBE CULT: ABNORMAL
GRAM STN SPEC: ABNORMAL
ORGANISM: ABNORMAL
ORGANISM: ABNORMAL

## 2024-01-05 RX ORDER — AMOXICILLIN AND CLAVULANATE POTASSIUM 875; 125 MG/1; MG/1
1 TABLET, FILM COATED ORAL 2 TIMES DAILY
Qty: 14 TABLET | Refills: 0 | Status: SHIPPED | OUTPATIENT
Start: 2024-01-05 | End: 2024-01-12

## 2024-01-05 NOTE — PROGRESS NOTES
Called and spoke with the patient.  I will prescribe her 7 days of Augmentin.    The risks, benefits and alternatives to antibiotics were discussed with patient in detail including but not limited to the risk of GI upset, xerostomia, anaphylaxis and rash/ sun sensitivity.  The patient was instructed to contact me should they develop any adverse reactions or have other concerns.

## 2024-01-08 ENCOUNTER — OFFICE VISIT (OUTPATIENT)
Dept: FAMILY MEDICINE CLINIC | Age: 44
End: 2024-01-08
Payer: COMMERCIAL

## 2024-01-08 VITALS
DIASTOLIC BLOOD PRESSURE: 78 MMHG | BODY MASS INDEX: 37.96 KG/M2 | TEMPERATURE: 99 F | OXYGEN SATURATION: 97 % | RESPIRATION RATE: 18 BRPM | WEIGHT: 235.2 LBS | SYSTOLIC BLOOD PRESSURE: 110 MMHG | HEART RATE: 105 BPM

## 2024-01-08 DIAGNOSIS — S76.112A STRAIN OF LEFT QUADRICEPS MUSCLE, INITIAL ENCOUNTER: Primary | ICD-10-CM

## 2024-01-08 PROCEDURE — 3074F SYST BP LT 130 MM HG: CPT | Performed by: FAMILY MEDICINE

## 2024-01-08 PROCEDURE — 3078F DIAST BP <80 MM HG: CPT | Performed by: FAMILY MEDICINE

## 2024-01-08 PROCEDURE — 99213 OFFICE O/P EST LOW 20 MIN: CPT | Performed by: FAMILY MEDICINE

## 2024-01-08 RX ORDER — METOCLOPRAMIDE 10 MG/1
10 TABLET ORAL DAILY
COMMUNITY

## 2024-01-08 RX ORDER — LUBIPROSTONE 24 UG/1
24 CAPSULE ORAL 2 TIMES DAILY WITH MEALS
COMMUNITY

## 2024-01-08 ASSESSMENT — PATIENT HEALTH QUESTIONNAIRE - PHQ9
SUM OF ALL RESPONSES TO PHQ QUESTIONS 1-9: 0
1. LITTLE INTEREST OR PLEASURE IN DOING THINGS: 0
SUM OF ALL RESPONSES TO PHQ QUESTIONS 1-9: 0
2. FEELING DOWN, DEPRESSED OR HOPELESS: 0
SUM OF ALL RESPONSES TO PHQ QUESTIONS 1-9: 0
SUM OF ALL RESPONSES TO PHQ9 QUESTIONS 1 & 2: 0
SUM OF ALL RESPONSES TO PHQ QUESTIONS 1-9: 0

## 2024-01-08 NOTE — PROGRESS NOTES
2024    Blood pressure 110/78, pulse (!) 105, temperature 99 °F (37.2 °C), temperature source Oral, resp. rate 18, weight 106.7 kg (235 lb 3.2 oz), last menstrual period 2023, SpO2 97 %, not currently breastfeeding.    Delores Bergeron (:  1980) is a 43 y.o. female, here for evaluation of the following medical concerns:    Chief Complaint   Patient presents with    Knee Pain     L knee pain.  No injury noted.     Here with left knee swelling and pain for the past 2 days.  First noted as she was sitting with leg bent under her and noted that it hurt and felt tight Saturday morning.  But no antecedent injury or symptoms.  Swelling has persisted and perhaps worsened.   Hurts to flex knee now, past 45 deg.  Worse if weighted.  No popping or grinding.  Just tight  No treatment. no meds.    No prior injury to left knee.  R knee has prior LCL tear 5 yrs ago.    Nonsmoker     Patient Active Problem List   Diagnosis    Polycystic ovarian disease    Hyperlipidemia    Hirsutism    Allergic rhinitis due to pollen    Nasal septal deviation    Asthma    Lumbar disc disease L4-5, L5-S1 Dr Stiles ([place]) Epidural injections     Essential hypertension    Prediabetes A1c 5.7 16    Obstructive sleep apnea (adult) (pediatric)    Excessive daytime sleepiness    Other viral warts    Non morbid obesity, unspecified obesity type    Chronic post-traumatic stress disorder (PTSD) with anxiety    Chronic GERD    Severe persistent asthma without complication    Nasal valve collapse    Chronic rhinitis    Hypertrophy of inferior nasal turbinate        Body mass index is 37.96 kg/m².    Wt Readings from Last 3 Encounters:   24 106.7 kg (235 lb 3.2 oz)   23 104.3 kg (230 lb)   23 103 kg (226 lb 15.8 oz)       BP Readings from Last 3 Encounters:   24 110/78   23 129/81   23 110/60       Allergies   Allergen Reactions    Peanut-Containing Drug Products Itching     PEANUTS-RUNNY

## 2024-01-09 ENCOUNTER — OFFICE VISIT (OUTPATIENT)
Dept: ENT CLINIC | Age: 44
End: 2024-01-09
Payer: COMMERCIAL

## 2024-01-09 VITALS
DIASTOLIC BLOOD PRESSURE: 73 MMHG | WEIGHT: 235 LBS | HEART RATE: 86 BPM | SYSTOLIC BLOOD PRESSURE: 128 MMHG | HEIGHT: 66 IN | OXYGEN SATURATION: 98 % | RESPIRATION RATE: 16 BRPM | BODY MASS INDEX: 37.77 KG/M2

## 2024-01-09 DIAGNOSIS — J34.89 NASAL CRUSTING: Primary | ICD-10-CM

## 2024-01-09 DIAGNOSIS — J33.1 POLYPOID SINUS DEGENERATION: ICD-10-CM

## 2024-01-09 DIAGNOSIS — J32.4 CHRONIC PANSINUSITIS: ICD-10-CM

## 2024-01-09 PROCEDURE — 99213 OFFICE O/P EST LOW 20 MIN: CPT | Performed by: STUDENT IN AN ORGANIZED HEALTH CARE EDUCATION/TRAINING PROGRAM

## 2024-01-09 PROCEDURE — 3078F DIAST BP <80 MM HG: CPT | Performed by: STUDENT IN AN ORGANIZED HEALTH CARE EDUCATION/TRAINING PROGRAM

## 2024-01-09 PROCEDURE — 31231 NASAL ENDOSCOPY DX: CPT | Performed by: STUDENT IN AN ORGANIZED HEALTH CARE EDUCATION/TRAINING PROGRAM

## 2024-01-09 PROCEDURE — 3074F SYST BP LT 130 MM HG: CPT | Performed by: STUDENT IN AN ORGANIZED HEALTH CARE EDUCATION/TRAINING PROGRAM

## 2024-01-09 ASSESSMENT — ENCOUNTER SYMPTOMS: SINUS COMPLAINT: 1

## 2024-01-09 NOTE — PROGRESS NOTES
[facility]  DIVISION OF OTOLARYNGOLOGY- HEAD & NECK SURGERY  CONSULT      Delores Bergeron (:  1980) is a 43 y.o. female, here for evaluation of the following chief complaint(s):  Post-Op Check and Follow-up (Right ear infection )      ASSESSMENT/PLAN:  1. Nasal crusting  2. Polypoid sinus degeneration          This is a very pleasant 43 y.o. female here today for evaluation of the the above-noted complaints.      Patient is status post open septorhinoplasty, septoplasty, ITR and posterior ablation of her nasal nerves on 10/29/2021.    Patient is status postendoscopic sinus surgery, revision septoplasty for CRSsNP on 12/15/22.      She is status post revision endoscopic sinus surgery on 2023.    -Mild crusting noted bilaterally today.  No evidence of purulence.  Expected postoperative polypoid changes to mucosa.  Sinuses are patent  -Begin Sotero's baby shampoo irrigations  -Will prescribe the patient Xhance  -Patient has responded to steroid tapers thus far.        SUBJECTIVE/OBJECTIVE:  Ear Problem    Other    Sinus Problem    Pharyngitis        Delores Bergeron is here today for evaluation of evaluation of issues related to her nose.  She gets multiple sinus infections per year.  She requires antibiotics for these.  She think she has a sinus infection right now.  She also has over the last 3 months had a loss of sense of smell.  She has not had Covid recently.  She has had some issues related to smelling gasoline and cigarette smoke that is not actually present.  She gets difficulty breathing through her nose it is worse on the left side.  She will get constant nasal drainage that is clear and green.  She gets very rare nosebleeds.    Update 3/17/2012:    She is still having issues breathing out of her nose and with nasal congestion.  She is still getting intermittent nasal drainage.    Update 2021:    Patient presents today for follow-up.  She is still having the same issues regarded to

## 2024-01-10 ENCOUNTER — TELEPHONE (OUTPATIENT)
Dept: INFECTIOUS DISEASES | Age: 44
End: 2024-01-10

## 2024-01-10 DIAGNOSIS — J32.4 CHRONIC PANSINUSITIS: Primary | ICD-10-CM

## 2024-01-10 RX ORDER — LEVOFLOXACIN 750 MG/1
750 TABLET, FILM COATED ORAL DAILY
Qty: 10 TABLET | Refills: 0 | Status: SHIPPED | OUTPATIENT
Start: 2024-01-10 | End: 2024-01-20

## 2024-01-10 NOTE — TELEPHONE ENCOUNTER
Regarding: Still feeling terrible   Contact: [phone]  ----- Message from Deisi Boogie RN sent at 1/9/2024  6:14 PM EST -----       ----- Message from Delores Bergeron to Av Khan MD sent at 1/9/2024  2:29 PM -----   Good afternoon Dr. Khan,    I had my third sinus surgery on 12/11/23. The pressure and pain is gone but I still feel terrible. Dr. Platt put me on a 7 day supply of Augmentin, but the congestion won’t go away. I’m still coughing up stuff up and I’ve been running a low grade temp. I just want to feel better. What would you recommend for me to do?

## 2024-01-10 NOTE — TELEPHONE ENCOUNTER
----- Message from Gus Platt MD sent at 1/9/2024  9:03 AM EST -----  Hello all,    We have been having issues getting Xhance prescriptions filled.  I would like to prescribe the patient above Xhance 1 spray each nostril twice daily.  The [facility] pharmacy that we were using has not been contacting patients and we have been putting in the prescriptions.  Does anyone have any information about how to go about prescribing this?  Is there a specific pharmacy we should be using?  Can we please reach out to the Xhance rep.  This continues to be a consistent issue.

## 2024-01-16 RX ORDER — FLUTICASONE PROPIONATE 93 UG/1
1 SPRAY, METERED NASAL 2 TIMES DAILY
Qty: 6.4 ML | Refills: 1 | Status: SHIPPED | OUTPATIENT
Start: 2024-01-16

## 2024-01-17 DIAGNOSIS — J32.4 CHRONIC PANSINUSITIS: ICD-10-CM

## 2024-01-19 LAB
BACTERIA SPEC RESP CULT: NORMAL
GRAM STN SPEC: NORMAL

## 2024-01-23 ENCOUNTER — TELEPHONE (OUTPATIENT)
Dept: ENT CLINIC | Age: 44
End: 2024-01-23

## 2024-01-23 DIAGNOSIS — G47.19 EXCESSIVE DAYTIME SLEEPINESS: ICD-10-CM

## 2024-01-23 RX ORDER — MODAFINIL 200 MG/1
200 TABLET ORAL 2 TIMES DAILY
Qty: 60 TABLET | Refills: 0 | Status: SHIPPED | OUTPATIENT
Start: 2024-01-23 | End: 2024-01-24 | Stop reason: SDUPTHER

## 2024-01-23 NOTE — TELEPHONE ENCOUNTER
PA needed for     Fluticasone Propionate (XHANCE) 93 MCG/ACT EXHU 1 spray by Nasal route 2 times daily Dispense: 6.4 mL       Dx.        Nasal crusting  - Primary J34.89    Polypoid sinus degeneration J33.1    Chronic pansinusitis J32.4

## 2024-01-24 PROBLEM — G47.10 HYPERSOMNIA: Status: ACTIVE | Noted: 2024-01-24

## 2024-01-24 NOTE — TELEPHONE ENCOUNTER
Submitted PA for [employer] , via FAX to [phone]. STATUS: PENDING     Uploaded forms in media.     Follow up done daily; if no decision with in three days we will refax.  If another three days goes by with no decision will call the insurance for status.

## 2024-01-24 NOTE — PROGRESS NOTES
Diagnosis: [x] BIBIANA (G47.33) [] CSA (G47.31) [] Apnea (G47.30)   Length of Need: [x] 15 Months [] 99 Months [] Other:   Machine (KAVIN!): [] Respironics Dream Station      Auto [] ResMed AirSense     Auto [] Other:     []  CPAP () [] Bilevel ()   Mode: [] Auto [] Spontaneous    Mode: [] Auto [] Spontaneous            Comfort Settings:      Humidifier: [] Heated ()        [x] Water chamber replacement ()/ 1 per 6 months        Mask:   [x] Nasal () /1 per 3 months [] Full Face () /1 per 3 months   [x] Patient choice -Size and fit mask [] Patient Choice - Size and fit mask   [] Dispense: [] Dispense:   [x] Headgear () / 1 per 3 months [] Headgear () / 1 per 3 months   [x] Replacement Nasal Cushion ()/2 per month [] Interface Replacement ()/1 per month   [] Replacement Nasal Pillows ()/2 per month         Tubing: [x] Heated ()/1 per 3 months    [] Standard ()/1 per 3 months [] Other:           Filters: [x] Non-disposable ()/1 per 6 months     [x] Ultra-Fine, Disposable ()/2 per month        Miscellaneous: [x] Chin Strap ()/ 1 per 6 months [] O2 bleed-in:        LPM   [] Oxymetry on CPAP/Bilevel []  Other:         Start Order Date: 01/24/24    MEDICAL JUSTIFICATION:  I, the undersigned, certify that the above prescribed supplies are medically necessary for this patient’s wellbeing.  In my opinion, the supplies are both reasonable and necessary in reference to accepted standards of medicalpractice in treatment of this patient’s condition.    JL RADFORD NP    NPI: [id number]       Order Signed Date: 01/24/24  [facility]  Pulmonary, Sleep, and Critical Care    Pulmonary, Sleep, and Critical Care  [facility] [address]                          [address] [place], [place] [address], [place] [address]                                    [place] [address]  Phone: [phone]    Fax:

## 2024-01-24 NOTE — TELEPHONE ENCOUNTER
Submitted PA for Xhance 93MCG/ACT exhaler suspension  Via CM  (Key: [id number]) STATUS: There is a previously denied request on file, please contact [employer] for further assistance    Called Gregory at [phone]. Spoke with Shan. Reports that the case was denied 5/2023. Reports the office can resubmit but cannot do this over CM. He will fax me the PA forms to complete.

## 2024-01-24 NOTE — PROGRESS NOTES
Diagnosis: [x] BIBIANA (G47.33) [] CSA (G47.31) [] Apnea (G47.30)   Length of Need: [x] 15 Months [] 99 Months [] Other:   Machine (KAVIN!): [] Respironics Dream Station      Auto [] ResMed AirSense     Auto [] Other:     []  CPAP () [] Bilevel ()   Mode: [] Auto [] Spontaneous    Mode: [] Auto [] Spontaneous            Comfort Settings:      Humidifier: [] Heated ()        [x] Water chamber replacement ()/ 1 per 6 months        Mask:   [x] Nasal () /1 per 3 months [] Full Face () /1 per 3 months   [x] Patient choice -Size and fit mask [] Patient Choice - Size and fit mask   [] Dispense: [] Dispense:   [x] Headgear () / 1 per 3 months [] Headgear () / 1 per 3 months   [x] Replacement Nasal Cushion ()/2 per month [] Interface Replacement ()/1 per month   [] Replacement Nasal Pillows ()/2 per month         Tubing: [x] Heated ()/1 per 3 months    [] Standard ()/1 per 3 months [] Other:           Filters: [x] Non-disposable ()/1 per 6 months     [x] Ultra-Fine, Disposable ()/2 per month        Miscellaneous: [x] Chin Strap ()/ 1 per 6 months [] O2 bleed-in:        LPM   [] Oxymetry on CPAP/Bilevel []  Other:         Start Order Date: 01/24/24    MEDICAL JUSTIFICATION:  I, the undersigned, certify that the above prescribed supplies are medically necessary for this patient’s wellbeing.  In my opinion, the supplies are both reasonable and necessary in reference to accepted standards of medicalpractice in treatment of this patient’s condition.    JL RADFORD NP    NPI: [id number]       Order Signed Date: 01/24/24  [facility]  Pulmonary, Sleep, and Critical Care    Pulmonary, Sleep, and Critical Care  [facility] [address]                          [address] [place], [place] [address], [place] [address]                                    [place] [address]  Phone: [phone]    Fax:

## 2024-01-24 NOTE — PROGRESS NOTES
Diagnosis: [x] BIBIANA (G47.33) [] CSA (G47.31) [] Apnea (G47.30)   Length of Need: [x] 15 Months [] 99 Months [] Other:   Machine (KAVIN!): [] Respironics Dream Station      Auto [] ResMed AirSense     Auto [] Other:     []  CPAP () [] Bilevel ()   Mode: [] Auto [] Spontaneous    Mode: [] Auto [] Spontaneous             Comfort Settings:      Humidifier: [] Heated ()        [x] Water chamber replacement ()/ 1 per 6 months        Mask:   [] Nasal () /1 per 3 months [x] Full Face () /1 per 3 months   [] Patient choice -Size and fit mask [x] Patient Choice - Size and fit mask   [] Dispense: [] Dispense:   [] Headgear () / 1 per 3 months [x] Headgear () / 1 per 3 months   [] Replacement Nasal Cushion ()/2 per month [x] Interface Replacement ()/1 per month   [] Replacement Nasal Pillows ()/2 per month         Tubing: [x] Heated ()/1 per 3 months    [] Standard ()/1 per 3 months [] Other:           Filters: [x] Non-disposable ()/1 per 6 months     [x] Ultra-Fine, Disposable ()/2 per month        Miscellaneous: [] Chin Strap ()/ 1 per 6 months [] O2 bleed-in:        LPM   [] Oxymetry on CPAP/Bilevel []  Other:         Start Order Date: 01/24/24    MEDICAL JUSTIFICATION:  I, the undersigned, certify that the above prescribed supplies are medically necessary for this patient’s wellbeing.  In my opinion, the supplies are both reasonable and necessary in reference to accepted standards of medicalpractice in treatment of this patient’s condition.    JL RADFORD NP    NPI: [id number]       Order Signed Date: 01/24/24  [facility]  Pulmonary, Sleep, and Critical Care    Pulmonary, Sleep, and Critical Care  [facility] [address]                          [address] [place] [address] [address]  [place] [address]                                    [place] [address]  Phone: [phone]    Fax:

## 2024-01-30 NOTE — TELEPHONE ENCOUNTER
Called for status update. Spoke with Chary. She reports they received the request and it is still in review. Expected decision no later than 2/5. Will receive information via fax.   Case# [id number]

## 2024-01-31 ENCOUNTER — OFFICE VISIT (OUTPATIENT)
Dept: ENT CLINIC | Age: 44
End: 2024-01-31
Payer: COMMERCIAL

## 2024-01-31 VITALS
SYSTOLIC BLOOD PRESSURE: 127 MMHG | HEART RATE: 83 BPM | DIASTOLIC BLOOD PRESSURE: 77 MMHG | WEIGHT: 234 LBS | RESPIRATION RATE: 16 BRPM | OXYGEN SATURATION: 99 % | HEIGHT: 66 IN | BODY MASS INDEX: 37.61 KG/M2

## 2024-01-31 DIAGNOSIS — J33.1 POLYPOID SINUS DEGENERATION: ICD-10-CM

## 2024-01-31 DIAGNOSIS — J34.89 NASAL CRUSTING: Primary | ICD-10-CM

## 2024-01-31 DIAGNOSIS — J32.4 CHRONIC PANSINUSITIS: ICD-10-CM

## 2024-01-31 DIAGNOSIS — J31.0 CHRONIC RHINITIS: ICD-10-CM

## 2024-01-31 PROCEDURE — 99024 POSTOP FOLLOW-UP VISIT: CPT | Performed by: STUDENT IN AN ORGANIZED HEALTH CARE EDUCATION/TRAINING PROGRAM

## 2024-01-31 PROCEDURE — 31231 NASAL ENDOSCOPY DX: CPT | Performed by: STUDENT IN AN ORGANIZED HEALTH CARE EDUCATION/TRAINING PROGRAM

## 2024-01-31 ASSESSMENT — ENCOUNTER SYMPTOMS: SINUS COMPLAINT: 1

## 2024-01-31 NOTE — TELEPHONE ENCOUNTER
The medication is APPROVED. Letter uploaded in media.     If this requires a response please respond to the pool ( P MHCX P[facility] MEDICATION PRE-AUTH).      Thank you please advise patient.

## 2024-01-31 NOTE — PROGRESS NOTES
[facility]  DIVISION OF OTOLARYNGOLOGY- HEAD & NECK SURGERY  CONSULT      Delores Bergeron (:  1980) is a 43 y.o. female, here for evaluation of the following chief complaint(s):  Post-Op Check      ASSESSMENT/PLAN:  1. Nasal crusting  2. Polypoid sinus degeneration  3. Chronic pansinusitis  4. Chronic rhinitis          This is a very pleasant 43 y.o. female here today for evaluation of the the above-noted complaints.      Patient is status post open septorhinoplasty, septoplasty, ITR and posterior ablation of her nasal nerves on 10/29/2021.    Patient is status postendoscopic sinus surgery, revision septoplasty for CRSsNP on 12/15/22.      She is status post revision endoscopic sinus surgery on 2023.    -Significant improvement, expected postoperative polypoid changes.  All sinuses appear patent  -No evidence of recurrent infection  -Begin Xhance  -Follow-up in 6 weeks        SUBJECTIVE/OBJECTIVE:  Ear Problem    Other    Sinus Problem    Pharyngitis        Delores Bergeron is here today for evaluation of evaluation of issues related to her nose.  She gets multiple sinus infections per year.  She requires antibiotics for these.  She think she has a sinus infection right now.  She also has over the last 3 months had a loss of sense of smell.  She has not had Covid recently.  She has had some issues related to smelling gasoline and cigarette smoke that is not actually present.  She gets difficulty breathing through her nose it is worse on the left side.  She will get constant nasal drainage that is clear and green.  She gets very rare nosebleeds.    Update 3/17/2012:    She is still having issues breathing out of her nose and with nasal congestion.  She is still getting intermittent nasal drainage.    Update 2021:    Patient presents today for follow-up.  She is still having the same issues regarded to difficulty breathing out of her nose.  She gets intermittent nasal drainage which is clear.  She

## 2024-02-13 DIAGNOSIS — J45.20 MILD INTERMITTENT ASTHMA WITHOUT COMPLICATION: ICD-10-CM

## 2024-02-13 RX ORDER — ATORVASTATIN CALCIUM 40 MG/1
TABLET, FILM COATED ORAL
Qty: 90 TABLET | Refills: 1 | Status: SHIPPED | OUTPATIENT
Start: 2024-02-13

## 2024-02-13 RX ORDER — MONTELUKAST SODIUM 10 MG/1
TABLET ORAL
Qty: 90 TABLET | Refills: 1 | Status: SHIPPED | OUTPATIENT
Start: 2024-02-13

## 2024-02-26 ENCOUNTER — OFFICE VISIT (OUTPATIENT)
Dept: PULMONOLOGY | Age: 44
End: 2024-02-26
Payer: COMMERCIAL

## 2024-02-26 VITALS
OXYGEN SATURATION: 96 % | SYSTOLIC BLOOD PRESSURE: 122 MMHG | WEIGHT: 237 LBS | BODY MASS INDEX: 38.09 KG/M2 | DIASTOLIC BLOOD PRESSURE: 72 MMHG | HEART RATE: 94 BPM | HEIGHT: 66 IN

## 2024-02-26 DIAGNOSIS — J45.50 SEVERE PERSISTENT ASTHMA WITHOUT COMPLICATION: ICD-10-CM

## 2024-02-26 DIAGNOSIS — J32.9 CHRONIC RHINOSINUSITIS: Primary | ICD-10-CM

## 2024-02-26 PROCEDURE — 99214 OFFICE O/P EST MOD 30 MIN: CPT | Performed by: INTERNAL MEDICINE

## 2024-02-26 PROCEDURE — 3074F SYST BP LT 130 MM HG: CPT | Performed by: INTERNAL MEDICINE

## 2024-02-26 PROCEDURE — 3078F DIAST BP <80 MM HG: CPT | Performed by: INTERNAL MEDICINE

## 2024-02-26 ASSESSMENT — ENCOUNTER SYMPTOMS
SHORTNESS OF BREATH: 0
CONSTIPATION: 0
COLOR CHANGE: 0
DIARRHEA: 0
VOMITING: 0
SORE THROAT: 0
STRIDOR: 0
RHINORRHEA: 0
VOICE CHANGE: 0
BLOOD IN STOOL: 0
CHOKING: 0
BACK PAIN: 0
WHEEZING: 0
ABDOMINAL PAIN: 0
ABDOMINAL DISTENTION: 0
CHEST TIGHTNESS: 0
COUGH: 0
APNEA: 0
SINUS PRESSURE: 0

## 2024-02-26 NOTE — PROGRESS NOTES
Delores Bergeron    YOB: 1980     Date of Service:  2/26/2024     Chief Complaint   Patient presents with    Asthma       HPI history of recurrent Pseudomonas related pansinusitis with DNS + severe persistent asthma.  Lately, patient's symptoms related to sinusitis and asthma are both stable.  Not really using albuterol inhaler often.  No significant cough, shortness of breath or wheezing.    Allergies   Allergen Reactions    Peanut-Containing Drug Products Itching     PEANUTS-RUNNY NOSE,ITCHING IN THROAT    Egg Solids, Whole Itching     RUNNY NOSE, THROAT ITCH    Food      Certain foods (chocolate, tea, soy, eggs, tree nuts, coffee, tomatoes, black pepper)-THROAT ITCHING,NOSE RUNNING     Outpatient Medications Marked as Taking for the 2/26/24 encounter (Office Visit) with Ede Oneal MD   Medication Sig Dispense Refill    atorvastatin (LIPITOR) 40 MG tablet TAKE 1 TABLET BY MOUTH DAILY 90 tablet 1    montelukast (SINGULAIR) 10 MG tablet TAKE ONE TABLET BY MOUTH ONCE NIGHTLY 90 tablet 1    modafinil (PROVIGIL) 200 MG tablet Take 1 tablet by mouth 2 times daily for 180 days. Max Daily Amount: 400 mg 60 tablet 5    Fluticasone Propionate (XHANCE) 93 MCG/ACT EXHU 1 spray by Nasal route 2 times daily 6.4 mL 1    metoclopramide (REGLAN) 10 MG tablet Take 1 tablet by mouth daily      lubiprostone (AMITIZA) 24 MCG capsule Take 1 capsule by mouth 2 times daily (with meals)      predniSONE (DELTASONE) 10 MG tablet 4 tabs a day for 5 days, 3 tabs a day for 3 days, 2 tabs a day for 3 days,1 tab a day for 3 days 38 tablet 0    VORTIoxetine HBr (TRINTELLIX) 20 MG TABS tablet Take 1 tablet by mouth at bedtime 90 tablet 1    losartan (COZAAR) 25 MG tablet Take 1 tablet by mouth daily 90 tablet 1    spironolactone (ALDACTONE) 100 MG tablet TAKE ONE TABLET BY MOUTH DAILY 30 tablet 5    Plecanatide (TRULANCE PO) Take 5 mg by mouth daily      TRELEGY ELLIPTA 200-62.5-25 MCG/ACT AEPB inhaler INHALE ONE

## 2024-03-11 RX ORDER — ALBUTEROL SULFATE 90 UG/1
2 AEROSOL, METERED RESPIRATORY (INHALATION) EVERY 4 HOURS PRN
Qty: 1 EACH | Refills: 0 | Status: SHIPPED | OUTPATIENT
Start: 2024-03-11 | End: 2024-03-11 | Stop reason: SDUPTHER

## 2024-03-11 RX ORDER — ALBUTEROL SULFATE 90 UG/1
2 AEROSOL, METERED RESPIRATORY (INHALATION) EVERY 4 HOURS PRN
Qty: 1 EACH | Refills: 0 | Status: SHIPPED | OUTPATIENT
Start: 2024-03-11

## 2024-03-21 RX ORDER — ALBUTEROL SULFATE 90 UG/1
2 AEROSOL, METERED RESPIRATORY (INHALATION) EVERY 4 HOURS PRN
Qty: 1 EACH | Refills: 0 | Status: SHIPPED | OUTPATIENT
Start: 2024-03-21

## 2024-03-27 ENCOUNTER — OFFICE VISIT (OUTPATIENT)
Dept: ENT CLINIC | Age: 44
End: 2024-03-27

## 2024-03-27 VITALS
HEIGHT: 66 IN | HEART RATE: 88 BPM | DIASTOLIC BLOOD PRESSURE: 83 MMHG | BODY MASS INDEX: 38.41 KG/M2 | OXYGEN SATURATION: 98 % | SYSTOLIC BLOOD PRESSURE: 123 MMHG | TEMPERATURE: 97.8 F | WEIGHT: 239 LBS | RESPIRATION RATE: 16 BRPM

## 2024-03-27 DIAGNOSIS — J32.4 CHRONIC PANSINUSITIS: ICD-10-CM

## 2024-03-27 DIAGNOSIS — J45.909 UNCOMPLICATED ASTHMA, UNSPECIFIED ASTHMA SEVERITY, UNSPECIFIED WHETHER PERSISTENT: ICD-10-CM

## 2024-03-27 DIAGNOSIS — J34.89 NASAL CRUSTING: Primary | ICD-10-CM

## 2024-03-27 DIAGNOSIS — J32.9 CHRONIC SINUSITIS, UNSPECIFIED LOCATION: ICD-10-CM

## 2024-03-27 DIAGNOSIS — J34.89 NASAL SEPTAL PERFORATION: ICD-10-CM

## 2024-03-27 DIAGNOSIS — J33.1 POLYPOID SINUS DEGENERATION: ICD-10-CM

## 2024-03-27 ASSESSMENT — ENCOUNTER SYMPTOMS: SINUS COMPLAINT: 1

## 2024-03-27 NOTE — PROGRESS NOTES
middle turbinates due to instability  Revision endoscopic septoplasty performed due to high nasal obstruction on the left      Update 1/4/2023:    Patient presents today for follow-up.  She has been irrigating several times a day.  Overall her drainage and pressure are better than prior to surgery but they are still present.  She is concerned about her ability to focus at work.  Portions of the propel stent were removed.    Update 2/8/2023:    No getting a lot of facial pain and pressure as well as continued drainage.  She gets some intermittent improvement of her smell, but other times has a difficult time smelling.  She is still irrigating.  She was using Flonase.    Update 3/22/2023:    Patient presents today for recurrent sinus complaints.  Overall her congestion and obstruction is improving.  She is still getting some mucoid and at times purulent drainage.    Update 5-2-23:    Patient presents today for follow-up for issues related to chronic rhinitis and chronic sinusitis.  She is getting a lot of pressure in between her eyes.  She is getting a lot of drainage which is white and sometimes clear or green.  She is irrigating twice per day, using Dymista, tried Xlear.    Update 6/14/2023:    Patient presents today for follow-up.  Her symptoms have improved since being on a round of ciprofloxacin.  Her cultures grew Pseudomonas.  States that she trialed the Xhance and noticed the most improvement and of all the nasal steroid sprays and combination sprays that she has tried.  Would like to continue this medication.    Update 8/3/2023:    Patient presents today for follow-up for issues related to chronic sinusitis and nasal drainage.  She initially was doing very well after her round of ciprofloxacin.  She then had an exacerbation of her symptoms and was treated with another round of oral medication and inhaled tobramycin.  Her lung symptoms have resolved, however she has persistent sinonasal issues including facial

## 2024-04-01 DIAGNOSIS — I10 ESSENTIAL HYPERTENSION: ICD-10-CM

## 2024-04-01 RX ORDER — ALBUTEROL SULFATE 90 UG/1
2 AEROSOL, METERED RESPIRATORY (INHALATION) EVERY 6 HOURS PRN
Qty: 1 EACH | Refills: 0 | Status: SHIPPED | OUTPATIENT
Start: 2024-04-01

## 2024-04-01 RX ORDER — SPIRONOLACTONE 100 MG/1
TABLET, FILM COATED ORAL
Qty: 30 TABLET | Refills: 5 | Status: SHIPPED | OUTPATIENT
Start: 2024-04-01

## 2024-04-08 ENCOUNTER — TELEMEDICINE (OUTPATIENT)
Dept: FAMILY MEDICINE CLINIC | Age: 44
End: 2024-04-08
Payer: COMMERCIAL

## 2024-04-08 DIAGNOSIS — M62.838 MUSCLE SPASM: ICD-10-CM

## 2024-04-08 DIAGNOSIS — I10 ESSENTIAL HYPERTENSION: Primary | Chronic | ICD-10-CM

## 2024-04-08 PROCEDURE — 99214 OFFICE O/P EST MOD 30 MIN: CPT | Performed by: FAMILY MEDICINE

## 2024-04-08 RX ORDER — LOSARTAN POTASSIUM 50 MG/1
50 TABLET ORAL DAILY
Qty: 30 TABLET | Refills: 3 | Status: SHIPPED | OUTPATIENT
Start: 2024-04-08

## 2024-04-08 NOTE — PROGRESS NOTES
Results   Component Value Date/Time     12/04/2023 04:28 PM    K 3.9 12/04/2023 04:28 PM     12/04/2023 04:28 PM    CO2 28 12/04/2023 04:28 PM    BUN 8 12/04/2023 04:28 PM    CREATININE 0.6 12/04/2023 04:28 PM    GLUCOSE 86 12/04/2023 04:28 PM    CALCIUM 9.0 12/04/2023 04:28 PM    LABGLOM >60 12/04/2023 04:28 PM        Started BP treatment with lisinopril 10 at age 13 (changed to losartan in 2023 due to cough.)  No SE's to losartan.  She has  noted more muscle cramps/spasms the past few months.  Can be arms, back/flank, legs. They are painful. Can last 3-5 min.    She stopped elavil - in sept 2023 (used for GI purposes, dr Dickey). Now using Reglan nightly- for chronic bloating and nausea. Started this in October.        Review of Systems As above        Objective   Patient-Reported Vitals  No data recorded     Physical Exam  Constitutional:       General: She is not in acute distress.     Appearance: Normal appearance. She is not ill-appearing.   Pulmonary:      Effort: Pulmonary effort is normal.   Neurological:      General: No focal deficit present.      Mental Status: She is alert and oriented to person, place, and time. Mental status is at baseline.   Psychiatric:         Mood and Affect: Mood normal.         Behavior: Behavior normal.         Thought Content: Thought content normal.         Judgment: Judgment normal.                  --Santiago Dale MD

## 2024-05-08 DIAGNOSIS — I10 ESSENTIAL HYPERTENSION: Chronic | ICD-10-CM

## 2024-05-08 LAB
ANION GAP SERPL CALCULATED.3IONS-SCNC: 10 MMOL/L (ref 3–16)
BUN SERPL-MCNC: 10 MG/DL (ref 7–20)
CALCIUM SERPL-MCNC: 9 MG/DL (ref 8.3–10.6)
CHLORIDE SERPL-SCNC: 104 MMOL/L (ref 99–110)
CO2 SERPL-SCNC: 26 MMOL/L (ref 21–32)
CREAT SERPL-MCNC: <0.5 MG/DL (ref 0.6–1.1)
GFR SERPLBLD CREATININE-BSD FMLA CKD-EPI: >90 ML/MIN/{1.73_M2}
GLUCOSE SERPL-MCNC: 100 MG/DL (ref 70–99)
POTASSIUM SERPL-SCNC: 4.2 MMOL/L (ref 3.5–5.1)
SODIUM SERPL-SCNC: 140 MMOL/L (ref 136–145)

## 2024-05-22 ENCOUNTER — TELEPHONE (OUTPATIENT)
Dept: ADMINISTRATIVE | Age: 44
End: 2024-05-22

## 2024-05-22 NOTE — TELEPHONE ENCOUNTER
Submitted PA for Modafinil 200MG tablets , via FAX to [phone]. STATUS: PENDING     Follow up done daily; if no decision with in three days we will refax.  If another three days goes by with no decision will call the insurance for status.

## 2024-05-23 RX ORDER — FLUTICASONE PROPIONATE 93 UG/1
1 SPRAY, METERED NASAL 2 TIMES DAILY
Qty: 6.4 ML | Refills: 1 | Status: SHIPPED | OUTPATIENT
Start: 2024-05-23

## 2024-05-23 NOTE — TELEPHONE ENCOUNTER
Rx refill request    Xhance  Last filled 1/16/2024 with 1 refill  Last ov 3/27/2024    Please advise thank you

## 2024-05-24 ENCOUNTER — TELEPHONE (OUTPATIENT)
Dept: ADMINISTRATIVE | Age: 44
End: 2024-05-24

## 2024-05-24 NOTE — TELEPHONE ENCOUNTER
Submitted PA for Modafinil 200mg Tab, via FAX to [phone] sent 5/23/2024. Form uploaded into media STATUS:       The medication is APPROVED. Letter uploaded into media.     If this requires a response please respond to the pool ( P [facility] MEDICATION PRE-AUTH).      Thank you please advise patient.

## 2024-05-29 ENCOUNTER — PATIENT MESSAGE (OUTPATIENT)
Dept: PULMONOLOGY | Age: 44
End: 2024-05-29

## 2024-05-29 DIAGNOSIS — J45.50 SEVERE PERSISTENT ASTHMA WITHOUT COMPLICATION: Primary | ICD-10-CM

## 2024-05-29 DIAGNOSIS — R05.1 ACUTE COUGH: ICD-10-CM

## 2024-05-29 RX ORDER — MOXIFLOXACIN HYDROCHLORIDE 400 MG/1
400 TABLET ORAL DAILY
Qty: 5 TABLET | Refills: 0 | Status: SHIPPED | OUTPATIENT
Start: 2024-05-29 | End: 2024-05-29

## 2024-05-29 RX ORDER — PREDNISONE 20 MG/1
40 TABLET ORAL DAILY
Qty: 20 TABLET | Refills: 0 | Status: SHIPPED | OUTPATIENT
Start: 2024-05-29 | End: 2024-06-08

## 2024-05-29 RX ORDER — LEVOFLOXACIN 500 MG/1
500 TABLET, FILM COATED ORAL DAILY
Qty: 5 TABLET | Refills: 0 | Status: SHIPPED | OUTPATIENT
Start: 2024-05-29 | End: 2024-06-03

## 2024-05-29 NOTE — TELEPHONE ENCOUNTER
From: Delores Bergeron  To: Dr. Ede Oneal  Sent: 5/29/2024 3:28 PM EDT  Subject: Congestion and mild wheezing     Hi Dr. Niño,    I have been having some congestion. I’m coughing up white and yellowish colored mucous. I have some wheezing. I’ve had to use my rescue inhaler a few times. We are going on vacation soon for two weeks and I want to stay on top of this before it turns into a full blown asthma flare up. What would recommend?    Thank you,  Delores Bergeron

## 2024-05-31 RX ORDER — LOSARTAN POTASSIUM 25 MG/1
25 TABLET ORAL DAILY
Qty: 90 TABLET | Refills: 1 | OUTPATIENT
Start: 2024-05-31

## 2024-05-31 RX ORDER — VORTIOXETINE 20 MG/1
20 TABLET, FILM COATED ORAL NIGHTLY
Qty: 90 TABLET | Refills: 1 | Status: SHIPPED | OUTPATIENT
Start: 2024-05-31

## 2024-06-03 RX ORDER — DUPILUMAB 300 MG/2ML
INJECTION, SOLUTION SUBCUTANEOUS
Qty: 4 ML | Refills: 3 | Status: SHIPPED | OUTPATIENT
Start: 2024-06-03

## 2024-06-20 DIAGNOSIS — R05.9 COUGH, UNSPECIFIED TYPE: Primary | ICD-10-CM

## 2024-06-20 RX ORDER — PREDNISONE 10 MG/1
TABLET ORAL
Qty: 30 TABLET | Refills: 0 | Status: SHIPPED | OUTPATIENT
Start: 2024-06-20

## 2024-06-20 RX ORDER — LEVOFLOXACIN 750 MG/1
750 TABLET, FILM COATED ORAL DAILY
Qty: 7 TABLET | Refills: 0 | Status: SHIPPED | OUTPATIENT
Start: 2024-06-20 | End: 2024-06-27

## 2024-06-24 DIAGNOSIS — R05.9 COUGH, UNSPECIFIED TYPE: ICD-10-CM

## 2024-06-26 LAB
BACTERIA SPEC RESP CULT: NORMAL
GRAM STN SPEC: NORMAL

## 2024-06-27 ENCOUNTER — OFFICE VISIT (OUTPATIENT)
Dept: ENT CLINIC | Age: 44
End: 2024-06-27

## 2024-06-27 VITALS — HEIGHT: 66 IN | BODY MASS INDEX: 38.73 KG/M2 | WEIGHT: 241 LBS

## 2024-06-27 DIAGNOSIS — J34.89 NASAL CRUSTING: ICD-10-CM

## 2024-06-27 DIAGNOSIS — J32.4 CHRONIC PANSINUSITIS: ICD-10-CM

## 2024-06-27 DIAGNOSIS — J32.0 CHRONIC MAXILLARY SINUSITIS: Primary | ICD-10-CM

## 2024-06-27 DIAGNOSIS — J31.0 CHRONIC RHINITIS: ICD-10-CM

## 2024-06-27 DIAGNOSIS — J34.89 PURULENT RHINORRHEA: ICD-10-CM

## 2024-06-27 RX ORDER — METHYLPREDNISOLONE 4 MG/1
TABLET ORAL
Qty: 1 KIT | Refills: 0 | Status: SHIPPED | OUTPATIENT
Start: 2024-06-27

## 2024-06-27 ASSESSMENT — ENCOUNTER SYMPTOMS: SINUS COMPLAINT: 1

## 2024-06-27 NOTE — PROGRESS NOTES
[facility]  DIVISION OF OTOLARYNGOLOGY- HEAD & NECK SURGERY  CONSULT      Delores Bergeron (:  1980) is a 44 y.o. female, here for evaluation of the following chief complaint(s):  Sinus Problem      ASSESSMENT/PLAN:  1. Chronic maxillary sinusitis  -     Culture, Aerobic and Anaerobic  2. Chronic pansinusitis  3. Nasal crusting  4. Purulent rhinorrhea  5. Chronic rhinitis          This is a very pleasant 44 y.o. female here today for evaluation of the the above-noted complaints.      Patient is status post open septorhinoplasty, septoplasty, ITR and posterior ablation of her nasal nerves on 10/29/2021.    Patient is status postendoscopic sinus surgery, revision septoplasty for CRSsNP on 12/15/22.      She is status post revision endoscopic sinus surgery on 2023.    -Treated with levofloxacin and Medrol Dosepak.  I will prescribe her a second Medrol Dosepak.  -Persistent mild polypoid changes located bilateral frontal recesses, improved on the right and worse in the left  -Culture taken of left ethmoid  -Continue Xhance  -Referral to my colleague in allergy and immunology?  -Okay to continue Dupixent from my point of view        SUBJECTIVE/OBJECTIVE:  Ear Problem    Other    Sinus Problem    Pharyngitis        Delores Bergeron is here today for evaluation of evaluation of issues related to her nose.  She gets multiple sinus infections per year.  She requires antibiotics for these.  She think she has a sinus infection right now.  She also has over the last 3 months had a loss of sense of smell.  She has not had Covid recently.  She has had some issues related to smelling gasoline and cigarette smoke that is not actually present.  She gets difficulty breathing through her nose it is worse on the left side.  She will get constant nasal drainage that is clear and green.  She gets very rare nosebleeds.    Update 3/17/2012:    She is still having issues breathing out of her nose and with nasal congestion.

## 2024-06-28 ENCOUNTER — TELEPHONE (OUTPATIENT)
Dept: ENT CLINIC | Age: 44
End: 2024-06-28

## 2024-06-28 NOTE — TELEPHONE ENCOUNTER
----- Message from Gus Platt MD sent at 6/28/2024 10:06 AM EDT -----  Please call Delores and let her know that her culture results did not grow out any organisms.  I do not feel that she needs any further antibiotics at this time.  ----- Message -----  From: Inga Incoming Lab Results From Soft (Epic Adt)  Sent: 6/27/2024  10:41 PM EDT  To: Gus Platt MD

## 2024-06-30 LAB
BACTERIA SPEC AEROBE CULT: NORMAL
BACTERIA SPEC ANAEROBE CULT: NORMAL
GRAM STN SPEC: NORMAL

## 2024-07-16 RX ORDER — ALBUTEROL SULFATE 90 UG/1
AEROSOL, METERED RESPIRATORY (INHALATION)
Qty: 8.5 G | Refills: 0 | Status: SHIPPED | OUTPATIENT
Start: 2024-07-16

## 2024-07-16 RX ORDER — LOSARTAN POTASSIUM 50 MG/1
50 TABLET ORAL DAILY
Qty: 90 TABLET | Refills: 0 | Status: SHIPPED | OUTPATIENT
Start: 2024-07-16

## 2024-07-16 RX ORDER — LOSARTAN POTASSIUM 25 MG/1
25 TABLET ORAL DAILY
Qty: 90 TABLET | Refills: 1 | OUTPATIENT
Start: 2024-07-16

## 2024-07-16 NOTE — TELEPHONE ENCOUNTER
Due for follow up with Dr Dale in September- please call them to schedule at their earliest convenience. (meds have been refilled this time)

## 2024-07-24 ENCOUNTER — TELEMEDICINE (OUTPATIENT)
Dept: PULMONOLOGY | Age: 44
End: 2024-07-24
Payer: COMMERCIAL

## 2024-07-24 VITALS — BODY MASS INDEX: 38.57 KG/M2 | WEIGHT: 240 LBS | HEIGHT: 66 IN

## 2024-07-24 DIAGNOSIS — I10 ESSENTIAL HYPERTENSION: Chronic | ICD-10-CM

## 2024-07-24 DIAGNOSIS — E66.9 NON MORBID OBESITY, UNSPECIFIED OBESITY TYPE: Chronic | ICD-10-CM

## 2024-07-24 DIAGNOSIS — G47.33 OBSTRUCTIVE SLEEP APNEA (ADULT) (PEDIATRIC): Primary | Chronic | ICD-10-CM

## 2024-07-24 DIAGNOSIS — G47.19 EXCESSIVE DAYTIME SLEEPINESS: ICD-10-CM

## 2024-07-24 PROCEDURE — 99214 OFFICE O/P EST MOD 30 MIN: CPT | Performed by: NURSE PRACTITIONER

## 2024-07-24 PROCEDURE — G2211 COMPLEX E/M VISIT ADD ON: HCPCS | Performed by: NURSE PRACTITIONER

## 2024-07-24 RX ORDER — MODAFINIL 200 MG/1
200 TABLET ORAL 2 TIMES DAILY
Qty: 60 TABLET | Refills: 5 | Status: SHIPPED | OUTPATIENT
Start: 2024-07-24 | End: 2025-01-20

## 2024-07-24 NOTE — ASSESSMENT & PLAN NOTE
Chronic-Stable: Reviewed and analyzed results of physiologic download from patient's machine and reviewed with patient.  Supplies and parts as needed for her machine.  These are medically necessary.  Limit caffeine use after 3pm. Based on the analyzed data will continue with current settings. Stable on her machine at current settings, getting benefit from the use, and having minimal side effects. Could consider a chin strap for oral dryness. Will see her back in 6 months. Encouraged her to contact the office with any questions or concerns.

## 2024-07-24 NOTE — ASSESSMENT & PLAN NOTE
Chronic- Stable.  Discussed the importance of treating obstructive sleep apnea as part of the management of this disorder.  She continues to take Provigil 400 mg in split doses.  She reports her symptoms are controlled at the current dose and she denies side effects.  Discussed need for 2 forms of contraceptives.  PDMP report reviewed.  Medication agreement updated on 1/24/24.  Will send refill at the current dose.

## 2024-07-24 NOTE — PROGRESS NOTES
[facility]  [address]  [address]  [place] [address]  F- [phone]     Video Visit- Follow up      Assessment/Plan:      1. Obstructive sleep apnea (adult) (pediatric)  Assessment & Plan:  Chronic-Stable: Reviewed and analyzed results of physiologic download from patient's machine and reviewed with patient.  Supplies and parts as needed for her machine.  These are medically necessary.  Limit caffeine use after 3pm. Based on the analyzed data will continue with current settings. Stable on her machine at current settings, getting benefit from the use, and having minimal side effects. Could consider a chin strap for oral dryness. Will see her back in 6 months. Encouraged her to contact the office with any questions or concerns.    2. Essential hypertension  Assessment & Plan:  Chronic- Stable.  Discussed the importance of treating obstructive sleep apnea as part of the management of this disorder.  Cont any meds per PCP and other physicians.    3. Excessive daytime sleepiness  Assessment & Plan:  Chronic- Stable.  Discussed the importance of treating obstructive sleep apnea as part of the management of this disorder.  She continues to take Provigil 400 mg in split doses.  She reports her symptoms are controlled at the current dose and she denies side effects.  Discussed need for 2 forms of contraceptives.  PDMP report reviewed.  Medication agreement updated on 1/24/24.  Will send refill at the current dose.     Orders:  -     modafinil (PROVIGIL) 200 MG tablet; Take 1 tablet by mouth 2 times daily for 180 days. Max Daily Amount: 400 mg, Disp-60 tablet, R-5Normal  4. Non morbid obesity, unspecified obesity type  Assessment & Plan:   Chronic-not stable:  Discussed importance of treating obstructive sleep apnea and getting sufficient sleep to assist with weight control.  Discussed weight gain and/or weight loss may require adjustments to machine settings. Encouraged

## 2024-08-01 RX ORDER — LOSARTAN POTASSIUM 50 MG/1
50 TABLET ORAL DAILY
Qty: 90 TABLET | Refills: 0 | Status: SHIPPED | OUTPATIENT
Start: 2024-08-01

## 2024-08-01 NOTE — TELEPHONE ENCOUNTER
Due for follow up with Dr Dale- please call them to schedule at their earliest convenience. (meds have been refilled this time)

## 2024-08-06 DIAGNOSIS — J45.20 MILD INTERMITTENT ASTHMA WITHOUT COMPLICATION: ICD-10-CM

## 2024-08-06 RX ORDER — MONTELUKAST SODIUM 10 MG/1
TABLET ORAL
Qty: 90 TABLET | Refills: 1 | Status: SHIPPED | OUTPATIENT
Start: 2024-08-06

## 2024-08-06 RX ORDER — ATORVASTATIN CALCIUM 40 MG/1
TABLET, FILM COATED ORAL
Qty: 90 TABLET | Refills: 1 | Status: SHIPPED | OUTPATIENT
Start: 2024-08-06

## 2024-08-11 DIAGNOSIS — J45.20 MILD INTERMITTENT ASTHMA WITHOUT COMPLICATION: ICD-10-CM

## 2024-08-12 RX ORDER — ATORVASTATIN CALCIUM 40 MG/1
TABLET, FILM COATED ORAL
Qty: 90 TABLET | Refills: 1 | Status: SHIPPED | OUTPATIENT
Start: 2024-08-12

## 2024-08-12 RX ORDER — MONTELUKAST SODIUM 10 MG/1
TABLET ORAL
Qty: 90 TABLET | Refills: 1 | Status: SHIPPED | OUTPATIENT
Start: 2024-08-12

## 2024-08-28 ENCOUNTER — HOSPITAL ENCOUNTER (OUTPATIENT)
Age: 44
Discharge: HOME | End: 2024-08-28
Payer: COMMERCIAL

## 2024-08-28 DIAGNOSIS — K59.09 CHRONIC CONSTIPATION: ICD-10-CM

## 2024-08-28 PROCEDURE — 74018 RADEX ABDOMEN 1 VIEW: CPT

## 2024-08-29 DIAGNOSIS — I10 ESSENTIAL HYPERTENSION: ICD-10-CM

## 2024-08-29 RX ORDER — SPIRONOLACTONE 100 MG/1
TABLET, FILM COATED ORAL
Qty: 90 TABLET | Refills: 1 | Status: SHIPPED | OUTPATIENT
Start: 2024-08-29

## 2024-09-12 RX ORDER — VORTIOXETINE 20 MG/1
20 TABLET, FILM COATED ORAL NIGHTLY
Qty: 90 TABLET | Refills: 1 | Status: SHIPPED | OUTPATIENT
Start: 2024-09-12

## 2024-09-13 ENCOUNTER — OFFICE VISIT (OUTPATIENT)
Dept: FAMILY MEDICINE CLINIC | Age: 44
End: 2024-09-13
Payer: COMMERCIAL

## 2024-09-13 VITALS
WEIGHT: 245 LBS | RESPIRATION RATE: 16 BRPM | DIASTOLIC BLOOD PRESSURE: 68 MMHG | HEART RATE: 87 BPM | SYSTOLIC BLOOD PRESSURE: 114 MMHG | OXYGEN SATURATION: 98 % | BODY MASS INDEX: 39.54 KG/M2

## 2024-09-13 DIAGNOSIS — E78.5 HYPERLIPIDEMIA, UNSPECIFIED HYPERLIPIDEMIA TYPE: ICD-10-CM

## 2024-09-13 DIAGNOSIS — N94.3 PMS (PREMENSTRUAL SYNDROME): ICD-10-CM

## 2024-09-13 DIAGNOSIS — I10 ESSENTIAL HYPERTENSION: Primary | Chronic | ICD-10-CM

## 2024-09-13 DIAGNOSIS — J45.50 SEVERE PERSISTENT ASTHMA WITHOUT COMPLICATION: Primary | ICD-10-CM

## 2024-09-13 DIAGNOSIS — E28.2 POLYCYSTIC OVARIAN DISEASE: ICD-10-CM

## 2024-09-13 DIAGNOSIS — M62.838 OTHER MUSCLE SPASM: ICD-10-CM

## 2024-09-13 DIAGNOSIS — R73.03 PREDIABETES: ICD-10-CM

## 2024-09-13 DIAGNOSIS — H65.91 RIGHT OTITIS MEDIA WITH EFFUSION: ICD-10-CM

## 2024-09-13 PROBLEM — G47.10 HYPERSOMNIA: Status: RESOLVED | Noted: 2024-01-24 | Resolved: 2024-09-13

## 2024-09-13 PROBLEM — B07.8 OTHER VIRAL WARTS: Status: RESOLVED | Noted: 2019-02-01 | Resolved: 2024-09-13

## 2024-09-13 PROCEDURE — 99215 OFFICE O/P EST HI 40 MIN: CPT | Performed by: FAMILY MEDICINE

## 2024-09-13 PROCEDURE — 3074F SYST BP LT 130 MM HG: CPT | Performed by: FAMILY MEDICINE

## 2024-09-13 PROCEDURE — 3078F DIAST BP <80 MM HG: CPT | Performed by: FAMILY MEDICINE

## 2024-09-13 RX ORDER — AZITHROMYCIN 250 MG/1
TABLET, FILM COATED ORAL
Qty: 1 PACKET | Refills: 0 | Status: SHIPPED | OUTPATIENT
Start: 2024-09-13

## 2024-09-13 RX ORDER — DUPILUMAB 300 MG/2ML
INJECTION, SOLUTION SUBCUTANEOUS
Qty: 4 ML | Refills: 2 | Status: SHIPPED | OUTPATIENT
Start: 2024-09-13

## 2024-09-13 SDOH — ECONOMIC STABILITY: FOOD INSECURITY: WITHIN THE PAST 12 MONTHS, YOU WORRIED THAT YOUR FOOD WOULD RUN OUT BEFORE YOU GOT MONEY TO BUY MORE.: NEVER TRUE

## 2024-09-13 SDOH — ECONOMIC STABILITY: FOOD INSECURITY: WITHIN THE PAST 12 MONTHS, THE FOOD YOU BOUGHT JUST DIDN'T LAST AND YOU DIDN'T HAVE MONEY TO GET MORE.: NEVER TRUE

## 2024-09-13 SDOH — ECONOMIC STABILITY: INCOME INSECURITY: HOW HARD IS IT FOR YOU TO PAY FOR THE VERY BASICS LIKE FOOD, HOUSING, MEDICAL CARE, AND HEATING?: NOT HARD AT ALL

## 2024-09-20 ENCOUNTER — PATIENT MESSAGE (OUTPATIENT)
Dept: FAMILY MEDICINE CLINIC | Age: 44
End: 2024-09-20

## 2024-09-20 DIAGNOSIS — M62.838 OTHER MUSCLE SPASM: ICD-10-CM

## 2024-09-20 DIAGNOSIS — E78.5 HYPERLIPIDEMIA, UNSPECIFIED HYPERLIPIDEMIA TYPE: ICD-10-CM

## 2024-09-20 DIAGNOSIS — R73.03 PREDIABETES: ICD-10-CM

## 2024-09-20 DIAGNOSIS — I10 ESSENTIAL HYPERTENSION: Chronic | ICD-10-CM

## 2024-09-20 LAB
ALBUMIN SERPL-MCNC: 3.9 G/DL (ref 3.4–5)
ALBUMIN/GLOB SERPL: 1.6 {RATIO} (ref 1.1–2.2)
ALP SERPL-CCNC: 99 U/L (ref 40–129)
ALT SERPL-CCNC: 15 U/L (ref 10–40)
ANION GAP SERPL CALCULATED.3IONS-SCNC: 8 MMOL/L (ref 3–16)
AST SERPL-CCNC: 17 U/L (ref 15–37)
BILIRUB SERPL-MCNC: 0.4 MG/DL (ref 0–1)
BUN SERPL-MCNC: 10 MG/DL (ref 7–20)
CALCIUM SERPL-MCNC: 8.9 MG/DL (ref 8.3–10.6)
CHLORIDE SERPL-SCNC: 105 MMOL/L (ref 99–110)
CHOLEST SERPL-MCNC: 142 MG/DL (ref 0–199)
CK SERPL-CCNC: 88 U/L (ref 26–192)
CO2 SERPL-SCNC: 25 MMOL/L (ref 21–32)
CREAT SERPL-MCNC: <0.5 MG/DL (ref 0.6–1.1)
CRP SERPL-MCNC: 4.6 MG/L (ref 0–5.1)
ERYTHROCYTE [SEDIMENTATION RATE] IN BLOOD BY WESTERGREN METHOD: 8 MM/HR (ref 0–20)
GFR SERPLBLD CREATININE-BSD FMLA CKD-EPI: >90 ML/MIN/{1.73_M2}
GLUCOSE SERPL-MCNC: 86 MG/DL (ref 70–99)
HDLC SERPL-MCNC: 39 MG/DL (ref 40–60)
LDLC SERPL CALC-MCNC: 86 MG/DL
POTASSIUM SERPL-SCNC: 4.2 MMOL/L (ref 3.5–5.1)
PROT SERPL-MCNC: 6.3 G/DL (ref 6.4–8.2)
SODIUM SERPL-SCNC: 138 MMOL/L (ref 136–145)
TRIGL SERPL-MCNC: 86 MG/DL (ref 0–150)
VLDLC SERPL CALC-MCNC: 17 MG/DL

## 2024-09-21 LAB
ANA SER QL IA: NEGATIVE
EST. AVERAGE GLUCOSE BLD GHB EST-MCNC: 111.2 MG/DL
HBA1C MFR BLD: 5.5 %

## 2024-10-04 ENCOUNTER — OFFICE VISIT (OUTPATIENT)
Dept: ENT CLINIC | Age: 44
End: 2024-10-04
Payer: COMMERCIAL

## 2024-10-04 VITALS
BODY MASS INDEX: 39.37 KG/M2 | SYSTOLIC BLOOD PRESSURE: 118 MMHG | HEART RATE: 76 BPM | HEIGHT: 66 IN | WEIGHT: 245 LBS | DIASTOLIC BLOOD PRESSURE: 74 MMHG | OXYGEN SATURATION: 99 %

## 2024-10-04 DIAGNOSIS — H69.91 DYSFUNCTION OF RIGHT EUSTACHIAN TUBE: Primary | ICD-10-CM

## 2024-10-04 DIAGNOSIS — J32.4 CHRONIC PANSINUSITIS: ICD-10-CM

## 2024-10-04 PROCEDURE — 3074F SYST BP LT 130 MM HG: CPT | Performed by: OTOLARYNGOLOGY

## 2024-10-04 PROCEDURE — 99213 OFFICE O/P EST LOW 20 MIN: CPT | Performed by: OTOLARYNGOLOGY

## 2024-10-04 PROCEDURE — 3078F DIAST BP <80 MM HG: CPT | Performed by: OTOLARYNGOLOGY

## 2024-10-04 NOTE — PROGRESS NOTES
[facility]  DIVISION OF OTOLARYNGOLOGY- HEAD & NECK SURGERY  Follow up      Patient Name: Delores Begreron  Medical Record Number:  [id number]  Primary Care Physician:  Santiago Dale MD  Date of Consultation: 10/4/2024    Chief Complaint: Ear issues        Interval History    Patient is presenting today for evaluation for ears.  The right ear has felt a little bit clogged.  She was told she had an air-fluid level in there.  May be getting a little bit better.  Her allergies have been acting up.  She is well-known to our practice for her history of chronic sinusitis.  From a nasal standpoint she actually is doing fairly well.  She says we have not related to do anything for ears.  She has not had a hearing test recently          REVIEW OF SYSTEMS  As above    PHYSICAL EXAM  GENERAL: No Acute Distress, Alert and Oriented, no Hoarseness, strong voice  EYES: EOMI, Anti-icteric  HENT:   Head: Normocephalic and atraumatic.   Face:  Symmetric, facial nerve intact, no sinus tenderness  Right Ear: Normal external ear, normal external auditory canal, intact tympanic membrane with normal mobility and aerated middle ear  Left Ear: Normal external ear, normal external auditory canal, intact tympanic membrane with normal mobility and aerated middle ear  Dotson did lateralized to the right side  Mouth/Oral Cavity:  normal lips, Uvula is midline, no mucosal lesions, no trismus  Oropharynx/Larynx:  normal oropharynx,   Nose:Normal external nasal appearance.  Anterior rhinoscopy shows no evidence of purulent drainage or significant polyp disease, small septal perforation  NECK: Normal range of motion, no thyromegaly, trachea is midline, no lymphadenopathy, no neck masses, no crepitus            ASSESSMENT/PLAN  1. Dysfunction of right eustachian tube  She does not have an obvious infection of the right ear today, but given that she has the subjective hearing loss with a Dotson lateralized to the right side I think we should

## 2024-10-08 DIAGNOSIS — H91.90 HEARING LOSS, UNSPECIFIED HEARING LOSS TYPE, UNSPECIFIED LATERALITY: Primary | ICD-10-CM

## 2024-10-11 RX ORDER — LOSARTAN POTASSIUM 50 MG/1
50 TABLET ORAL DAILY
Qty: 90 TABLET | Refills: 1 | Status: SHIPPED | OUTPATIENT
Start: 2024-10-11

## 2024-10-28 ENCOUNTER — PATIENT MESSAGE (OUTPATIENT)
Dept: FAMILY MEDICINE CLINIC | Age: 44
End: 2024-10-28

## 2024-11-01 RX ORDER — VORTIOXETINE 20 MG/1
20 TABLET, FILM COATED ORAL NIGHTLY
Qty: 90 TABLET | OUTPATIENT
Start: 2024-11-01

## 2024-11-21 ENCOUNTER — TELEPHONE (OUTPATIENT)
Dept: FAMILY MEDICINE CLINIC | Age: 44
End: 2024-11-21

## 2024-11-21 NOTE — TELEPHONE ENCOUNTER
Pharmacy received a prescription for Trintellix for insurance to cover they need 2 separate prescriptions one for 10 mg once a day and 20 mh once a day     [address]

## 2024-11-26 RX ORDER — VORTIOXETINE 20 MG/1
20 TABLET, FILM COATED ORAL NIGHTLY
Qty: 90 TABLET | Refills: 1 | Status: SHIPPED | OUTPATIENT
Start: 2024-11-26

## 2024-11-28 DIAGNOSIS — J45.50 SEVERE PERSISTENT ASTHMA WITHOUT COMPLICATION: ICD-10-CM

## 2024-11-29 RX ORDER — DUPILUMAB 300 MG/2ML
INJECTION, SOLUTION SUBCUTANEOUS
Qty: 4 ML | Refills: 0 | Status: SHIPPED | OUTPATIENT
Start: 2024-11-29

## 2024-12-26 ENCOUNTER — TELEPHONE (OUTPATIENT)
Dept: PULMONOLOGY | Age: 44
End: 2024-12-26

## 2024-12-26 NOTE — TELEPHONE ENCOUNTER
LM for pt to return call. Rcvd a fax today from [facility] for refill of Dupixent. Pt no showed her last appt which was made for her to continue getting refills of Dupixen. And needed for her f/u. We have not seen her since 2/26/2024. She was due back 5/2024. Last refill request for Dupixent was denied and only a 30 day supply given in order for her to come in and be seen. Since she did no show for that appt, we cannot give refills until she is seen. In the message I left for her I let her know he has many openings next week that we can fit her in, in order to get continued medications.    If patient calls and schedules, please let me know so we can have her refill request ready for Dr Niño to sign at her Appt. Cannot approve it until her appt.

## 2024-12-31 RX ORDER — ALBUTEROL SULFATE 90 UG/1
INHALANT RESPIRATORY (INHALATION)
Qty: 8.5 G | Refills: 0 | Status: SHIPPED | OUTPATIENT
Start: 2024-12-31

## 2025-01-09 NOTE — TELEPHONE ENCOUNTER
Spoke to Delores and scheduled her for 1/27/2025. She has one injection left which she is taking Sunday. She said she would be fine to come in on the 27th since she still has one left.

## 2025-01-14 RX ORDER — LOSARTAN POTASSIUM 50 MG/1
50 TABLET ORAL DAILY
Qty: 90 TABLET | Refills: 1 | Status: SHIPPED | OUTPATIENT
Start: 2025-01-14

## 2025-01-15 ENCOUNTER — TELEPHONE (OUTPATIENT)
Dept: ADMINISTRATIVE | Age: 45
End: 2025-01-15

## 2025-01-15 NOTE — TELEPHONE ENCOUNTER
Submitted PA for Dupixent 300MG/2ML auto-injectors   Via CMM Key: [id number]  STATUS: PENDING.    Follow up done daily; if no decision with in three days we will refax.  If another three days goes by with no decision will call the insurance for status.

## 2025-01-16 NOTE — TELEPHONE ENCOUNTER
Submitted PA for Dupixent 300MG/2ML auto-injectors   Via CMM Key: [id number]  STATUS: PENDING.    Follow up done daily; if no decision with in three days we will refax.  If another three days goes by with no decision will call the insurance for status.

## 2025-01-16 NOTE — TELEPHONE ENCOUNTER
Spoke with patient.  Had her confirm the information on her card which is the same as what we have in her chart.  She states nothing has changed for this year.

## 2025-01-18 DIAGNOSIS — J32.9 CHRONIC SINUSITIS, UNSPECIFIED LOCATION: Primary | ICD-10-CM

## 2025-01-20 ENCOUNTER — TELEPHONE (OUTPATIENT)
Dept: ENT CLINIC | Age: 45
End: 2025-01-20

## 2025-01-20 RX ORDER — FLUTICASONE PROPIONATE 93 UG/1
SPRAY, METERED NASAL
Qty: 48 ML | Refills: 1 | Status: SHIPPED | OUTPATIENT
Start: 2025-01-20

## 2025-01-20 NOTE — TELEPHONE ENCOUNTER
Refill Request:     Last Office Visit:  6/27/2024     Next Scheduled Visit : Visit date not found     Medication Requested:   Requested Prescriptions     Pending Prescriptions Disp Refills    XHANCE 93 MCG/ACT EXHU [Pharmacy Med Name: Xhance 93 mcg/actuation breath activated aerosol] 48 mL 1     Sig: USE 1 SPRAY IN EACH NOSTRIL TWICE DAILY AS DIRECTED        Pharmacy:    [facility] - [address] - P [phone] - F [phone]  [address] [facility] [address]  Phone: [phone] Fax: [phone]    [facility] [id number] - [facility] [address] [facility] - P [phone] - F [phone]  [address] [facility] [address]  Phone: [phone] Fax: [phone]    [facility] [id number] - [place], [place] - [address] - P [phone] - F [phone]  [address] [place] [address]  Phone: [phone] Fax: [phone]    [email or web address] - [place], [place] - [address] [facility] [address] - P [phone] - F [phone]  [address] [facility] [address]  [facility] [address]  Phone: [phone] Fax: [phone]    [facility] [place] [address] -  [phone] - F [phone]  [address]  [place] [address]  Phone: [phone] Fax: [phone]

## 2025-01-20 NOTE — TELEPHONE ENCOUNTER
P.A. needed for   XHANCE 93 MCG/ACT EXHU USE 1 SPRAY IN EACH NOSTRIL TWICE DAILY AS DIRECTED       Dx.          Chronic maxillary sinusitis  - Primary J32.0    Chronic pansinusitis J32.4    Nasal crusting J34.89    Purulent rhinorrhea J34.89    Chronic rhinitis J31.0

## 2025-01-20 NOTE — TELEPHONE ENCOUNTER
Submitted PA for Xhance 93MCG/ACT exhaler suspension Via CM [id number]  STATUS: Prior Authorization duplicate/approved      Called plan at [phone] and spoke with Neel. He reports that there is a current auth on file but expires in 10 days. There is not a new one on file. He has recommended that he fax the PA for for competition. Will await form.

## 2025-01-27 ENCOUNTER — OFFICE VISIT (OUTPATIENT)
Dept: PULMONOLOGY | Age: 45
End: 2025-01-27
Payer: COMMERCIAL

## 2025-01-27 VITALS
WEIGHT: 255.8 LBS | SYSTOLIC BLOOD PRESSURE: 118 MMHG | BODY MASS INDEX: 41.11 KG/M2 | HEART RATE: 100 BPM | HEIGHT: 66 IN | DIASTOLIC BLOOD PRESSURE: 78 MMHG | OXYGEN SATURATION: 98 %

## 2025-01-27 DIAGNOSIS — J45.50 SEVERE PERSISTENT ASTHMA WITHOUT COMPLICATION: Primary | ICD-10-CM

## 2025-01-27 DIAGNOSIS — J30.89 PERENNIAL ALLERGIC RHINITIS: ICD-10-CM

## 2025-01-27 PROCEDURE — G2211 COMPLEX E/M VISIT ADD ON: HCPCS | Performed by: INTERNAL MEDICINE

## 2025-01-27 PROCEDURE — 3078F DIAST BP <80 MM HG: CPT | Performed by: INTERNAL MEDICINE

## 2025-01-27 PROCEDURE — 99214 OFFICE O/P EST MOD 30 MIN: CPT | Performed by: INTERNAL MEDICINE

## 2025-01-27 PROCEDURE — 3074F SYST BP LT 130 MM HG: CPT | Performed by: INTERNAL MEDICINE

## 2025-01-27 RX ORDER — BUDESONIDE AND FORMOTEROL FUMARATE DIHYDRATE 160; 4.5 UG/1; UG/1
2 AEROSOL RESPIRATORY (INHALATION) 2 TIMES DAILY
Qty: 1 EACH | Refills: 5 | Status: SHIPPED | OUTPATIENT
Start: 2025-01-27

## 2025-01-27 ASSESSMENT — ENCOUNTER SYMPTOMS
CONSTIPATION: 0
COUGH: 0
SORE THROAT: 0
WHEEZING: 0
SINUS PRESSURE: 0
CHOKING: 0
VOICE CHANGE: 0
DIARRHEA: 0
ABDOMINAL DISTENTION: 0
APNEA: 0
BLOOD IN STOOL: 0
VOMITING: 0
CHEST TIGHTNESS: 0
SHORTNESS OF BREATH: 0
ABDOMINAL PAIN: 0
RHINORRHEA: 0
BACK PAIN: 0
STRIDOR: 0
COLOR CHANGE: 0

## 2025-01-27 NOTE — PROGRESS NOTES
Delores Bergeron    YOB: 1980     Date of Service:  1/27/2025     Chief Complaint   Patient presents with    Shortness of Breath     Doing very well. Stable. Stopped taking Trelegy and is doing well off of it.        HPI patient was last seen in our office in 2/2024.  History of chronic maxillary sinusitis status post septorhinoplasty with recurrent Pseudomonas infection, severe persistent asthma and perennial allergies.  Patient was seen by Dr. Hahn, allergist and started on allergy immunotherapy.  She was noted to have significant skin reaction to mold.    Patient states that since she started allergy immunotherapy, she has no longer required inhalers-in fact stopped using Trelegy due to throat discomfort/irritation.  Not had any exacerbations of asthma.    Allergies   Allergen Reactions    Peanut-Containing Drug Products Itching     PEANUTS-RUNNY NOSE,ITCHING IN THROAT    Egg Solids, Whole Itching     RUNNY NOSE, THROAT ITCH    Food      Certain foods (chocolate, tea, soy, eggs, tree nuts, coffee, tomatoes, black pepper)-THROAT ITCHING,NOSE RUNNING     Outpatient Medications Marked as Taking for the 1/27/25 encounter (Office Visit) with Ede Oneal MD   Medication Sig Dispense Refill    budesonide-formoterol (SYMBICORT) 160-4.5 MCG/ACT AERO Inhale 2 puffs into the lungs 2 times daily 1 each 5    XHANCE 93 MCG/ACT EXHU USE 1 SPRAY IN EACH NOSTRIL TWICE DAILY AS DIRECTED 48 mL 1    losartan (COZAAR) 50 MG tablet TAKE 1 TABLET BY MOUTH DAILY 90 tablet 1    albuterol sulfate HFA (PROVENTIL;VENTOLIN;PROAIR) 108 (90 Base) MCG/ACT inhaler INHALE 2 PUFFS BY MOUTH EVERY 6 HOURS AS NEEDED FOR WHEEZING OR FOR SHORTNESS OF BREATH 8.5 g 0    DUPIXENT 300 MG/2ML SOAJ injection Inject the contents of 1 pen (300 mg) under the skin every 2 weeks. 4 mL 0    TRINTELLIX 20 MG TABS tablet TAKE 1 TABLET BY MOUTH AT BEDTIME 90 tablet 1    VORTIoxetine (TRINTELLIX) 10 MG TABS tablet Take 1 tablet by mouth

## 2025-02-18 DIAGNOSIS — G47.19 EXCESSIVE DAYTIME SLEEPINESS: ICD-10-CM

## 2025-02-18 RX ORDER — MODAFINIL 200 MG/1
200 TABLET ORAL 2 TIMES DAILY
Qty: 60 TABLET | Refills: 0 | Status: SHIPPED | OUTPATIENT
Start: 2025-02-18 | End: 2025-03-20

## 2025-02-24 ENCOUNTER — TELEMEDICINE (OUTPATIENT)
Dept: PULMONOLOGY | Age: 45
End: 2025-02-24
Payer: COMMERCIAL

## 2025-02-24 DIAGNOSIS — G47.33 OBSTRUCTIVE SLEEP APNEA (ADULT) (PEDIATRIC): Primary | Chronic | ICD-10-CM

## 2025-02-24 DIAGNOSIS — I10 ESSENTIAL HYPERTENSION: ICD-10-CM

## 2025-02-24 DIAGNOSIS — G47.19 EXCESSIVE DAYTIME SLEEPINESS: ICD-10-CM

## 2025-02-24 DIAGNOSIS — E66.9 NON MORBID OBESITY, UNSPECIFIED OBESITY TYPE: Chronic | ICD-10-CM

## 2025-02-24 DIAGNOSIS — I10 ESSENTIAL HYPERTENSION: Chronic | ICD-10-CM

## 2025-02-24 PROCEDURE — G2211 COMPLEX E/M VISIT ADD ON: HCPCS | Performed by: NURSE PRACTITIONER

## 2025-02-24 PROCEDURE — 99214 OFFICE O/P EST MOD 30 MIN: CPT | Performed by: NURSE PRACTITIONER

## 2025-02-24 RX ORDER — SPIRONOLACTONE 100 MG/1
TABLET, FILM COATED ORAL
Qty: 90 TABLET | Refills: 1 | Status: SHIPPED | OUTPATIENT
Start: 2025-02-24

## 2025-02-24 RX ORDER — MODAFINIL 200 MG/1
200 TABLET ORAL 2 TIMES DAILY
Qty: 60 TABLET | Refills: 5 | Status: SHIPPED | OUTPATIENT
Start: 2025-02-24 | End: 2025-08-23

## 2025-02-24 ASSESSMENT — SLEEP AND FATIGUE QUESTIONNAIRES
HOW LIKELY ARE YOU TO NOD OFF OR FALL ASLEEP WHILE SITTING QUIETLY AFTER LUNCH WITHOUT ALCOHOL: MODERATE CHANCE OF DOZING
HOW LIKELY ARE YOU TO NOD OFF OR FALL ASLEEP WHEN YOU ARE A PASSENGER IN A CAR FOR AN HOUR WITHOUT A BREAK: HIGH CHANCE OF DOZING
HOW LIKELY ARE YOU TO NOD OFF OR FALL ASLEEP WHILE SITTING AND READING: HIGH CHANCE OF DOZING
HOW LIKELY ARE YOU TO NOD OFF OR FALL ASLEEP WHILE SITTING AND READING: HIGH CHANCE OF DOZING
HOW LIKELY ARE YOU TO NOD OFF OR FALL ASLEEP WHILE SITTING INACTIVE IN A PUBLIC PLACE: WOULD NEVER DOZE
HOW LIKELY ARE YOU TO NOD OFF OR FALL ASLEEP IN A CAR, WHILE STOPPED FOR A FEW MINUTES IN TRAFFIC: WOULD NEVER DOZE
HOW LIKELY ARE YOU TO NOD OFF OR FALL ASLEEP IN A CAR, WHILE STOPPED FOR A FEW MINUTES IN TRAFFIC: WOULD NEVER DOZE
HOW LIKELY ARE YOU TO NOD OFF OR FALL ASLEEP WHILE SITTING INACTIVE IN A PUBLIC PLACE: WOULD NEVER DOZE
HOW LIKELY ARE YOU TO NOD OFF OR FALL ASLEEP WHILE WATCHING TV: MODERATE CHANCE OF DOZING
HOW LIKELY ARE YOU TO NOD OFF OR FALL ASLEEP WHEN YOU ARE A PASSENGER IN A CAR FOR AN HOUR WITHOUT A BREAK: HIGH CHANCE OF DOZING
HOW LIKELY ARE YOU TO NOD OFF OR FALL ASLEEP WHILE SITTING AND TALKING TO SOMEONE: WOULD NEVER DOZE
ESS TOTAL SCORE: 13
HOW LIKELY ARE YOU TO NOD OFF OR FALL ASLEEP WHILE LYING DOWN TO REST IN THE AFTERNOON WHEN CIRCUMSTANCES PERMIT: HIGH CHANCE OF DOZING
HOW LIKELY ARE YOU TO NOD OFF OR FALL ASLEEP WHILE LYING DOWN TO REST IN THE AFTERNOON WHEN CIRCUMSTANCES PERMIT: HIGH CHANCE OF DOZING
HOW LIKELY ARE YOU TO NOD OFF OR FALL ASLEEP WHILE WATCHING TV: MODERATE CHANCE OF DOZING
HOW LIKELY ARE YOU TO NOD OFF OR FALL ASLEEP WHILE SITTING AND TALKING TO SOMEONE: WOULD NEVER DOZE
HOW LIKELY ARE YOU TO NOD OFF OR FALL ASLEEP WHILE SITTING QUIETLY AFTER LUNCH WITHOUT ALCOHOL: MODERATE CHANCE OF DOZING

## 2025-02-24 NOTE — PROGRESS NOTES
[facility]  [address]  [address]  [place] [address]  P- [phone]  F- [phone]   Video Visit- Follow up      Assessment/Plan:      1. Obstructive sleep apnea (adult) (pediatric)  Assessment & Plan:  Chronic-Stable: Reviewed and analyzed results of physiologic download from patient's machine and reviewed with patient.  Supplies and parts as needed for her machine.  These are medically necessary.  Limit caffeine use after 3pm. Based on the analyzed data will continue with current settings. Stable on her machine at current settings, getting benefit from the use, and having minimal side effects. Will see her back in 6 months for an in office appointment. Encouraged her to contact the office with any questions or concerns.     2. Essential hypertension  Assessment & Plan:  Chronic- Stable.  Discussed the importance of treating obstructive sleep apnea as part of the management of this disorder.  Cont any meds per PCP and other physicians.    3. Excessive daytime sleepiness  Assessment & Plan:  Chronic- Stable.  Discussed the importance of treating obstructive sleep apnea as part of the management of this disorder.  She continues to take Provigil 400 mg in split doses.  She reports her symptoms are controlled at the current dose and she denies side effects.  Discussed need for 2 forms of contraceptives.  No driving when sleepy. PDMP report reviewed.  Medication agreement updated on 1/24/24.  Will have her update during her next in office visit. Will send refill at the current dose.   Orders:  -     modafinil (PROVIGIL) 200 MG tablet; Take 1 tablet by mouth 2 times daily for 180 days. Max Daily Amount: 400 mg, Disp-60 tablet, R-5Normal  4. Non morbid obesity, unspecified obesity type  Assessment & Plan:  Chronic-not stable:  Discussed importance of treating obstructive sleep apnea and getting sufficient sleep to assist with weight control.  Discussed weight

## 2025-02-24 NOTE — PROGRESS NOTES
Diagnosis: [x] BIBIANA (G47.33) [] CSA (G47.31) [] Apnea (G47.30)   Length of Need: [x] 15 Months [] 99 Months [] Other:   Machine (KAVIN!): [] Respironics Dream Station      Auto [] ResMed AirSense     Auto [] Other:     []  CPAP () [] Bilevel ()   Mode: [] Auto [] Spontaneous    Mode: [] Auto [] Spontaneous              Comfort Settings:      Humidifier: [] Heated ()        [x] Water chamber replacement ()/ 1 per 6 months        Mask:   [x] Nasal () /1 per 3 months [] Full Face () /1 per 3 months   [x] Patient choice -Size and fit mask [] Patient Choice - Size and fit mask   [] Dispense: [] Dispense:   [x] Headgear () / 1 per 3 months [] Headgear () / 1 per 3 months   [] Replacement Nasal Cushion ()/2 per month [] Interface Replacement ()/1 per month   [x] Replacement Nasal Pillows ()/2 per month         Tubing: [x] Heated ()/1 per 3 months    [] Standard ()/1 per 3 months [] Other:           Filters: [x] Non-disposable ()/1 per 6 months     [x] Ultra-Fine, Disposable ()/2 per month        Miscellaneous: [] Chin Strap ()/ 1 per 6 months [] O2 bleed-in:        LPM   [] Oxymetry on CPAP/Bilevel []  Other:         Start Order Date: 02/24/25    MEDICAL JUSTIFICATION:  I, the undersigned, certify that the above prescribed supplies are medically necessary for this patient’s wellbeing.  In my opinion, the supplies are both reasonable and necessary in reference to accepted standards of medicalpractice in treatment of this patient’s condition.    JL RADFORD NP    NPI: [id number]       Order Signed Date: 02/24/25  [facility]  Pulmonary, Sleep, and Critical Care    Pulmonary, Sleep, and Critical Care  [facility] [address]                          [address] [place] [address] [address]  [place] [address]                                    [place] [address]  Phone: [phone]    Fax:

## 2025-02-24 NOTE — ASSESSMENT & PLAN NOTE
Chronic- Stable.  Discussed the importance of treating obstructive sleep apnea as part of the management of this disorder.  She continues to take Provigil 400 mg in split doses.  She reports her symptoms are controlled at the current dose and she denies side effects.  Discussed need for 2 forms of contraceptives.  No driving when sleepy. PDMP report reviewed.  Medication agreement updated on 1/24/24.  Will have her update during her next in office visit. Will send refill at the current dose.

## 2025-02-24 NOTE — ASSESSMENT & PLAN NOTE
Chronic-Stable: Reviewed and analyzed results of physiologic download from patient's machine and reviewed with patient.  Supplies and parts as needed for her machine.  These are medically necessary.  Limit caffeine use after 3pm. Based on the analyzed data will continue with current settings. Stable on her machine at current settings, getting benefit from the use, and having minimal side effects. Will see her back in 6 months for an in office appointment. Encouraged her to contact the office with any questions or concerns.

## 2025-03-03 ENCOUNTER — PATIENT MESSAGE (OUTPATIENT)
Dept: PULMONOLOGY | Age: 45
End: 2025-03-03

## 2025-03-04 RX ORDER — PREDNISONE 10 MG/1
10 TABLET ORAL DAILY
COMMUNITY
Start: 2025-03-04 | End: 2025-03-13

## 2025-03-05 ENCOUNTER — TELEPHONE (OUTPATIENT)
Dept: ENT CLINIC | Age: 45
End: 2025-03-05

## 2025-03-10 DIAGNOSIS — J45.50 SEVERE PERSISTENT ASTHMA WITHOUT COMPLICATION (HCC): ICD-10-CM

## 2025-04-01 ENCOUNTER — PATIENT MESSAGE (OUTPATIENT)
Dept: PULMONOLOGY | Age: 45
End: 2025-04-01

## 2025-04-01 RX ORDER — TIOTROPIUM BROMIDE INHALATION SPRAY 1.56 UG/1
2 SPRAY, METERED RESPIRATORY (INHALATION) DAILY
Qty: 1 EACH | Refills: 5 | Status: SHIPPED | OUTPATIENT
Start: 2025-04-01

## 2025-04-01 NOTE — TELEPHONE ENCOUNTER
Called and spoke to patient she is taking her Symbicort and dupixent as prescribed. Informed her about the Stiolto medication being sent to pharmacy. Will call back if symptoms worsen. Patient voiced understanding

## 2025-04-02 RX ORDER — ALBUTEROL SULFATE 90 UG/1
INHALANT RESPIRATORY (INHALATION)
Qty: 8.5 G | Refills: 0 | Status: SHIPPED | OUTPATIENT
Start: 2025-04-02

## 2025-04-23 ENCOUNTER — OFFICE VISIT (OUTPATIENT)
Dept: ENT CLINIC | Age: 45
End: 2025-04-23
Payer: COMMERCIAL

## 2025-04-23 VITALS
DIASTOLIC BLOOD PRESSURE: 82 MMHG | WEIGHT: 252 LBS | BODY MASS INDEX: 40.5 KG/M2 | SYSTOLIC BLOOD PRESSURE: 134 MMHG | HEIGHT: 66 IN

## 2025-04-23 DIAGNOSIS — J32.1 CHRONIC FRONTAL SINUSITIS: ICD-10-CM

## 2025-04-23 DIAGNOSIS — J32.0 CHRONIC MAXILLARY SINUSITIS: ICD-10-CM

## 2025-04-23 DIAGNOSIS — J33.1 POLYPOID SINUS DEGENERATION: Primary | ICD-10-CM

## 2025-04-23 DIAGNOSIS — J31.0 CHRONIC RHINITIS: ICD-10-CM

## 2025-04-23 DIAGNOSIS — J32.3 CHRONIC SPHENOIDAL SINUSITIS: ICD-10-CM

## 2025-04-23 DIAGNOSIS — J32.2 CHRONIC ETHMOIDAL SINUSITIS: ICD-10-CM

## 2025-04-23 PROCEDURE — 31231 NASAL ENDOSCOPY DX: CPT | Performed by: STUDENT IN AN ORGANIZED HEALTH CARE EDUCATION/TRAINING PROGRAM

## 2025-04-23 PROCEDURE — 99213 OFFICE O/P EST LOW 20 MIN: CPT | Performed by: STUDENT IN AN ORGANIZED HEALTH CARE EDUCATION/TRAINING PROGRAM

## 2025-04-23 PROCEDURE — 3075F SYST BP GE 130 - 139MM HG: CPT | Performed by: STUDENT IN AN ORGANIZED HEALTH CARE EDUCATION/TRAINING PROGRAM

## 2025-04-23 PROCEDURE — 3079F DIAST BP 80-89 MM HG: CPT | Performed by: STUDENT IN AN ORGANIZED HEALTH CARE EDUCATION/TRAINING PROGRAM

## 2025-04-23 RX ORDER — METHYLPREDNISOLONE 4 MG/1
TABLET ORAL
Qty: 1 KIT | Refills: 0 | Status: SHIPPED | OUTPATIENT
Start: 2025-04-23

## 2025-04-23 RX ORDER — AZITHROMYCIN 250 MG/1
250 TABLET, FILM COATED ORAL DAILY
Qty: 10 TABLET | Refills: 0 | Status: SHIPPED | OUTPATIENT
Start: 2025-04-23 | End: 2025-05-03

## 2025-04-23 ASSESSMENT — ENCOUNTER SYMPTOMS: SINUS COMPLAINT: 1

## 2025-04-23 NOTE — PROGRESS NOTES
worse than prior to surgery.    Update 3/8/2022:  -Doing BID irrigations and Flonase  -Still having significant nasal congestion  -Using Zyrtec, singulair, Dupixent  -Feels like her asthma is well controlled  -Still feels like she has nasal congestion and ear fullness/pain    The patient is reporting hyposmia that is worse after surgery.  I reviewed with the patient that prior to surgery she did discuss with me that she had a significant loss of smell and parosmia's.  We did not operate anywhere near the olfactory neurons for her most recent procedure, so I do not feel it is necessarily related to the surgery.    Update 3/15/2022:    Patient presents today for follow-up.  She is still having significant nasal congestion, ear fullness and pain.  She had a CT scan which I independently reviewed.  Shows interval worsening of her sinus disease.  She now has inflammation of the bilateral maxillary sinuses and subtotal opacifications of her ethmoids.    Update 11/2/22:    -Has had multiple rounds of antibiotics with no improvement in symptoms. Most recent one was Augmentin. Also got a round of oral steroids.   -Still with facial pain and pressure, nasal congestion, lots of green and yellow discharge, decreased sense of smell, general feeling of malaise and increased fatigue.    Update 12/21/2022:    Patient presents today for follow-up for issues related to her nose.  She is getting scant bleeding.  Feels a lot of head pressure and congestion.  Sense of smell is diminished.  Has been taking the antibiotics as prescribed.    Op note 12/15/2022:  1) Bilateral nasal endoscopy with sphenoethmoidectomies with tissue removal- (CPT 55123-94)  2) Bilateral nasal endoscopy with maxillary antrostomies with tissue removal- (CPT 67019-71)  3) Bilateral frontal sinusotomies with tissue removal- (CPT 68389-84)  4) Endoscopic septoplasty (CPT 79172)  5) Stereotactic extradural image guidance (CPT 74238)          Findings:    Diffuse

## 2025-05-05 ENCOUNTER — OFFICE VISIT (OUTPATIENT)
Dept: FAMILY MEDICINE CLINIC | Age: 45
End: 2025-05-05
Payer: COMMERCIAL

## 2025-05-05 VITALS
RESPIRATION RATE: 14 BRPM | HEIGHT: 66 IN | DIASTOLIC BLOOD PRESSURE: 82 MMHG | BODY MASS INDEX: 40.18 KG/M2 | HEART RATE: 84 BPM | WEIGHT: 250 LBS | OXYGEN SATURATION: 99 % | SYSTOLIC BLOOD PRESSURE: 126 MMHG

## 2025-05-05 DIAGNOSIS — I10 ESSENTIAL HYPERTENSION: Chronic | ICD-10-CM

## 2025-05-05 DIAGNOSIS — N95.1 MENOPAUSAL FLUSHING: ICD-10-CM

## 2025-05-05 DIAGNOSIS — J45.50 SEVERE PERSISTENT ASTHMA WITHOUT COMPLICATION (HCC): ICD-10-CM

## 2025-05-05 DIAGNOSIS — E78.5 HYPERLIPIDEMIA, UNSPECIFIED HYPERLIPIDEMIA TYPE: ICD-10-CM

## 2025-05-05 DIAGNOSIS — M62.838 OTHER MUSCLE SPASM: Primary | ICD-10-CM

## 2025-05-05 DIAGNOSIS — E28.2 POLYCYSTIC OVARIAN DISEASE: ICD-10-CM

## 2025-05-05 DIAGNOSIS — L68.0 HIRSUTISM: ICD-10-CM

## 2025-05-05 PROCEDURE — 3074F SYST BP LT 130 MM HG: CPT | Performed by: FAMILY MEDICINE

## 2025-05-05 PROCEDURE — 99215 OFFICE O/P EST HI 40 MIN: CPT | Performed by: FAMILY MEDICINE

## 2025-05-05 PROCEDURE — 3079F DIAST BP 80-89 MM HG: CPT | Performed by: FAMILY MEDICINE

## 2025-05-05 RX ORDER — LEVETIRACETAM 500 MG/1
500 TABLET ORAL 2 TIMES DAILY
Qty: 60 TABLET | Refills: 2 | Status: SHIPPED | OUTPATIENT
Start: 2025-05-05

## 2025-05-05 RX ORDER — BETHANECHOL CHLORIDE 25 MG/1
25 TABLET ORAL DAILY
COMMUNITY
Start: 2025-04-09

## 2025-05-05 RX ORDER — PRUCALOPRIDE 1 MG/1
1 TABLET, FILM COATED ORAL DAILY
COMMUNITY
Start: 2025-04-29

## 2025-05-05 RX ORDER — FEXOFENADINE HCL AND PSEUDOEPHEDRINE HCL 180; 240 MG/1; MG/1
1 TABLET, EXTENDED RELEASE ORAL DAILY
Qty: 90 TABLET | Refills: 1 | Status: SHIPPED | OUTPATIENT
Start: 2025-05-05

## 2025-05-05 SDOH — ECONOMIC STABILITY: FOOD INSECURITY: WITHIN THE PAST 12 MONTHS, YOU WORRIED THAT YOUR FOOD WOULD RUN OUT BEFORE YOU GOT MONEY TO BUY MORE.: NEVER TRUE

## 2025-05-05 SDOH — ECONOMIC STABILITY: FOOD INSECURITY: WITHIN THE PAST 12 MONTHS, THE FOOD YOU BOUGHT JUST DIDN'T LAST AND YOU DIDN'T HAVE MONEY TO GET MORE.: NEVER TRUE

## 2025-05-05 ASSESSMENT — PATIENT HEALTH QUESTIONNAIRE - PHQ9
SUM OF ALL RESPONSES TO PHQ QUESTIONS 1-9: 0
1. LITTLE INTEREST OR PLEASURE IN DOING THINGS: NOT AT ALL
2. FEELING DOWN, DEPRESSED OR HOPELESS: NOT AT ALL

## 2025-05-05 NOTE — ASSESSMENT & PLAN NOTE
- this sounds like myclonic spasms, but should have neuro eval.  - Rx for keppra (because this is painful) while she awaits neurology consultation.    Orders:    levETIRAcetam (KEPPRA) 500 MG tablet; Take 1 tablet by mouth 2 times daily    Non [facility] - External Referral To Neurology

## 2025-05-05 NOTE — ASSESSMENT & PLAN NOTE
- she may stop adactone- impact of this is minimal due to light hair color and likely not as needed as she nears 48 yrs of age and perimenopause.  But she will need to keep an eye on BP if she does.

## 2025-05-05 NOTE — ASSESSMENT & PLAN NOTE
- trintellix is not really helping.  But helps mood, so will continue 30 mg dose.  - discussed that Keppra (for muscle spasms) may help reduce hot flushes also.  She will oberve for this.         Total time spent on this encounter: 40 min

## 2025-05-05 NOTE — PROGRESS NOTES
2025    Blood pressure 126/82, pulse 84, resp. rate 14, height 1.676 m (5' 6\"), weight 113.4 kg (250 lb), SpO2 99%, not currently breastfeeding.    Delores Bergeron (:  1980) is a 45 y.o. female, here for evaluation of the following medical concerns:    Chief Complaint   Patient presents with    Spasms     Having muscle spasms everywhere. Would like muscle relaxer    Hypertension     Here for routine check up.  Has HTN, PCOS, Hyperlipidemia: GERD, prediabetes.    She reports feeling 'muscle spasms'  These are painful and last a few minutes, often triggered by a muscle contraction (like a sneeze can cause abd wall muscle spasm).  Triggered by exercise- makes her less inclined to exercise.  Can be in trunk, feet, hands, arms.  Says this has been happening 'for a long time' progressing in the past few years.  Thinks it started in her teens even.  Before being on any meds at all.  Mother has 'same thing'  she sees Dr Samayoa for pcp and uses tegretol.      Delores is on bethanechol (cholinergic stimulator) per GI (dr gillespie) for constipation.  Also on GERD. Takes prtonix.    HTN: takes losartan 50.  Aldactone for PCOS hirsutism.  Last renal function test:   Lab Results   Component Value Date/Time     2024 11:09 AM    K 4.2 2024 11:09 AM     2024 11:09 AM    CO2 25 2024 11:09 AM    BUN 10 2024 11:09 AM    CREATININE <0.5 2024 11:09 AM    GLUCOSE 86 2024 11:09 AM    CALCIUM 8.9 2024 11:09 AM    LABGLOM >90 2024 11:09 AM    LABGLOM >60 2023 04:28 PM          Takes atorva for very high LDL.  No SE's.  Lab Results   Component Value Date    CHOL 142 2024    TRIG 86 2024    HDL 39 (L) 2024    LDL 86 2024    VLDL 17 2024      Sees pulm for asthma- has been severe and difficult to control.  now using immunitherapy and Dupixent/Symbicort. recent flare up improved with addition of Spiriva.  Also with severe nasal

## 2025-05-05 NOTE — ASSESSMENT & PLAN NOTE
- no longer impactful in gyn sense, but metabolic effects are more important now (prediabetes, need for low carb diet/exercise, weight mgmt).

## 2025-05-05 NOTE — ASSESSMENT & PLAN NOTE
- BP stable on losartan/aldactone. Renal fxn normal.   - she will monitor BP if she stops aldactone.

## 2025-05-14 ENCOUNTER — PATIENT MESSAGE (OUTPATIENT)
Dept: FAMILY MEDICINE CLINIC | Age: 45
End: 2025-05-14

## 2025-05-27 RX ORDER — VORTIOXETINE 10 MG/1
TABLET, FILM COATED ORAL
Qty: 30 TABLET | Refills: 5 | Status: SHIPPED | OUTPATIENT
Start: 2025-05-27

## 2025-06-16 RX ORDER — LEVOFLOXACIN 750 MG/1
750 TABLET, FILM COATED ORAL DAILY
Qty: 7 TABLET | Refills: 0 | Status: SHIPPED | OUTPATIENT
Start: 2025-06-16 | End: 2025-06-23

## 2025-06-16 RX ORDER — METHYLPREDNISOLONE 4 MG/1
TABLET ORAL
Qty: 1 KIT | Refills: 0 | Status: SHIPPED | OUTPATIENT
Start: 2025-06-16

## 2025-06-24 RX ORDER — LOSARTAN POTASSIUM 50 MG/1
50 TABLET ORAL DAILY
Qty: 90 TABLET | Refills: 1 | Status: SHIPPED | OUTPATIENT
Start: 2025-06-24

## 2025-07-01 RX ORDER — VORTIOXETINE 20 MG/1
20 TABLET, FILM COATED ORAL NIGHTLY
Qty: 90 TABLET | Refills: 1 | Status: SHIPPED | OUTPATIENT
Start: 2025-07-01

## 2025-07-03 ENCOUNTER — PATIENT MESSAGE (OUTPATIENT)
Dept: FAMILY MEDICINE CLINIC | Age: 45
End: 2025-07-03

## 2025-07-21 DIAGNOSIS — J45.50 SEVERE PERSISTENT ASTHMA WITHOUT COMPLICATION (HCC): ICD-10-CM

## 2025-07-21 RX ORDER — DUPILUMAB 300 MG/2ML
INJECTION, SOLUTION SUBCUTANEOUS
Qty: 4 ML | Refills: 4 | Status: SHIPPED | OUTPATIENT
Start: 2025-07-21

## 2025-07-28 ENCOUNTER — PATIENT MESSAGE (OUTPATIENT)
Dept: ENT CLINIC | Age: 45
End: 2025-07-28

## 2025-07-28 RX ORDER — BUDESONIDE AND FORMOTEROL FUMARATE DIHYDRATE 160; 4.5 UG/1; UG/1
AEROSOL RESPIRATORY (INHALATION)
Qty: 10.2 G | Refills: 3 | Status: SHIPPED | OUTPATIENT
Start: 2025-07-28

## 2025-07-29 ENCOUNTER — PATIENT MESSAGE (OUTPATIENT)
Dept: FAMILY MEDICINE CLINIC | Age: 45
End: 2025-07-29

## 2025-07-29 ENCOUNTER — TELEPHONE (OUTPATIENT)
Dept: ENT CLINIC | Age: 45
End: 2025-07-29

## 2025-07-29 NOTE — TELEPHONE ENCOUNTER
Spoke with insurance regarding Xhance.  They will not cover Xhance until the patient has tried the following: Fluticasone, Mometasone and QNasl     Patients Preferred Pharmacy     [facility]  [address]    Patient notified. Please call pt when Rx is called in

## 2025-08-03 DIAGNOSIS — M62.838 OTHER MUSCLE SPASM: ICD-10-CM

## 2025-08-04 RX ORDER — LEVETIRACETAM 500 MG/1
500 TABLET ORAL 2 TIMES DAILY
Qty: 60 TABLET | Refills: 2 | Status: SHIPPED | OUTPATIENT
Start: 2025-08-04

## 2025-08-12 ENCOUNTER — PATIENT MESSAGE (OUTPATIENT)
Dept: FAMILY MEDICINE CLINIC | Age: 45
End: 2025-08-12

## 2025-08-14 ENCOUNTER — OFFICE VISIT (OUTPATIENT)
Dept: FAMILY MEDICINE CLINIC | Age: 45
End: 2025-08-14
Payer: COMMERCIAL

## 2025-08-14 VITALS
WEIGHT: 240 LBS | HEART RATE: 82 BPM | RESPIRATION RATE: 14 BRPM | SYSTOLIC BLOOD PRESSURE: 138 MMHG | OXYGEN SATURATION: 98 % | BODY MASS INDEX: 38.74 KG/M2 | DIASTOLIC BLOOD PRESSURE: 90 MMHG

## 2025-08-14 DIAGNOSIS — K21.9 CHRONIC GERD: ICD-10-CM

## 2025-08-14 DIAGNOSIS — R73.03 PREDIABETES: ICD-10-CM

## 2025-08-14 DIAGNOSIS — E21.3 HYPERPARATHYROIDISM: ICD-10-CM

## 2025-08-14 DIAGNOSIS — I10 ESSENTIAL HYPERTENSION: Chronic | ICD-10-CM

## 2025-08-14 DIAGNOSIS — R31.9 HEMATURIA, UNSPECIFIED TYPE: ICD-10-CM

## 2025-08-14 DIAGNOSIS — E78.5 HYPERLIPIDEMIA, UNSPECIFIED HYPERLIPIDEMIA TYPE: ICD-10-CM

## 2025-08-14 DIAGNOSIS — R10.9 FLANK PAIN: Primary | ICD-10-CM

## 2025-08-14 DIAGNOSIS — M62.838 OTHER MUSCLE SPASM: ICD-10-CM

## 2025-08-14 LAB
BILIRUBIN, POC: NORMAL
BLOOD URINE, POC: NORMAL
CLARITY, POC: CLEAR
COLOR, POC: YELLOW
GLUCOSE URINE, POC: NORMAL MG/DL
KETONES, POC: NORMAL MG/DL
LEUKOCYTE EST, POC: NORMAL
NITRITE, POC: NORMAL
PH, POC: 6
PROTEIN, POC: NORMAL MG/DL
SPECIFIC GRAVITY, POC: 1.01
UROBILINOGEN, POC: 0.2 MG/DL

## 2025-08-14 PROCEDURE — 81002 URINALYSIS NONAUTO W/O SCOPE: CPT | Performed by: FAMILY MEDICINE

## 2025-08-14 PROCEDURE — 3075F SYST BP GE 130 - 139MM HG: CPT | Performed by: FAMILY MEDICINE

## 2025-08-14 PROCEDURE — 99214 OFFICE O/P EST MOD 30 MIN: CPT | Performed by: FAMILY MEDICINE

## 2025-08-14 PROCEDURE — 3080F DIAST BP >= 90 MM HG: CPT | Performed by: FAMILY MEDICINE

## 2025-08-14 RX ORDER — LEVETIRACETAM 500 MG/1
500 TABLET ORAL 2 TIMES DAILY
Qty: 60 TABLET | Refills: 2 | Status: SHIPPED | OUTPATIENT
Start: 2025-08-14

## 2025-08-14 RX ORDER — LOSARTAN POTASSIUM 100 MG/1
100 TABLET ORAL DAILY
Qty: 90 TABLET | Refills: 1 | Status: SHIPPED | OUTPATIENT
Start: 2025-08-14

## 2025-08-17 ENCOUNTER — PATIENT MESSAGE (OUTPATIENT)
Dept: FAMILY MEDICINE CLINIC | Age: 45
End: 2025-08-17

## 2025-08-17 DIAGNOSIS — R10.9 FLANK PAIN: Primary | ICD-10-CM

## 2025-08-17 DIAGNOSIS — R31.9 HEMATURIA, UNSPECIFIED TYPE: ICD-10-CM

## 2025-08-18 DIAGNOSIS — R31.9 HEMATURIA, UNSPECIFIED TYPE: ICD-10-CM

## 2025-08-18 LAB
BACTERIA URNS QL MICRO: ABNORMAL /HPF
BILIRUB UR QL STRIP.AUTO: NEGATIVE
CLARITY UR: ABNORMAL
COARSE GRAN CASTS #/AREA URNS LPF: ABNORMAL /LPF (ref 0–2)
COLOR UR: YELLOW
EPI CELLS #/AREA URNS HPF: ABNORMAL /HPF (ref 0–5)
GLUCOSE UR STRIP.AUTO-MCNC: NEGATIVE MG/DL
HGB UR QL STRIP.AUTO: NEGATIVE
KETONES UR STRIP.AUTO-MCNC: NEGATIVE MG/DL
LEUKOCYTE ESTERASE UR QL STRIP.AUTO: ABNORMAL
NITRITE UR QL STRIP.AUTO: NEGATIVE
PH UR STRIP.AUTO: 7 [PH] (ref 5–8)
PROT UR STRIP.AUTO-MCNC: NEGATIVE MG/DL
RBC #/AREA URNS HPF: ABNORMAL /HPF (ref 0–4)
SP GR UR STRIP.AUTO: 1.01 (ref 1–1.03)
UA DIPSTICK W REFLEX MICRO PNL UR: YES
URN SPEC COLLECT METH UR: ABNORMAL
UROBILINOGEN UR STRIP-ACNC: 0.2 E.U./DL
WBC #/AREA URNS HPF: ABNORMAL /HPF (ref 0–5)

## 2025-08-18 RX ORDER — LOSARTAN POTASSIUM AND HYDROCHLOROTHIAZIDE 12.5; 1 MG/1; MG/1
1 TABLET ORAL DAILY
Qty: 90 TABLET | Refills: 1 | Status: SHIPPED | OUTPATIENT
Start: 2025-08-18

## 2025-08-24 ENCOUNTER — PATIENT MESSAGE (OUTPATIENT)
Dept: FAMILY MEDICINE CLINIC | Age: 45
End: 2025-08-24

## 2025-08-24 DIAGNOSIS — N30.00 ACUTE CYSTITIS WITHOUT HEMATURIA: Primary | ICD-10-CM

## 2025-08-28 ENCOUNTER — OFFICE VISIT (OUTPATIENT)
Age: 45
End: 2025-08-28
Payer: COMMERCIAL

## 2025-08-28 VITALS
HEIGHT: 66 IN | WEIGHT: 238 LBS | DIASTOLIC BLOOD PRESSURE: 81 MMHG | HEART RATE: 84 BPM | BODY MASS INDEX: 38.25 KG/M2 | SYSTOLIC BLOOD PRESSURE: 121 MMHG | OXYGEN SATURATION: 96 %

## 2025-08-28 DIAGNOSIS — G47.19 EXCESSIVE DAYTIME SLEEPINESS: ICD-10-CM

## 2025-08-28 DIAGNOSIS — J45.20 MILD INTERMITTENT ASTHMA WITHOUT COMPLICATION: ICD-10-CM

## 2025-08-28 DIAGNOSIS — I10 ESSENTIAL HYPERTENSION: Chronic | ICD-10-CM

## 2025-08-28 DIAGNOSIS — E66.9 NON MORBID OBESITY, UNSPECIFIED OBESITY TYPE: Chronic | ICD-10-CM

## 2025-08-28 DIAGNOSIS — G47.33 OBSTRUCTIVE SLEEP APNEA (ADULT) (PEDIATRIC): Primary | Chronic | ICD-10-CM

## 2025-08-28 PROCEDURE — 3074F SYST BP LT 130 MM HG: CPT | Performed by: NURSE PRACTITIONER

## 2025-08-28 PROCEDURE — G2211 COMPLEX E/M VISIT ADD ON: HCPCS | Performed by: NURSE PRACTITIONER

## 2025-08-28 PROCEDURE — 99214 OFFICE O/P EST MOD 30 MIN: CPT | Performed by: NURSE PRACTITIONER

## 2025-08-28 PROCEDURE — 3079F DIAST BP 80-89 MM HG: CPT | Performed by: NURSE PRACTITIONER

## 2025-08-28 RX ORDER — ATORVASTATIN CALCIUM 40 MG/1
40 TABLET, FILM COATED ORAL DAILY
Qty: 90 TABLET | Refills: 1 | Status: SHIPPED | OUTPATIENT
Start: 2025-08-28

## 2025-08-28 RX ORDER — CALCIUM CARBONATE 500(1250)
500 TABLET ORAL DAILY
COMMUNITY

## 2025-08-28 RX ORDER — MODAFINIL 200 MG/1
200 TABLET ORAL 2 TIMES DAILY
Qty: 60 TABLET | Refills: 5 | Status: SHIPPED | OUTPATIENT
Start: 2025-08-28 | End: 2026-02-24

## 2025-08-28 RX ORDER — MONTELUKAST SODIUM 10 MG/1
10 TABLET ORAL NIGHTLY
Qty: 90 TABLET | Refills: 1 | Status: SHIPPED | OUTPATIENT
Start: 2025-08-28

## 2025-08-28 RX ORDER — MULTIVIT-MIN/IRON/FOLIC ACID/K 18-600-40
2000 CAPSULE ORAL DAILY
COMMUNITY

## 2025-08-28 ASSESSMENT — SLEEP AND FATIGUE QUESTIONNAIRES
HOW LIKELY ARE YOU TO NOD OFF OR FALL ASLEEP WHEN YOU ARE A PASSENGER IN A CAR FOR AN HOUR WITHOUT A BREAK: HIGH CHANCE OF DOZING
HOW LIKELY ARE YOU TO NOD OFF OR FALL ASLEEP WHILE SITTING AND TALKING TO SOMEONE: WOULD NEVER DOZE
HOW LIKELY ARE YOU TO NOD OFF OR FALL ASLEEP WHILE WATCHING TV: SLIGHT CHANCE OF DOZING
HOW LIKELY ARE YOU TO NOD OFF OR FALL ASLEEP WHILE SITTING QUIETLY AFTER LUNCH WITHOUT ALCOHOL: SLIGHT CHANCE OF DOZING
ESS TOTAL SCORE: 12
HOW LIKELY ARE YOU TO NOD OFF OR FALL ASLEEP WHILE LYING DOWN TO REST IN THE AFTERNOON WHEN CIRCUMSTANCES PERMIT: HIGH CHANCE OF DOZING
HOW LIKELY ARE YOU TO NOD OFF OR FALL ASLEEP IN A CAR, WHILE STOPPED FOR A FEW MINUTES IN TRAFFIC: WOULD NEVER DOZE
HOW LIKELY ARE YOU TO NOD OFF OR FALL ASLEEP WHILE SITTING AND READING: HIGH CHANCE OF DOZING
HOW LIKELY ARE YOU TO NOD OFF OR FALL ASLEEP WHILE SITTING INACTIVE IN A PUBLIC PLACE: SLIGHT CHANCE OF DOZING

## 2025-08-28 ASSESSMENT — LIFESTYLE VARIABLES
HOW OFTEN DO YOU HAVE A DRINK CONTAINING ALCOHOL: NEVER
HOW MANY STANDARD DRINKS CONTAINING ALCOHOL DO YOU HAVE ON A TYPICAL DAY: PATIENT DOES NOT DRINK

## 2025-08-29 RX ORDER — BECLOMETHASONE DIPROPIONATE 80 UG/1
AEROSOL, METERED NASAL
Qty: 10.6 G | Refills: 0 | Status: SHIPPED | OUTPATIENT
Start: 2025-08-29

## 2025-09-04 ENCOUNTER — HOSPITAL ENCOUNTER (OUTPATIENT)
Age: 45
Discharge: HOME | End: 2025-09-04
Payer: COMMERCIAL

## 2025-09-04 DIAGNOSIS — R31.9 HEMATURIA, UNSPECIFIED TYPE: ICD-10-CM

## 2025-09-04 DIAGNOSIS — R10.9 FLANK PAIN: ICD-10-CM

## 2025-09-04 PROCEDURE — 74176 CT ABD & PELVIS W/O CONTRAST: CPT

## (undated) DEVICE — BLADE ES ELASTOMERIC COAT INSUL DURABLE BEND UPTO 90DEG

## (undated) DEVICE — SURGICAL SUCTION CONNECTING TUBE WITH MALE CONNECTOR AND SUCTION CLAMP, 2 FT. LONG (.6 M), 5 MM I.D.: Brand: CONMED

## (undated) DEVICE — Device: Brand: HEMOPORE

## (undated) DEVICE — PATIENT TRACKER 9734887XOM NON-INVASIVE

## (undated) DEVICE — SYRINGE MED 10ML LUERLOCK TIP W/O SFTY DISP

## (undated) DEVICE — BLADE 1884016HR RAD60 5PK M4 4MM ROTATE: Brand: RAD

## (undated) DEVICE — STAPLER SKIN H3.9MM WIRE DIA0.58MM CRWN 6.9MM 35 STPL ROT

## (undated) DEVICE — INSTRUMENT TRACKER 9733533XOM ENT 1PK

## (undated) DEVICE — SUTURE PROL SZ 6-0 L18IN NONABSORBABLE BLU L36MM P-1 3/8 8697G

## (undated) DEVICE — TUBING SUCT 12FR MAL ALUM SHFT FN CAP VENT UNIV CONN W/ OBT

## (undated) DEVICE — SHEATH 1912000 5PK 4MM/0DEG STORZ XOMED: Brand: ENDO-SCRUB®

## (undated) DEVICE — TUBING 1895522 5PK STRAIGHTSHOT TO XPS: Brand: STRAIGHTSHOT®

## (undated) DEVICE — COVER LT HNDL BLU PLAS

## (undated) DEVICE — SPLINT NSL W0.6XL2IN INTNSL CHITOSAN POLYMER HYDRATED

## (undated) DEVICE — COAGULATOR SUCT 10FR LAIN FTSWCH ACTIVATION DISP VALLEYLAB

## (undated) DEVICE — SYRINGE IRRIG 60ML SFT PLIABLE BLB EZ TO GRP 1 HND USE W/

## (undated) DEVICE — SOLUTION SCRB 4OZ 10% POVIDONE IOD ANTIMIC BTL

## (undated) DEVICE — Z DISCONTINUED BY MEDLINE USE 2711682 TRAY SKIN PREP DRY W/ PREM GLV

## (undated) DEVICE — SUTURE CHROMIC GUT SZ 5-0 L18IN ABSRB BRN P-3 L13MM 3/8 CIR 687G

## (undated) DEVICE — AGENT HEMSTAT W2XL3IN OXIDIZED REGENERATED CELOS ABSRB

## (undated) DEVICE — SPLINT 1524075 DOYLE BIVALVE SET C-FLEX: Brand: C-FLEX®

## (undated) DEVICE — CODMAN® SURGICAL PATTIES 1/2" X 3" (1.27CM X 7.62CM): Brand: CODMAN®

## (undated) DEVICE — 3M™ STERI-DRAPE™ INSTRUMENT POUCH 1018: Brand: STERI-DRAPE™

## (undated) DEVICE — KIT,ANTI FOG,W/SPONGE & FLUID,SOFT PACK: Brand: MEDLINE

## (undated) DEVICE — SYRINGE MED 50ML LUERLOCK TIP

## (undated) DEVICE — 8FR FRAZIER SUCTION HANDLE: Brand: CARDINAL HEALTH

## (undated) DEVICE — SPLINT NSL TRAPEZOIDAL IVRY

## (undated) DEVICE — SUTURE PROL SZ 3-0 L18IN NONABSORBABLE BLU L30MM FS-1 3/8 8663G

## (undated) DEVICE — TUBING, SUCTION, 1/4" X 12', STRAIGHT: Brand: MEDLINE

## (undated) DEVICE — BLADE 1882916 RAD60 3PK SINUS 2.9MM: Brand: RAD®

## (undated) DEVICE — BASIC SINGLE BASIN 1-LF: Brand: MEDLINE INDUSTRIES, INC.

## (undated) DEVICE — DEVICE CRYOTHERAPY W/ CRYO CANSTR CLARIFIX GEN II

## (undated) DEVICE — ELECTRODE PT RET AD L9FT HI MOIST COND ADH HYDRGEL CORDED

## (undated) DEVICE — TOWEL,OR,DSP,ST,BLUE,STD,4/PK,20PK/CS: Brand: MEDLINE

## (undated) DEVICE — MASTISOL ADHESIVE LIQ 2/3ML

## (undated) DEVICE — GLOVE ORTHO 7 1/2   MSG9475

## (undated) DEVICE — INTENDED FOR TISSUE SEPARATION, AND OTHER PROCEDURES THAT REQUIRE A SHARP SURGICAL BLADE TO PUNCTURE OR CUT.: Brand: BARD-PARKER ® STAINLESS STEEL BLADES

## (undated) DEVICE — SOLUTION PREP POVIDONE IOD FOR SKIN MUCOUS MEM PRIOR TO

## (undated) DEVICE — DRAPE WARMER SLUSH 66X44IN

## (undated) DEVICE — GOWN SIRUS NONREIN XL W/TWL: Brand: MEDLINE INDUSTRIES, INC.

## (undated) DEVICE — ENT I-LF: Brand: MEDLINE INDUSTRIES, INC.

## (undated) DEVICE — TUBING SUCT 10FR MAL ALUM SHFT FN CAP VENT UNIV CONN W/ OBT

## (undated) DEVICE — MERCY HEALTH WEST TURNOVER: Brand: MEDLINE INDUSTRIES, INC.

## (undated) DEVICE — DRAPE THER FLUID WARMING 66X44 IN FLAT SLUSH DBL DISC ORS

## (undated) DEVICE — SUTURE PDS II SZ 5-0 L18IN ABSRB UD P-3 L13MM 3/8 CIR PRIM Z493G

## (undated) DEVICE — SUTURE ABSORBABLE MONOFILAMENT 4-0 SC-1 18 IN PLN GUT 1824H

## (undated) DEVICE — TUBING SUCT 8FR MAL ALUM SHFT FN CAP VENT UNIV CONN W/ OBT

## (undated) DEVICE — PENCIL ES L3M BTTN SWCH S STL HEX LOK BLDE ELECTRD HOLSTER

## (undated) DEVICE — BLADE 1884080EM TRICUT 4MMX13CM M4 ROHS: Brand: FUSION®

## (undated) DEVICE — NEEDLE HYPO 27GA L15IN REG BVL W O SFTY FOR SYR DISPOSABLE

## (undated) DEVICE — SKIN MARKER REGULAR TIP WITH RULER CAP AND LABELS: Brand: DEVON

## (undated) DEVICE — ENT: Brand: MEDLINE INDUSTRIES, INC.

## (undated) DEVICE — DRAPE,INSTRUMENT,MAGNETIC,10X16: Brand: MEDLINE

## (undated) DEVICE — STRIP,CLOSURE,WOUND,MEDI-STRIP,1/2X4: Brand: MEDLINE

## (undated) DEVICE — SPONGE,NEURO,0.5"X3",XR,STRL,LF,10/PK: Brand: MEDLINE

## (undated) DEVICE — GLOVE ORANGE PI 7 1/2   MSG9075

## (undated) DEVICE — CONTAINER,SPECIMEN,PNEU TUBE,3OZ,OR STRL: Brand: MEDLINE